# Patient Record
Sex: MALE | Race: WHITE | Employment: FULL TIME | URBAN - METROPOLITAN AREA
[De-identification: names, ages, dates, MRNs, and addresses within clinical notes are randomized per-mention and may not be internally consistent; named-entity substitution may affect disease eponyms.]

---

## 2022-01-15 ENCOUNTER — HOSPITAL ENCOUNTER (EMERGENCY)
Facility: CLINIC | Age: 67
Discharge: HOME | End: 2022-01-15
Attending: PHYSICIAN ASSISTANT | Admitting: PHYSICIAN ASSISTANT
Payer: OTHER GOVERNMENT

## 2022-01-15 ENCOUNTER — NURSE TRIAGE (OUTPATIENT)
Dept: NURSING | Facility: CLINIC | Age: 67
End: 2022-01-15

## 2022-01-15 ENCOUNTER — APPOINTMENT (OUTPATIENT)
Dept: CT IMAGING | Facility: CLINIC | Age: 67
End: 2022-01-15
Attending: PHYSICIAN ASSISTANT
Payer: OTHER GOVERNMENT

## 2022-01-15 VITALS
OXYGEN SATURATION: 95 % | HEART RATE: 66 BPM | BODY MASS INDEX: 31.29 KG/M2 | SYSTOLIC BLOOD PRESSURE: 135 MMHG | HEIGHT: 72 IN | WEIGHT: 231 LBS | DIASTOLIC BLOOD PRESSURE: 65 MMHG | RESPIRATION RATE: 20 BRPM | TEMPERATURE: 100.5 F

## 2022-01-15 DIAGNOSIS — U07.1 INFECTION DUE TO 2019 NOVEL CORONAVIRUS: ICD-10-CM

## 2022-01-15 LAB
ALBUMIN SERPL-MCNC: 3.1 G/DL (ref 3.4–5)
ALBUMIN UR-MCNC: 50 MG/DL
ALP SERPL-CCNC: 93 U/L (ref 40–150)
ALT SERPL W P-5'-P-CCNC: 56 U/L (ref 0–70)
ANION GAP SERPL CALCULATED.3IONS-SCNC: 8 MMOL/L (ref 3–14)
APPEARANCE UR: CLEAR
AST SERPL W P-5'-P-CCNC: 29 U/L (ref 0–45)
BASOPHILS # BLD AUTO: 0 10E3/UL (ref 0–0.2)
BASOPHILS NFR BLD AUTO: 0 %
BILIRUB DIRECT SERPL-MCNC: 0.2 MG/DL (ref 0–0.2)
BILIRUB SERPL-MCNC: 0.7 MG/DL (ref 0.2–1.3)
BILIRUB UR QL STRIP: NEGATIVE
BUN SERPL-MCNC: 17 MG/DL (ref 7–30)
CALCIUM SERPL-MCNC: 8.8 MG/DL (ref 8.5–10.1)
CHLORIDE BLD-SCNC: 99 MMOL/L (ref 94–109)
CO2 SERPL-SCNC: 26 MMOL/L (ref 20–32)
COLOR UR AUTO: YELLOW
CREAT SERPL-MCNC: 1 MG/DL (ref 0.66–1.25)
EOSINOPHIL # BLD AUTO: 0 10E3/UL (ref 0–0.7)
EOSINOPHIL NFR BLD AUTO: 0 %
ERYTHROCYTE [DISTWIDTH] IN BLOOD BY AUTOMATED COUNT: 11.9 % (ref 10–15)
FLUAV RNA SPEC QL NAA+PROBE: NEGATIVE
FLUBV RNA RESP QL NAA+PROBE: NEGATIVE
GFR SERPL CREATININE-BSD FRML MDRD: 84 ML/MIN/1.73M2
GLUCOSE BLD-MCNC: 282 MG/DL (ref 70–99)
GLUCOSE UR STRIP-MCNC: >=1000 MG/DL
HCT VFR BLD AUTO: 49.2 % (ref 40–53)
HGB BLD-MCNC: 16.6 G/DL (ref 13.3–17.7)
HGB UR QL STRIP: NEGATIVE
HOLD SPECIMEN: NORMAL
HYALINE CASTS: 4 /LPF
IMM GRANULOCYTES # BLD: 0 10E3/UL
IMM GRANULOCYTES NFR BLD: 0 %
KETONES UR STRIP-MCNC: 10 MG/DL
LEUKOCYTE ESTERASE UR QL STRIP: NEGATIVE
LYMPHOCYTES # BLD AUTO: 1 10E3/UL (ref 0.8–5.3)
LYMPHOCYTES NFR BLD AUTO: 17 %
MCH RBC QN AUTO: 28.2 PG (ref 26.5–33)
MCHC RBC AUTO-ENTMCNC: 33.7 G/DL (ref 31.5–36.5)
MCV RBC AUTO: 84 FL (ref 78–100)
MONOCYTES # BLD AUTO: 0.3 10E3/UL (ref 0–1.3)
MONOCYTES NFR BLD AUTO: 6 %
MUCOUS THREADS #/AREA URNS LPF: PRESENT /LPF
NEUTROPHILS # BLD AUTO: 4.5 10E3/UL (ref 1.6–8.3)
NEUTROPHILS NFR BLD AUTO: 77 %
NITRATE UR QL: NEGATIVE
NRBC # BLD AUTO: 0 10E3/UL
NRBC BLD AUTO-RTO: 0 /100
NT-PROBNP SERPL-MCNC: 67 PG/ML (ref 0–900)
PH UR STRIP: 5.5 [PH] (ref 5–7)
PLATELET # BLD AUTO: 154 10E3/UL (ref 150–450)
POTASSIUM BLD-SCNC: 3.8 MMOL/L (ref 3.4–5.3)
PROT SERPL-MCNC: 7.7 G/DL (ref 6.8–8.8)
RBC # BLD AUTO: 5.89 10E6/UL (ref 4.4–5.9)
RBC URINE: 1 /HPF
SARS-COV-2 RNA RESP QL NAA+PROBE: POSITIVE
SODIUM SERPL-SCNC: 133 MMOL/L (ref 133–144)
SP GR UR STRIP: 1.01 (ref 1–1.03)
SQUAMOUS EPITHELIAL: <1 /HPF
TROPONIN I SERPL HS-MCNC: 10 NG/L
TROPONIN I SERPL HS-MCNC: 8 NG/L
UROBILINOGEN UR STRIP-MCNC: NORMAL MG/DL
WBC # BLD AUTO: 5.8 10E3/UL (ref 4–11)
WBC URINE: 1 /HPF

## 2022-01-15 PROCEDURE — 84484 ASSAY OF TROPONIN QUANT: CPT | Performed by: PHYSICIAN ASSISTANT

## 2022-01-15 PROCEDURE — 36415 COLL VENOUS BLD VENIPUNCTURE: CPT | Performed by: PHYSICIAN ASSISTANT

## 2022-01-15 PROCEDURE — 87636 SARSCOV2 & INF A&B AMP PRB: CPT | Performed by: PHYSICIAN ASSISTANT

## 2022-01-15 PROCEDURE — 84484 ASSAY OF TROPONIN QUANT: CPT | Mod: 91 | Performed by: PHYSICIAN ASSISTANT

## 2022-01-15 PROCEDURE — 87636 SARSCOV2 & INF A&B AMP PRB: CPT | Performed by: EMERGENCY MEDICINE

## 2022-01-15 PROCEDURE — 93005 ELECTROCARDIOGRAM TRACING: CPT

## 2022-01-15 PROCEDURE — 250N000011 HC RX IP 250 OP 636: Performed by: PHYSICIAN ASSISTANT

## 2022-01-15 PROCEDURE — 84484 ASSAY OF TROPONIN QUANT: CPT | Performed by: EMERGENCY MEDICINE

## 2022-01-15 PROCEDURE — 74177 CT ABD & PELVIS W/CONTRAST: CPT

## 2022-01-15 PROCEDURE — 85025 COMPLETE CBC W/AUTO DIFF WBC: CPT | Performed by: EMERGENCY MEDICINE

## 2022-01-15 PROCEDURE — 81003 URINALYSIS AUTO W/O SCOPE: CPT | Performed by: PHYSICIAN ASSISTANT

## 2022-01-15 PROCEDURE — 99285 EMERGENCY DEPT VISIT HI MDM: CPT | Mod: 25

## 2022-01-15 PROCEDURE — 36415 COLL VENOUS BLD VENIPUNCTURE: CPT | Performed by: EMERGENCY MEDICINE

## 2022-01-15 PROCEDURE — C9803 HOPD COVID-19 SPEC COLLECT: HCPCS

## 2022-01-15 PROCEDURE — 250N000009 HC RX 250: Performed by: PHYSICIAN ASSISTANT

## 2022-01-15 PROCEDURE — 82310 ASSAY OF CALCIUM: CPT | Performed by: EMERGENCY MEDICINE

## 2022-01-15 PROCEDURE — 83880 ASSAY OF NATRIURETIC PEPTIDE: CPT | Performed by: PHYSICIAN ASSISTANT

## 2022-01-15 PROCEDURE — 80048 BASIC METABOLIC PNL TOTAL CA: CPT | Performed by: PHYSICIAN ASSISTANT

## 2022-01-15 PROCEDURE — 85025 COMPLETE CBC W/AUTO DIFF WBC: CPT | Performed by: PHYSICIAN ASSISTANT

## 2022-01-15 PROCEDURE — 82248 BILIRUBIN DIRECT: CPT | Performed by: PHYSICIAN ASSISTANT

## 2022-01-15 RX ORDER — LISINOPRIL 40 MG/1
1 TABLET ORAL DAILY
COMMUNITY
Start: 2021-10-13

## 2022-01-15 RX ORDER — IOPAMIDOL 755 MG/ML
500 INJECTION, SOLUTION INTRAVASCULAR ONCE
Status: COMPLETED | OUTPATIENT
Start: 2022-01-15 | End: 2022-01-15

## 2022-01-15 RX ORDER — GABAPENTIN 300 MG/1
900 CAPSULE ORAL 3 TIMES DAILY
COMMUNITY
Start: 2021-11-14

## 2022-01-15 RX ORDER — ATENOLOL 100 MG/1
1 TABLET ORAL DAILY
COMMUNITY
Start: 2021-10-13

## 2022-01-15 RX ORDER — ACETAMINOPHEN 325 MG/1
975 TABLET ORAL ONCE
Status: DISCONTINUED | OUTPATIENT
Start: 2022-01-15 | End: 2022-01-15 | Stop reason: HOSPADM

## 2022-01-15 RX ORDER — AMLODIPINE BESYLATE 10 MG/1
10 TABLET ORAL DAILY
COMMUNITY
Start: 2021-10-13

## 2022-01-15 RX ORDER — INSULIN ASPART 100 [IU]/ML
INJECTION, SOLUTION INTRAVENOUS; SUBCUTANEOUS
COMMUNITY

## 2022-01-15 RX ADMIN — SODIUM CHLORIDE 90 ML: 9 INJECTION, SOLUTION INTRAVENOUS at 12:37

## 2022-01-15 RX ADMIN — IOPAMIDOL 100 ML: 755 INJECTION, SOLUTION INTRAVENOUS at 12:37

## 2022-01-15 ASSESSMENT — ENCOUNTER SYMPTOMS
FEVER: 1
COUGH: 1
SHORTNESS OF BREATH: 1
CONSTIPATION: 1
WEAKNESS: 1
MYALGIAS: 1
FREQUENCY: 0
DYSURIA: 0
ABDOMINAL PAIN: 1
SORE THROAT: 1

## 2022-01-15 ASSESSMENT — MIFFLIN-ST. JEOR: SCORE: 1870.81

## 2022-01-15 NOTE — TELEPHONE ENCOUNTER
"Milly spouse calling reporting patient has been having symptoms \"of COVID.\"  Known exposure to COVID 19 in past week.  Symptoms starting in past 2 days.    RN requested to speak with patient to complete triage. Patient requesting spouse speak for him due to shortness of breath.    Patient is able to ambulate, completing \"short\" sentences.    Disposition ED.    Milly plans to drive patient by car now to [facility] ED.    Reviewed to call 911 with any increase or change in symptoms.     Caty Bird RN  [facility]    COVID 19 Nurse Triage Plan/Patient Instructions    Please be aware that novel coronavirus (COVID-19) may be circulating in the community. If you develop symptoms such as fever, cough, or SOB or if you have concerns about the presence of another infection including coronavirus (COVID-19), please contact your health care provider or visit [email or web address].     Disposition/Instructions    ED Visit recommended. Follow protocol based instructions.     Bring Your Own Device:  Please also bring your smart device(s) (smart phones, tablets, laptops) and their charging cables for your personal use and to communicate with your care team during your visit.    Thank you for taking steps to prevent the spread of this virus.  o Limit your contact with others.  o Wear a simple mask to cover your cough.  o Wash your hands well and often.    Resources    [facility]: About COVID-19: [email or web address]    CDC: What to Do If You're Sick: www.cdc.gov/coronavirus/2019-ncov/about/steps-when-sick.html    CDC: Ending Home Isolation: www.cdc.gov/coronavirus/2019-ncov/hcp/disposition-in-home-patients.html     CDC: Caring for Someone: www.cdc.gov/coronavirus/2019-ncov/if-you-are-sick/care-for-someone.html     [facility]: Interim Guidance for Hospital Discharge to Home: www.health.[facility].[place].us/diseases/coronavirus/hcp/hospdischarge.pdf    [facility] clinical trials (COVID-19 " research studies): [email or web address]-clinical-trials     Below are the COVID-19 hotlines at the [place] Department of Health ([facility]). Interpreters are available.   o For health questions: Call 698-420-4788 or 1-190.536.8922 (7 a.m. to 7 p.m.)  o For questions about schools and childcare: Call 679-191-5048 or 1-400.587.8792 (7 a.m. to 7 p.m.)                       Reason for Disposition    MODERATE difficulty breathing (e.g., speaks in phrases, SOB even at rest, pulse 100-120)    Additional Information    Negative: SEVERE difficulty breathing (e.g., struggling for each breath, speaks in single words)    Negative: Difficult to awaken or acting confused (e.g., disoriented, slurred speech)    Negative: Bluish (or gray) lips or face now    Negative: Shock suspected (e.g., cold/pale/clammy skin, too weak to stand, low BP, rapid pulse)    Negative: Sounds like a life-threatening emergency to the triager    Negative: SEVERE or constant chest pain or pressure (Exception: mild central chest pain, present only when coughing)    Protocols used: CORONAVIRUS (COVID-19) DIAGNOSED OR [id number] 8.25.2021

## 2022-01-15 NOTE — ED TRIAGE NOTES
A&O x4.  ABC's intact.      Pt arrives with c/o woke up this morning unable to breath with mid chest pain & abdomen.  On Tuesday intermittent dizziness w/ nausea.  Wife & grandson COVID.

## 2022-01-15 NOTE — ED PROVIDER NOTES
History   Chief Complaint:  Cough, Dyspnea, Chest Pain, and Generalized Myalgias     The history is provided by the patient.      Bob Echols is a 66 year old male with history of diabetes mellitus, hypertension, and hyperlipidemia who presents to the ED for evaluation of cough, dyspnea, chest pain, and generalized myalgias.  Patient reports starting to feel generally unwell about 5 days ago with generalized myalgias.  He notes that the next day he developed a fever up to 100.8F.  He states that since then he has developed mild sore throat.  Today he developed left-sided chest pain as well as dyspnea, prompting his presentation to the emergency department with concern for COVID.  He notes mild bilateral lower abdominal pain, constipation for the last 2 days, trace swelling to his bilateral lower extremities.  He denies any urinary symptoms.    Review of Systems   Constitutional: Positive for fever.   HENT: Positive for sore throat.    Respiratory: Positive for cough and shortness of breath.    Cardiovascular: Positive for chest pain and leg swelling.   Gastrointestinal: Positive for abdominal pain and constipation.   Genitourinary: Negative for dysuria, frequency and urgency.   Musculoskeletal: Positive for myalgias.   Neurological: Positive for weakness.   All other systems reviewed and are negative.      Allergies:  No Known Allergies    Medications:  amlodipine   aspirin 81 mg   Atenolol   atorvastatin   divalproex   gabapentin  lisinopril   Metformin  Naproxen    Viagra   trazodone    Past Medical History:     Anxiety   Bursitis, trochanteric   Cataract   Diabetes mellitus   Hyperlipidemia   Hypertension    Neuropathy   Obesity   Sleep difficulties   Unstable angina  Dasha (monopolar)   Depression  Bipolar I disorder    Past Surgical History:    Cholecystectomy   Phacoemulsification clear cornea with standard intraocular lens implant    Social History:  Presents alone.  PCP: Bradley Manuel  H      Physical Exam     Patient Vitals for the past 24 hrs:   BP Temp Temp src Pulse Resp SpO2 Height Weight   01/15/22 1703 135/65 -- -- -- -- 95 % -- --   01/15/22 1630 139/69 -- -- 66 -- 94 % -- --   01/15/22 1620 131/69 -- -- -- -- 95 % -- --   01/15/22 1545 139/71 -- -- 67 -- 96 % -- --   01/15/22 1530 134/81 -- -- 70 -- 95 % -- --   01/15/22 1515 (!) 149/97 -- -- 65 -- 94 % -- --   01/15/22 1500 (!) 141/74 -- -- 69 -- -- -- --   01/15/22 1445 (!) 141/67 -- -- 68 -- -- -- --   01/15/22 1430 138/68 -- -- 66 -- 96 % -- --   01/15/22 1415 (!) 146/67 -- -- 67 -- (!) 87 % -- --   01/15/22 1400 128/69 -- -- 68 -- 94 % -- --   01/15/22 1345 (!) 142/89 -- -- 72 -- 96 % -- --   01/15/22 1330 129/68 -- -- 68 -- 95 % -- --   01/15/22 1315 123/69 -- -- -- -- -- -- --   01/15/22 1230 (!) 153/94 -- -- 69 -- 95 % -- --   01/15/22 1215 136/71 -- -- 69 -- 95 % -- --   01/15/22 1200 115/68 -- -- 67 -- 94 % -- --   01/15/22 1145 124/66 -- -- 68 -- 95 % -- --   01/15/22 0735 (!) 140/92 (!) 100.5  F (38.1  C) Oral 77 20 97 % 1.829 m (6') 104.8 kg (231 lb)       Physical Exam  Constitutional: Pleasant. Cooperative.   Eyes: Pupils equally round and reactive  HENT: Head is normal in appearance. Oropharynx is normal with moist mucus membranes.  Cardiovascular: Regular rate and rhythm and without murmurs.  Respiratory: Normal respiratory effort, lungs are clear bilaterally.  GI: Mild lower abdominal TTP, otherwise non-tender, soft, non-distended. No guarding, rebound, or rigidity.  Musculoskeletal: No asymmetry of the lower extremities, no tenderness to palpation.   Skin: Normal, without rash.  Neurologic: Cranial nerves grossly intact, normal cognition, no focal deficits. Alert and oriented x 3.   Psychiatric: Normal affect.  Nursing notes and vital signs reviewed.      Emergency Department Course   ECG:  ECG taken at 0818, ECG read at 1141  Normal sinus rhythm  Possible anterior infarct, age undetermined   Abnormal ECG  No  significant change when compared to EKG dated 5/21/13.   Rate 75 bpm. DE interval 196 ms. QRS duration 86 ms. QT/QTc 394/439 ms. P-R-T axes 58 22 44.      Imaging:  CT Chest (PE) Abdomen Pelvis w Contrast   Final Result   IMPRESSION:   1.  Multifocal irregular pulmonary consolidation consistent with COVID pneumonia.   2.  Multiple enlarged lymph nodes in the chest.   3.  No evidence for pulmonary embolism.   4.  Fatty liver.   5.  No acute abnormality is seen in the abdomen or pelvis.           Report per radiology    Laboratory:  Labs Ordered and Resulted from Time of ED Arrival to Time of ED Departure   BASIC METABOLIC PANEL - Abnormal       Result Value    Sodium 133      Potassium 3.8      Chloride 99      Carbon Dioxide (CO2) 26      Anion Gap 8      Urea Nitrogen 17      Creatinine 1.00      Calcium 8.8      Glucose 282 (*)     GFR Estimate 84     INFLUENZA A/B & SARS-COV2 PCR MULTIPLEX - Abnormal    Influenza A PCR Negative      Influenza B PCR Negative      SARS CoV2 PCR Positive (*)    HEPATIC FUNCTION PANEL - Abnormal    Bilirubin Total 0.7      Bilirubin Direct 0.2      Protein Total 7.7      Albumin 3.1 (*)     Alkaline Phosphatase 93      AST 29      ALT 56     ROUTINE UA WITH MICROSCOPIC REFLEX TO CULTURE - Abnormal    Color Urine Yellow      Appearance Urine Clear      Glucose Urine >=1000 (*)     Bilirubin Urine Negative      Ketones Urine 10  (*)     Specific Gravity Urine 1.015      Blood Urine Negative      pH Urine 5.5      Protein Albumin Urine 50  (*)     Urobilinogen Urine Normal      Nitrite Urine Negative      Leukocyte Esterase Urine Negative      Mucus Urine Present (*)     RBC Urine 1      WBC Urine 1      Squamous Epithelials Urine <1      Hyaline Casts Urine 4 (*)    TROPONIN I - Normal    Troponin I High Sensitivity 10     NT PROBNP INPATIENT - Normal    N terminal Pro BNP Inpatient 67     TROPONIN I - Normal    Troponin I High Sensitivity 8     CBC WITH PLATELETS AND DIFFERENTIAL     WBC Count 5.8      RBC Count 5.89      Hemoglobin 16.6      Hematocrit 49.2      MCV 84      MCH 28.2      MCHC 33.7      RDW 11.9      Platelet Count 154      % Neutrophils 77      % Lymphocytes 17      % Monocytes 6      % Eosinophils 0      % Basophils 0      % Immature Granulocytes 0      NRBCs per 100 WBC 0      Absolute Neutrophils 4.5      Absolute Lymphocytes 1.0      Absolute Monocytes 0.3      Absolute Eosinophils 0.0      Absolute Basophils 0.0      Absolute Immature Granulocytes 0.0      Absolute NRBCs 0.0          Emergency Department Course:  Reviewed:  I reviewed nursing notes, vitals, past medical history and Care Everywhere    Assessments:  1125 I obtained history and examined the patient as noted above.   1530 I rechecked the patient and explained findings.   1648 I updated the patient.    Interventions:  Medications   acetaminophen (TYLENOL) tablet 975 mg (975 mg Oral Not Given 1/15/22 1433)   sodium chloride 0.9 % bag 500mL for CT scan flush use (90 mLs Intravenous Given 1/15/22 1237)   iopamidol (ISOVUE-370) solution 500 mL (100 mLs Intravenous Given 1/15/22 1237)       Disposition:  The patient was discharged to home.     Impression & Plan     Medical Decision Making:  Bob Echols is a 66 year old male who presents to the ED for evaluation of cough, dyspnea, chest pain, and generalized myalgias.  He is concerned for COVID.  See HPI as above for additional details.  Vitals and physical exam as above.  Differential was broad included pneumonia including COVID, ACS, CHF, dissection, PE, pneumothorax, pericarditis, among others.  Patient did note some lower abdominal pain, thus did work-up for appendicitis, UTI, diverticulitis, hernia, amongst others.  Work-up as above concerning for COVID-pneumonia.  Fortunately, patient's vital signs are reassuring here, no hypoxemia tachycardia.  No evidence for PE, pneumothorax, dissection, or intra-abdominal pathology on imaging.  ECG without any ST  changes as above.  Suspect patient's symptoms are all secondary to his COVID-positive status.  Overall, did feel that patient would be appropriate for at least initial outpatient management.  Patient did meet criteria for initiating outpatient Paxlovid. After coordination with pharmacy, this was prescribed.  [place] patient was safe for discharge to home. Discussed reasons to return. All questions answered. Patient discharged to home in stable condition.    Diagnosis:    ICD-10-CM    1. Infection due to 2019 novel coronavirus  U07.[facility]     Care Coordination Referral       Discharge Medications:  Discharge Medication List as of 1/15/2022  4:57 PM      START taking these medications    Details   nirmatrelvir and ritonavir (PAXLOVID) therapy pack Take 3 tablets by mouth 2 times daily for 5 days Take 2 Nirmatrelvir tablets and 1 Ritonavir tablet twice daily for 5 days., Disp-30 each, R-0, Local PrintIf Paxlovid unavailable and no Molnupiravir ordered, refer patient to MNRAP at [email or web address] [email or web address]. If Molnupiravir ordered along with Paxlovid, dispense Molnupiravir and review embryotoxicity.             Scribe Disclosure:  I, Natalia Sanabria, am serving as a scribe at 11:05 AM on 1/15/2022 to document services personally performed by Jasper Vasques PA-C based on my observations and the provider's statements to me.     This record was created at least in part using electronic voice recognition software, so please excuse any typographical errors.         Jasper Vasques PA-C  01/15/22 4741

## 2022-01-15 NOTE — PHARMACY-ADMISSION MEDICATION HISTORY
Admission medication history interview status for this patient is complete. See [facility] admission navigator for allergy information, prior to admission medications and immunization status.     Medication history interview source(s):Patient  Medication history resources (including written lists, pill bottles, clinic record):rx fill records  [facility]    Changes made to PTA medication list:  Added: amlodipine, gabapentin, insulins,   Deleted: lipitor, divalproex, Viagra, naproxen fish oil, trazodone  Changed: metformin amlodipine    Actions taken by pharmacist (provider contacted, etc):None     Additional medication history information:None    Medication reconciliation/reorder completed by provider prior to medication history? No      Prior to Admission medications    Medication Sig Last Dose Taking? Auth Provider   amLODIPine (NORVASC) 10 MG tablet Take 10 mg by mouth daily Unknown at Unknown time Yes Unknown, Entered By History   aspirin 81 MG tablet Take  by mouth daily. Unknown at Unknown time Yes Reported, Patient   atenolol (TENORMIN) 100 MG tablet Take 1 tablet by mouth daily Unknown at Unknown time Yes Unknown, Entered By History   gabapentin (NEURONTIN) 300 MG capsule Take 900 mg by mouth 3 times daily Unknown at Unknown time Yes Unknown, Entered By History   insulin aspart (NOVOLOG FLEXPEN) 100 UNIT/ML pen  Unknown at Unknown time Yes Unknown, Entered By History   insulin glargine (LANTUS PEN) 100 UNIT/ML pen Inject Subcutaneous At Bedtime Unknown at Unknown time Yes Unknown, Entered By History   lisinopril (ZESTRIL) 40 MG tablet Take 1 tablet by mouth daily Unknown at Unknown time Yes Unknown, Entered By History   metFORMIN (GLUCOPHAGE) 1000 MG tablet Take 1,000 mg by mouth daily Unknown at Unknown time Yes Unknown, Entered By History     '

## 2022-01-16 ENCOUNTER — PATIENT OUTREACH (OUTPATIENT)
Dept: CARE COORDINATION | Facility: CLINIC | Age: 67
End: 2022-01-16

## 2022-01-16 NOTE — PROGRESS NOTES
Clinic Care Coordination Contact    Care Coordination ED Discharge Follow up Note    Patient referred for Virtual Home Monitoring Program for COVID-19.     Called and left message for patient encouraging them to schedule virtual visit with PCP and to watch for invitation from [employer] website. Phone number to reach both [facility] primary scheduling and Nurse Advisor line ([phone]) reviewed on message.      Isa Serrato RN  [facility]  - Clinic Care Coordinator

## 2022-01-17 LAB
ATRIAL RATE - MUSE: 75 BPM
DIASTOLIC BLOOD PRESSURE - MUSE: NORMAL MMHG
INTERPRETATION ECG - MUSE: NORMAL
P AXIS - MUSE: 58 DEGREES
PR INTERVAL - MUSE: 196 MS
QRS DURATION - MUSE: 86 MS
QT - MUSE: 394 MS
QTC - MUSE: 439 MS
R AXIS - MUSE: 22 DEGREES
SYSTOLIC BLOOD PRESSURE - MUSE: NORMAL MMHG
T AXIS - MUSE: 44 DEGREES
VENTRICULAR RATE- MUSE: 75 BPM

## 2025-01-01 ENCOUNTER — APPOINTMENT (OUTPATIENT)
Dept: NEUROLOGY | Facility: CLINIC | Age: 70
DRG: 003 | End: 2025-01-01
Attending: STUDENT IN AN ORGANIZED HEALTH CARE EDUCATION/TRAINING PROGRAM
Payer: COMMERCIAL

## 2025-01-01 ENCOUNTER — APPOINTMENT (OUTPATIENT)
Dept: CT IMAGING | Facility: CLINIC | Age: 70
DRG: 003 | End: 2025-01-01
Attending: STUDENT IN AN ORGANIZED HEALTH CARE EDUCATION/TRAINING PROGRAM
Payer: COMMERCIAL

## 2025-01-01 ENCOUNTER — APPOINTMENT (OUTPATIENT)
Dept: GENERAL RADIOLOGY | Facility: CLINIC | Age: 70
DRG: 003 | End: 2025-01-01
Attending: HOSPITALIST
Payer: COMMERCIAL

## 2025-01-01 ENCOUNTER — APPOINTMENT (OUTPATIENT)
Dept: GENERAL RADIOLOGY | Facility: CLINIC | Age: 70
DRG: 003 | End: 2025-01-01
Attending: STUDENT IN AN ORGANIZED HEALTH CARE EDUCATION/TRAINING PROGRAM
Payer: COMMERCIAL

## 2025-01-01 ENCOUNTER — APPOINTMENT (OUTPATIENT)
Dept: ULTRASOUND IMAGING | Facility: CLINIC | Age: 70
DRG: 003 | End: 2025-01-01
Attending: STUDENT IN AN ORGANIZED HEALTH CARE EDUCATION/TRAINING PROGRAM
Payer: COMMERCIAL

## 2025-01-01 ENCOUNTER — APPOINTMENT (OUTPATIENT)
Dept: CARDIOLOGY | Facility: CLINIC | Age: 70
DRG: 003 | End: 2025-01-01
Attending: STUDENT IN AN ORGANIZED HEALTH CARE EDUCATION/TRAINING PROGRAM
Payer: COMMERCIAL

## 2025-01-01 ENCOUNTER — APPOINTMENT (OUTPATIENT)
Dept: GENERAL RADIOLOGY | Facility: CLINIC | Age: 70
DRG: 003 | End: 2025-01-01
Attending: INTERNAL MEDICINE
Payer: COMMERCIAL

## 2025-01-01 ENCOUNTER — HEALTH MAINTENANCE LETTER (OUTPATIENT)
Age: 70
End: 2025-01-01

## 2025-01-01 ENCOUNTER — TELEPHONE (OUTPATIENT)
Dept: CARDIOLOGY | Facility: CLINIC | Age: 70
End: 2025-01-01
Payer: COMMERCIAL

## 2025-01-01 PROCEDURE — 95714 VEEG EA 12-26 HR UNMNTR: CPT

## 2025-01-01 PROCEDURE — 74018 RADEX ABDOMEN 1 VIEW: CPT | Mod: 26 | Performed by: RADIOLOGY

## 2025-01-01 PROCEDURE — 71045 X-RAY EXAM CHEST 1 VIEW: CPT

## 2025-01-01 PROCEDURE — 95718 EEG PHYS/QHP 2-12 HR W/VEEG: CPT | Performed by: PSYCHIATRY & NEUROLOGY

## 2025-01-01 PROCEDURE — 70450 CT HEAD/BRAIN W/O DYE: CPT | Mod: 26 | Performed by: RADIOLOGY

## 2025-01-01 PROCEDURE — 71045 X-RAY EXAM CHEST 1 VIEW: CPT | Mod: 26 | Performed by: RADIOLOGY

## 2025-01-01 PROCEDURE — 70450 CT HEAD/BRAIN W/O DYE: CPT

## 2025-01-01 PROCEDURE — 93880 EXTRACRANIAL BILAT STUDY: CPT

## 2025-01-01 PROCEDURE — 71045 X-RAY EXAM CHEST 1 VIEW: CPT | Mod: 26 | Performed by: STUDENT IN AN ORGANIZED HEALTH CARE EDUCATION/TRAINING PROGRAM

## 2025-01-01 PROCEDURE — 93306 TTE W/DOPPLER COMPLETE: CPT

## 2025-01-01 PROCEDURE — 95720 EEG PHY/QHP EA INCR W/VEEG: CPT | Performed by: PSYCHIATRY & NEUROLOGY

## 2025-01-01 PROCEDURE — 95711 VEEG 2-12 HR UNMONITORED: CPT

## 2025-01-01 PROCEDURE — 95720 EEG PHY/QHP EA INCR W/VEEG: CPT | Mod: GC | Performed by: PSYCHIATRY & NEUROLOGY

## 2025-01-01 PROCEDURE — 71250 CT THORAX DX C-: CPT

## 2025-01-01 PROCEDURE — 71250 CT THORAX DX C-: CPT | Mod: 26 | Performed by: RADIOLOGY

## 2025-01-01 PROCEDURE — 999N000065 XR CHEST PORT 1 VIEW

## 2025-01-01 PROCEDURE — 74176 CT ABD & PELVIS W/O CONTRAST: CPT | Mod: 26 | Performed by: RADIOLOGY

## 2025-01-01 PROCEDURE — 93880 EXTRACRANIAL BILAT STUDY: CPT | Mod: 26 | Performed by: STUDENT IN AN ORGANIZED HEALTH CARE EDUCATION/TRAINING PROGRAM

## 2025-01-01 PROCEDURE — 999N000065 XR ABDOMEN PORT 1 VIEW

## 2025-01-01 PROCEDURE — 93306 TTE W/DOPPLER COMPLETE: CPT | Mod: 26 | Performed by: INTERNAL MEDICINE

## 2025-07-10 ENCOUNTER — HOSPITAL ENCOUNTER (INPATIENT)
Facility: CLINIC | Age: 70
DRG: 321 | End: 2025-07-10
Attending: EMERGENCY MEDICINE | Admitting: INTERNAL MEDICINE
Payer: COMMERCIAL

## 2025-07-10 ENCOUNTER — APPOINTMENT (OUTPATIENT)
Dept: CARDIOLOGY | Facility: CLINIC | Age: 70
DRG: 321 | End: 2025-07-10
Attending: INTERNAL MEDICINE
Payer: COMMERCIAL

## 2025-07-10 ENCOUNTER — APPOINTMENT (OUTPATIENT)
Dept: GENERAL RADIOLOGY | Facility: CLINIC | Age: 70
DRG: 321 | End: 2025-07-10
Attending: EMERGENCY MEDICINE
Payer: COMMERCIAL

## 2025-07-10 VITALS
OXYGEN SATURATION: 95 % | DIASTOLIC BLOOD PRESSURE: 75 MMHG | RESPIRATION RATE: 22 BRPM | HEART RATE: 72 BPM | WEIGHT: 267.42 LBS | SYSTOLIC BLOOD PRESSURE: 135 MMHG | BODY MASS INDEX: 36.27 KG/M2 | TEMPERATURE: 98.1 F

## 2025-07-10 DIAGNOSIS — I50.31 ACUTE DIASTOLIC CONGESTIVE HEART FAILURE (H): ICD-10-CM

## 2025-07-10 DIAGNOSIS — I21.3 ST ELEVATION MI (STEMI) (H): ICD-10-CM

## 2025-07-10 DIAGNOSIS — R55 VASOVAGAL SYNCOPE: ICD-10-CM

## 2025-07-10 DIAGNOSIS — I21.29 ST ELEVATION MYOCARDIAL INFARCTION (STEMI) INVOLVING OTHER CORONARY ARTERY (H): Primary | ICD-10-CM

## 2025-07-10 DIAGNOSIS — E11.42 DIABETIC POLYNEUROPATHY ASSOCIATED WITH TYPE 2 DIABETES MELLITUS (H): ICD-10-CM

## 2025-07-10 DIAGNOSIS — Z86.79 HISTORY OF HEART BLOCK: ICD-10-CM

## 2025-07-10 DIAGNOSIS — I21.3 ST ELEVATION MYOCARDIAL INFARCTION (STEMI), UNSPECIFIED ARTERY (H): ICD-10-CM

## 2025-07-10 DIAGNOSIS — I48.0 PAF (PAROXYSMAL ATRIAL FIBRILLATION) (H): ICD-10-CM

## 2025-07-10 PROBLEM — Z98.61 PERCUTANEOUS TRANSLUMINAL CORONARY ANGIOPLASTY STATUS: Status: ACTIVE | Noted: 2025-07-10

## 2025-07-10 LAB
ACT BLD: 251 SECONDS (ref 74–150)
ACT BLD: 255 SECONDS (ref 74–150)
ALBUMIN SERPL BCG-MCNC: 3.4 G/DL (ref 3.5–5.2)
ALP SERPL-CCNC: 74 U/L (ref 40–150)
ALT SERPL W P-5'-P-CCNC: 23 U/L (ref 0–70)
ANION GAP SERPL CALCULATED.3IONS-SCNC: 10 MMOL/L (ref 7–15)
ANION GAP SERPL CALCULATED.3IONS-SCNC: 12 MMOL/L (ref 7–15)
APTT PPP: 30 SECONDS (ref 22–38)
AST SERPL W P-5'-P-CCNC: 45 U/L (ref 0–45)
ATRIAL RATE - MUSE: 288 BPM
ATRIAL RATE - MUSE: 64 BPM
ATRIAL RATE - MUSE: 91 BPM
ATRIAL RATE - MUSE: NORMAL BPM
BASOPHILS # BLD AUTO: 0 10E3/UL (ref 0–0.2)
BASOPHILS NFR BLD AUTO: 0 %
BILIRUB SERPL-MCNC: 1 MG/DL
BUN SERPL-MCNC: 30.9 MG/DL (ref 8–23)
BUN SERPL-MCNC: 32.3 MG/DL (ref 8–23)
CALCIUM SERPL-MCNC: 8.4 MG/DL (ref 8.8–10.4)
CALCIUM SERPL-MCNC: 9 MG/DL (ref 8.8–10.4)
CHLORIDE SERPL-SCNC: 98 MMOL/L (ref 98–107)
CHLORIDE SERPL-SCNC: 98 MMOL/L (ref 98–107)
CHOLEST SERPL-MCNC: 187 MG/DL
CHOLEST SERPL-MCNC: 206 MG/DL
CREAT SERPL-MCNC: 1.58 MG/DL (ref 0.67–1.17)
CREAT SERPL-MCNC: 1.62 MG/DL (ref 0.67–1.17)
CRP SERPL-MCNC: 131.21 MG/L
DIASTOLIC BLOOD PRESSURE - MUSE: NORMAL MMHG
EGFRCR SERPLBLD CKD-EPI 2021: 46 ML/MIN/1.73M2
EGFRCR SERPLBLD CKD-EPI 2021: 47 ML/MIN/1.73M2
EOSINOPHIL # BLD AUTO: 0 10E3/UL (ref 0–0.7)
EOSINOPHIL NFR BLD AUTO: 0 %
ERYTHROCYTE [DISTWIDTH] IN BLOOD BY AUTOMATED COUNT: 13 % (ref 10–15)
ERYTHROCYTE [DISTWIDTH] IN BLOOD BY AUTOMATED COUNT: 13 % (ref 10–15)
ERYTHROCYTE [DISTWIDTH] IN BLOOD BY AUTOMATED COUNT: 13.2 % (ref 10–15)
EST. AVERAGE GLUCOSE BLD GHB EST-MCNC: 246 MG/DL
FLUAV RNA SPEC QL NAA+PROBE: NEGATIVE
FLUBV RNA RESP QL NAA+PROBE: NEGATIVE
GLUCOSE BLDC GLUCOMTR-MCNC: 161 MG/DL (ref 70–99)
GLUCOSE BLDC GLUCOMTR-MCNC: 168 MG/DL (ref 70–99)
GLUCOSE BLDC GLUCOMTR-MCNC: 180 MG/DL (ref 70–99)
GLUCOSE BLDC GLUCOMTR-MCNC: 227 MG/DL (ref 70–99)
GLUCOSE SERPL-MCNC: 227 MG/DL (ref 70–99)
GLUCOSE SERPL-MCNC: 268 MG/DL (ref 70–99)
HBA1C MFR BLD: 10.2 %
HCO3 SERPL-SCNC: 23 MMOL/L (ref 22–29)
HCO3 SERPL-SCNC: 25 MMOL/L (ref 22–29)
HCT VFR BLD AUTO: 35.6 % (ref 40–53)
HCT VFR BLD AUTO: 36.8 % (ref 40–53)
HCT VFR BLD AUTO: 38.1 % (ref 40–53)
HDLC SERPL-MCNC: 39 MG/DL
HDLC SERPL-MCNC: 41 MG/DL
HGB BLD-MCNC: 11.9 G/DL (ref 13.3–17.7)
HGB BLD-MCNC: 12.4 G/DL (ref 13.3–17.7)
HGB BLD-MCNC: 12.8 G/DL (ref 13.3–17.7)
HOLD SPECIMEN: NORMAL
IMM GRANULOCYTES # BLD: 0.1 10E3/UL
IMM GRANULOCYTES NFR BLD: 1 %
INR PPP: 1.08 (ref 0.85–1.15)
INTERPRETATION ECG - MUSE: NORMAL
LDLC SERPL CALC-MCNC: 132 MG/DL
LDLC SERPL CALC-MCNC: 141 MG/DL
LVEF ECHO: NORMAL
LYMPHOCYTES # BLD AUTO: 1.5 10E3/UL (ref 0.8–5.3)
LYMPHOCYTES NFR BLD AUTO: 11 %
MCH RBC QN AUTO: 27.9 PG (ref 26.5–33)
MCH RBC QN AUTO: 28 PG (ref 26.5–33)
MCH RBC QN AUTO: 28.1 PG (ref 26.5–33)
MCHC RBC AUTO-ENTMCNC: 33.4 G/DL (ref 31.5–36.5)
MCHC RBC AUTO-ENTMCNC: 33.6 G/DL (ref 31.5–36.5)
MCHC RBC AUTO-ENTMCNC: 33.7 G/DL (ref 31.5–36.5)
MCV RBC AUTO: 83 FL (ref 78–100)
MCV RBC AUTO: 83 FL (ref 78–100)
MCV RBC AUTO: 84 FL (ref 78–100)
MONOCYTES # BLD AUTO: 1.3 10E3/UL (ref 0–1.3)
MONOCYTES NFR BLD AUTO: 9 %
NEUTROPHILS # BLD AUTO: 11.1 10E3/UL (ref 1.6–8.3)
NEUTROPHILS NFR BLD AUTO: 79 %
NONHDLC SERPL-MCNC: 146 MG/DL
NONHDLC SERPL-MCNC: 167 MG/DL
NRBC # BLD AUTO: 0 10E3/UL
NRBC BLD AUTO-RTO: 0 /100
NT-PROBNP SERPL-MCNC: 1631 PG/ML (ref 0–229)
P AXIS - MUSE: 87 DEGREES
P AXIS - MUSE: NORMAL DEGREES
PLATELET # BLD AUTO: 172 10E3/UL (ref 150–450)
PLATELET # BLD AUTO: 202 10E3/UL (ref 150–450)
PLATELET # BLD AUTO: 204 10E3/UL (ref 150–450)
POTASSIUM SERPL-SCNC: 4.3 MMOL/L (ref 3.4–5.3)
POTASSIUM SERPL-SCNC: 4.4 MMOL/L (ref 3.4–5.3)
PR INTERVAL - MUSE: NORMAL MS
PROT SERPL-MCNC: 6.4 G/DL (ref 6.4–8.3)
PROTHROMBIN TIME: 13.8 SECONDS (ref 11.8–14.8)
QRS DURATION - MUSE: 104 MS
QRS DURATION - MUSE: 172 MS
QRS DURATION - MUSE: 84 MS
QRS DURATION - MUSE: 96 MS
QT - MUSE: 398 MS
QT - MUSE: 412 MS
QT - MUSE: 450 MS
QT - MUSE: 474 MS
QTC - MUSE: 366 MS
QTC - MUSE: 413 MS
QTC - MUSE: 428 MS
QTC - MUSE: 511 MS
R AXIS - MUSE: -6 DEGREES
R AXIS - MUSE: 43 DEGREES
R AXIS - MUSE: 44 DEGREES
R AXIS - MUSE: 75 DEGREES
RBC # BLD AUTO: 4.25 10E6/UL (ref 4.4–5.9)
RBC # BLD AUTO: 4.41 10E6/UL (ref 4.4–5.9)
RBC # BLD AUTO: 4.58 10E6/UL (ref 4.4–5.9)
RSV RNA SPEC NAA+PROBE: NEGATIVE
SARS-COV-2 RNA RESP QL NAA+PROBE: NEGATIVE
SODIUM SERPL-SCNC: 133 MMOL/L (ref 135–145)
SODIUM SERPL-SCNC: 133 MMOL/L (ref 135–145)
SYSTOLIC BLOOD PRESSURE - MUSE: NORMAL MMHG
T AXIS - MUSE: 2 DEGREES
T AXIS - MUSE: 38 DEGREES
T AXIS - MUSE: 50 DEGREES
T AXIS - MUSE: 57 DEGREES
TRIGL SERPL-MCNC: 130 MG/DL
TRIGL SERPL-MCNC: 72 MG/DL
TROPONIN T SERPL HS-MCNC: 1159 NG/L
TROPONIN T SERPL HS-MCNC: 1249 NG/L
TROPONIN T SERPL HS-MCNC: 526 NG/L
TROPONIN T SERPL HS-MCNC: 758 NG/L
UFH PPP CHRO-ACNC: <0.1 IU/ML (ref ?–1.1)
VENTRICULAR RATE- MUSE: 40 BPM
VENTRICULAR RATE- MUSE: 65 BPM
VENTRICULAR RATE- MUSE: 65 BPM
VENTRICULAR RATE- MUSE: 70 BPM
WBC # BLD AUTO: 12.3 10E3/UL (ref 4–11)
WBC # BLD AUTO: 13.4 10E3/UL (ref 4–11)
WBC # BLD AUTO: 14 10E3/UL (ref 4–11)

## 2025-07-10 PROCEDURE — 84484 ASSAY OF TROPONIN QUANT: CPT | Performed by: EMERGENCY MEDICINE

## 2025-07-10 PROCEDURE — 84155 ASSAY OF PROTEIN SERUM: CPT | Performed by: EMERGENCY MEDICINE

## 2025-07-10 PROCEDURE — 250N000012 HC RX MED GY IP 250 OP 636 PS 637: Performed by: HOSPITALIST

## 2025-07-10 PROCEDURE — 36415 COLL VENOUS BLD VENIPUNCTURE: CPT | Performed by: EMERGENCY MEDICINE

## 2025-07-10 PROCEDURE — 999N000157 HC STATISTIC RCP TIME EA 10 MIN

## 2025-07-10 PROCEDURE — 272N000001 HC OR GENERAL SUPPLY STERILE: Performed by: INTERNAL MEDICINE

## 2025-07-10 PROCEDURE — 93010 ELECTROCARDIOGRAM REPORT: CPT | Performed by: INTERNAL MEDICINE

## 2025-07-10 PROCEDURE — 93306 TTE W/DOPPLER COMPLETE: CPT | Mod: 26 | Performed by: INTERNAL MEDICINE

## 2025-07-10 PROCEDURE — 250N000013 HC RX MED GY IP 250 OP 250 PS 637: Performed by: INTERNAL MEDICINE

## 2025-07-10 PROCEDURE — 93010 ELECTROCARDIOGRAM REPORT: CPT | Mod: XU | Performed by: INTERNAL MEDICINE

## 2025-07-10 PROCEDURE — 93458 L HRT ARTERY/VENTRICLE ANGIO: CPT | Performed by: INTERNAL MEDICINE

## 2025-07-10 PROCEDURE — 250N000011 HC RX IP 250 OP 636: Performed by: NURSE PRACTITIONER

## 2025-07-10 PROCEDURE — 93005 ELECTROCARDIOGRAM TRACING: CPT

## 2025-07-10 PROCEDURE — 94640 AIRWAY INHALATION TREATMENT: CPT

## 2025-07-10 PROCEDURE — 250N000009 HC RX 250: Performed by: HOSPITALIST

## 2025-07-10 PROCEDURE — 93005 ELECTROCARDIOGRAM TRACING: CPT | Mod: 76

## 2025-07-10 PROCEDURE — 36415 COLL VENOUS BLD VENIPUNCTURE: CPT | Performed by: NURSE PRACTITIONER

## 2025-07-10 PROCEDURE — 258N000003 HC RX IP 258 OP 636: Performed by: EMERGENCY MEDICINE

## 2025-07-10 PROCEDURE — 85610 PROTHROMBIN TIME: CPT | Performed by: EMERGENCY MEDICINE

## 2025-07-10 PROCEDURE — 250N000011 HC RX IP 250 OP 636: Performed by: EMERGENCY MEDICINE

## 2025-07-10 PROCEDURE — 250N000009 HC RX 250: Performed by: INTERNAL MEDICINE

## 2025-07-10 PROCEDURE — B2151ZZ FLUOROSCOPY OF LEFT HEART USING LOW OSMOLAR CONTRAST: ICD-10-PCS | Performed by: INTERNAL MEDICINE

## 2025-07-10 PROCEDURE — 99153 MOD SED SAME PHYS/QHP EA: CPT | Performed by: INTERNAL MEDICINE

## 2025-07-10 PROCEDURE — 82465 ASSAY BLD/SERUM CHOLESTEROL: CPT | Performed by: INTERNAL MEDICINE

## 2025-07-10 PROCEDURE — B2111ZZ FLUOROSCOPY OF MULTIPLE CORONARY ARTERIES USING LOW OSMOLAR CONTRAST: ICD-10-PCS | Performed by: INTERNAL MEDICINE

## 2025-07-10 PROCEDURE — C1769 GUIDE WIRE: HCPCS | Performed by: INTERNAL MEDICINE

## 2025-07-10 PROCEDURE — 027034Z DILATION OF CORONARY ARTERY, ONE ARTERY WITH DRUG-ELUTING INTRALUMINAL DEVICE, PERCUTANEOUS APPROACH: ICD-10-PCS | Performed by: INTERNAL MEDICINE

## 2025-07-10 PROCEDURE — C1874 STENT, COATED/COV W/DEL SYS: HCPCS | Performed by: INTERNAL MEDICINE

## 2025-07-10 PROCEDURE — 86140 C-REACTIVE PROTEIN: CPT | Performed by: INTERNAL MEDICINE

## 2025-07-10 PROCEDURE — 85520 HEPARIN ASSAY: CPT | Performed by: HOSPITALIST

## 2025-07-10 PROCEDURE — 99207 PR NO BILLABLE SERVICE THIS VISIT: CPT | Performed by: HOSPITALIST

## 2025-07-10 PROCEDURE — 99207 PR NO BILLABLE SERVICE THIS VISIT: CPT | Performed by: INTERNAL MEDICINE

## 2025-07-10 PROCEDURE — C1894 INTRO/SHEATH, NON-LASER: HCPCS | Performed by: INTERNAL MEDICINE

## 2025-07-10 PROCEDURE — 87637 SARSCOV2&INF A&B&RSV AMP PRB: CPT | Performed by: INTERNAL MEDICINE

## 2025-07-10 PROCEDURE — 36415 COLL VENOUS BLD VENIPUNCTURE: CPT | Performed by: INTERNAL MEDICINE

## 2025-07-10 PROCEDURE — C1725 CATH, TRANSLUMIN NON-LASER: HCPCS | Performed by: INTERNAL MEDICINE

## 2025-07-10 PROCEDURE — 250N000011 HC RX IP 250 OP 636: Performed by: INTERNAL MEDICINE

## 2025-07-10 PROCEDURE — 99152 MOD SED SAME PHYS/QHP 5/>YRS: CPT | Performed by: INTERNAL MEDICINE

## 2025-07-10 PROCEDURE — 85027 COMPLETE CBC AUTOMATED: CPT | Performed by: INTERNAL MEDICINE

## 2025-07-10 PROCEDURE — C9606 PERC D-E COR REVASC W AMI S: HCPCS | Mod: LC | Performed by: INTERNAL MEDICINE

## 2025-07-10 PROCEDURE — 83036 HEMOGLOBIN GLYCOSYLATED A1C: CPT | Performed by: INTERNAL MEDICINE

## 2025-07-10 PROCEDURE — 83880 ASSAY OF NATRIURETIC PEPTIDE: CPT | Performed by: EMERGENCY MEDICINE

## 2025-07-10 PROCEDURE — 85347 COAGULATION TIME ACTIVATED: CPT

## 2025-07-10 PROCEDURE — 96374 THER/PROPH/DIAG INJ IV PUSH: CPT

## 2025-07-10 PROCEDURE — 84484 ASSAY OF TROPONIN QUANT: CPT | Performed by: NURSE PRACTITIONER

## 2025-07-10 PROCEDURE — 210N000001 HC R&B IMCU HEART CARE

## 2025-07-10 PROCEDURE — 85014 HEMATOCRIT: CPT | Performed by: NURSE PRACTITIONER

## 2025-07-10 PROCEDURE — C1730 CATH, EP, 19 OR FEW ELECT: HCPCS | Performed by: INTERNAL MEDICINE

## 2025-07-10 PROCEDURE — 258N000003 HC RX IP 258 OP 636: Performed by: INTERNAL MEDICINE

## 2025-07-10 PROCEDURE — 85730 THROMBOPLASTIN TIME PARTIAL: CPT | Performed by: EMERGENCY MEDICINE

## 2025-07-10 PROCEDURE — 255N000002 HC RX 255 OP 636: Performed by: INTERNAL MEDICINE

## 2025-07-10 PROCEDURE — 85025 COMPLETE CBC W/AUTO DIFF WBC: CPT | Performed by: EMERGENCY MEDICINE

## 2025-07-10 PROCEDURE — 99223 1ST HOSP IP/OBS HIGH 75: CPT | Mod: 25 | Performed by: INTERNAL MEDICINE

## 2025-07-10 PROCEDURE — 82310 ASSAY OF CALCIUM: CPT | Performed by: INTERNAL MEDICINE

## 2025-07-10 PROCEDURE — 71045 X-RAY EXAM CHEST 1 VIEW: CPT

## 2025-07-10 PROCEDURE — 33210 INSERT ELECTRD/PM CATH SNGL: CPT | Performed by: INTERNAL MEDICINE

## 2025-07-10 PROCEDURE — 250N000013 HC RX MED GY IP 250 OP 250 PS 637: Performed by: HOSPITALIST

## 2025-07-10 PROCEDURE — 250N000012 HC RX MED GY IP 250 OP 636 PS 637: Performed by: INTERNAL MEDICINE

## 2025-07-10 PROCEDURE — 999N000208 ECHOCARDIOGRAM COMPLETE

## 2025-07-10 PROCEDURE — C1887 CATHETER, GUIDING: HCPCS | Performed by: INTERNAL MEDICINE

## 2025-07-10 PROCEDURE — 99222 1ST HOSP IP/OBS MODERATE 55: CPT | Performed by: INTERNAL MEDICINE

## 2025-07-10 PROCEDURE — 99291 CRITICAL CARE FIRST HOUR: CPT | Mod: 25

## 2025-07-10 DEVICE — STENT CORONARY DES SYNERGY XD MR US 2.75X20MM H7493941820270: Type: IMPLANTABLE DEVICE | Status: FUNCTIONAL

## 2025-07-10 RX ORDER — FENTANYL CITRATE 50 UG/ML
INJECTION, SOLUTION INTRAMUSCULAR; INTRAVENOUS
Status: DISCONTINUED | OUTPATIENT
Start: 2025-07-10 | End: 2025-07-10 | Stop reason: HOSPADM

## 2025-07-10 RX ORDER — VERAPAMIL HYDROCHLORIDE 2.5 MG/ML
INJECTION INTRAVENOUS
Status: DISCONTINUED | OUTPATIENT
Start: 2025-07-10 | End: 2025-07-10 | Stop reason: HOSPADM

## 2025-07-10 RX ORDER — ACETAMINOPHEN 325 MG/1
650 TABLET ORAL EVERY 4 HOURS PRN
Status: DISCONTINUED | OUTPATIENT
Start: 2025-07-10 | End: 2025-07-14 | Stop reason: HOSPADM

## 2025-07-10 RX ORDER — SODIUM CHLORIDE 9 MG/ML
INJECTION, SOLUTION INTRAVENOUS CONTINUOUS PRN
Status: COMPLETED | OUTPATIENT
Start: 2025-07-10 | End: 2025-07-10

## 2025-07-10 RX ORDER — ONDANSETRON 4 MG/1
4 TABLET, ORALLY DISINTEGRATING ORAL EVERY 6 HOURS PRN
Status: DISCONTINUED | OUTPATIENT
Start: 2025-07-10 | End: 2025-07-14 | Stop reason: HOSPADM

## 2025-07-10 RX ORDER — OXYCODONE HYDROCHLORIDE 5 MG/1
5 TABLET ORAL EVERY 4 HOURS PRN
Status: DISCONTINUED | OUTPATIENT
Start: 2025-07-10 | End: 2025-07-14 | Stop reason: HOSPADM

## 2025-07-10 RX ORDER — ATROPINE SULFATE 0.1 MG/ML
0.5 INJECTION INTRAVENOUS
Status: ACTIVE | OUTPATIENT
Start: 2025-07-10 | End: 2025-07-10

## 2025-07-10 RX ORDER — HYDRALAZINE HYDROCHLORIDE 20 MG/ML
10 INJECTION INTRAMUSCULAR; INTRAVENOUS EVERY 4 HOURS PRN
Status: DISCONTINUED | OUTPATIENT
Start: 2025-07-10 | End: 2025-07-11

## 2025-07-10 RX ORDER — POLYETHYLENE GLYCOL 3350 17 G/17G
17 POWDER, FOR SOLUTION ORAL 2 TIMES DAILY PRN
Status: DISCONTINUED | OUTPATIENT
Start: 2025-07-10 | End: 2025-07-11

## 2025-07-10 RX ORDER — NALOXONE HYDROCHLORIDE 0.4 MG/ML
0.2 INJECTION, SOLUTION INTRAMUSCULAR; INTRAVENOUS; SUBCUTANEOUS
Status: DISCONTINUED | OUTPATIENT
Start: 2025-07-10 | End: 2025-07-14 | Stop reason: HOSPADM

## 2025-07-10 RX ORDER — ASPIRIN 81 MG/1
81 TABLET ORAL DAILY
Qty: 30 TABLET | Refills: 3 | Status: SHIPPED | OUTPATIENT
Start: 2025-07-11 | End: 2025-07-14

## 2025-07-10 RX ORDER — TICAGRELOR 90 MG/1
90 TABLET, FILM COATED ORAL EVERY 12 HOURS
Status: DISCONTINUED | OUTPATIENT
Start: 2025-07-10 | End: 2025-07-14

## 2025-07-10 RX ORDER — ONDANSETRON 2 MG/ML
4 INJECTION INTRAMUSCULAR; INTRAVENOUS EVERY 6 HOURS PRN
Status: DISCONTINUED | OUTPATIENT
Start: 2025-07-10 | End: 2025-07-14 | Stop reason: HOSPADM

## 2025-07-10 RX ORDER — GABAPENTIN 300 MG/1
300 CAPSULE ORAL 3 TIMES DAILY
Status: DISCONTINUED | OUTPATIENT
Start: 2025-07-10 | End: 2025-07-14 | Stop reason: HOSPADM

## 2025-07-10 RX ORDER — HEPARIN SODIUM 1000 [USP'U]/ML
INJECTION, SOLUTION INTRAVENOUS; SUBCUTANEOUS
Status: DISCONTINUED | OUTPATIENT
Start: 2025-07-10 | End: 2025-07-10 | Stop reason: HOSPADM

## 2025-07-10 RX ORDER — FENTANYL CITRATE-0.9 % NACL/PF 10 MCG/ML
PLASTIC BAG, INJECTION (ML) INTRAVENOUS
Status: DISCONTINUED | OUTPATIENT
Start: 2025-07-10 | End: 2025-07-10 | Stop reason: HOSPADM

## 2025-07-10 RX ORDER — FENTANYL CITRATE 50 UG/ML
25 INJECTION, SOLUTION INTRAMUSCULAR; INTRAVENOUS
Status: DISCONTINUED | OUTPATIENT
Start: 2025-07-10 | End: 2025-07-14 | Stop reason: HOSPADM

## 2025-07-10 RX ORDER — FUROSEMIDE 10 MG/ML
10 INJECTION INTRAMUSCULAR; INTRAVENOUS ONCE
Status: COMPLETED | OUTPATIENT
Start: 2025-07-10 | End: 2025-07-10

## 2025-07-10 RX ORDER — TICAGRELOR 90 MG/1
180 TABLET, FILM COATED ORAL ONCE
Status: DISCONTINUED | OUTPATIENT
Start: 2025-07-10 | End: 2025-07-10

## 2025-07-10 RX ORDER — NITROGLYCERIN 0.4 MG/1
0.4 TABLET SUBLINGUAL EVERY 5 MIN PRN
Status: DISCONTINUED | OUTPATIENT
Start: 2025-07-10 | End: 2025-07-14 | Stop reason: HOSPADM

## 2025-07-10 RX ORDER — LISINOPRIL 40 MG/1
40 TABLET ORAL DAILY
Status: DISCONTINUED | OUTPATIENT
Start: 2025-07-10 | End: 2025-07-14

## 2025-07-10 RX ORDER — FUROSEMIDE 10 MG/ML
INJECTION INTRAMUSCULAR; INTRAVENOUS
Status: DISCONTINUED | OUTPATIENT
Start: 2025-07-10 | End: 2025-07-10 | Stop reason: HOSPADM

## 2025-07-10 RX ORDER — ASPIRIN 81 MG/1
324 TABLET, CHEWABLE ORAL ONCE
Status: DISCONTINUED | OUTPATIENT
Start: 2025-07-10 | End: 2025-07-10

## 2025-07-10 RX ORDER — DEXTROSE MONOHYDRATE 25 G/50ML
25-50 INJECTION, SOLUTION INTRAVENOUS
Status: DISCONTINUED | OUTPATIENT
Start: 2025-07-10 | End: 2025-07-14 | Stop reason: HOSPADM

## 2025-07-10 RX ORDER — GABAPENTIN 300 MG/1
300 CAPSULE ORAL ONCE
Status: COMPLETED | OUTPATIENT
Start: 2025-07-10 | End: 2025-07-10

## 2025-07-10 RX ORDER — ASPIRIN 81 MG/1
81 TABLET, CHEWABLE ORAL ONCE
Status: COMPLETED | OUTPATIENT
Start: 2025-07-10 | End: 2025-07-10

## 2025-07-10 RX ORDER — NALOXONE HYDROCHLORIDE 0.4 MG/ML
0.4 INJECTION, SOLUTION INTRAMUSCULAR; INTRAVENOUS; SUBCUTANEOUS
Status: DISCONTINUED | OUTPATIENT
Start: 2025-07-10 | End: 2025-07-14 | Stop reason: HOSPADM

## 2025-07-10 RX ORDER — ATROPINE SULFATE 0.1 MG/ML
INJECTION INTRAVENOUS
Status: DISCONTINUED
Start: 2025-07-10 | End: 2025-07-10 | Stop reason: HOSPADM

## 2025-07-10 RX ORDER — IOPAMIDOL 755 MG/ML
INJECTION, SOLUTION INTRAVASCULAR
Status: DISCONTINUED | OUTPATIENT
Start: 2025-07-10 | End: 2025-07-10 | Stop reason: HOSPADM

## 2025-07-10 RX ORDER — ATORVASTATIN CALCIUM 80 MG/1
80 TABLET, FILM COATED ORAL DAILY
Status: DISCONTINUED | OUTPATIENT
Start: 2025-07-10 | End: 2025-07-14 | Stop reason: HOSPADM

## 2025-07-10 RX ORDER — SODIUM CHLORIDE 9 MG/ML
INJECTION, SOLUTION INTRAVENOUS CONTINUOUS
Status: ACTIVE | OUTPATIENT
Start: 2025-07-10 | End: 2025-07-10

## 2025-07-10 RX ORDER — ASPIRIN 81 MG/1
81 TABLET ORAL DAILY
Status: DISCONTINUED | OUTPATIENT
Start: 2025-07-11 | End: 2025-07-14 | Stop reason: HOSPADM

## 2025-07-10 RX ORDER — AMOXICILLIN 250 MG
1 CAPSULE ORAL 2 TIMES DAILY
Status: DISCONTINUED | OUTPATIENT
Start: 2025-07-10 | End: 2025-07-14 | Stop reason: HOSPADM

## 2025-07-10 RX ORDER — ATORVASTATIN CALCIUM 80 MG/1
80 TABLET, FILM COATED ORAL DAILY
Qty: 90 TABLET | Refills: 3 | Status: SHIPPED | OUTPATIENT
Start: 2025-07-10 | End: 2025-07-14

## 2025-07-10 RX ORDER — HEPARIN SODIUM 10000 [USP'U]/100ML
0-5000 INJECTION, SOLUTION INTRAVENOUS CONTINUOUS
Status: DISCONTINUED | OUTPATIENT
Start: 2025-07-10 | End: 2025-07-12

## 2025-07-10 RX ORDER — NITROGLYCERIN 5 MG/ML
VIAL (ML) INTRAVENOUS
Status: DISCONTINUED | OUTPATIENT
Start: 2025-07-10 | End: 2025-07-10 | Stop reason: HOSPADM

## 2025-07-10 RX ORDER — NICOTINE POLACRILEX 4 MG
15-30 LOZENGE BUCCAL
Status: DISCONTINUED | OUTPATIENT
Start: 2025-07-10 | End: 2025-07-14 | Stop reason: HOSPADM

## 2025-07-10 RX ORDER — ALBUTEROL SULFATE 0.83 MG/ML
2.5 SOLUTION RESPIRATORY (INHALATION)
Status: DISCONTINUED | OUTPATIENT
Start: 2025-07-10 | End: 2025-07-14 | Stop reason: HOSPADM

## 2025-07-10 RX ORDER — AMLODIPINE BESYLATE 10 MG/1
10 TABLET ORAL DAILY
Status: DISCONTINUED | OUTPATIENT
Start: 2025-07-10 | End: 2025-07-11

## 2025-07-10 RX ORDER — FUROSEMIDE 10 MG/ML
40 INJECTION INTRAMUSCULAR; INTRAVENOUS
Status: DISCONTINUED | OUTPATIENT
Start: 2025-07-10 | End: 2025-07-10

## 2025-07-10 RX ORDER — FLUMAZENIL 0.1 MG/ML
0.2 INJECTION, SOLUTION INTRAVENOUS
Status: DISCONTINUED | OUTPATIENT
Start: 2025-07-10 | End: 2025-07-14 | Stop reason: HOSPADM

## 2025-07-10 RX ORDER — FUROSEMIDE 10 MG/ML
40 INJECTION INTRAMUSCULAR; INTRAVENOUS
Status: DISCONTINUED | OUTPATIENT
Start: 2025-07-10 | End: 2025-07-11

## 2025-07-10 RX ORDER — TICAGRELOR 90 MG/1
TABLET, FILM COATED ORAL
Status: DISCONTINUED | OUTPATIENT
Start: 2025-07-10 | End: 2025-07-10 | Stop reason: HOSPADM

## 2025-07-10 RX ORDER — OXYCODONE HYDROCHLORIDE 5 MG/1
10 TABLET ORAL EVERY 4 HOURS PRN
Status: DISCONTINUED | OUTPATIENT
Start: 2025-07-10 | End: 2025-07-14 | Stop reason: HOSPADM

## 2025-07-10 RX ORDER — METOPROLOL TARTRATE 1 MG/ML
5 INJECTION, SOLUTION INTRAVENOUS
Status: DISCONTINUED | OUTPATIENT
Start: 2025-07-10 | End: 2025-07-14 | Stop reason: HOSPADM

## 2025-07-10 RX ADMIN — GABAPENTIN 300 MG: 300 CAPSULE ORAL at 05:05

## 2025-07-10 RX ADMIN — ALBUTEROL SULFATE 2.5 MG: 2.5 SOLUTION RESPIRATORY (INHALATION) at 16:24

## 2025-07-10 RX ADMIN — FUROSEMIDE 40 MG: 10 INJECTION, SOLUTION INTRAMUSCULAR; INTRAVENOUS at 19:19

## 2025-07-10 RX ADMIN — SENNOSIDES AND DOCUSATE SODIUM 1 TABLET: 50; 8.6 TABLET ORAL at 21:28

## 2025-07-10 RX ADMIN — GABAPENTIN 300 MG: 300 CAPSULE ORAL at 09:56

## 2025-07-10 RX ADMIN — TICAGRELOR 90 MG: 90 TABLET, FILM COATED ORAL at 14:29

## 2025-07-10 RX ADMIN — INSULIN GLARGINE 24 UNITS: 100 INJECTION, SOLUTION SUBCUTANEOUS at 21:29

## 2025-07-10 RX ADMIN — INSULIN ASPART 1 UNITS: 100 INJECTION, SOLUTION INTRAVENOUS; SUBCUTANEOUS at 14:42

## 2025-07-10 RX ADMIN — EMPAGLIFLOZIN 10 MG: 10 TABLET, FILM COATED ORAL at 09:56

## 2025-07-10 RX ADMIN — INSULIN ASPART 2 UNITS: 100 INJECTION, SOLUTION INTRAVENOUS; SUBCUTANEOUS at 11:28

## 2025-07-10 RX ADMIN — FUROSEMIDE 10 MG: 10 INJECTION, SOLUTION INTRAMUSCULAR; INTRAVENOUS at 06:32

## 2025-07-10 RX ADMIN — ATORVASTATIN CALCIUM 80 MG: 80 TABLET, FILM COATED ORAL at 08:13

## 2025-07-10 RX ADMIN — HEPARIN SODIUM 1200 UNITS/HR: 10000 INJECTION, SOLUTION INTRAVENOUS at 12:31

## 2025-07-10 RX ADMIN — SODIUM CHLORIDE 1000 ML: 0.9 INJECTION, SOLUTION INTRAVENOUS at 01:06

## 2025-07-10 RX ADMIN — FUROSEMIDE 40 MG: 10 INJECTION, SOLUTION INTRAMUSCULAR; INTRAVENOUS at 13:09

## 2025-07-10 RX ADMIN — INSULIN ASPART 1 UNITS: 100 INJECTION, SOLUTION INTRAVENOUS; SUBCUTANEOUS at 19:20

## 2025-07-10 RX ADMIN — HEPARIN SODIUM 8500 UNITS: 1000 INJECTION, SOLUTION INTRAVENOUS; SUBCUTANEOUS at 01:13

## 2025-07-10 RX ADMIN — SENNOSIDES AND DOCUSATE SODIUM 1 TABLET: 50; 8.6 TABLET ORAL at 13:09

## 2025-07-10 RX ADMIN — PERFLUTREN 10 ML (DILUTED): 6.52 INJECTION, SUSPENSION INTRAVENOUS at 14:00

## 2025-07-10 RX ADMIN — GABAPENTIN 300 MG: 300 CAPSULE ORAL at 19:18

## 2025-07-10 ASSESSMENT — ACTIVITIES OF DAILY LIVING (ADL)
ADLS_ACUITY_SCORE: 41
ADLS_ACUITY_SCORE: 20
ADLS_ACUITY_SCORE: 20
ADLS_ACUITY_SCORE: 25
ADLS_ACUITY_SCORE: 20
ADLS_ACUITY_SCORE: 25
ADLS_ACUITY_SCORE: 27
ADLS_ACUITY_SCORE: 20
ADLS_ACUITY_SCORE: 20
ADLS_ACUITY_SCORE: 25
ADLS_ACUITY_SCORE: 20
ADLS_ACUITY_SCORE: 25
ADLS_ACUITY_SCORE: 20
ADLS_ACUITY_SCORE: 20
ADLS_ACUITY_SCORE: 27
ADLS_ACUITY_SCORE: 41
ADLS_ACUITY_SCORE: 20
ADLS_ACUITY_SCORE: 27
ADLS_ACUITY_SCORE: 41

## 2025-07-10 NOTE — PROGRESS NOTES
[facility]  Cardiology Progress Note  Date of Service: 07/10/2025  Date of Admission: 7/10/2025    Summary    Bob Echols is a 70 year old male with a history of uncontrolled diabetes, obstructive sleep apnea, hypertension admitted on 7/10/2025. He presents to the emergency department after 1 day of central chest tightness building throughout the day and resulting in lightheadedness, increasing shortness of breath, diaphoresis and presyncope. Found to have an inferior ST elevation myocardial infarction.     Asssessment:     Inferior ST elevation myocardial infarction   Coronary artery disease  New atrial fibrillation, controlled rate, TPW in place.  Acute hypoxic respiratory failure  Improved symptoms subjectively.  On 8L O2, diuresing IV furosemide 40 mg BID.  Sats >95%.  Essentially CP free after PCI revascularization.  Multiple uncontrolled cardiac risk factors.  LIBBY on CKD, Cr 1.58  Uncontrolled type 2 diabetes with neuropathy, nephropathy; started on Jardiance this admission.  Obstructive sleep apnea, untreated.  Hypertension  Hyperlipidemia, , PTA untreated, started on statin this admission.  Elevated CRP  Presented with 2 day of GI symptoms/fever and stuttering anginal symptoms.  Viral panel negative.  Query infection vs post-MI stress response?  _____________________________________________________________    PLAN:  TPW in place - EP adjusted to minimum pacing rate @ 40.  Pt continues to intermittently require temporary pacing on telemetry review.  Symptomatic to lower rates.  Continue DAPT (ASA + Brilinta).  Start IV heparin.  Inpatient echocardiogram pending.  Trend troponin to peak.  Accurate I/O monitoring.  _____________________________________________________________    Plan of care and the majority of MDM was formulated under direction and guidance of Dr. Marquez.    Thank you for the opportunity to participate in the care of . Bob Echols.  Please feel  free to reach out with any additional questions.    ROSALBA Martin, CNP, CCK  Nurse Practitioner  [facility]  Paging available via [employer]    Physical Exam   Temp: 98.7  F (37.1  C) Temp src: Oral BP: (!) 154/95 Pulse: 64   Resp: (!) 8 SpO2: 95 % O2 Device: Oxymizer cannula Oxygen Delivery: 6 LPM    Vitals:    07/10/25 0111   Weight: 121.3 kg (267 lb 6.7 oz)     I/O last 3 completed shifts:  In: 200 [P.O.:200]  Out: 400 [Urine:400]    Constitutional: Appears stated age, well nourished, NAD.  Eyes: Pupils equal, round. Sclerae anicteric.   HEENT: Normocephalic, atraumatic.   Neck: Supple. Carotid pulses full and equal. No JVD appreciated.  Respiratory: Non-labored. Lungs CTAB.  Cardiovascular: ***RRR, normal S1 and S2. No M/G/R.  Vascular: Peripheral pulses *** and symmetric bilaterally  GI: Soft, non-distended, non-tender, bowel sounds present equally.  Skin: Warm and dry. No rashes, cyanosis, edema.  Musculoskeletal/Extremities: Symmetrical movement. No edema.  Neurologic: No gross focal deficits. Alert, awake, and oriented to all spheres.  Psychiatric: Affect appropriate. Mentation normal.    Data   {What labs and imaging do you want to display?:958823}    {Hospitalist Lab Pick List (Most refreshable):488880}     Patient Active Problem List   Diagnosis    CARDIOVASCULAR SCREENING; LDL GOAL LESS THAN 160    Unstable angina (H)    Dasha (monopolar) single episode or unspecified    Depression    Bipolar I disorder, most recent episode depressed (H)    Percutaneous transluminal coronary angioplasty status     Medications   Current Facility-Administered Medications   Medication Dose Route Frequency Provider Last Rate Last Admin    heparin 25,000 units in 0.45% NaCl 250 mL ANTICOAGULANT infusion  0-5,000 Units/hr Intravenous Continuous Maude Rodriguez APRN CNP        Percutaneous Coronary Intervention orders placed (this is information for BPA alerting)   Does not apply DOES NOT GO TO  Farhana Chapa MD        Reason beta blocker order not selected   Does not apply DOES NOT GO TO Farhana Chapa MD         Current Facility-Administered Medications   Medication Dose Route Frequency Provider Last Rate Last Admin    [Held by provider] amLODIPine (NORVASC) tablet 10 mg  10 mg Oral Daily Epifanio Bush MD        [START ON 7/11/2025] aspirin EC tablet 81 mg  81 mg Oral Daily Farhana Clark MD        atorvastatin (LIPITOR) tablet 80 mg  80 mg Oral Daily MarchFarhana MD   80 mg at 07/10/25 0813    empagliflozin (JARDIANCE) tablet 10 mg  10 mg Oral Daily Bush, Epifanio Rudd MD   10 mg at 07/10/25 0956    furosemide (LASIX) injection 40 mg  40 mg Intravenous BID Dallas Hill MD        gabapentin (NEURONTIN) capsule 300 mg  300 mg Oral TID Bush, Epifanio Rudd MD   300 mg at 07/10/25 0956    insulin aspart (NovoLOG) injection (RAPID ACTING)   Subcutaneous TID w/meals Dallas Hill MD   12 Units at 07/10/25 1131    insulin aspart (NovoLOG) injection (RAPID ACTING)  1-7 Units Subcutaneous TID AC Bush, Epifanio Rudd MD   2 Units at 07/10/25 1128    insulin aspart (NovoLOG) injection (RAPID ACTING)  1-5 Units Subcutaneous At Bedtime Bush, Epifanio Rudd MD        insulin glargine (LANTUS PEN) injection 24 Units  24 Units Subcutaneous At Bedtime Dallas Hill MD        [Held by provider] lisinopril (ZESTRIL) tablet 40 mg  40 mg Oral Daily Epifanio Bush MD        [Held by provider] metFORMIN (GLUCOPHAGE) tablet 1,000 mg  1,000 mg Oral Daily with supper Bush, Epifanio Rudd MD        senna-docusate (SENOKOT-S/PERICOLACE) 8.6-50 MG per tablet 1 tablet  1 tablet Oral BID Joel Gonzalez MD        ticagrelor (BRILINTA) tablet 90 mg  90 mg Oral Q12H Farhana Clark MD           Data   Last 24 hours labs personally reviewed.  Echo: No results found for this or any previous visit (from the past 4320 hours).       mg  81 mg Oral Daily MarchFarhana MD        atorvastatin (LIPITOR) tablet 80 mg  80 mg Oral Daily March, Farhana Daniel MD   80 mg at 07/10/25 0813    empagliflozin (JARDIANCE) tablet 10 mg  10 mg Oral Daily [place], Epifanio Rudd MD   10 mg at 07/10/25 0956    furosemide (LASIX) injection 40 mg  40 mg Intravenous BID Dallas Hill MD        gabapentin (NEURONTIN) capsule 300 mg  300 mg Oral TID [place], Epifanio Rudd MD   300 mg at 07/10/25 0956    insulin aspart (NovoLOG) injection (RAPID ACTING)   Subcutaneous TID w/meals Dallas Hill MD   12 Units at 07/10/25 1131    insulin aspart (NovoLOG) injection (RAPID ACTING)  1-7 Units Subcutaneous TID AC [place], Epifanio Rudd MD   2 Units at 07/10/25 1128    insulin aspart (NovoLOG) injection (RAPID ACTING)  1-5 Units Subcutaneous At Bedtime [place], Epifanio Rudd MD        insulin glargine (LANTUS PEN) injection 24 Units  24 Units Subcutaneous At Bedtime Dallas Hill MD        [Held by provider] lisinopril (ZESTRIL) tablet 40 mg  40 mg Oral Daily Bush, Epifanio Rudd MD        [Held by provider] metFORMIN (GLUCOPHAGE) tablet 1,000 mg  1,000 mg Oral Daily with supper Bush, Epifanio Rudd MD        senna-docusate (SENOKOT-S/PERICOLACE) 8.6-50 MG per tablet 1 tablet  1 tablet Oral BID Joel Gonzalez MD        ticagrelor (BRILINTA) tablet 90 mg  90 mg Oral Q12H Farhana Clark MD           Data   Last 24 hours labs personally reviewed.  Echo: No results found for this or any previous visit (from the past 4320 hours).

## 2025-07-10 NOTE — Clinical Note
The first balloon was inserted into the circumflex.Max pressure = 6 abdelrahman. Total duration = 6 seconds.     Max pressure = 6 abdelrahman. Total duration = 4 seconds.    Balloon reinflated a second time: Max pressure = 6 abdelrahman. Total duration = 4 seconds.  Balloon reinflated a third time: Max pressure = 6 abdelrahman. Total duration = 4 seconds. Max pressure = 6 abdelrahman. Total duration = 4 seconds.  Balloon reinflated a fourth time: Max pressure = 6 abdelrahman. Total d uration = 4 seconds.

## 2025-07-10 NOTE — ED TRIAGE NOTES
Pt BIBA after chest pain and dyspnea that began at 1000 7/9/25. Pt at times bradycardic. Pt had 324 mg ASA PTA

## 2025-07-10 NOTE — CONSULTS
NUTRITION EDUCATION      REASON FOR ASSESSMENT:  Consult: Heart Healthy Education     NUTRITION HISTORY:  Information obtained from patient and his wife at bedside.   Patient typically has 2-3 meals daily PTA. [facility] Patient tries to follow a HH diet by consuming lean meats, whole foods, not salting foods, and limiting CHO intakes.     CURRENT DIET:  Orders Placed This Encounter      Low Saturated Fat Na <2400 mg      INTERVENTIONS:    Nutrition Prescription:  Discussed heart healthy education.     Implementation:      *  Nutrition Education (Content):   A)  Provided handout Heart Healthy Nutrition Therapy   B)  Discussed lean meats, frequency of red meats, whole grains, whole foods, limit salt      *  Nutrition Education (Application):   A)  Discussed current eating habits and recommended alternative food choices      *  Anticipate good compliance      *  Diet Education - refer to Education Flowsheet    Goals:      *  Patient will verbalize understanding of diet.       *  All of the above goals met during the education session    Follow Up/Monitoring:      *  Provided RD contact information for future questions      *  Recommended Out-Patient Nutrition Referral, if further diet instructions are needed    Jason Hogan, RD, LD, MS  Pool Coverage  Available on [employer]

## 2025-07-10 NOTE — Clinical Note
Temporary pacemaker Rate= 70bpmPaced mA= 4Pacer wire was captured appropriately, Pacer is set and Pacing catheter was left in place

## 2025-07-10 NOTE — PROGRESS NOTES
Brief Cardiology Note  Pt: Bob Echols    1955     Bob Echols is a 70 year old male with a history of uncontrolled diabetes, obstructive sleep apnea, hypertension admitted on 7/10/2025. He presents to the emergency department after 1 day of central chest tightness building throughout the day and resulting in lightheadedness, increasing shortness of breath, diaphoresis and presyncope.  Found to have an inferior ST elevation myocardial infarction.     ASSESSMENT:    Inferior ST elevation myocardial infarction   Coronary artery disease  New atrial fibrillation, controlled rate, TPW in place.  Acute hypoxic respiratory failure  Improved symptoms subjectively.  On 8L O2, diuresing.  Sats >95%.  LIBBY on CKD, Cr 1.58  Uncontrolled type 2 diabetes with neuropathy, nephropathy  Obstructive sleep apnea, untreated.  Hypertension  Elevated CRP  Presented with 2 day of GI symptoms/fever and stuttering anginal symptoms.  Viral panel negative.  Query infection vs post-MI stress response?    PLAN:  TPW in place - EP adjusted to minimum pacing rate @ 40.  Pt continues to intermittently require temporary pacing on telemetry review.  Symptomatic to lower rates.  Continue DAPT (ASA + Brilinta).  Start IV heparin.  Inpatient echocardiogram pending.  Trend troponin to peak.  Accurate I/O monitoring.    ROSALBA Martin, CNP, CCK  9:54 AM 7/10/2025   [facility]  Pager: [phone]

## 2025-07-10 NOTE — PRE-PROCEDURE
GENERAL PRE-PROCEDURE:   Procedure:  Coronary angiogram  Date/Time:  7/10/2025 1:50 AM    Verbal consent obtained?: Yes    Written consent obtained?: No    Emergent situation    Risks and benefits: Risks, benefits and alternatives were discussed    Consent given by:  Patient  Patient states understanding of procedure being performed: Yes    Patient's understanding of procedure matches consent: Yes    Procedure consent matches procedure scheduled: Yes    Expected level of sedation:  Moderate  Appropriately NPO:  Yes  ASA Class:  3  Mallampati  :  Grade 2- soft palate, base of uvula, tonsillar pillars, and portion of posterior pharyngeal wall visible  Lungs:  Lungs clear with good breath sounds bilaterally  Heart:  Normal heart sounds and rate  History & Physical reviewed:  History and physical reviewed and no updates needed  Statement of review:  I have reviewed the lab findings, diagnostic data, medications, and the plan for sedation

## 2025-07-10 NOTE — ED PROVIDER NOTES
Emergency Department Note      History of Present Illness     Chief Complaint   Shortness of Breath      HPI   Bob Echols is a 70 year old male with a history of type 2 diabetes mellitus, hypertension, hyperlipidemia, anxiety, depression, and obesity who presents to the ED for shortness of breath. The patient reports that he started having chest pressure two days ago that lasted all day. As of yesterday, the patient's chest pressure had primarily gone away, but he started feeling short of breath on top of a light chest pressure. The patient then woke up at midnight feeling excessive shortness of breath and called 911. Upon EMS arrival the patient was near syncopal while getting on to stretcher. They note the reason the patient did not call EMS sooner was due to the chest pressure feeling similar to COVID. The patient's HR was 40s-60s on route and had a BP of 84/40. The patient did not receive nitroglycerin or aspirin on route, but an 18 gauge was placed in to the right AC. The patient is an insulin diabetic that had a blood glucose of 351 upon arrival. Upon arrival the patient is conscious and reports that he has no chest pain, but has constant chest tightness and shortness of breath. The patient denies radiation of pain. The patient noted that sleeping down made the pressure better. The patient denies known allergies and use of anticoagulants.     Independent Historian   EMS as detailed above.    Review of External Notes   Office visit from 5/23/2025    Past Medical History     Medical History and Problem List   Anxiety   Bursitis, trochanteric   Cataract   Type 2 diabetes mellitus   Hyperlipidemia   Hypertension   Neuropathy   Obesity   Sleep difficulties   Stress at work   Bipolar 1 disorder  Depression  Erectile dysfunction    Medications   Amlodipine  Aspirin 81mg  Atenolol  Gabapentin  Insulin   Metformin  Lisinopril    Surgical History   Cholecystectomy  Eye surgery  Phacoemulsification clear cornea  with standard intraocular lens implant   Cataract removal    Physical Exam     Patient Vitals for the past 24 hrs:   BP Temp Temp src Pulse Resp SpO2 Weight   07/10/25 0114 -- 99.2  F (37.3  C) Temporal -- -- -- --   07/10/25 0112 98/68 -- -- (!) 40 10 97 % --   07/10/25 0111 -- -- -- -- -- -- 121.3 kg (267 lb 6.7 oz)   07/10/25 0107 -- -- -- (!) 38 (!) 0 94 % --   07/10/25 0057 97/43 -- -- (!) 46 (!) 9 -- --     Physical Exam  General: Patient is alert and dyspneic appearing  HEENT: Head atraumatic    Eyes: pupils equal and reactive. Conjunctiva clear   Nares: patent   Oropharynx: no lesions, uvula midline, no palatal draping, normal voice, no trismus  Neck: Supple without lymphadenopathy, no meningismus  Chest: Bradycardic but regular.  Lungs: Equal clear to auscultation with no wheeze or rales  Abdomen: Soft, non tender, nondistended, normal bowel sounds  Back: No costovertebral angle tenderness, no midline C, T or L spine tenderness  Neuro: Grossly nonfocal, normal speech, strength equal bilaterally, CN 2-12 intact  Extremities: No deformities, equal radial and DP pulses. No clubbing, cyanosis.  No edema  Skin: Warm and dry with no rash.       Diagnostics     Lab Results   Labs Ordered and Resulted from Time of ED Arrival to Time of ED Departure   COMPREHENSIVE METABOLIC PANEL - Abnormal       Result Value    Sodium 133 (*)     Potassium 4.4      Carbon Dioxide (CO2) 25      Anion Gap 10      Urea Nitrogen 30.9 (*)     Creatinine 1.62 (*)     GFR Estimate 46 (*)     Calcium 9.0      Chloride 98      Glucose 268 (*)     Alkaline Phosphatase 74      AST 45      ALT 23      Protein Total 6.4      Albumin 3.4 (*)     Bilirubin Total 1.0     CBC WITH PLATELETS AND DIFFERENTIAL - Abnormal    WBC Count 14.0 (*)     RBC Count 4.41      Hemoglobin 12.4 (*)     Hematocrit 36.8 (*)     MCV 83      MCH 28.1      MCHC 33.7      RDW 13.0      Platelet Count 204      % Neutrophils 79      % Lymphocytes 11      % Monocytes 9       % Eosinophils 0      % Basophils 0      % Immature Granulocytes 1      NRBCs per 100 WBC 0      Absolute Neutrophils 11.1 (*)     Absolute Lymphocytes 1.5      Absolute Monocytes 1.3      Absolute Eosinophils 0.0      Absolute Basophils 0.0      Absolute Immature Granulocytes 0.1      Absolute NRBCs 0.0     INR - Normal    INR 1.08      PT 13.8     PARTIAL THROMBOPLASTIN TIME - Normal    aPTT 30     NT-PROBNP       Imaging   XR Chest Port 1 View   Final Result   IMPRESSION:       Hypoinflated lungs, limiting evaluation. Additionally, external objects also limits evaluation. Streaky bilateral opacities, favored to be atelectasis. No definite focal airspace disease. No pleural effusion or pneumothorax.      Mildly enlarged cardiopericardial silhouette, possibly exaggerated by lung hypoinflation and AP technique. Aortic calcifications.        ECG #1  ECG taken at 0056, ECG read at 0058  ** Critical test result: STEMI  Atrial fibrillation with slow ventricular response with premature ventricular or aberrantly conducted complexes  Possible inferior infarct, age undetermined  Anterolateral injury pattern   Rate 52 bpm. WA interval * ms. QRS duration 102 ms. QT/QTc 452/420 ms. P-R-T axes * 39 40.     EKG #2   ECG taken at 0058, ECG read at 0100  ** Critical test result: Low HR , STEMI  Junctional bradycardia  Inferior infarct, age undetermined  Anterolateral injury pattern  ** ** Acute MI   / STEMI ** **  Rate 40 bpm. WA interval * ms. QRS duration 84 ms. QT/QTc 450/366 ms. P-R-T axes * 44 50.      Independent Interpretation   CXR: No pneumothorax or infiltrate.  Consideration for aortic dissection given slightly wider mediastinum however due to to patient's AP positioning I suspect that the reason it appears this way.  Will hold Brilinta until Cath Lab    ED Course      Medications Administered   Medications   sodium chloride 0.9% BOLUS 1,000 mL (1,000 mLs Intravenous $New Bag 7/10/25 0106)   atropine 1 MG/10ML  injection (has no administration in time range)   heparin (porcine) injection 1000 units/mL (rounds in 500 unit increments) (8,500 Units Intravenous $Given 7/10/25 0113)       Procedures   Procedures     Discussion of Management   Cardiology,  March 0103    ED Course   ED Course as of 07/10/25 0146   u Jul 10, 2025   0055 I obtained history and examined the patient as noted above.    0137 The patient is being transferred to cath lab.        Additional Documentation  None    Medical Decision Making / Diagnosis     CMS Diagnoses: The patient has a STEMI     The patient was evaluated at 0055   Cath lab transfer at 0137 for cardiac intervention   ASA given by medics or taken today              MIPS   None               MDM   Bob Echols is a 70 year old male who presents with chest pain, shortness of breath, nausea and dizziness..  Their history and risk factor analysis are significant for diabetes and hypertension.  The workup in the Emergency Department (see above for cardiac enzymes and EKG)  is consistent with a STEMI.    After discussing with patient the risks and benefits of heparinization and given lack of contraindication to this therapy, heparin bolus was started.  Aspirin was given by EMS.  Cardiology will initiate heparin drip and Brilinta in cath lab.  Patient bradycardic and hypotensive.  Fluids were given for the hypotension.  Pacer pads were placed however did not start pacing as patient maintained his mentation and blood pressure with fluids.  Consideration for slightly wider mediastinum on chest x-ray however does not take up more than one third of the chest cavity and given patient's flat for x-ray suspect this is the reasoning.  Patient has equal pulses bilaterally.  No pain radiating to the back and it is a heaviness in his chest or tightness feeling.  Will give heparin for the STEMI but hold Brilinta until Cath Lab    There is no clinical, laboratory, or radiographic evidence of  pulmonary embolism, AAA, aortic dissection, pneumonia or pneumothorax.     The patient agrees to plan and will discuss with cardiologist further plan including catheterization.       Disposition   The patient  was transferred to cath lab.    Critical Care  The critical condition was: ACS: STEMI/NSTEMI      Critical care time was 35 minutes exclusive of time spent on separately billable procedures.      Diagnosis     ICD-10-CM    1. ST elevation myocardial infarction (STEMI), unspecified artery (H)  I21.3            Discharge Medications   New Prescriptions    No medications on file         Scribe Disclosure:  I, Joel Bradford, am serving as a scribe at 1:13 AM on 7/10/2025 to document services personally performed by Lisa Holley MD based on my observations and the provider's statements to me.        Lisa Holley MD  07/10/25 0150       Lisa Holley MD  07/10/25 0151

## 2025-07-10 NOTE — PROGRESS NOTES
Hospitalist brief udpate note:    H&P reviewed, Patient was seen this morning. No chest pain but reports dyspnea. No h/o asthma/COPD, no cough/fever. Was on 8 LPM oxymask.   A fib with slow ventricular rate, Temporary pacing wire in place, intermittent pacing.   Lung dim, crackles noted, no increased WOB    S/p coronary angiogram and stent to distal Cx, on DAPT, statin, jardiance.   Hypoxia likely due to acute CHF, lasix 40 mg BID. Daily weight and I&O. O2 weaned down to 2 LPM  Heparin started per cardiology for a fib.   HbA1c 12.2, PTA lantus resumed, premeal novolog and ISS. Gabapentin for neuropathy.   Follow labs including Cr/WBC. Hold off on abx. Mild CRP elevation non specific and could be due to MI as well.   Large callus Lt toe. Podiatry consulted.     Care plan discussed with patient, his spouse. Discussed with cardiologist.     Dallas Hill MD  Hospitalist

## 2025-07-10 NOTE — PHARMACY-ADMISSION MEDICATION HISTORY
Pharmacy Intern Admission Medication History    Admission medication history is complete. The information provided in this note is only as accurate as the sources available at the time of the update.    Information Source(s): Patient via in-person    Pertinent Information: None    Changes made to PTA medication list:  Added: None  Deleted: Metformin  Changed: Confirmed dosing on both insulins    Allergies reviewed with patient and updates made in EHR: yes    Medication History Completed By: Zack Penn 7/10/2025 9:27 AM    PTA Med List   Medication Sig Last Dose/Taking    amLODIPine (NORVASC) 10 MG tablet Take 10 mg by mouth daily 7/9/2025 Morning    aspirin 81 MG tablet Take  by mouth daily. 7/9/2025 Evening    atenolol (TENORMIN) 100 MG tablet Take 1 tablet by mouth daily 7/9/2025 Evening    gabapentin (NEURONTIN) 300 MG capsule Take 900 mg by mouth 3 times daily 7/9/2025 at  4:30 PM    insulin aspart (NOVOLOG FLEXPEN) 100 UNIT/ML pen Inject 12 Units subcutaneously 3 times daily (with meals). 7/9/2025 Noon    insulin glargine (LANTUS PEN) 100 UNIT/ML pen Inject 24 Units subcutaneously at bedtime. 7/8/2025 Evening    lisinopril (ZESTRIL) 40 MG tablet Take 1 tablet by mouth daily 7/9/2025 Morning

## 2025-07-10 NOTE — PLAN OF CARE
Pt arrived from Cath Lab post Angio and TPM placement.  Tele:V paced, rate of 70 which matches TPM settings.  Left wrist is c/d/I post Angio and TR band has been removed.  No c/o chest pain/pressure but he does endorse intermittent SOB for which Dr Bush of informed and he ordered 10 mg of IV Lasix.  EKG has been completed.  His SOB causes the Pt to hyperventilate for a few breaths and then his breathing  goes to his baseline. He is on Oxymask at 8L.  LS are clear and he has normal heart tones.  Covid swab has been completed.  Next shift informed, plan of care is on going.

## 2025-07-10 NOTE — PROVIDER NOTIFICATION
MD Notification    Notified Person: MD    Notified Person Name: Bush     Notification Date/Time:7/10/2025  9513    Notification Interaction:    Purpose of Notification:intermittent SOB    Orders Received:IV Lasix    Comments:will continue to monitor.

## 2025-07-10 NOTE — CONSULTS
[facility]    Cardiology Consultation     Date of Admission:  7/10/2025    Assessment & Plan   #1 Inferior STEMI- distal circumflex S/P 2.75 X 20 Promus RENA  #2 Heart block with bradycardia secondary to #1  #3 HTN  #4 Hyperlipidemia  #5 DM II  - poorly controlled with A1C 10  #6 BMI 36  #7 Acute renal insufficiency with GFR 46, creatinine 1.6 (2022 GFR 84, Cr 1)  #8 Diastolic heart failure (EDP 21 mmHg, BNP 1600, EF 60% on LV angiogram)  #9 SOB, fever reported, WBC 14    It was a pleasure to be involved in the care of Mr. Echols. I reviewed his history and symptoms in detail and testing as outlined.  Presented with 2 days of stuttering symptoms of SOB/Chest pressure that worsened this evening.  Found to have inferior ST elevation on ECG and HR in the 40s with CHB in the ED.   Distal circumflex occluded, S/P placement of 2.75 X 20 mm Promus RENA.  Distal vessels small.  Symptoms improved but remained SOB with elevated BNP so 10 mg IV lasix given in the cath lab.  Temporary pacemaker placed in the RIJ with HR set to 70.  Patient reported to his wife he thought he had covid and reported a fever (not certain whether just diaphoretic or objective fever).  Covid PCR sent.  Brilinta given in the cath lab. Continue ASA.  Will start on statin.  Start BB/ACE as BP/HR allows. Would benefit from Ozempic for his diabetes/weight/CAD.      Echo tomorrow. If remains in CHB, EP consult for consideration of permanent pacemaker.   Discussed with ED physician, patient, his family and Dr. Bush on hospital service. Cardiology will follow. Please call with questions.     High Complexity - 47 minutes separate from the procedure     Farhana Clark MD    Primary Care Physician   [facility]    Reason for Consult   Reason for consult: I was asked by the ER to evaluate this patient for chest pain.    History of Present Illness   Bob Echols is a 70 year old male with a PMH significant  for diabetes mellitus II, hypertension, hyperlipidemia, BMI 36, who presented this evening to the [facility] ED for shortness of breath.     He reported he started having chest pressure two days ago that lasted all day, but resolved mostly but continued to have SOB.  He then woke up at midnight with symptoms of increased shortness of breath and recurrent chest pressure, for which he called 911.  He was near syncopal when EMS arrived and hypotensive, so nitroglycerin was not given.  In the ED he was found to have ST elevation inferiorly and was bradycardic with HR in the 40s.  Troponin was 526. Cath lab was activated.    Past Medical History   Past Medical History:   Diagnosis Date    Anxiety     Bursitis, trochanteric     Cataract     Diabetes mellitus (H)     Hyperlipidaemia     Hypertension     Hypertension     Neuropathy     Obesity     Sleep difficulties     Stress at work          Past Surgical History   Past Surgical History:   Procedure Laterality Date    CHOLECYSTECTOMY      EYE SURGERY      as a child for crossed eyes    PHACOEMULSIFICATION CLEAR CORNEA WITH STANDARD INTRAOCULAR LENS IMPLANT  4/24/2013    Procedure: PHACOEMULSIFICATION CLEAR CORNEA WITH STANDARD INTRAOCULAR LENS IMPLANT;  RIGHT CLEAR CORNEA PHACOEMULSIFICATION WITH STANDARD INTRAOCULAR LENS IMPLANT ;  Surgeon: Elvira Ch MD;  Location: [facility]       Prior to Admission Medications   Prior to Admission Medications   Prescriptions Last Dose Informant Patient Reported? Taking?   amLODIPine (NORVASC) 10 MG tablet   Yes No   Sig: Take 10 mg by mouth daily   aspirin 81 MG tablet   Yes No   Sig: Take  by mouth daily.   atenolol (TENORMIN) 100 MG tablet   Yes No   Sig: Take 1 tablet by mouth daily   gabapentin (NEURONTIN) 300 MG capsule   Yes No   Sig: Take 900 mg by mouth 3 times daily   insulin aspart (NOVOLOG FLEXPEN) 100 UNIT/ML pen   Yes No   insulin glargine (LANTUS PEN) 100 UNIT/ML pen   Yes No   Sig: Inject Subcutaneous At  Bedtime   lisinopril (ZESTRIL) 40 MG tablet   Yes No   Sig: Take 1 tablet by mouth daily   metFORMIN (GLUCOPHAGE) 1000 MG tablet   Yes No   Sig: Take 1,000 mg by mouth daily      Facility-Administered Medications: None     Current Facility-Administered Medications   Medication Dose Route Frequency Provider Last Rate Last Admin    sodium chloride 0.9% BOLUS 1,000 mL  1,000 mL Intravenous Once Lisa Holley MD 1,000 mL/hr at 07/10/25 0106 1,000 mL at 07/10/25 0106     Current Facility-Administered Medications   Medication Dose Route Frequency Provider Last Rate Last Admin    sodium chloride 0.9% BOLUS 1,000 mL  1,000 mL Intravenous Once Lisa Holley MD 1,000 mL/hr at 07/10/25 0106 1,000 mL at 07/10/25 0106     Allergies   No Known Allergies    Social History    reports that he has never smoked. He does not have any smokeless tobacco history on file. He reports current alcohol use. He reports that he does not use drugs.    Family History   No early CAD    Review of Systems   A comprehensive review of system was performed and is negative other than that noted in the HPI or here.     Physical Exam   Vital Signs with Ranges  Temp:  [99.2  F (37.3  C)] 99.2  F (37.3  C)  Pulse:  [38-46] 40  Resp:  [0-10] 10  BP: (97-98)/(43-68) 98/68  SpO2:  [94 %-97 %] 97 %  Wt Readings from Last 4 Encounters:   07/10/25 121.3 kg (267 lb 6.7 oz)   01/15/22 104.8 kg (231 lb)   07/07/13 100.7 kg (222 lb)   07/02/13 101.2 kg (223 lb)     No intake/output data recorded.      Vitals: BP 98/68   Pulse (!) 40   Temp 99.2  F (37.3  C) (Temporal)   Resp 10   Wt 121.3 kg (267 lb 6.7 oz)   SpO2 97%   BMI 36.27 kg/m      Physical Exam:   General - Alert and oriented to time place and person in no acute distress  Eyes - No scleral icterus  HEENT - Neck supple, moist mucous membranes  Cardiovascular - RRR  Extremities - There is no edema  Respiratory - clear   Skin - No pallor or cyanosis  Gastrointestinal - Non tender and non  "distended without rebound or guarding  Psych - Appropriate affect   Neurological - No gross motor neurological focal deficits    No lab results found in last 7 days.    Invalid input(s): \"TROPONINIES\"    Recent Labs   Lab 07/10/25  0100   WBC 14.0*   HGB 12.4*   MCV 83      INR 1.08   *   POTASSIUM 4.4   CHLORIDE 98   CO2 25   BUN 30.9*   CR 1.62*   GFRESTIMATED 46*   ANIONGAP 10   PALMIRA 9.0   *   ALBUMIN 3.4*   PROTTOTAL 6.4   BILITOTAL 1.0   ALKPHOS 74   ALT 23   AST 45     No results for input(s): \"CHOL\", \"HDL\", \"LDL\", \"TRIG\", \"CHOLHDLRATIO\" in the last 84843 hours.  Recent Labs   Lab 07/10/25  0100   WBC 14.0*   HGB 12.4*   HCT 36.8*   MCV 83          Recent Labs   Lab 07/10/25  0100          Imaging:  Recent Results (from the past 48 hours)   XR Chest Port 1 View    Narrative    EXAM: XR CHEST PORT 1 VIEW  LOCATION: [facility]  DATE: 7/10/2025    INDICATION: Chest pain.  COMPARISON: 5/21/2013, CTA chest 1/15/2022      Impression    IMPRESSION:     Hypoinflated lungs, limiting evaluation. Additionally, external objects also limits evaluation. Streaky bilateral opacities, favored to be atelectasis. No definite focal airspace disease. No pleural effusion or pneumothorax.    Mildly enlarged cardiopericardial silhouette, possibly exaggerated by lung hypoinflation and AP technique. Aortic calcifications.       Echo:  No results found for this or any previous visit (from the past 4320 hours).    Clinically Significant Risk Factors Present on Admission         # Hyponatremia: Lowest Na = 133 mmol/L in last 2 days, will monitor as appropriate       # Hypoalbuminemia: Lowest albumin = 3.4 g/dL at 7/10/2025  1:00 AM, will monitor as appropriate   # Drug Induced Platelet Defect: home medication list includes an antiplatelet medication   # Hypertension: Home medication list includes antihypertensive(s)                     Native vessel CAD    Acute kidney failure, " unspecified      Chronic Fatigue and Other Debilities: Age-related physical debility

## 2025-07-10 NOTE — H&P
[facility]    History and Physical - Hospitalist Service       Date of Admission:  7/10/2025    Assessment & Plan      Bob Echols is a 70 year old male with a history of uncontrolled diabetes, obstructive sleep apnea, hypertension admitted on 7/10/2025. He presents to the emergency department after 1 day of central chest tightness building throughout the day and resulting in lightheadedness, increasing shortness of breath, diaphoresis and presyncope.  Found to have an inferior ST elevation myocardial infarction.    Inferior ST elevation myocardial infarction: 2 days of GI upset, 1 day of cough, chest tightness with shortness of breath and subsequent diaphoresis and presyncope.  Distal circumflex occlusion on angiogram now stented.  Does not smoke, intermittent infrequent alcohol use.  - Continue temporary pacemaker, may need conversion to permanent pacemaker  - Cardiology consulted and following  - Hold beta-blockade with inferior infarct and bradycardia  - Continue aspirin 81 mg daily  - Continue Brilinta 90 mg twice daily  - Initiating Jardiance 10 mg daily  - Atorvastatin 80 mg daily  - Lipid panel pending  - Cardiac rehab  - TTE  - Diabetes control as below  - Holding lisinopril currently as patient with some hypotension requiring pressors in the Cath Lab as well as acute kidney injury.    Acute hypoxic respiratory failure: Suspect secondary to STEMI with heart failure.  Acute diastolic heart failure: Thought to be hyperdynamic on LV gram in the Cath Lab but requiring 6 L facemask oxygen to maintain oxygen saturations.  BNP elevated.  May be related to bradycardia and systemic hypoperfusion  - Temporary pacemaker in place  - Received an initial 10 mg IV Lasix in the Cath Lab.  Repeating 10 mg Lasix dose now given some increased intermittent dyspnea    Possible acute infectious illness: Temperature of 100.3 measured at home.  GI upset for 2 days, cough for 1 day.  Uncertain if  patient's cough and GI upset were actually anginal equivalents in the setting of inferior coronary disease.  Symptoms are resolved currently.  - COVID, influenza, RSV pending  - Chest x-ray without clear infiltrates, though study somewhat limited by body habitus  - Monitor for infectious signs/symptoms  - Not currently on anti-infectives.  - Trend CBC.  Leukocytosis could be stress response with STEMI  - Added on CRP.  If significantly elevated, can initiate anti-infectives for possible community-acquired pneumonia    Acute kidney injury on stage II chronic kidney disease: Baseline creatinine appears to be 1.1-1.2 in the setting of uncontrolled diabetes.  Suspect secondary to hypoperfusion with bradycardia and hypotension with myocardial infarction  - Received an initial 1 L fluid bolus in the emergency department for hypotension  - Received 10 mg x 2 IV Lasix  - Intake and output, daily weights    Uncontrolled type 2 diabetes with neuropathy, nephropathy: Hemoglobin A1c has been in the 10-11 range for the past 2 years.  Reports he has had no changes to his insulin regimen over that timeframe  - Lantus 32 units at bedtime  - Prandial insulin at 1 unit per 15 g carbohydrate  - Medium dose sliding scale insulin  - Added Jardiance 10 mg daily  - Will need to get back to primary care clinic for ongoing adjustments to his diabetic regimen.  Consider semaglutide or similar for obesity with diabetes.  Not available in hospital  - Outpatient ophthalmology follow-up for annual eye exams  - Continue aspirin 81 mg daily.  Was already taking this prior to admission  - Increase to high intensity statin  - Metformin currently on hold given intra-arterial contrast administration    Obstructive sleep apnea: Nonadherent with CPAP  - CPAP as per home regimen ordered.  Discussed importance of correcting nocturnal hypoxia as this causes stress on the heart.  - Encouraged patient to trial CPAP during waking hours to acclimate to  mask.    Hypertension:  - Holding prior to admission amlodipine 10 mg daily  - Holding prior to admission atenolol with bradycardia  - Holding prior to admission lisinopril 40 mg daily with LIBBY, though anticipate resuming if creatinine improves with diuresis            Diet: NPO for Medical/Clinical Reasons Except for: Meds    DVT Prophylaxis: Pneumatic Compression Devices  Fox Catheter: Not present  Lines: PRESENT         Temporary pacemaker right IJ  Cardiac Monitoring: ACTIVE order. Indication: Post- PCI/Angiogram (24 hours)  Code Status: Full Code      Clinically Significant Risk Factors Present on Admission         # Hyponatremia: Lowest Na = 133 mmol/L in last 2 days, will monitor as appropriate       # Hypoalbuminemia: Lowest albumin = 3.4 g/dL at 7/10/2025  1:00 AM, will monitor as appropriate   # Drug Induced Platelet Defect: home medication list includes an antiplatelet medication   # Hypertension: Home medication list includes antihypertensive(s)          # DMII: A1C = 10.2 % (Ref range: <5.7 %) within past 6 months               Disposition Plan     Medically Ready for Discharge: Anticipated in 2-4 Days pending cardiac rehab clearance, possible permanent pacemaker implantation           Epifanio Bush MD  Hospitalist Service  [facility]  Securely message with [employer] (more info)  Text page via [facility] Paging/Directory     ______________________________________________________________________    Chief Complaint   Chest discomfort, lightheadedness, shortness of breath    History is obtained from the patient, patient's wife at bedside, chart review, discussion with Dr. Clark of interventional cardiology, review of outside records including primary care follow-up through [facility] and persistently elevated hemoglobin A1c    History of Present Illness   Bob Echols is a 70 year old male who has a history of hypertension, uncontrolled type 2 diabetes, untreated  sleep apnea, obesity who presents to the emergency department with 1 day of central chest tightness with increasing shortness of breath and culminating in presyncope with diaphoresis.  He is found to have an inferior ST elevation myocardial infarction.  Went emergently to the Cath Lab where he was noted to have an occluded distal circumflex, stented.    Patient has poorly controlled type 2 diabetes.  No regimen changes in the past 2 years by his report despite hemoglobin A1c in the 10-11 range.  He tells me that he is good about taking his insulin and describes at bedtime Lantus and prandial insulin dosing with short acting insulin.    Has been taking an aspirin 81 mg daily.    For the past 2 days, patient has not had an appetite.  Describes some general stomach upset without nausea or vomiting.  Question if this may have been a coronary equivalent with inferior coronary disease.    7/9/2025 noted a cough as well as some developing shortness of breath.  Later in the day did have some central chest tightness which continued to build throughout the day.  Took a nap, but it still worsened.  Wondered if he might have COVID, and had a forehead temperature of 100.3 per his wife.    Went to bed, but woke up at night as he was now developing significant shortness of breath.  And when he stood, felt lightheaded and presyncopal.    Presented to the emergency department was found to be hypotensive in the 80s, bradycardic in the 30s, inferior STEMI on EKG, hypoxic.  Did receive an initial 1 L bolus for his hypotension and blood pressures responded.  Mentation remained intact    Went emergently to the Cath Lab where stent was placed.    Did require pressors briefly for hypotension and a temporary pacemaker was placed.    After intervention, patient reports no shortness of breath, though still on 4 to 6 L of facemask oxygen.  No further chest discomfort.    Past Medical History    Past Medical History:   Diagnosis Date    Anxiety      Bursitis, trochanteric     Cataract     Diabetes mellitus (H)     Hyperlipidaemia     Hypertension     Hypertension     Neuropathy     Obesity     Sleep difficulties     Stress at work        Past Surgical History   Past Surgical History:   Procedure Laterality Date    CHOLECYSTECTOMY      EYE SURGERY      as a child for crossed eyes    PHACOEMULSIFICATION CLEAR CORNEA WITH STANDARD INTRAOCULAR LENS IMPLANT  4/24/2013    Procedure: PHACOEMULSIFICATION CLEAR CORNEA WITH STANDARD INTRAOCULAR LENS IMPLANT;  RIGHT CLEAR CORNEA PHACOEMULSIFICATION WITH STANDARD INTRAOCULAR LENS IMPLANT ;  Surgeon: Elvira Ch MD;  Location: [facility]       Prior to Admission Medications   Prior to Admission Medications   Prescriptions Last Dose Informant Patient Reported? Taking?   amLODIPine (NORVASC) 10 MG tablet   Yes No   Sig: Take 10 mg by mouth daily   aspirin 81 MG tablet   Yes No   Sig: Take  by mouth daily.   atenolol (TENORMIN) 100 MG tablet   Yes No   Sig: Take 1 tablet by mouth daily   gabapentin (NEURONTIN) 300 MG capsule   Yes No   Sig: Take 900 mg by mouth 3 times daily   insulin aspart (NOVOLOG FLEXPEN) 100 UNIT/ML pen   Yes No   insulin glargine (LANTUS PEN) 100 UNIT/ML pen   Yes No   Sig: Inject Subcutaneous At Bedtime   lisinopril (ZESTRIL) 40 MG tablet   Yes No   Sig: Take 1 tablet by mouth daily   metFORMIN (GLUCOPHAGE) 1000 MG tablet   Yes No   Sig: Take 1,000 mg by mouth daily      Facility-Administered Medications: None           Physical Exam   Vital Signs: Temp: 99.2  F (37.3  C) Temp src: Temporal BP: 94/50 Pulse: 70   Resp: (!) 8 SpO2: 95 % O2 Device: Oxymask    Weight: 267 lbs 6.69 oz    General Appearance: Obese 70-year-old male resting comfortably in bed.  Nontoxic-appearing on facemask oxygen, but no increased work of breathing  Eyes: No scleral icterus or injection  HEENT: Normocephalic and atraumatic, right IJ temporary pacemaker.    Respiratory: Breath sounds are clear bilaterally to  auscultation with no wheezes, no crackles.  On facemask oxygen  Cardiovascular: Paced rhythm with heart rate of 70  Lymph/Hematologic: Patient with 1+ pitting edema bilateral lower extremities  Skin: Somewhat gabino complexion.  Calluses on the right 1st and 2nd toes with some underlying eschar  Musculoskeletal: Muscular tone and bulk intact in all extremities.  Neurologic: Peripheral neuropathy present.  Alert and conversant.  Mental status is grossly intact  Psychiatric: Pleasant, normal affect    Medical Decision Making       65 MINUTES SPENT BY ME on the date of service doing chart review, history, exam, documentation & further activities per the note.      Data     I have personally reviewed the following data over the past 24 hrs:    12.3 (H)  \   11.9 (L)   / 172     133 (L) 98 32.3 (H) /  227 (H)   4.3 23 1.58 (H) \     ALT: 23 AST: 45 AP: 74 TBILI: 1.0   ALB: 3.4 (L) TOT PROTEIN: 6.4 LIPASE: N/A     Trop: 758 (HH) BNP: 1,631 (H)     TSH: N/A T4: N/A A1C: 10.2 (H)     INR:  1.08 PTT:  30   D-dimer:  N/A Fibrinogen:  N/A       Imaging results reviewed over the past 24 hrs:   Recent Results (from the past 24 hours)   XR Chest Port 1 View    Narrative    EXAM: XR CHEST PORT 1 VIEW  LOCATION: [facility]  DATE: 7/10/2025    INDICATION: Chest pain.  COMPARISON: 5/21/2013, CTA chest 1/15/2022      Impression    IMPRESSION:     Hypoinflated lungs, limiting evaluation. Additionally, external objects also limits evaluation. Streaky bilateral opacities, favored to be atelectasis. No definite focal airspace disease. No pleural effusion or pneumothorax.    Mildly enlarged cardiopericardial silhouette, possibly exaggerated by lung hypoinflation and AP technique. Aortic calcifications.   Cardiac Catheterization    Narrative    1) Occluded distal circumflex S/P placement of 2.75 X 20 mm Promus RENA  2) Diffuse coronary artery disease with small caliber distal vessels,   consistent with long-term  poorly controlled DM II  3) Complete heart block likely secondary to #1,  S/P placement of   temporary PM with HR set at 70  4) SOB with LVEDP 20 mmHg

## 2025-07-11 ENCOUNTER — APPOINTMENT (OUTPATIENT)
Dept: GENERAL RADIOLOGY | Facility: CLINIC | Age: 70
DRG: 321 | End: 2025-07-11
Attending: NURSE PRACTITIONER
Payer: COMMERCIAL

## 2025-07-11 LAB
ALBUMIN SERPL BCG-MCNC: 3.4 G/DL (ref 3.5–5.2)
ALBUMIN UR-MCNC: 50 MG/DL
ALP SERPL-CCNC: 84 U/L (ref 40–150)
ALT SERPL W P-5'-P-CCNC: 22 U/L (ref 0–70)
ANION GAP SERPL CALCULATED.3IONS-SCNC: 14 MMOL/L (ref 7–15)
ANION GAP SERPL CALCULATED.3IONS-SCNC: 14 MMOL/L (ref 7–15)
APO A-I SERPL-MCNC: 24 MG/DL
APPEARANCE UR: CLEAR
AST SERPL W P-5'-P-CCNC: 31 U/L (ref 0–45)
ATRIAL RATE - MUSE: 288 BPM
ATRIAL RATE - MUSE: 375 BPM
ATRIAL RATE - MUSE: 80 BPM
ATRIAL RATE - MUSE: 91 BPM
BILIRUB SERPL-MCNC: 0.9 MG/DL
BILIRUB UR QL STRIP: NEGATIVE
BUN SERPL-MCNC: 28 MG/DL (ref 8–23)
BUN SERPL-MCNC: 29.1 MG/DL (ref 8–23)
CALCIUM SERPL-MCNC: 8.8 MG/DL (ref 8.8–10.4)
CALCIUM SERPL-MCNC: 9.2 MG/DL (ref 8.8–10.4)
CHLORIDE SERPL-SCNC: 100 MMOL/L (ref 98–107)
CHLORIDE SERPL-SCNC: 97 MMOL/L (ref 98–107)
COLOR UR AUTO: ABNORMAL
CREAT SERPL-MCNC: 1.39 MG/DL (ref 0.67–1.17)
CREAT SERPL-MCNC: 1.46 MG/DL (ref 0.67–1.17)
CRP SERPL-MCNC: 251.59 MG/L
DIASTOLIC BLOOD PRESSURE - MUSE: NORMAL MMHG
EGFRCR SERPLBLD CKD-EPI 2021: 52 ML/MIN/1.73M2
EGFRCR SERPLBLD CKD-EPI 2021: 55 ML/MIN/1.73M2
ERYTHROCYTE [DISTWIDTH] IN BLOOD BY AUTOMATED COUNT: 13.2 % (ref 10–15)
ERYTHROCYTE [DISTWIDTH] IN BLOOD BY AUTOMATED COUNT: 13.4 % (ref 10–15)
ERYTHROCYTE [SEDIMENTATION RATE] IN BLOOD BY WESTERGREN METHOD: 117 MM/HR (ref 0–20)
GLUCOSE BLDC GLUCOMTR-MCNC: 132 MG/DL (ref 70–99)
GLUCOSE BLDC GLUCOMTR-MCNC: 136 MG/DL (ref 70–99)
GLUCOSE BLDC GLUCOMTR-MCNC: 170 MG/DL (ref 70–99)
GLUCOSE BLDC GLUCOMTR-MCNC: 175 MG/DL (ref 70–99)
GLUCOSE BLDC GLUCOMTR-MCNC: 176 MG/DL (ref 70–99)
GLUCOSE SERPL-MCNC: 141 MG/DL (ref 70–99)
GLUCOSE SERPL-MCNC: 171 MG/DL (ref 70–99)
GLUCOSE UR STRIP-MCNC: >=1000 MG/DL
HCO3 SERPL-SCNC: 24 MMOL/L (ref 22–29)
HCO3 SERPL-SCNC: 24 MMOL/L (ref 22–29)
HCT VFR BLD AUTO: 37.7 % (ref 40–53)
HCT VFR BLD AUTO: 39.2 % (ref 40–53)
HGB BLD-MCNC: 13.1 G/DL (ref 13.3–17.7)
HGB BLD-MCNC: 13.2 G/DL (ref 13.3–17.7)
HGB UR QL STRIP: ABNORMAL
INTERPRETATION ECG - MUSE: NORMAL
KETONES UR STRIP-MCNC: 10 MG/DL
LEUKOCYTE ESTERASE UR QL STRIP: NEGATIVE
MCH RBC QN AUTO: 27.9 PG (ref 26.5–33)
MCH RBC QN AUTO: 28.2 PG (ref 26.5–33)
MCHC RBC AUTO-ENTMCNC: 33.7 G/DL (ref 31.5–36.5)
MCHC RBC AUTO-ENTMCNC: 34.7 G/DL (ref 31.5–36.5)
MCV RBC AUTO: 81 FL (ref 78–100)
MCV RBC AUTO: 83 FL (ref 78–100)
MUCOUS THREADS #/AREA URNS LPF: PRESENT /LPF
NITRATE UR QL: NEGATIVE
P AXIS - MUSE: 65 DEGREES
P AXIS - MUSE: 81 DEGREES
P AXIS - MUSE: 87 DEGREES
P AXIS - MUSE: NORMAL DEGREES
PH UR STRIP: 5.5 [PH] (ref 5–7)
PLATELET # BLD AUTO: 206 10E3/UL (ref 150–450)
PLATELET # BLD AUTO: 235 10E3/UL (ref 150–450)
POTASSIUM SERPL-SCNC: 3.4 MMOL/L (ref 3.4–5.3)
POTASSIUM SERPL-SCNC: 3.6 MMOL/L (ref 3.4–5.3)
PR INTERVAL - MUSE: 208 MS
PR INTERVAL - MUSE: NORMAL MS
PROCALCITONIN SERPL IA-MCNC: 0.56 NG/ML
PROT SERPL-MCNC: 7.2 G/DL (ref 6.4–8.3)
QRS DURATION - MUSE: 104 MS
QRS DURATION - MUSE: 104 MS
QRS DURATION - MUSE: 106 MS
QRS DURATION - MUSE: 96 MS
QT - MUSE: 398 MS
QT - MUSE: 406 MS
QT - MUSE: 408 MS
QT - MUSE: 412 MS
QTC - MUSE: 413 MS
QTC - MUSE: 428 MS
QTC - MUSE: 437 MS
QTC - MUSE: 468 MS
R AXIS - MUSE: -24 DEGREES
R AXIS - MUSE: -6 DEGREES
R AXIS - MUSE: 26 DEGREES
R AXIS - MUSE: 43 DEGREES
RBC # BLD AUTO: 4.65 10E6/UL (ref 4.4–5.9)
RBC # BLD AUTO: 4.73 10E6/UL (ref 4.4–5.9)
RBC URINE: 2 /HPF
SODIUM SERPL-SCNC: 135 MMOL/L (ref 135–145)
SODIUM SERPL-SCNC: 138 MMOL/L (ref 135–145)
SP GR UR STRIP: 1.03 (ref 1–1.03)
SQUAMOUS EPITHELIAL: <1 /HPF
SYSTOLIC BLOOD PRESSURE - MUSE: NORMAL MMHG
T AXIS - MUSE: 38 DEGREES
T AXIS - MUSE: 44 DEGREES
T AXIS - MUSE: 51 DEGREES
T AXIS - MUSE: 57 DEGREES
TROPONIN T SERPL HS-MCNC: 1015 NG/L
TROPONIN T SERPL HS-MCNC: 989 NG/L
UFH PPP CHRO-ACNC: 0.18 IU/ML (ref ?–1.1)
UFH PPP CHRO-ACNC: 0.19 IU/ML (ref ?–1.1)
UFH PPP CHRO-ACNC: 0.21 IU/ML (ref ?–1.1)
UROBILINOGEN UR STRIP-MCNC: NORMAL MG/DL
VENTRICULAR RATE- MUSE: 65 BPM
VENTRICULAR RATE- MUSE: 65 BPM
VENTRICULAR RATE- MUSE: 69 BPM
VENTRICULAR RATE- MUSE: 80 BPM
WBC # BLD AUTO: 12.4 10E3/UL (ref 4–11)
WBC # BLD AUTO: 13.8 10E3/UL (ref 4–11)
WBC URINE: <1 /HPF

## 2025-07-11 PROCEDURE — 250N000013 HC RX MED GY IP 250 OP 250 PS 637: Performed by: INTERNAL MEDICINE

## 2025-07-11 PROCEDURE — 80048 BASIC METABOLIC PNL TOTAL CA: CPT | Performed by: HOSPITALIST

## 2025-07-11 PROCEDURE — 84484 ASSAY OF TROPONIN QUANT: CPT | Performed by: HOSPITALIST

## 2025-07-11 PROCEDURE — 85018 HEMOGLOBIN: CPT | Performed by: NURSE PRACTITIONER

## 2025-07-11 PROCEDURE — 85520 HEPARIN ASSAY: CPT | Performed by: HOSPITALIST

## 2025-07-11 PROCEDURE — 99207 PR APP CREDIT; MD BILLING SHARED VISIT: CPT

## 2025-07-11 PROCEDURE — 99291 CRITICAL CARE FIRST HOUR: CPT | Performed by: NURSE PRACTITIONER

## 2025-07-11 PROCEDURE — 85652 RBC SED RATE AUTOMATED: CPT | Performed by: NURSE PRACTITIONER

## 2025-07-11 PROCEDURE — 87040 BLOOD CULTURE FOR BACTERIA: CPT | Performed by: NURSE PRACTITIONER

## 2025-07-11 PROCEDURE — 71045 X-RAY EXAM CHEST 1 VIEW: CPT

## 2025-07-11 PROCEDURE — 93010 ELECTROCARDIOGRAM REPORT: CPT | Performed by: INTERNAL MEDICINE

## 2025-07-11 PROCEDURE — 99233 SBSQ HOSP IP/OBS HIGH 50: CPT | Mod: FS | Performed by: NURSE PRACTITIONER

## 2025-07-11 PROCEDURE — 250N000011 HC RX IP 250 OP 636: Performed by: NURSE PRACTITIONER

## 2025-07-11 PROCEDURE — 85018 HEMOGLOBIN: CPT | Performed by: HOSPITALIST

## 2025-07-11 PROCEDURE — 86140 C-REACTIVE PROTEIN: CPT | Performed by: NURSE PRACTITIONER

## 2025-07-11 PROCEDURE — 0HBNXZZ EXCISION OF LEFT FOOT SKIN, EXTERNAL APPROACH: ICD-10-PCS | Performed by: PODIATRIST

## 2025-07-11 PROCEDURE — 11055 PARING/CUTG B9 HYPRKER LES 1: CPT | Mod: GY | Performed by: PODIATRIST

## 2025-07-11 PROCEDURE — 81003 URINALYSIS AUTO W/O SCOPE: CPT | Performed by: NURSE PRACTITIONER

## 2025-07-11 PROCEDURE — 36415 COLL VENOUS BLD VENIPUNCTURE: CPT | Performed by: HOSPITALIST

## 2025-07-11 PROCEDURE — 36415 COLL VENOUS BLD VENIPUNCTURE: CPT | Performed by: NURSE PRACTITIONER

## 2025-07-11 PROCEDURE — 85520 HEPARIN ASSAY: CPT | Performed by: PODIATRIST

## 2025-07-11 PROCEDURE — 84145 PROCALCITONIN (PCT): CPT | Performed by: NURSE PRACTITIONER

## 2025-07-11 PROCEDURE — 83695 ASSAY OF LIPOPROTEIN(A): CPT | Performed by: INTERNAL MEDICINE

## 2025-07-11 PROCEDURE — 82310 ASSAY OF CALCIUM: CPT | Performed by: NURSE PRACTITIONER

## 2025-07-11 PROCEDURE — 210N000001 HC R&B IMCU HEART CARE

## 2025-07-11 PROCEDURE — 250N000013 HC RX MED GY IP 250 OP 250 PS 637: Performed by: HOSPITALIST

## 2025-07-11 PROCEDURE — 99221 1ST HOSP IP/OBS SF/LOW 40: CPT | Mod: 25 | Performed by: PODIATRIST

## 2025-07-11 PROCEDURE — 99232 SBSQ HOSP IP/OBS MODERATE 35: CPT | Performed by: HOSPITALIST

## 2025-07-11 PROCEDURE — 93010 ELECTROCARDIOGRAM REPORT: CPT | Mod: XP | Performed by: INTERNAL MEDICINE

## 2025-07-11 PROCEDURE — 93005 ELECTROCARDIOGRAM TRACING: CPT

## 2025-07-11 RX ORDER — FUROSEMIDE 40 MG/1
40 TABLET ORAL DAILY
Status: DISCONTINUED | OUTPATIENT
Start: 2025-07-12 | End: 2025-07-13

## 2025-07-11 RX ORDER — POLYETHYLENE GLYCOL 3350 17 G/17G
17 POWDER, FOR SOLUTION ORAL DAILY PRN
Status: DISCONTINUED | OUTPATIENT
Start: 2025-07-11 | End: 2025-07-14 | Stop reason: HOSPADM

## 2025-07-11 RX ORDER — HYDRALAZINE HYDROCHLORIDE 20 MG/ML
10 INJECTION INTRAMUSCULAR; INTRAVENOUS EVERY 4 HOURS PRN
Status: DISCONTINUED | OUTPATIENT
Start: 2025-07-11 | End: 2025-07-14 | Stop reason: HOSPADM

## 2025-07-11 RX ORDER — AMLODIPINE BESYLATE 5 MG/1
5 TABLET ORAL DAILY
Status: DISCONTINUED | OUTPATIENT
Start: 2025-07-12 | End: 2025-07-12

## 2025-07-11 RX ADMIN — ACETAMINOPHEN 650 MG: 325 TABLET ORAL at 19:50

## 2025-07-11 RX ADMIN — INSULIN GLARGINE 24 UNITS: 100 INJECTION, SOLUTION SUBCUTANEOUS at 21:39

## 2025-07-11 RX ADMIN — POLYETHYLENE GLYCOL 3350 17 G: 17 POWDER, FOR SOLUTION ORAL at 00:41

## 2025-07-11 RX ADMIN — GABAPENTIN 300 MG: 300 CAPSULE ORAL at 21:38

## 2025-07-11 RX ADMIN — ASPIRIN 81 MG: 81 TABLET, DELAYED RELEASE ORAL at 09:02

## 2025-07-11 RX ADMIN — HEPARIN SODIUM 1500 UNITS/HR: 10000 INJECTION, SOLUTION INTRAVENOUS at 02:45

## 2025-07-11 RX ADMIN — POLYETHYLENE GLYCOL 3350 17 G: 17 POWDER, FOR SOLUTION ORAL at 16:00

## 2025-07-11 RX ADMIN — INSULIN ASPART 1 UNITS: 100 INJECTION, SOLUTION INTRAVENOUS; SUBCUTANEOUS at 18:30

## 2025-07-11 RX ADMIN — TICAGRELOR 90 MG: 90 TABLET, FILM COATED ORAL at 15:55

## 2025-07-11 RX ADMIN — EMPAGLIFLOZIN 10 MG: 10 TABLET, FILM COATED ORAL at 09:02

## 2025-07-11 RX ADMIN — GABAPENTIN 300 MG: 300 CAPSULE ORAL at 00:41

## 2025-07-11 RX ADMIN — SENNOSIDES AND DOCUSATE SODIUM 1 TABLET: 50; 8.6 TABLET ORAL at 21:38

## 2025-07-11 RX ADMIN — HYDRALAZINE HYDROCHLORIDE 10 MG: 20 INJECTION INTRAMUSCULAR; INTRAVENOUS at 20:40

## 2025-07-11 RX ADMIN — GABAPENTIN 300 MG: 300 CAPSULE ORAL at 15:55

## 2025-07-11 RX ADMIN — SENNOSIDES AND DOCUSATE SODIUM 1 TABLET: 50; 8.6 TABLET ORAL at 09:02

## 2025-07-11 RX ADMIN — GABAPENTIN 300 MG: 300 CAPSULE ORAL at 09:01

## 2025-07-11 RX ADMIN — HEPARIN SODIUM 1800 UNITS/HR: 10000 INJECTION, SOLUTION INTRAVENOUS at 15:51

## 2025-07-11 RX ADMIN — TICAGRELOR 90 MG: 90 TABLET, FILM COATED ORAL at 00:41

## 2025-07-11 RX ADMIN — ATORVASTATIN CALCIUM 80 MG: 80 TABLET, FILM COATED ORAL at 09:02

## 2025-07-11 RX ADMIN — FUROSEMIDE 40 MG: 10 INJECTION, SOLUTION INTRAMUSCULAR; INTRAVENOUS at 08:48

## 2025-07-11 ASSESSMENT — ACTIVITIES OF DAILY LIVING (ADL)
ADLS_ACUITY_SCORE: 37
ADLS_ACUITY_SCORE: 37
ADLS_ACUITY_SCORE: 27
ADLS_ACUITY_SCORE: 37
ADLS_ACUITY_SCORE: 27
ADLS_ACUITY_SCORE: 37
ADLS_ACUITY_SCORE: 27
ADLS_ACUITY_SCORE: 37
ADLS_ACUITY_SCORE: 38
ADLS_ACUITY_SCORE: 37
ADLS_ACUITY_SCORE: 27
ADLS_ACUITY_SCORE: 37
ADLS_ACUITY_SCORE: 27
ADLS_ACUITY_SCORE: 37

## 2025-07-11 NOTE — PROGRESS NOTES
Temporary pacemaker wire removal         Consulted to have temporary pacemaker removed. Patient stable. Pacemaker not currently on. Removal of pacer wire after removal of any remaining air from the balloon       20 seconds after pacemaker wire removal Mr Echols experienced short loss of consciousness (true syncope). Telemetry showed Pause of approximately 6 seconds without ventricular escape.    Pacer sheath remains in place capped on case another pacemaker needs to be floated.

## 2025-07-11 NOTE — CONSULTS
[facility]  CONSULTATION NOTE    CHIEF COMPLAINT:      I was asked by Dallas Hill MD  to evaluate this patient for     PATIENT HISTORY:  Bob Echols is a 70 year old male  with a past medical history significant for what's listed below, presented for increasing SOB and diaphoresis, found to have an MI.   -Podiatry has been consulted for his left foot where there's a noted large callus. Per patient, no previous history of foot ulcers before. Denies drainage or pain to site. Hasn't followed with podiatry in the past.         Review of Systems:  A 10 point review of systems was performed and is positive for that noted above in the patient history.  All other areas are negative.     PAST MEDICAL HISTORY:   Past Medical History:   Diagnosis Date    Anxiety     Bursitis, trochanteric     Cataract     Diabetes mellitus (H)     Hyperlipidaemia     Hypertension     Hypertension     Neuropathy     Obesity     Sleep difficulties     Stress at work         PAST SURGICAL HISTORY:   Past Surgical History:   Procedure Laterality Date    CHOLECYSTECTOMY      CV CORONARY ANGIOGRAM N/A 7/10/2025    Procedure: Coronary Angiogram;  Surgeon: Farhana Clark MD;  Location: [facility] CARDIAC CATH LAB    EYE SURGERY      as a child for crossed eyes    PHACOEMULSIFICATION CLEAR CORNEA WITH STANDARD INTRAOCULAR LENS IMPLANT  4/24/2013    Procedure: PHACOEMULSIFICATION CLEAR CORNEA WITH STANDARD INTRAOCULAR LENS IMPLANT;  RIGHT CLEAR CORNEA PHACOEMULSIFICATION WITH STANDARD INTRAOCULAR LENS IMPLANT ;  Surgeon: Elvira Ch MD;  [facility]        MEDICATIONS:  Reviewed in Epic. Current.     ALLERGIES:  No Known Allergies     SOCIAL HISTORY:   Social History     Socioeconomic History    Marital status:      Spouse name: Not on file    Number of children: Not on file    Years of education: Not on file    Highest education level: Not on file   Occupational History    Not on  file   Tobacco Use    Smoking status: Never    Smokeless tobacco: Not on file   Substance and Sexual Activity    Alcohol use: Yes     Comment: occ    Drug use: No    Sexual activity: Not on file   Other Topics Concern    Not on file   Social History Narrative    Not on file     Social Drivers of Health     Financial Resource Strain: Low Risk  (7/10/2025)    Financial Resource Strain     Within the past 12 months, have you or your family members you live with been unable to get utilities (heat, electricity) when it was really needed?: No   Food Insecurity: Low Risk  (7/10/2025)    Food Insecurity     Within the past 12 months, did you worry that your food would run out before you got money to buy more?: No     Within the past 12 months, did the food you bought just not last and you didn t have money to get more?: No   Transportation Needs: Low Risk  (7/10/2025)    Transportation Needs     Within the past 12 months, has lack of transportation kept you from medical appointments, getting your medicines, non-medical meetings or appointments, work, or from getting things that you need?: No   Physical Activity: Not on file   Stress: Not on file   Social Connections: Not on file   Interpersonal Safety: Low Risk  (7/10/2025)    Interpersonal Safety     Do you feel physically and emotionally safe where you currently live?: Yes     Within the past 12 months, have you been hit, slapped, kicked or otherwise physically hurt by someone?: No     Within the past 12 months, have you been humiliated or emotionally abused in other ways by your partner or ex-partner?: No   Housing Stability: Low Risk  (7/10/2025)    Housing Stability     Do you have housing? : Yes     Are you worried about losing your housing?: No        FAMILY HISTORY: No family history on file.     EXAM:Vitals: /61   Pulse 75   Temp 99.2  F (37.3  C) (Axillary)   Resp 20   Wt 117 kg (257 lb 14.4 oz)   SpO2 (!) 90%   BMI 34.98 kg/m    BMI= Body mass index  is 34.98 kg/m .    LABS:   .a1c  Last Comprehensive Metabolic Panel:  Sodium   Date Value Ref Range Status   07/11/2025 138 135 - 145 mmol/L Final   07/06/2013 142 133 - 144 mmol/L Final     Potassium   Date Value Ref Range Status   07/11/2025 3.4 3.4 - 5.3 mmol/L Final   01/15/2022 3.8 3.4 - 5.3 mmol/L Final   07/06/2013 3.6 3.4 - 5.3 mmol/L Final     Chloride   Date Value Ref Range Status   07/11/2025 100 98 - 107 mmol/L Final   01/15/2022 99 94 - 109 mmol/L Final   07/06/2013 103 94 - 109 mmol/L Final     Carbon Dioxide   Date Value Ref Range Status   07/06/2013 26 20 - 32 mmol/L Final     Carbon Dioxide (CO2)   Date Value Ref Range Status   07/11/2025 24 22 - 29 mmol/L Final   01/15/2022 26 20 - 32 mmol/L Final     Anion Gap   Date Value Ref Range Status   07/11/2025 14 7 - 15 mmol/L Final   01/15/2022 8 3 - 14 mmol/L Final   07/06/2013 12.9 6 - 17 mmol/L Final     Glucose   Date Value Ref Range Status   07/11/2025 141 (H) 70 - 99 mg/dL Final   01/15/2022 282 (H) 70 - 99 mg/dL Final   07/06/2013 86 60 - 99 mg/dL Final     GLUCOSE BY METER POCT   Date Value Ref Range Status   07/11/2025 132 (H) 70 - 99 mg/dL Final     Urea Nitrogen   Date Value Ref Range Status   07/11/2025 28.0 (H) 8.0 - 23.0 mg/dL Final   01/15/2022 17 7 - 30 mg/dL Final   07/06/2013 15 7 - 30 mg/dL Final     Creatinine   Date Value Ref Range Status   07/11/2025 1.46 (H) 0.67 - 1.17 mg/dL Final   07/06/2013 0.90 0.66 - 1.25 mg/dL Final     GFR Estimate   Date Value Ref Range Status   07/11/2025 52 (L) >60 mL/min/1.73m2 Final     Comment:     eGFR calculated using 2021 CKD-EPI equation.   07/06/2013 87 >60 mL/min/1.7m2 Final     Calcium   Date Value Ref Range Status   07/11/2025 8.8 8.8 - 10.4 mg/dL Final   07/06/2013 9.7 8.5 - 10.4 mg/dL Final     Lab Results   Component Value Date    WBC 13.4 07/10/2025    WBC 6.7 06/27/2013     Lab Results   Component Value Date    RBC 4.58 07/10/2025    RBC 4.76 06/27/2013     Lab Results   Component Value  "Date    HGB 12.8 07/10/2025    HGB 13.7 06/27/2013     Lab Results   Component Value Date    HCT 38.1 07/10/2025    HCT 41.1 06/27/2013     Lab Results   Component Value Date     07/10/2025     06/27/2013      Lab Results   Component Value Date    INR 1.08 07/10/2025        General appearance: Patient is alert and fully cooperative with history & exam.  No sign of distress is noted during the visit.      Respiratory: Breathing is regular & unlabored while sitting.      HEENT: Hearing is intact to spoken word.  Speech is clear.  No gross evidence of visual impairment that would impact ambulation.      Dermatologic: Left foot: distal hallux callus - debrided as below. No open lesions. No cellulitis. No ischemia. No drainage.      Vascular: Dorsalis pedis and posterior tibial pulses are intact & regular bilaterally.  CFT and skin temperature is normal to both lower extremities.       Neurologic: Lower extremity sensation is diminished, bilateral foot, to light touch.  No evidence of neurological-based weakness or contracture in the lower extremities.       Musculoskeletal: Patient is ambulatory without an assistive device or brace.  No gross foot or ankle deformity noted.       Psychiatric: Affect is pleasant & appropriate.      All cultures:  No results for input(s): \"CULTURE\" in the last 168 hours.     IMAGING:   None per lower extremity     PROCEDURE:   Verbal consent was obtained for debridement. A 15 blade was used to excise the nonivable tissue to the callus, x 1 on left foot. No noted purulence afterwards. Debrided site measures 3 cm x 2 cm x .1 cm. No probe to bone. Well tolerated. Site was cleansed with alcohol.       ASSESSMENT:  Left foot hallux callus: debrided at bedside, no open lesions  Diabetes Mellitus --> hba1c: 10.2  Admission: [facility] [place] [place]     MEDICAL DECISION MAKING:   -Discussed all findings with patient. Chart and imaging reviewed.     -Left foot evaluated: no open lesions following " callus debridement. Okay to be full weightbearing. No dressing needed.     -Discussed and advised patient to follow up outpatient give his elevated hba1c and preulcerative lesion to his hallux. Likely would benefit from diabetic shoes and further outpatient management.     -Will place podiatry contact information his chart. Will sign off. [employer] with questions/concerns.       Thank you for the consultation request and the opportunity to participate in the care of Bob Hawkins DPM   [facility]  Available via [email or web address]

## 2025-07-11 NOTE — CODE/RAPID RESPONSE
[facility] NP Note    Syncope 2/2 10 second pause  Patient had a brief episode of unresponsiveness after his temporary pacemaker wire was removed by the Cardiology fellow while the patient was sitting in the chair. Code blue was called, however it was cancelled as patient gained consciousness approximately 20 seconds later. Upon arrival patient is sitting upright in the chair. He is alert, but does not recall losing consciousness. Initially, patient reported blurred vision and speech was delayed. About a minute into evaluation, patient reports blurred vision resolved and had clear speech. A full neurological exam was performed, and no focal neurological deficits appreciated.     Previous EKG this at 7:26 AM showed Aflutter with inferolateral injury pattern. There was some ST elevation present in lead II, and aVF.     Differentials considered include PE, air embolus, dislodged pacer wire, ACS, and vasovagal response.     Interventions  - 12-lead EKG shows patient has converted to SR; however does appear to have new ST elevation in V5 and V6  - Discussed with Maude Rodriguez CNP with Cardiology who reviewed EKGs; no concern for STEMI at this time  - Troponin pending  - Patient will remain in CCU  - Appreciate further recommendations from Cardiology.    At end of evaluation patient is lying in bed, not in distress. Remains hemodynamically stable. Discussed and defer further cares to Cardiology and primary hospitalist Dr. Hill.    Victor Hugo Rosales NP  [facility]  Securely message with the [employer]oc[employer] Web Console (learn more here)  Text page via [employer] Paging/Directory           EKG Interpretation:      Interpreted by Victor Hugo Rosales NP  Time reviewed: 11:35 AM   Symptoms at time of EKG: syncope   Rhythm: Normal sinus   Rate: 80  Axis: Normal  Ectopy: None  Conduction: Normal  ST Segments/ T Waves: Worsening ST elevation in V5 and V6  Q Waves: None  Comparison to prior: lateral leads with ST  elevation    Clinical Impression: ST elevation in lateral leads         Victor Hugo Rosales NP  [facility]  Securely message with the [employer] Web Console (learn more here)  Text page via [employer] Paging/Directory        Time Spent on this Encounter   I spent 30 minutes of critical care time on the unit/floor managing the care of Bob Echols. Upon evaluation, this patient had a high probability of imminent or life-threatening deterioration due to the interventions listed in this note, which required my direct attention, intervention, and personal management. 100% of my time was spent at the bedside counseling the patient and/or coordinating care regarding services listed in this note.

## 2025-07-11 NOTE — PROGRESS NOTES
[facility]    Medicine Progress Note - Hospitalist Service    Date of Admission:  7/10/2025    Assessment & Plan     Bob Echols is a 70 year old male with a history of uncontrolled diabetes, obstructive sleep apnea, hypertension admitted on 7/10/2025. He presents to the emergency department after 1 day of central chest tightness building throughout the day and resulting in lightheadedness, increasing shortness of breath, diaphoresis and presyncope.  Found to have an inferior ST elevation myocardial infarction.     Inferior ST elevation myocardial infarction  Hypertension   2 days of GI upset, 1 day of cough, chest tightness with shortness of breath and subsequent diaphoresis and presyncope.  Distal circumflex occlusion on angiogram now stented.  Also noted 70% stenosis in second marginal, 50% in proximal to mid LAD and 70% in right posterior lateral branch.  Does not smoke, intermittent infrequent alcohol use.  -On aspirin and Brilinta, continue.  Since he will be on anticoagulation as well, antiplatelet regimen per cardiology.  -High intensity statin, Lipitor 80 Mg daily.  Jardiance initiated  -PTA amlodipine resumed.  Holding lisinopril, plan is to resume as outpatient once creatinine improves  -TTE shows LVEF 55 to 60%, mild concentric LVH, mild AS moderate biatrial enlargement, mild MR.  -Cardiology considering outpatient stress MRI  -PTA amlodipine resumed  -No beta-blocker due to pause, see below and no ACE inhibitor given LIBBY.      Paroxysmal A-fib/flutter with bradycardia  10 seconds asystole with LOC after removal of pacing wire, ?  Vasovagal  Post angiogram.  Temporary pacing wire was placed.  Patient with intermittent paced rhythm initially.  Did not require pacing overnight.  Given this TPW was removed but shortly after procedure, after 20 seconds, had 10 seconds of asystole and patient lost consciousness.  Subsequently on sinus rhythm  - Heparin initiated, plan to  transition to apixaban likely in a.m.  - Not on rate controlling med given bradycardia and pauses.  Patient does not want pacemaker.  Pacer sheath remains in place in case of another pacemaker wire needs to be floated.    Acute hypoxic respiratory failure: Suspect secondary to STEMI with heart failure.  Acute diastolic heart failure: Thought to be hyperdynamic on LV gram in the Cath Lab but requiring 6 L facemask oxygen to maintain oxygen saturations.  BNP elevated.  May be related to bradycardia and systemic hypoperfusion  - Temporary pacemaker in place, TPW wire removed 7/11  -Diuresed with IV Lasix, transition to p.o.  -Daily weight, fluid restriction      Possible acute infectious illness: Temperature of 100.3 measured at home.  GI upset for 2 days, cough for 1 day.  Uncertain if patient's cough and GI upset were actually anginal equivalents in the setting of inferior coronary disease.  Symptoms are resolved currently.  - COVID, influenza, RSV negative  - Chest x-ray without clear infiltrates, though study somewhat limited by body habitus   - Leukocytosis could be stress response with STEMI, trended down.  Fever has resolved     Acute kidney injury on stage II chronic kidney disease: Baseline creatinine appears to be 1.1-1.2 in the setting of uncontrolled diabetes, although this was 1.5 years ago.  Suspect  CKD has progressed since then.  Suspect secondary to hypoperfusion with bradycardia and hypotension with myocardial infarction  -Received IV fluid in ER but showed signs of fluid overload and on Lasix.  -Creatinine was up to 1.58, slightly better at 1.46 despite diuresis.  - Follow BMP.     Uncontrolled type 2 diabetes with neuropathy, nephropathy: Hemoglobin A1c has been in the 10-11 range for the past 2 years.  Reports he has had no changes to his insulin regimen over that timeframe  - Lantus 24 units at bedtime  - Prandial insulin at 1 unit per 10 g carbohydrate  - Medium dose sliding scale insulin  -  Added Jardiance 10 mg daily  - Will need to get back to primary care clinic for ongoing adjustments to his diabetic regimen.  Consider semaglutide or similar for obesity with diabetes.  Not available in hospital  - Outpatient ophthalmology follow-up for annual eye exams  - Continue aspirin 81 mg daily.  Was already taking this prior to admission  - Increase to high intensity statin  - Metformin currently on hold given intra-arterial contrast administration     Obstructive sleep apnea: Nonadherent with CPAP  - CPAP as per home regimen ordered.  Discussed importance of correcting nocturnal hypoxia as this causes stress on the heart.  - Encouraged patient to trial CPAP during waking hours to acclimate to mask.         Diet: Low Saturated Fat Na <2400 mg    DVT Prophylaxis: anticoagulation as above   Fox Catheter: Not present  Lines: PRESENT             Cardiac Monitoring: ACTIVE order. Indication: Post- PCI/Angiogram (24 hours)  Code Status: Full Code      Clinically Significant Risk Factors         # Hyponatremia: Lowest Na = 133 mmol/L in last 2 days, will monitor as appropriate   # Hypocalcemia: Lowest Ca = 8.4 mg/dL in last 2 days, will monitor and replace as appropriate     # Hypoalbuminemia: Lowest albumin = 3.4 g/dL at 7/10/2025  1:00 AM, will monitor as appropriate                            Disposition Plan     Medically Ready for Discharge: Anticipated Tomorrow             Dallas Hill MD  Hospitalist Service  [facility]  Securely message with [employer] (more info)  Text page via [facility] Paging/Directory   ______________________________________________________________________    Interval History     Chart reviewed and patient was seen this morning.  Dyspnea much improved, good response to diuretic, weaned off supplemental O2 and is on room air.  Denies chest pain.  Denies dizziness or palpitation.  Patient in A-fib/flutter, CVR.  Subsequently, temporary pacing wire was removed, and  20 seconds later, he had 10 seconds of asystole with loss of consciousness.    Physical Exam   Vital Signs: Temp: 98.1  F (36.7  C) Temp src: Oral BP: 124/79 Pulse: 64   Resp: 20 SpO2: (!) 90 % O2 Device: Oxymizer cannula Oxygen Delivery: 1 LPM  Weight: 257 lbs 14.4 oz    General: AAOx3, appears comfortable.  HEENT: PERRLA EOMI. Mucosa moist.   Lungs: Bilateral equal air entry. Clear to auscultation, normal work of breathing.   CVS: S1S2 regular, no tachycardia or murmur.   Abdomen: Soft, NT, ND. BS heard.  MSK: No edema or deformities.  Neuro: AAOX3. CN 2-12 normal. Strength symmetrical.  Skin: No rash.       Medical Decision Making       45 MINUTES SPENT BY ME on the date of service doing chart review, history, exam, documentation & further activities per the note.  MANAGEMENT DISCUSSED with the following over the past 24 hours: patient RN, cardiology and EP       Data     I have personally reviewed the following data over the past 24 hrs:    12.4 (H)  \   13.1 (L)   / 206     138 100 28.0 (H) /  136 (H)   3.4 24 1.46 (H) \     Trop: 989 (HH) BNP: N/A       Imaging results reviewed over the past 24 hrs:   Recent Results (from the past 24 hours)   Echocardiogram Complete   Result Value    LVEF  55-60%    Narrative    [id number]  [id number]  [id number]  [id number]^MARCH^FARHANA^SPENSER     [facility]  Echocardiography Laboratory  [address] [place] [address]     Name: HOOD MARTÍNEZ  MRN: [id number]  : 1955  Study Date: 07/10/2025 01:39 PM  Age: 70 yrs  Gender: Male  Patient Location: [facility]  Reason For Study: MI - Acute  Ordering Physician: Farhana Clark MD  Referring Physician: [facility]  Performed By: Tobin Chowdary RDCS     BSA: 2.4 m2  Height: 72 in  Weight: 267 lb  HR: 73  BP: 159/93 mmHg  ______________________________________________________________________________  Procedure  Echocardiogram with two-dimensional, color and spectral Doppler.  Definity (NDC  #[id number]) given intravenously.  ______________________________________________________________________________  Interpretation Summary     The rhythm was atrial fibrillation with paced rhythm.  No regional wall motion abnormalities noted.  Left ventricular systolic function is normal.  The visual ejection fraction is 55-60%.  The left ventricle is normal in size.  There is mild concentric left ventricular hypertrophy.  The right ventricle is normal in structure, function and size.  There is a pacemaker lead in the right ventricle.  There is mild-moderate biatrial enlargement.  There is mild (1+) mitral regurgitation.  No old studies for comparison  ______________________________________________________________________________  Left Ventricle  The left ventricle is normal in size. There is mild concentric left  ventricular hypertrophy. Left ventricular systolic function is normal. The  visual ejection fraction is 55-60%. Left ventricular diastolic function is  indeterminate. No regional wall motion abnormalities noted. There is no  thrombus seen in the left ventricle.     Right Ventricle  The right ventricle is normal in structure, function and size. There is a  pacemaker lead in the right ventricle.     Atria  There is mild-moderate biatrial enlargement. Pacer wire in right atrium. The  left atrial appendage is not well visualized.     Mitral Valve  The mitral valve leaflets appear normal. There is no evidence of stenosis,  fluttering, or prolapse. There is mild (1+) mitral regurgitation. There is no  mitral valve stenosis.     Tricuspid Valve  Normal tricuspid valve. No tricuspid regurgitation. Right ventricular systolic  pressure could not be approximated due to inadequate tricuspid regurgitation.  There is no tricuspid stenosis.     Aortic Valve  The aortic valve is trileaflet. No aortic regurgitation is present. No aortic  stenosis is present.     Pulmonic Valve  The pulmonic valve is not well  visualized. There is no pulmonic valvular  regurgitation. There is no pulmonic valvular stenosis.     Vessels  The aortic root is normal size. Normal size ascending aorta. Dilation of the  inferior vena cava is present with normal respiratory variation in diameter.  The pulmonary artery is normal size.     Pericardium  The pericardium appears normal. There is no pleural effusion.     Rhythm  The rhythm was atrial fibrillation with paced rhythm.  ______________________________________________________________________________  MMode/2D Measurements & Calculations  IVSd: 1.3 cm     LVIDd: 4.8 cm  LVIDs: 3.6 cm  LVPWd: 1.2 cm  FS: 25.0 %  LV mass(C)d: 232.2 grams  LV mass(C)dI: 96.4 grams/m2  Ao root diam: 3.4 cm  asc Aorta Diam: 3.8 cm  Ao root diam index Ht(cm/m): 1.9  Ao root diam index BSA (cm/m2): 1.4  Asc Ao diam index BSA (cm/m2): 1.6  Asc Ao diam index Ht(cm/m): 2.1  EF Biplane: 50.1 %  LA Volume (BP): 94.1 ml     LA Volume Index (BP): 39.0 ml/m2  RWT: 0.50     Doppler Measurements & Calculations  MV E max jabari: 117.0 cm/sec  MV dec time: 0.13 sec  PA acc time: 0.14 sec  E/E' avg: 10.5  Lateral E/e': 10.1  Medial E/e': 10.9     ______________________________________________________________________________  Report approved by: Dr. Nick Kendall on 07/10/2025 04:06 PM

## 2025-07-11 NOTE — PLAN OF CARE
Heart Failure Care Map  GOALS TO BE MET BEFORE DISCHARGE:    1. Decrease congestion and/or edema with diuretic therapy to achieve near optimal volume status.     Dyspnea improved: No, further care required to meet this goal. Please explain  Pt endorsed of SOB and needs 2LNC   Edema improved: No, further care required to meet this goal. Please explain  Pt has 2+ BLE edema.         Last 24 hour I/O:   Intake/Output Summary (Last 24 hours) at 7/10/2025 2300  Last data filed at 7/10/2025 2300  Gross per 24 hour   Intake 650 ml   Output 2800 ml   Net -2150 ml           Net I/O and Weights since admission:   06/11 0700 - 07/11 0659  In: 650 [P.O.:650]  Out: 2800 [Urine:2800]  Net: -2150     Vitals:    07/10/25 0111   Weight: 121.3 kg (267 lb 6.7 oz)       2.  O2 sats > 90% on room air, or at prior home O2 therapy level.      Able to wean O2 this shift to keep sats above 90%?: No, further care required to meet this goal. Please explain  2LNC   Does patient use Home O2? No          Current oxygenation status:   SpO2: 95 %     O2 Device: Oxymizer cannula, Oxygen Delivery: 2 LPM    3.  Tolerates ambulation and mobility near baseline.     Ambulation: No, further care required to meet this goal. Please explain  Did not ambulate today.    Times patient ambulated with staff this shift: 0    Please review the Heart Failure Care Map for additional HF goal outcomes.    Pt aox4, A1, 2LNC and full code. Tele afib/ aflutter cvr. Pt denies CP. Pt endorses SOB all day. STEMI over night with stent to cirx. L radial WDL, soft and CMS intact. Temp pacer placed for CHB. VVI, 40 and 4 amps. RIJ WDL. New afib, started heparin 1500, recheck xa at 3am. IV lasix BID. L great toe wound. Podiatry ordered.   Plan: wean O2, continue to diuresis and cards rehab.     Dominic Rudd, RN  7/10/2025

## 2025-07-11 NOTE — PROGRESS NOTES
[facility]  Cardiology Progress Note  Date of Service: 07/11/2025  Date of Admission: 7/10/2025    Summary    Bob Echols is a 70 year old male with a history of uncontrolled diabetes, obstructive sleep apnea, hypertension admitted on 7/10/2025. He presents to the emergency department after 1 day of central chest tightness building throughout the day and resulting in lightheadedness, increasing shortness of breath, diaphoresis and presyncope. Found to have an inferior ST elevation myocardial infarction.     Interval 07/11/25    Feeling well today.  Telemetry reviewed.  No acute overnight events.    Asssessment:     Inferior ST elevation myocardial infarction   Coronary artery disease  S/p PCI of 100% mCx lesion with RENA x1, 7/10/25  Acute hypoxic respiratory failure, improved  Improved symptoms subjectively.  Weaning O2 needs.  Diuresing IV furosemide 40 mg BID.  TN peaked ~1200, now trending down.  CP free after PCI revascularization.  Multiple uncontrolled cardiac risk factors.  Inpatient TTE: Echo reviewed, EF reported normal, however on direct review appears low normal with mid to basal inferior and inferolateral hypokinesis. Patient RV appears mild dilated mild RV dysfunction with dilated IVC.   Tolerating DAPT.  New atrial fibrillation  Controlled rate, currently 70s.  TPW in place - rate adjusted down 7/10 with minimal pacing burden.  On heparin gtt.  LIBBY on CKD, Cr 1.58  Uncontrolled type 2 diabetes with neuropathy, nephropathy  Started on Jardiance this admission.  Obstructive sleep apnea, untreated.  Hypertension  Hyperlipidemia, , PTA untreated, started on statin this admission.  Elevated CRP  Presented with 2 day of GI symptoms/fever and stuttering anginal symptoms.  Viral panel negative.  Query infection vs post-MI stress response?  _____________________________________________________________    PLAN:      TPW removed at bedside by cath lab resident.  Pt  experienced 10 seconds asystole with LOC.  [employer] ISMAEL called to bedside, formal evaluation performed.  See note for complete details.  While the TPW wire has been removed, the sheath remains in place.  If there are recurrent blocks or pauses, may need to float a new wire in.  I discussed this personally with the resident.  Continue telemetry.  Discussed with EP, review of rhythm is coarse AF vs Aflutter.  Now SR following TPW removal.  In light of above events, hold off on OAC for now.  Keep heparin gtt.  PAF likely transient in the setting of acute STEMI > review need for long-term OAC tomorrow.  Heart rhythm monitor at discharge.    No BB 2/2 transient heart block; no ACEi 2/2 LIBBY - consider starting as outpatient.  Resume PTA Amlodipine.  Transition IV diuresis to PO Furosemide 40 mg/daily.  Inpatient cardiac rehab.  Education and support regarding CAD and diabetes.  Needs aggressive risk factor optimization.  Hold discharge today, monitor overnight.  Possible home tomorrow with close outpatient follow up in cardiology clinic.  _____________________________________________________________    Plan of care and the majority of MDM was formulated under direction and guidance of Dr. Marquez.    Thank you for the opportunity to participate in the care of Mr. Bob Echols.  Please feel free to reach out with any additional questions.    Maude Rodriguez, APRN, CNP, CCK  Nurse Practitioner  [facility]  Paging available via [employer]    Physical Exam   Temp: 99.2  F (37.3  C) Temp src: Axillary BP: 135/61 Pulse: 75   Resp: 20 SpO2: (!) 90 % O2 Device: Oxymizer cannula Oxygen Delivery: 1 LPM    Vitals:    07/10/25 0111 07/11/25 0605   Weight: 121.3 kg (267 lb 6.7 oz) 117 kg (257 lb 14.4 oz)     I/O last 3 completed shifts:  In: 650 [P.O.:650]  Out: 3375 [Urine:3375]    Constitutional: Appears stated age, well nourished, NAD.  Respiratory: Non-labored. Lungs CTAB.  Cardiovascular: IRR, normal S1 and S2.  No M/G/R.  Vascular: Peripheral pulses intact and symmetric bilaterally  GI: Soft, non-distended, non-tender, bowel sounds present equally.  Skin: Warm and dry. No rashes, cyanosis, edema.  Musculoskeletal/Extremities: Symmetrical movement. No edema.  Neurologic: No gross focal deficits. Alert, awake, and oriented to all spheres.  Psychiatric: Affect appropriate. Mentation normal.    Data   Recent Labs   Lab 07/11/25  0747 07/11/25  0307 07/10/25  2125 07/10/25  1438 07/10/25  1124 07/10/25  0846 07/10/25  0350 07/10/25  0100   WBC  --   --   --   --  13.4*  --  12.3* 14.0*   HGB  --   --   --   --  12.8*  --  11.9* 12.4*   MCV  --   --   --   --  83  --  84 83   PLT  --   --   --   --  202  --  172 204   INR  --   --   --   --   --   --   --  1.08   NA  --  138  --   --   --   --  133* 133*   POTASSIUM  --  3.4  --   --   --   --  4.3 4.4   CHLORIDE  --  100  --   --   --   --  98 98   CO2  --  24  --   --   --   --  23 25   BUN  --  28.0*  --   --   --   --  32.3* 30.9*   CR  --  1.46*  --   --   --   --  1.58* 1.62*   ANIONGAP  --  14  --   --   --   --  12 10   PALMIRA  --  8.8  --   --   --   --  8.4* 9.0   * 141* 168*   < >  --    < > 227* 268*   ALBUMIN  --   --   --   --   --   --   --  3.4*   PROTTOTAL  --   --   --   --   --   --   --  6.4   BILITOTAL  --   --   --   --   --   --   --  1.0   ALKPHOS  --   --   --   --   --   --   --  74   ALT  --   --   --   --   --   --   --  23   AST  --   --   --   --   --   --   --  45    < > = values in this interval not displayed.       Recent Labs   Lab 07/10/25  1124 07/10/25  0350 07/10/25  0100   WBC 13.4* 12.3* 14.0*   HGB 12.8* 11.9* 12.4*   HCT 38.1* 35.6* 36.8*   MCV 83 84 83    172 204     Recent Labs   Lab 07/11/25  0747 07/11/25  0307 07/10/25  2125 07/10/25  0846 07/10/25  0350 07/10/25  0100   NA  --  138  --   --  133* 133*   POTASSIUM  --  3.4  --   --  4.3 4.4   CHLORIDE  --  100  --   --  98 98   CO2  --  24  --   --  23 25   ANIONGAP  --   14  --   --  12 10   * 141* 168*   < > 227* 268*   BUN  --  28.0*  --   --  32.3* 30.9*   CR  --  1.46*  --   --  1.58* 1.62*   GFRESTIMATED  --  52*  --   --  47* 46*   PALMIRA  --  8.8  --   --  8.4* 9.0    < > = values in this interval not displayed.        Patient Active Problem List   Diagnosis    CARDIOVASCULAR SCREENING; LDL GOAL LESS THAN 160    Unstable angina (H)    Dasha (monopolar) single episode or unspecified    Depression    Bipolar I disorder, most recent episode depressed (H)    Percutaneous transluminal coronary angioplasty status     Medications   Current Facility-Administered Medications   Medication Dose Route Frequency Provider Last Rate Last Admin    heparin 25,000 units in 0.45% NaCl 250 mL ANTICOAGULANT infusion  0-5,000 Units/hr Intravenous Continuous Maude Rodriguez APRN CNP 16.5 mL/hr at 07/11/25 0402 1,650 Units/hr at 07/11/25 0402    Percutaneous Coronary Intervention orders placed (this is information for BPA alerting)   Does not apply DOES NOT GO TO MAR Farhana Clark MD        Reason beta blocker order not selected   Does not apply DOES NOT GO TO MAR MarchFarhana MD         Current Facility-Administered Medications   Medication Dose Route Frequency Provider Last Rate Last Admin    [Held by provider] amLODIPine (NORVASC) tablet 10 mg  10 mg Oral Daily Eleazar, Epifanio Rudd MD        aspirin EC tablet 81 mg  81 mg Oral Daily MarchFarhana MD   81 mg at 07/11/25 0902    atorvastatin (LIPITOR) tablet 80 mg  80 mg Oral Daily MarchFarhana MD   80 mg at 07/11/25 0902    empagliflozin (JARDIANCE) tablet 10 mg  10 mg Oral Daily Epifanio Bush MD   10 mg at 07/11/25 0902    furosemide (LASIX) injection 40 mg  40 mg Intravenous BID Maude Rodriguez APRN CNP   40 mg at 07/11/25 0848    gabapentin (NEURONTIN) capsule 300 mg  300 mg Oral TID Eleazar, Epifanio Rudd MD   300 mg at 07/11/25 0901    insulin aspart (NovoLOG) injection (RAPID ACTING)    Subcutaneous TID w/meals Dallas Hill MD   3 Units at 07/11/25 0858    insulin aspart (NovoLOG) injection (RAPID ACTING)  1-7 Units Subcutaneous TID AC Bush, Epifanio Rudd MD   1 Units at 07/10/25 1920    insulin aspart (NovoLOG) injection (RAPID ACTING)  1-5 Units Subcutaneous At Bedtime Bush, Epifanio Rudd MD        insulin glargine (LANTUS PEN) injection 24 Units  24 Units Subcutaneous At Bedtime Dallas Hill MD   24 Units at 07/10/25 2129    [Held by provider] lisinopril (ZESTRIL) tablet 40 mg  40 mg Oral Daily Bush, Epifanio Rudd MD        senna-docusate (SENOKOT-S/PERICOLACE) 8.6-50 MG per tablet 1 tablet  1 tablet Oral BID Joel Gonzalez MD   1 tablet at 07/11/25 0902    ticagrelor (BRILINTA) tablet 90 mg  90 mg Oral Q12H Farhana Clark MD   90 mg at 07/11/25 0041       Data   Last 24 hours labs personally reviewed.  Echo: No results found for this or any previous visit (from the past 4320 hours).

## 2025-07-11 NOTE — PLAN OF CARE
Neuro- A&Ox4  Most Recent Vitals- Temp: 98.1  F (36.7  C) Temp src: Oral BP: 124/79 Pulse: 64   Resp: 20 SpO2: (!) 90 % O2 Device: Oxymizer cannula   Tele/Cardiac- A flutter CVR, denies CP, no pacing overnight seen  Resp- weaned to RA, LS dim, endorses some PARKS  Activity- SBA due to temporary pacemaker  Pain- denies  Drips- Heparin  Drains/Tubes- PIV  Skin- dark area on left great toe, L radial site-CMS intact  GI/- WDL  Aggression Color- Green  Plan- podiatry consult, cardiac rehab, possibly pull temp pacer   Misc- NA    Tosha Mancera, RN

## 2025-07-12 ENCOUNTER — APPOINTMENT (OUTPATIENT)
Dept: CARDIOLOGY | Facility: CLINIC | Age: 70
DRG: 321 | End: 2025-07-12
Attending: NURSE PRACTITIONER
Payer: COMMERCIAL

## 2025-07-12 ENCOUNTER — APPOINTMENT (OUTPATIENT)
Dept: OCCUPATIONAL THERAPY | Facility: CLINIC | Age: 70
DRG: 321 | End: 2025-07-12
Attending: INTERNAL MEDICINE
Payer: COMMERCIAL

## 2025-07-12 LAB
ANION GAP SERPL CALCULATED.3IONS-SCNC: 14 MMOL/L (ref 7–15)
ATRIAL RATE - MUSE: 111 BPM
ATRIAL RATE - MUSE: 85 BPM
BASOPHILS # BLD AUTO: 0 10E3/UL (ref 0–0.2)
BASOPHILS NFR BLD AUTO: 0 %
BUN SERPL-MCNC: 26.4 MG/DL (ref 8–23)
CALCIUM SERPL-MCNC: 9.1 MG/DL (ref 8.8–10.4)
CHLORIDE SERPL-SCNC: 101 MMOL/L (ref 98–107)
CREAT SERPL-MCNC: 1.36 MG/DL (ref 0.67–1.17)
CRP SERPL-MCNC: 254.01 MG/L
DIASTOLIC BLOOD PRESSURE - MUSE: NORMAL MMHG
DIASTOLIC BLOOD PRESSURE - MUSE: NORMAL MMHG
EGFRCR SERPLBLD CKD-EPI 2021: 56 ML/MIN/1.73M2
EOSINOPHIL # BLD AUTO: 0.1 10E3/UL (ref 0–0.7)
EOSINOPHIL NFR BLD AUTO: 1 %
ERYTHROCYTE [DISTWIDTH] IN BLOOD BY AUTOMATED COUNT: 13.2 % (ref 10–15)
ERYTHROCYTE [DISTWIDTH] IN BLOOD BY AUTOMATED COUNT: 13.2 % (ref 10–15)
ERYTHROCYTE [SEDIMENTATION RATE] IN BLOOD BY WESTERGREN METHOD: 130 MM/HR (ref 0–20)
GLUCOSE BLDC GLUCOMTR-MCNC: 128 MG/DL (ref 70–99)
GLUCOSE BLDC GLUCOMTR-MCNC: 135 MG/DL (ref 70–99)
GLUCOSE BLDC GLUCOMTR-MCNC: 145 MG/DL (ref 70–99)
GLUCOSE BLDC GLUCOMTR-MCNC: 162 MG/DL (ref 70–99)
GLUCOSE SERPL-MCNC: 118 MG/DL (ref 70–99)
HCO3 SERPL-SCNC: 23 MMOL/L (ref 22–29)
HCT VFR BLD AUTO: 35 % (ref 40–53)
HCT VFR BLD AUTO: 38.9 % (ref 40–53)
HGB BLD-MCNC: 12.2 G/DL (ref 13.3–17.7)
HGB BLD-MCNC: 13 G/DL (ref 13.3–17.7)
IMM GRANULOCYTES # BLD: 0.1 10E3/UL
IMM GRANULOCYTES NFR BLD: 1 %
INTERPRETATION ECG - MUSE: NORMAL
INTERPRETATION ECG - MUSE: NORMAL
LYMPHOCYTES # BLD AUTO: 1.3 10E3/UL (ref 0.8–5.3)
LYMPHOCYTES NFR BLD AUTO: 10 %
MCH RBC QN AUTO: 27.9 PG (ref 26.5–33)
MCH RBC QN AUTO: 28 PG (ref 26.5–33)
MCHC RBC AUTO-ENTMCNC: 33.4 G/DL (ref 31.5–36.5)
MCHC RBC AUTO-ENTMCNC: 34.9 G/DL (ref 31.5–36.5)
MCV RBC AUTO: 80 FL (ref 78–100)
MCV RBC AUTO: 84 FL (ref 78–100)
MONOCYTES # BLD AUTO: 0.9 10E3/UL (ref 0–1.3)
MONOCYTES NFR BLD AUTO: 7 %
NEUTROPHILS # BLD AUTO: 10 10E3/UL (ref 1.6–8.3)
NEUTROPHILS NFR BLD AUTO: 81 %
NRBC # BLD AUTO: 0 10E3/UL
NRBC BLD AUTO-RTO: 0 /100
P AXIS - MUSE: 49 DEGREES
P AXIS - MUSE: 52 DEGREES
PLATELET # BLD AUTO: 225 10E3/UL (ref 150–450)
PLATELET # BLD AUTO: 246 10E3/UL (ref 150–450)
POTASSIUM SERPL-SCNC: 3.6 MMOL/L (ref 3.4–5.3)
PR INTERVAL - MUSE: 194 MS
PR INTERVAL - MUSE: 202 MS
QRS DURATION - MUSE: 100 MS
QRS DURATION - MUSE: 86 MS
QT - MUSE: 330 MS
QT - MUSE: 404 MS
QTC - MUSE: 448 MS
QTC - MUSE: 480 MS
R AXIS - MUSE: -42 DEGREES
R AXIS - MUSE: 26 DEGREES
RBC # BLD AUTO: 4.36 10E6/UL (ref 4.4–5.9)
RBC # BLD AUTO: 4.66 10E6/UL (ref 4.4–5.9)
SODIUM SERPL-SCNC: 138 MMOL/L (ref 135–145)
SYSTOLIC BLOOD PRESSURE - MUSE: NORMAL MMHG
SYSTOLIC BLOOD PRESSURE - MUSE: NORMAL MMHG
T AXIS - MUSE: 54 DEGREES
T AXIS - MUSE: 62 DEGREES
UFH PPP CHRO-ACNC: 0.19 IU/ML (ref ?–1.1)
UFH PPP CHRO-ACNC: 0.23 IU/ML (ref ?–1.1)
UFH PPP CHRO-ACNC: 0.24 IU/ML (ref ?–1.1)
UFH PPP CHRO-ACNC: 0.3 IU/ML (ref ?–1.1)
VENTRICULAR RATE- MUSE: 111 BPM
VENTRICULAR RATE- MUSE: 85 BPM
WBC # BLD AUTO: 11.3 10E3/UL (ref 4–11)
WBC # BLD AUTO: 12.4 10E3/UL (ref 4–11)

## 2025-07-12 PROCEDURE — 93321 DOPPLER ECHO F-UP/LMTD STD: CPT | Mod: 26 | Performed by: INTERNAL MEDICINE

## 2025-07-12 PROCEDURE — 36415 COLL VENOUS BLD VENIPUNCTURE: CPT | Performed by: NURSE PRACTITIONER

## 2025-07-12 PROCEDURE — 85520 HEPARIN ASSAY: CPT | Performed by: INTERNAL MEDICINE

## 2025-07-12 PROCEDURE — 93325 DOPPLER ECHO COLOR FLOW MAPG: CPT | Mod: 26 | Performed by: INTERNAL MEDICINE

## 2025-07-12 PROCEDURE — 97535 SELF CARE MNGMENT TRAINING: CPT | Mod: GO | Performed by: OCCUPATIONAL THERAPIST

## 2025-07-12 PROCEDURE — 85004 AUTOMATED DIFF WBC COUNT: CPT | Performed by: HOSPITALIST

## 2025-07-12 PROCEDURE — 85027 COMPLETE CBC AUTOMATED: CPT | Performed by: INTERNAL MEDICINE

## 2025-07-12 PROCEDURE — 93010 ELECTROCARDIOGRAM REPORT: CPT | Performed by: INTERNAL MEDICINE

## 2025-07-12 PROCEDURE — 250N000013 HC RX MED GY IP 250 OP 250 PS 637

## 2025-07-12 PROCEDURE — 255N000002 HC RX 255 OP 636: Performed by: NURSE PRACTITIONER

## 2025-07-12 PROCEDURE — 85520 HEPARIN ASSAY: CPT | Performed by: HOSPITALIST

## 2025-07-12 PROCEDURE — 97165 OT EVAL LOW COMPLEX 30 MIN: CPT | Mod: GO | Performed by: OCCUPATIONAL THERAPIST

## 2025-07-12 PROCEDURE — 250N000013 HC RX MED GY IP 250 OP 250 PS 637: Performed by: INTERNAL MEDICINE

## 2025-07-12 PROCEDURE — 250N000013 HC RX MED GY IP 250 OP 250 PS 637: Performed by: HOSPITALIST

## 2025-07-12 PROCEDURE — 250N000013 HC RX MED GY IP 250 OP 250 PS 637: Performed by: NURSE PRACTITIONER

## 2025-07-12 PROCEDURE — 97110 THERAPEUTIC EXERCISES: CPT | Mod: GO | Performed by: OCCUPATIONAL THERAPIST

## 2025-07-12 PROCEDURE — 99233 SBSQ HOSP IP/OBS HIGH 50: CPT | Mod: 25 | Performed by: INTERNAL MEDICINE

## 2025-07-12 PROCEDURE — 36415 COLL VENOUS BLD VENIPUNCTURE: CPT | Performed by: HOSPITALIST

## 2025-07-12 PROCEDURE — 93325 DOPPLER ECHO COLOR FLOW MAPG: CPT

## 2025-07-12 PROCEDURE — 85652 RBC SED RATE AUTOMATED: CPT | Performed by: NURSE PRACTITIONER

## 2025-07-12 PROCEDURE — 93005 ELECTROCARDIOGRAM TRACING: CPT

## 2025-07-12 PROCEDURE — 250N000011 HC RX IP 250 OP 636: Performed by: NURSE PRACTITIONER

## 2025-07-12 PROCEDURE — 99232 SBSQ HOSP IP/OBS MODERATE 35: CPT | Performed by: HOSPITALIST

## 2025-07-12 PROCEDURE — 86140 C-REACTIVE PROTEIN: CPT | Performed by: NURSE PRACTITIONER

## 2025-07-12 PROCEDURE — 36415 COLL VENOUS BLD VENIPUNCTURE: CPT | Performed by: INTERNAL MEDICINE

## 2025-07-12 PROCEDURE — 210N000001 HC R&B IMCU HEART CARE

## 2025-07-12 PROCEDURE — 93308 TTE F-UP OR LMTD: CPT | Mod: 26 | Performed by: INTERNAL MEDICINE

## 2025-07-12 PROCEDURE — 80048 BASIC METABOLIC PNL TOTAL CA: CPT | Performed by: HOSPITALIST

## 2025-07-12 RX ORDER — HEPARIN SODIUM 10000 [USP'U]/100ML
0-5000 INJECTION, SOLUTION INTRAVENOUS CONTINUOUS
Status: DISCONTINUED | OUTPATIENT
Start: 2025-07-12 | End: 2025-07-13

## 2025-07-12 RX ORDER — HEPARIN SODIUM 10000 [USP'U]/100ML
0-5000 INJECTION, SOLUTION INTRAVENOUS CONTINUOUS
Status: DISCONTINUED | OUTPATIENT
Start: 2025-07-12 | End: 2025-07-12

## 2025-07-12 RX ORDER — AMLODIPINE BESYLATE 10 MG/1
10 TABLET ORAL DAILY
Status: DISCONTINUED | OUTPATIENT
Start: 2025-07-13 | End: 2025-07-14 | Stop reason: HOSPADM

## 2025-07-12 RX ORDER — AMLODIPINE BESYLATE 5 MG/1
5 TABLET ORAL ONCE
Status: COMPLETED | OUTPATIENT
Start: 2025-07-12 | End: 2025-07-12

## 2025-07-12 RX ADMIN — GABAPENTIN 300 MG: 300 CAPSULE ORAL at 16:01

## 2025-07-12 RX ADMIN — INSULIN ASPART 1 UNITS: 100 INJECTION, SOLUTION INTRAVENOUS; SUBCUTANEOUS at 13:13

## 2025-07-12 RX ADMIN — AMLODIPINE BESYLATE 5 MG: 5 TABLET ORAL at 08:57

## 2025-07-12 RX ADMIN — TICAGRELOR 90 MG: 90 TABLET, FILM COATED ORAL at 13:22

## 2025-07-12 RX ADMIN — HEPARIN SODIUM 1950 UNITS/HR: 10000 INJECTION, SOLUTION INTRAVENOUS at 02:36

## 2025-07-12 RX ADMIN — INSULIN GLARGINE 24 UNITS: 100 INJECTION, SOLUTION SUBCUTANEOUS at 21:01

## 2025-07-12 RX ADMIN — HEPARIN SODIUM 2100 UNITS/HR: 10000 INJECTION, SOLUTION INTRAVENOUS at 12:55

## 2025-07-12 RX ADMIN — AMLODIPINE BESYLATE 5 MG: 5 TABLET ORAL at 13:11

## 2025-07-12 RX ADMIN — GABAPENTIN 300 MG: 300 CAPSULE ORAL at 21:01

## 2025-07-12 RX ADMIN — TICAGRELOR 90 MG: 90 TABLET, FILM COATED ORAL at 01:35

## 2025-07-12 RX ADMIN — SENNOSIDES AND DOCUSATE SODIUM 1 TABLET: 50; 8.6 TABLET ORAL at 08:56

## 2025-07-12 RX ADMIN — ASPIRIN 81 MG: 81 TABLET, DELAYED RELEASE ORAL at 08:57

## 2025-07-12 RX ADMIN — FUROSEMIDE 40 MG: 40 TABLET ORAL at 08:56

## 2025-07-12 RX ADMIN — MELATONIN TAB 3 MG 3 MG: 3 TAB at 21:47

## 2025-07-12 RX ADMIN — ATORVASTATIN CALCIUM 80 MG: 80 TABLET, FILM COATED ORAL at 08:56

## 2025-07-12 RX ADMIN — PERFLUTREN 10 ML (DILUTED): 6.52 INJECTION, SUSPENSION INTRAVENOUS at 12:58

## 2025-07-12 RX ADMIN — EMPAGLIFLOZIN 10 MG: 10 TABLET, FILM COATED ORAL at 08:57

## 2025-07-12 RX ADMIN — GABAPENTIN 300 MG: 300 CAPSULE ORAL at 08:56

## 2025-07-12 ASSESSMENT — ACTIVITIES OF DAILY LIVING (ADL)
ADLS_ACUITY_SCORE: 37
ADLS_ACUITY_SCORE: 36
ADLS_ACUITY_SCORE: 37
ADLS_ACUITY_SCORE: 38
ADLS_ACUITY_SCORE: 36
ADLS_ACUITY_SCORE: 37
ADLS_ACUITY_SCORE: 36
ADLS_ACUITY_SCORE: 37
ADLS_ACUITY_SCORE: 37
ADLS_ACUITY_SCORE: 36
ADLS_ACUITY_SCORE: 37

## 2025-07-12 NOTE — PROGRESS NOTES
07/12/25 1340   Appointment Info   Signing Clinician's Name / Credentials (OT) Lizz Host, OTR/L   Living Environment   People in Home other (see comments);child(irlanda), adult;other relative(s)  (2 grandsons)   Current Living Arrangements house   Home Accessibility stairs within home   Number of Stairs, Within Home, Primary greater than 10 stairs   Living Environment Comments Split level home   Self-Care   Usual Activity Tolerance good   Current Activity Tolerance fair   Equipment Currently Used at Home none   Fall history within last six months no   Activity/Exercise/Self-Care Comment Pt reports independence at baseline, no AD.   General Information   Onset of Illness/Injury or Date of Surgery 07/10/25   Referring Physician Farhana Clark MD   Patient/Family Therapy Goal Statement (OT) none stated   Additional Occupational Profile Info/Pertinent History of Current Problem STEMI, S/p PCI of 100% mCx lesion with RENA x1, 7/10/25   Existing Precautions/Restrictions cardiac   Cognitive Status Examination   Orientation Status orientation to person, place and time   Affect/Mental Status (Cognitive) flat/blunted affect   Pain Assessment   Patient Currently in Pain No   Transfers   Transfers sit-stand transfer   Sit-Stand Transfer   Sit-Stand Stanton (Transfers) supervision   Assistive Device (Sit-Stand Transfers) other (see comments)  (IV Pole)   Clinical Impression   Criteria for Skilled Therapeutic Interventions Met (OT) Yes, treatment indicated   OT Diagnosis decreased activity margo for I/ADLs   OT Problem List-Impairments impacting ADL problems related to;activity tolerance impaired   Assessment of Occupational Performance 1-3 Performance Deficits   Identified Performance Deficits activity margo for I/ADLs   Planned Therapy Interventions (OT) ADL retraining;progressive activity/exercise;home program guidelines;risk factor education   Clinical Decision Making Complexity (OT) problem focused assessment/low  complexity   Risk & Benefits of therapy have been explained patient   OT Total Evaluation Time   OT Eval, Low Complexity Minutes (12936) 8   OT Goals   Therapy Frequency (OT) Daily   OT Predicted Duration/Target Date for Goal Attainment 07/18/25   OT Goals Cardiac Phase 1   OT: Understanding of cardiac education to maximize quality of life, condition management, and health outcomes Patient;Verbalize   OT: Perform aerobic activity with stable cardiovascular response intermittent;10 minutes;ambulation   OT: Functional/aerobic ambulation tolerance with stable cardiovascular response in order to return to home and community environment Independent   OT: Navigation of stairs simulating home set up with stable cardiovascular response in order to return to home and community environment Independent;Greater than 10 stairs   Self-Care/Home Management   Self-Care/Home Mgmt/ADL, Compensatory, Meal Prep Minutes (56209) 9   Treatment Detail/Skilled Intervention Initiated MI packet edu, see details below. Pt receptive, verbalized understanding. Will cont to complete edu in future sessions.   Therapeutic Procedures/Exercise   Therapeutic Procedure: strength, endurance, ROM, flexibillity minutes (18637) 8   Symptoms Noted During/After Treatment fatigue;increase work of breath   Treatment Detail/Skilled Intervention see below   Treatment Time Includes (CR Only) See specific exercise details intervention group(s);Monitoring of vital signs (see vital signs flowsheet for details)   Ambulation   Workload 200 ft   Duration (minutes) 4 minutes   Symptoms Dyspnea;Fatigue;Weakness   Cardiovascular Response Hypertension at rest   Exercise Details CGA pushing IV pole.   Vital Signs Details see VS Flowsheet   Cardiac Education   Education Provided Stop light tool;Signs and symptoms;Risk factors;Resuming home activities;Outpatient Cardiac Rehab;Diet;Precautions   Education Packet Given to Patient Yes   All Patient Education Handouts Reviewed  with Patient and/or Family No   Cardiac Rehab Phase II Plan   Phase II Order Received Yes   Phase II Appointment Status Scheduled   Date/Time 7/28 @7am   Location [facility]   OT Discharge Planning   OT Plan amb, stairs. finish edu (HEP)   OT Discharge Recommendation (DC Rec) home with assist;home with outpatient cardiac rehab   OT Rationale for DC Rec Recommend OP CR for further cardiac edu, exercise and monitoring   OT Brief overview of current status Goals of therapy will be to address safe mobility and make recs for d/c to next level of care. Pt and RN will continue to follow all falls risk precautions as documented by RN staff while hospitalized   OT Total Distance Amb During Session (feet) 200   Total Session Time   Timed Code Treatment Minutes 17   Total Session Time (sum of timed and untimed services) 25

## 2025-07-12 NOTE — PROGRESS NOTES
[facility]    Medicine Progress Note - Hospitalist Service    Date of Admission:  7/10/2025    Assessment & Plan     Bob Echols is a 70 year old male with a history of uncontrolled diabetes, obstructive sleep apnea, hypertension admitted on 7/10/2025. He presents to the emergency department after 1 day of central chest tightness building throughout the day and resulting in lightheadedness, increasing shortness of breath, diaphoresis and presyncope.  Found to have an inferior ST elevation myocardial infarction.     Inferior ST elevation myocardial infarction  Hypertension  Recurrent chest pain, suspect pericarditis   2 days of GI upset, 1 day of cough, chest tightness with shortness of breath and subsequent diaphoresis and presyncope.  Distal circumflex occlusion on angiogram now stented.  Also noted 70% stenosis in second marginal, 50% in proximal to mid LAD and 70% in right posterior lateral branch.  Does not smoke, intermittent infrequent alcohol use.  -On aspirin and Brilinta, continue. Since he will be on anticoagulation as well, antiplatelet regimen per cardiology.  -High intensity statin, Lipitor 80 Mg daily.  Jardiance initiated.  -PTA amlodipine resumed.  Holding lisinopril, plan is to resume as outpatient once creatinine improves  -TTE shows LVEF 55 to 60%, mild concentric LVH, mild AS moderate biatrial enlargement, mild MR.  -Cardiology considering outpatient stress MRI  -PTA amlodipine resumed  -No beta-blocker due to pause, see below and no ACE inhibitor given LIBBY.     -Recurrence of chest pain with ST elevation and increased troponin on 7/11.  CRP as well elevated.  Pain is better when he leans forward.  Suspect pericarditis.  Repeat TTE is pending.  Defer colchicine to cardiology.     Paroxysmal A-fib/flutter with bradycardia  10 seconds asystole with LOC after removal of pacing wire, ?  Vasovagal  Post angiogram.  Temporary pacing wire was placed.  Patient with  intermittent paced rhythm initially.  Did not require pacing overnight.  Given this TPW was removed but shortly after procedure, after 20 seconds, had 10 seconds of asystole and patient lost consciousness.  Subsequently on sinus rhythm  - Heparin initiated, plan to transition to apixaban, defer to cardiology.  - Not on rate controlling med given bradycardia and pauses.  Patient does not want pacemaker.  Pacer sheath remains in place in case of another pacemaker wire needs to be floated.  No subsequent pauses.    Acute hypoxic respiratory failure: Suspect secondary to STEMI with heart failure.  Acute diastolic heart failure: Thought to be hyperdynamic on LV gram in the Cath Lab but requiring 6 L facemask oxygen to maintain oxygen saturations.  BNP elevated.  May be related to bradycardia and systemic hypoperfusion  - Temporary pacemaker in place, TPW wire removed 7/11  - Diuresed with IV Lasix, transitioned to p.o., remains on room air.  - Daily weight, fluid restriction      Possible acute viral illness, could be pericarditis: Temperature of 100.3 measured at home.  GI upset for 2 days, cough for 1 day.  Uncertain if patient's cough and GI upset were actually anginal equivalents in the setting of inferior coronary disease.  Symptoms are resolved currently.  - COVID, influenza, RSV negative.  - Chest x-ray without clear infiltrates, including on repeat.  Blood cultures have been obtained, follow-up.  Procalcitonin not significantly elevated.  - Leukocytosis could be stress response with STEMI / pericarditis. T max 99.7     Acute kidney injury on stage II chronic kidney disease: Baseline creatinine appears to be 1.1-1.2 in the setting of uncontrolled diabetes, although this was 1.5 years ago.  Suspect  CKD has progressed since then.  Suspect secondary to hypoperfusion with bradycardia and hypotension with myocardial infarction  -Received IV fluid in ER but showed signs of fluid overload and on Lasix.  -Creatinine was  up to 1.58, slightly better at 1.36 despite diuresis.  - Follow BMP.     Uncontrolled type 2 diabetes with neuropathy, nephropathy: Hemoglobin A1c has been in the 10-11 range for the past 2 years.  Reports he has had no changes to his insulin regimen over that timeframe  - Lantus 24 units at bedtime  - Prandial insulin at 1 unit per 10 g carbohydrate  - Medium dose sliding scale insulin  - Added Jardiance 10 mg daily  - Will need to get back to primary care clinic for ongoing adjustments to his diabetic regimen.  Consider semaglutide or similar for obesity with diabetes.  Not available in hospital  - Outpatient ophthalmology follow-up for annual eye exams  - Continue aspirin 81 mg daily.  Was already taking this prior to admission  - Increase to high intensity statin  - Metformin currently on hold given contrast administration, resume at discharge    Callus Lt Great toe  -Podiatry consulted and callus excised.     Obstructive sleep apnea: Nonadherent with CPAP  - CPAP as per home regimen ordered.  Discussed importance of correcting nocturnal hypoxia as this causes stress on the heart.  - Encouraged patient to trial CPAP during waking hours to acclimate to mask.         Diet: Low Saturated Fat Na <2400 mg    DVT Prophylaxis: anticoagulation as above   Fox Catheter: Not present  Lines: PRESENT             Cardiac Monitoring: ACTIVE order. Indication: Post- PCI/Angiogram (24 hours)  Code Status: Full Code      Clinically Significant Risk Factors          # Hypochloremia: Lowest Cl = 97 mmol/L in last 2 days, will monitor as appropriate      # Hypoalbuminemia: Lowest albumin = 3.4 g/dL at 7/11/2025  8:25 PM, will monitor as appropriate                            Disposition Plan     Medically Ready for Discharge: Anticipated Tomorrow             Dallas Hill MD  Hospitalist Service  [facility]  Securely message with [employer] (more info)  Text page via [employer] Paging/Directory    ______________________________________________________________________    Interval History     Chart reviewed and patient was seen this morning.   Noted RRT for chest pain/pressure.  EKG showed ST elevation in inferior lateral leads.  Cardiology contacted.  Mascot due to pericarditis.  -Patient remains in sinus otherwise, no subsequent pauses since yesterday.  - Denies shortness of breath.    Physical Exam   Vital Signs: Temp: 98.4  F (36.9  C) Temp src: Oral BP: 131/64 Pulse: 83   Resp: 18 SpO2: 95 % O2 Device: None (Room air) Oxygen Delivery: 2 LPM  Weight: 256 lbs 6.4 oz    General: AAOx3, appears comfortable.  HEENT: PERRLA EOMI. Mucosa moist.   Lungs: Bilateral equal air entry. Clear to auscultation, normal work of breathing.   CVS: S1S2 regular, no tachycardia or murmur.   Abdomen: Soft, NT, ND. BS heard.  MSK: No edema or deformities.  Neuro: AAOX3. CN 2-12 normal. Strength symmetrical.  Skin: No rash.       Medical Decision Making       45 MINUTES SPENT BY ME on the date of service doing chart review, history, exam, documentation & further activities per the note.  MANAGEMENT DISCUSSED with the following over the past 24 hours: patient RN, cardiology and EP       Data     I have personally reviewed the following data over the past 24 hrs:    12.4 (H)  \   13.0 (L)   / 246     138 101 26.4 (H) /  118 (H)   3.6 23 1.36 (H) \     ALT: 22 AST: 31 AP: 84 TBILI: 0.9   ALB: 3.4 (L) TOT PROTEIN: 7.2 LIPASE: N/A     Trop: 1,015 (HH) BNP: N/A     Procal: 0.56 (H) CRP: 254.01 (H) Lactic Acid: N/A         Imaging results reviewed over the past 24 hrs:   Recent Results (from the past 24 hours)   XR Chest Port 1 View    Narrative    EXAM: XR CHEST PORT 1 VIEW  LOCATION: [facility]  DATE: 7/11/2025    INDICATION: RRT hypoxia fever  COMPARISON: None.      Impression    IMPRESSION: Right IJ line tip in SVC. Lungs clear.

## 2025-07-12 NOTE — PROGRESS NOTES
Admission/Transfer from: Cath Lab  2 RN skin assessment completed. Yes  Significant findings include: left toe wounds  WOC Nurse Consult Ordered? Yes

## 2025-07-12 NOTE — PLAN OF CARE
A&O x 4. VSS, RA. Tele: SR w/BBB. Hep gtt @ 1800units/hr. Next Hep Xa at 1736. Denies pain/CP/dizziness/SOB. Up SBA. Diuresing w/IV lasix. R) radial site, unchanged, soft, no hematoma. CMS intact. R) internal jugular sheath dressing, CDI. Will continue w/plan of care.    Addendum: Temp pacer sent to Cath Lab.

## 2025-07-12 NOTE — PROGRESS NOTES
[facility]  Cardiology Progress Note  Date of Service: 07/12/2025  Date of Admission: 7/10/2025    Summary    Bob Echols is a 70 year old male with a history of uncontrolled diabetes, obstructive sleep apnea, hypertension admitted on 7/10/2025. He presents to the emergency department after 1 day of central chest tightness building throughout the day and resulting in lightheadedness, increasing shortness of breath, diaphoresis and presyncope. Found to have an inferior ST elevation myocardial infarction.     Interval 07/12/25    Yesterday evening he had issues with left-sided chest pain, elevated blood pressure and low-grade fever.  An ECG demonstrated improving ST segment elevations in his inferior leads.  Chest x-ray did not demonstrate any consolidation or edema.  His inflammatory markers were elevated.  Blood pressure was managed with as needed hydralazine.    This morning he feels well and reports no chest pain, shortness of breath, lightheadedness or dizziness.  Repeat ECG demonstrates ST elevations diffusely with saddle shape.  Repeat limited echocardiogram this morning demonstrates preserved ejection fraction and trivial pericardial effusion.  His creatinine is improving and is 1.36 today compared to 1.39 and 1.46 yesterday. He has had no pauses or arrhythmia on telemetry.  Blood cultures drawn yesterday evening are negative to date.    Asssessment:     Inferior ST elevation myocardial infarction   Coronary artery disease  S/p PCI of 100% mCx lesion with RENA x1, 7/10/25  Acute hypoxic respiratory failure, improved  Improved symptoms subjectively.  Weaning O2 needs.  Diuresing IV furosemide 40 mg BID.  TN peaked ~1200, now trending down.  Multiple uncontrolled cardiac risk factors.  Inpatient TTE: Echo reviewed, EF 65%, no significant valvular heart disease, trivial pericardial effusion  Tolerating DAPT.  New atrial fibrillation  Satisfactory heart rates  On heparin gtt.  LIBBY  on CKD,  Uncontrolled type 2 diabetes with neuropathy, nephropathy  Started on Jardiance this admission.  Obstructive sleep apnea, untreated.  Hypertension  Hyperlipidemia, , PTA untreated, started on statin this admission.  Elevated CRP  Presented with 2 day of GI symptoms/fever and stuttering anginal symptoms.  Viral panel negative.  Query infection vs post-MI stress response?  _____________________________________________________________    PLAN:    Keep TPW sheath in place for 24 more hours, if no further evidence of forces this can be removed tomorrow  Discussed with EP, review of rhythm is coarse AF vs Aflutter.  Now SR  In light of above events, hold off on OAC for now.  Keep heparin gtt.  PAF likely transient in the setting of acute STEMI > review need for long-term OAC.  Heart rhythm monitor at discharge.  No BB 2/2 transient heart block; no ACEi 2/2 LIBBY - consider starting as outpatient.  Increase Amlodipine to 10 mg daily for better BP control  Continue PO Furosemide 40 mg/daily.  Despite diffuse ST elevation with a saddle shape morphology on today's ECG we will hold off on initiating treatment for pericarditis as he is pain-free, although we will continue to observe him for this.  Inpatient cardiac rehab.  Education and support regarding CAD and diabetes.  Needs aggressive risk factor optimization.  Possible home tomorrow/Monday with close outpatient follow up in cardiology clinic.      Physical Exam   Temp: 98  F (36.7  C) Temp src: Oral BP: (!) 163/93 Pulse: 87   Resp: 18 SpO2: 96 % O2 Device: None (Room air) Oxygen Delivery: 2 LPM    Vitals:    07/10/25 0111 07/11/25 0605 07/12/25 0239   Weight: 121.3 kg (267 lb 6.7 oz) 117 kg (257 lb 14.4 oz) 116.3 kg (256 lb 6.4 oz)     I/O last 3 completed shifts:  In: 450 [P.O.:450]  Out: 1125 [Urine:1125]    Constitutional: Appears stated age, well nourished, NAD.  Respiratory: Non-labored. Lungs CTAB.  Cardiovascular: IRR, normal S1 and S2. No  M/G/R.  Vascular: Peripheral pulses intact and symmetric bilaterally  GI: Soft, non-distended, non-tender, bowel sounds present equally.  Skin: Warm and dry. No rashes, cyanosis, edema.  Musculoskeletal/Extremities: Symmetrical movement. No edema.  Neurologic: No gross focal deficits. Alert, awake, and oriented to all spheres.  Psychiatric: Affect appropriate. Mentation normal.    Data   Recent Labs   Lab 07/12/25  1128 07/12/25  0715 07/12/25  0233 07/11/25  2136 07/11/25  2025 07/11/25  1328 07/11/25  1027 07/11/25  0747 07/11/25  0307 07/10/25  0350 07/10/25  0100   WBC  --  12.4*  --   --  13.8*  --  12.4*  --   --    < > 14.0*   HGB  --  13.0*  --   --  13.2*  --  13.1*  --   --    < > 12.4*   MCV  --  84  --   --  83  --  81  --   --    < > 83   PLT  --  246  --   --  235  --  206  --   --    < > 204   INR  --   --   --   --   --   --   --   --   --   --  1.08   NA  --  138  --   --  135  --   --   --  138   < > 133*   POTASSIUM  --  3.6  --   --  3.6  --   --   --  3.4   < > 4.4   CHLORIDE  --  101  --   --  97*  --   --   --  100   < > 98   CO2  --  23  --   --  24  --   --   --  24   < > 25   BUN  --  26.4*  --   --  29.1*  --   --   --  28.0*   < > 30.9*   CR  --  1.36*  --   --  1.39*  --   --   --  1.46*   < > 1.62*   ANIONGAP  --  14  --   --  14  --   --   --  14   < > 10   PALMIRA  --  9.1  --   --  9.2  --   --   --  8.8   < > 9.0   * 118* 145*   < > 171*   < >  --    < > 141*   < > 268*   ALBUMIN  --   --   --   --  3.4*  --   --   --   --   --  3.4*   PROTTOTAL  --   --   --   --  7.2  --   --   --   --   --  6.4   BILITOTAL  --   --   --   --  0.9  --   --   --   --   --  1.0   ALKPHOS  --   --   --   --  84  --   --   --   --   --  74   ALT  --   --   --   --  22  --   --   --   --   --  23   AST  --   --   --   --  31  --   --   --   --   --  45    < > = values in this interval not displayed.       Recent Labs   Lab 07/12/25  0715 07/11/25  2025 07/11/25  1027   WBC 12.4* 13.8* 12.4*   HGB  13.0* 13.2* 13.1*   HCT 38.9* 39.2* 37.7*   MCV 84 83 81    235 206     Recent Labs   Lab 07/12/25  1128 07/12/25  0715 07/12/25  0233 07/11/25  2136 07/11/25 2025 07/11/25  0747 07/11/25  0307   NA  --  138  --   --  135  --  138   POTASSIUM  --  3.6  --   --  3.6  --  3.4   CHLORIDE  --  101  --   --  97*  --  100   CO2  --  23  --   --  24  --  24   ANIONGAP  --  14  --   --  14  --  14   * 118* 145*   < > 171*   < > 141*   BUN  --  26.4*  --   --  29.1*  --  28.0*   CR  --  1.36*  --   --  1.39*  --  1.46*   GFRESTIMATED  --  56*  --   --  55*  --  52*   PALMIRA  --  9.1  --   --  9.2  --  8.8    < > = values in this interval not displayed.        Patient Active Problem List   Diagnosis    CARDIOVASCULAR SCREENING; LDL GOAL LESS THAN 160    Unstable angina (H)    Dasha (monopolar) single episode or unspecified    Depression    Bipolar I disorder, most recent episode depressed (H)    Percutaneous transluminal coronary angioplasty status     Medications   Current Facility-Administered Medications   Medication Dose Route Frequency Provider Last Rate Last Admin    Percutaneous Coronary Intervention orders placed (this is information for BPA alerting)   Does not apply DOES NOT GO TO MAR MarchFarhana MD        Reason beta blocker order not selected   Does not apply DOES NOT GO TO MAR March, Farhana Daniel MD         Current Facility-Administered Medications   Medication Dose Route Frequency Provider Last Rate Last Admin    [START ON 7/13/2025] amLODIPine (NORVASC) tablet 10 mg  10 mg Oral Daily Teresita Judd MD        aspirin EC tablet 81 mg  81 mg Oral Daily March, Farhana Daniel MD   81 mg at 07/12/25 0857    atorvastatin (LIPITOR) tablet 80 mg  80 mg Oral Daily March, Farhana Daniel MD   80 mg at 07/12/25 0856    empagliflozin (JARDIANCE) tablet 10 mg  10 mg Oral Daily Epifanio Bush MD   10 mg at 07/12/25 0857    furosemide (LASIX) tablet 40 mg  40 mg Oral Daily Jennifer  ROSALBA Santoro CNP   40 mg at 07/12/25 0856    gabapentin (NEURONTIN) capsule 300 mg  300 mg Oral TID Bush, Epifanio Rudd MD   300 mg at 07/12/25 0856    insulin aspart (NovoLOG) injection (RAPID ACTING)   Subcutaneous TID w/meals Dallas Hill MD   3 Units at 07/12/25 1316    insulin aspart (NovoLOG) injection (RAPID ACTING)  1-7 Units Subcutaneous TID AC Bush, Epifanio Rudd MD   1 Units at 07/12/25 1313    insulin aspart (NovoLOG) injection (RAPID ACTING)  1-5 Units Subcutaneous At Bedtime Bush, Epifanio Rudd MD        insulin glargine (LANTUS PEN) injection 24 Units  24 Units Subcutaneous At Bedtime Dallas Hill MD   24 Units at 07/11/25 2139    [Held by provider] lisinopril (ZESTRIL) tablet 40 mg  40 mg Oral Daily Bush, Epifanio Rudd MD        senna-docusate (SENOKOT-S/PERICOLACE) 8.6-50 MG per tablet 1 tablet  1 tablet Oral BID Joel Gonzalez MD   1 tablet at 07/12/25 0856    ticagrelor (BRILINTA) tablet 90 mg  90 mg Oral Q12H MarchFarhana MD   90 mg at 07/12/25 1322       Data   Last 24 hours labs personally reviewed.  Echo 7/12/2025:  1. Normal biventricular size and systolic function. Left ventricular ejection  fraction of 60-65%. Mild LVH.  2. No hemodynamically significant valvular disease.  3. Trivial pericardial effusion.

## 2025-07-12 NOTE — PLAN OF CARE
Goal Outcome Evaluation:      Plan of Care Reviewed With: patient    Overall Patient Progress: no changeOverall Patient Progress: no change        Orientations: Alert and oriented x 4  Vitals/Pain: Slightly febrile,Vital signs stable,Pain level 1/10 on R side of chest.  Tele: SR with ST Elevations  Lines/Drains: R and L PIV,infusing with Heparin x 1950 units/hr  Skin/Wounds: Scattered bruising   GI/: No BM on this shift,passing gas.Adequate urine output.  Labs: Abnormal/Trends, Electrolyte Replacement- Hep XA monitoring,BS ACHS  Ambulation/Assist: Independent  Sleep Quality: Fair  Plan: Continue plan of care,possible discharge today.Repeat ECHO and EKG in am.    RRT callled at 19:30 for chest pain and elevated BP.Blood CS,Urinalysis,EKG done.          Heart Failure Care Map  GOALS TO BE MET BEFORE DISCHARGE:    1. Decrease congestion and/or edema with diuretic therapy to achieve near optimal volume status.     Dyspnea improved: Yes, satisfactory for discharge.   Edema improved: Yes, satisfactory for discharge.        Last 24 hour I/O: Intake/Output Summary (Last 24 hours) at 07/12/25 0619   Last data filed at 07/12/25 0400          Gross per 24 hour  Intake:              450 ml  Output:             1500 ml  Net   :            -1050 ml        Net I/O and Weights since admission:   06/12 0700 - 07/12 0659  In: 1300 [P.O.:1300]  Out: 5275 [Urine:5275]  Net: -3975   -----------------------------------------------------------------------------------                   07/10/25               07/11/25                07/12/25                             0111                   0605                    0239            -----------------------------------------------------------------------------------   Weight: 121.3 kg (267 lb 6.7 oz)117 kg (257 lb 14.4 oz)116.3 kg (256 lb 6.4 oz)  -----------------------------------------------------------------------------------    2.  O2 sats > 90% on room air, or at  prior home O2 therapy level.      Able to wean O2 this shift to keep sats above 90%?: Yes, satisfactory for discharge.   Does patient use Home O2? No          Current oxygenation status:   SpO2: 95 %     O2 Device: None (Room air), Oxygen Delivery: 2 LPM    3.  Tolerates ambulation and mobility near baseline.     Ambulation: Yes, satisfactory for discharge.   Times patient ambulated with staff this shift: 3    Please review the Heart Failure Care Map for additional HF goal outcomes.    Tae Ch RN  7/12/2025

## 2025-07-12 NOTE — CODE/RAPID RESPONSE
[facility]    House ISMAEL RRT Note  7/12/2025   Time Called: 1959    RRT called for: Chest pain, low grade fever, hypoxia    Assessment & Plan     L sided chest pressure suspect 2/2 recent STEMI, hypertension.  Alternatively considered pericarditis  Low grade fever, elevated inflammatory markers.  Considered pericarditis  Acute hypoxic respiratory failure possibly 2/2 low grade fever, tachycardia.  Hypertension.  Mild leukocytosis, persistent.    - Upon arrival, pt lying in bed, awake, alert, in no overt distress with pt's wife present at pt's bedside.  Nursing notes concern that pt endorsing L sided chest pressure, tachycardia, hypoxia, low grade fever.  Pt reports no rhinorrhea, sore throat, cough, SOB, pain with urination, diarrhea.  Pt's VS noting HR 110s, SBP 170s-200s, RR 10s, O2 sats > 92% on 5L O2 via oximyzer, temp 99.7.  Pt reports no recent tick exposure.  Note mild erythema of pt's R lateral hand/wrist, ?previous site of PIV.      INTERVENTIONS:  - Stat EKG obtained prior to arrival - noted improving ischemic changes  - Stat CXR  - UA/UC  - Stat CBC, procalcitonin, CRP, ESR, CMP  - Will order PRN hydralazine for SBP > 180  - Contacted by nursing at 2121 noting CRP elevated  - Contacted Dr. Teresita Judd, cardiology, to discuss concern for possible pericarditis.  Dr. Judd requests for blood cultures x2, repeat echocardiogram and EKG tomorrow AM along with repeat ESR, CRP in AM and notes to defer initiating pt on colchicine at this time  - Provided an update to pt regarding above    At the end of the RRT pt resting in bed, SBP 140s, no current chest pressure after BP improvement    Discussed with and defer further cares to nursing, hospitalist and Dr. Judd, cardiology    Interval History     Bob Echols is a 70 year old male who was admitted on 7/10/2025 for chest pain, SOB, lightheadedness, diaphoresis.    Medical history significant for:   Past Medical History:   Diagnosis  Date    Anxiety     Bursitis, trochanteric     Cataract     Diabetes mellitus (H)     Hyperlipidaemia     Hypertension     Hypertension     Neuropathy     Obesity     Sleep difficulties     Stress at work      Code Status: Full Code    Allergies   No Known Allergies    Physical Exam   Vital Signs with Ranges:  Temp:  [98.3  F (36.8  C)-99.7  F (37.6  C)] 99.3  F (37.4  C)  Pulse:  [] 94  Resp:  [16-20] 16  BP: (126-189)/(61-97) 147/87  SpO2:  [92 %-97 %] 97 %  I/O last 3 completed shifts:  In: 200 [P.O.:200]  Out: 2475 [Urine:2475]    Constitutional: Pt lying in bed, awake, alert, in no overt distress with pt's wife present at pt's bedside  Neck: No upper airway wheezes or stridor noted  Pulmonary: In no apparent respiratory distress, clear to auscultation bilaterally, no crackles or wheezes noted  Cardiovascular: Tachycardic, regular rate and rhythm, normal S1S2, no murmur, rub or gallop noted  GI: Round, soft, nondistended, nontender to palpation, no guarding or rebound tenderness noted  Skin/Integumen: Warm, dry, pink, noted mild erythema of pt's R lateral hand/wrist  Neuro: Awake, alert, clear speech, no obvious focal neuro deficit noted, moving all extremities spontaneously  Psych:  Calm  Extremities: Generalized nonpitting BLE edema    Data     EKG:  Interpreted by ROSALBA De Paz CNP  Time reviewed: 1955  Symptoms at time of EKG: Chest pressure   Rhythm: sinus tachycardia  Rate: 100-110  Axis: Normal  Ectopy: none  Conduction: normal  ST Segments/ T Waves: Diffuse ST changes  Q Waves: III  Comparison to prior: Improved from post     IMAGING: (X-ray/CT/MRI)   Recent Results (from the past 24 hours)   XR Chest Port 1 View    Narrative    EXAM: XR CHEST PORT 1 VIEW  LOCATION: [facility]  DATE: 7/11/2025    INDICATION: RRT hypoxia fever  COMPARISON: None.      Impression    IMPRESSION: Right IJ line tip in SVC. Lungs clear.     CBC with Diff:  Recent Labs   Lab Test  07/11/25  2025 07/10/25  0350 07/10/25  0100   WBC 13.8*   < > 14.0*   HGB 13.2*   < > 12.4*   MCV 83   < > 83      < > 204   INR  --   --  1.08    < > = values in this interval not displayed.      Comprehensive Metabolic Panel:  Recent Labs   Lab 07/11/25 2136 07/11/25 2025   NA  --  135   POTASSIUM  --  3.6   CHLORIDE  --  97*   CO2  --  24   ANIONGAP  --  14   * 171*   BUN  --  29.1*   CR  --  1.39*   GFRESTIMATED  --  55*   PALMIRA  --  9.2   PROTTOTAL  --  7.2   ALBUMIN  --  3.4*   BILITOTAL  --  0.9   ALKPHOS  --  84   AST  --  31   ALT  --  22     Recent Labs   Lab 07/11/25 2025   *      Latest Reference Range & Units 07/11/25 20:25   Procalcitonin <0.50 ng/mL 0.56 (H)   (H): Data is abnormally high     Latest Reference Range & Units 07/11/25 20:25   CRP Inflammation <5.00 mg/L 251.59 (H)   (H): Data is abnormally high    UA:  Recent Labs   Lab 07/11/25 2120   COLOR Light Yellow   APPEARANCE Clear   URINEGLC >=1000*   URINEBILI Negative   URINEKETONE 10*   SG 1.026   UBLD Trace*   URINEPH 5.5   PROTEIN 50*   NITRITE Negative   LEUKEST Negative   RBCU 2   WBCU <1     Time Spent on this Encounter   I spent 45 minutes of critical care time on the unit/floor managing the care of Bob Echols. Upon evaluation, this patient had a high probability of imminent or life-threatening deterioration due to myriad symptoms, which required my direct attention, intervention, and personal management. 100% of my time was spent at the bedside counseling the patient and/or coordinating care regarding services listed in this note.    ROSALBA De Paz [facility]  Hospitalist-House ISMAEL  Hospitalist Service  Securely message with [employer] (more info)  Text page via [facility] Paging/Directory

## 2025-07-13 ENCOUNTER — APPOINTMENT (OUTPATIENT)
Dept: OCCUPATIONAL THERAPY | Facility: CLINIC | Age: 70
DRG: 321 | End: 2025-07-13
Payer: COMMERCIAL

## 2025-07-13 LAB
ANION GAP SERPL CALCULATED.3IONS-SCNC: 15 MMOL/L (ref 7–15)
BASOPHILS # BLD AUTO: 0 10E3/UL (ref 0–0.2)
BASOPHILS NFR BLD AUTO: 0 %
BUN SERPL-MCNC: 24.8 MG/DL (ref 8–23)
CALCIUM SERPL-MCNC: 8.9 MG/DL (ref 8.8–10.4)
CHLORIDE SERPL-SCNC: 98 MMOL/L (ref 98–107)
CREAT SERPL-MCNC: 1.24 MG/DL (ref 0.67–1.17)
EGFRCR SERPLBLD CKD-EPI 2021: 63 ML/MIN/1.73M2
EOSINOPHIL # BLD AUTO: 0.2 10E3/UL (ref 0–0.7)
EOSINOPHIL NFR BLD AUTO: 2 %
ERYTHROCYTE [DISTWIDTH] IN BLOOD BY AUTOMATED COUNT: 13.2 % (ref 10–15)
GLUCOSE BLDC GLUCOMTR-MCNC: 110 MG/DL (ref 70–99)
GLUCOSE BLDC GLUCOMTR-MCNC: 130 MG/DL (ref 70–99)
GLUCOSE BLDC GLUCOMTR-MCNC: 135 MG/DL (ref 70–99)
GLUCOSE BLDC GLUCOMTR-MCNC: 180 MG/DL (ref 70–99)
GLUCOSE SERPL-MCNC: 84 MG/DL (ref 70–99)
HCO3 SERPL-SCNC: 24 MMOL/L (ref 22–29)
HCT VFR BLD AUTO: 36.1 % (ref 40–53)
HGB BLD-MCNC: 12.5 G/DL (ref 13.3–17.7)
IMM GRANULOCYTES # BLD: 0 10E3/UL
IMM GRANULOCYTES NFR BLD: 0 %
LYMPHOCYTES # BLD AUTO: 1.4 10E3/UL (ref 0.8–5.3)
LYMPHOCYTES NFR BLD AUTO: 15 %
MCH RBC QN AUTO: 28 PG (ref 26.5–33)
MCHC RBC AUTO-ENTMCNC: 34.6 G/DL (ref 31.5–36.5)
MCV RBC AUTO: 81 FL (ref 78–100)
MONOCYTES # BLD AUTO: 0.8 10E3/UL (ref 0–1.3)
MONOCYTES NFR BLD AUTO: 9 %
NEUTROPHILS # BLD AUTO: 6.6 10E3/UL (ref 1.6–8.3)
NEUTROPHILS NFR BLD AUTO: 74 %
NRBC # BLD AUTO: 0 10E3/UL
NRBC BLD AUTO-RTO: 0 /100
PLATELET # BLD AUTO: 241 10E3/UL (ref 150–450)
POTASSIUM SERPL-SCNC: 3.3 MMOL/L (ref 3.4–5.3)
RBC # BLD AUTO: 4.46 10E6/UL (ref 4.4–5.9)
SODIUM SERPL-SCNC: 137 MMOL/L (ref 135–145)
UFH PPP CHRO-ACNC: 0.48 IU/ML (ref ?–1.1)
WBC # BLD AUTO: 9 10E3/UL (ref 4–11)

## 2025-07-13 PROCEDURE — 250N000013 HC RX MED GY IP 250 OP 250 PS 637: Performed by: INTERNAL MEDICINE

## 2025-07-13 PROCEDURE — 250N000011 HC RX IP 250 OP 636: Performed by: INTERNAL MEDICINE

## 2025-07-13 PROCEDURE — 93005 ELECTROCARDIOGRAM TRACING: CPT

## 2025-07-13 PROCEDURE — 36415 COLL VENOUS BLD VENIPUNCTURE: CPT | Performed by: HOSPITALIST

## 2025-07-13 PROCEDURE — 93010 ELECTROCARDIOGRAM REPORT: CPT | Performed by: INTERNAL MEDICINE

## 2025-07-13 PROCEDURE — 250N000011 HC RX IP 250 OP 636: Performed by: HOSPITALIST

## 2025-07-13 PROCEDURE — 99232 SBSQ HOSP IP/OBS MODERATE 35: CPT | Performed by: HOSPITALIST

## 2025-07-13 PROCEDURE — 250N000013 HC RX MED GY IP 250 OP 250 PS 637: Performed by: NURSE PRACTITIONER

## 2025-07-13 PROCEDURE — 99232 SBSQ HOSP IP/OBS MODERATE 35: CPT | Performed by: INTERNAL MEDICINE

## 2025-07-13 PROCEDURE — 85520 HEPARIN ASSAY: CPT | Performed by: HOSPITALIST

## 2025-07-13 PROCEDURE — 250N000013 HC RX MED GY IP 250 OP 250 PS 637

## 2025-07-13 PROCEDURE — 210N000001 HC R&B IMCU HEART CARE

## 2025-07-13 PROCEDURE — 97110 THERAPEUTIC EXERCISES: CPT | Mod: GO | Performed by: OCCUPATIONAL THERAPIST

## 2025-07-13 PROCEDURE — 85004 AUTOMATED DIFF WBC COUNT: CPT | Performed by: HOSPITALIST

## 2025-07-13 PROCEDURE — 80048 BASIC METABOLIC PNL TOTAL CA: CPT | Performed by: HOSPITALIST

## 2025-07-13 RX ORDER — TORSEMIDE 20 MG/1
20 TABLET ORAL DAILY
Status: DISCONTINUED | OUTPATIENT
Start: 2025-07-14 | End: 2025-07-14 | Stop reason: HOSPADM

## 2025-07-13 RX ORDER — METOPROLOL TARTRATE 25 MG/1
25 TABLET, FILM COATED ORAL 2 TIMES DAILY
Status: DISCONTINUED | OUTPATIENT
Start: 2025-07-13 | End: 2025-07-13

## 2025-07-13 RX ORDER — HEPARIN SODIUM 10000 [USP'U]/100ML
0-5000 INJECTION, SOLUTION INTRAVENOUS CONTINUOUS
Status: DISCONTINUED | OUTPATIENT
Start: 2025-07-13 | End: 2025-07-14

## 2025-07-13 RX ADMIN — ATORVASTATIN CALCIUM 80 MG: 80 TABLET, FILM COATED ORAL at 09:59

## 2025-07-13 RX ADMIN — AMLODIPINE BESYLATE 10 MG: 10 TABLET ORAL at 10:00

## 2025-07-13 RX ADMIN — FUROSEMIDE 40 MG: 40 TABLET ORAL at 09:59

## 2025-07-13 RX ADMIN — INSULIN GLARGINE 24 UNITS: 100 INJECTION, SOLUTION SUBCUTANEOUS at 21:21

## 2025-07-13 RX ADMIN — HEPARIN SODIUM 2400 UNITS/HR: 10000 INJECTION, SOLUTION INTRAVENOUS at 18:01

## 2025-07-13 RX ADMIN — GABAPENTIN 300 MG: 300 CAPSULE ORAL at 09:59

## 2025-07-13 RX ADMIN — TICAGRELOR 90 MG: 90 TABLET, FILM COATED ORAL at 01:36

## 2025-07-13 RX ADMIN — MELATONIN TAB 3 MG 3 MG: 3 TAB at 23:05

## 2025-07-13 RX ADMIN — TICAGRELOR 90 MG: 90 TABLET, FILM COATED ORAL at 15:36

## 2025-07-13 RX ADMIN — GABAPENTIN 300 MG: 300 CAPSULE ORAL at 21:21

## 2025-07-13 RX ADMIN — HEPARIN SODIUM 2400 UNITS/HR: 10000 INJECTION, SOLUTION INTRAVENOUS at 00:14

## 2025-07-13 RX ADMIN — HEPARIN SODIUM 2400 UNITS/HR: 10000 INJECTION, SOLUTION INTRAVENOUS at 10:12

## 2025-07-13 RX ADMIN — ASPIRIN 81 MG: 81 TABLET, DELAYED RELEASE ORAL at 09:59

## 2025-07-13 RX ADMIN — GABAPENTIN 300 MG: 300 CAPSULE ORAL at 15:36

## 2025-07-13 RX ADMIN — EMPAGLIFLOZIN 10 MG: 10 TABLET, FILM COATED ORAL at 09:59

## 2025-07-13 RX ADMIN — METOPROLOL TARTRATE 12.5 MG: 25 TABLET, FILM COATED ORAL at 11:25

## 2025-07-13 RX ADMIN — METOPROLOL TARTRATE 12.5 MG: 25 TABLET, FILM COATED ORAL at 21:21

## 2025-07-13 ASSESSMENT — ACTIVITIES OF DAILY LIVING (ADL)
ADLS_ACUITY_SCORE: 38

## 2025-07-13 NOTE — PROGRESS NOTES
[facility]    Medicine Progress Note - Hospitalist Service    Date of Admission:  7/10/2025    Assessment & Plan     Bob Echols is a 70 year old male with a history of uncontrolled diabetes, obstructive sleep apnea, hypertension admitted on 7/10/2025. He presents to the emergency department after 1 day of central chest tightness building throughout the day and resulting in lightheadedness, increasing shortness of breath, diaphoresis and presyncope.  Found to have an inferior ST elevation myocardial infarction.     Inferior ST elevation myocardial infarction  Hypertension  Recurrent chest pain, suspect pericarditis   2 days of GI upset, 1 day of cough, chest tightness with shortness of breath and subsequent diaphoresis and presyncope.  Distal circumflex occlusion on angiogram now stented.  Also noted 70% stenosis in second marginal, 50% in proximal to mid LAD and 70% in right posterior lateral branch.  Does not smoke, intermittent infrequent alcohol use.  -On aspirin and Brilinta post PCI, continue. Since he will be on anticoagulation as well, antiplatelet regimen per cardiology.  -High intensity statin, Lipitor 80 Mg daily.  Jardiance initiated this stay.  -PTA amlodipine resumed.  Holding lisinopril, plan is to resume as outpatient once creatinine improves per cardiology  -Bradycardic with pulse earlier, resolved, low-dose metoprolol started.  -TTE shows LVEF 55 to 60%, mild concentric LVH, mild AS moderate biatrial enlargement, mild MR.  -Cardiology considering outpatient stress MRI     -Recurrence of chest pain with ST elevation and increased troponin on 7/11.  CRP as well elevated.  Pain is better when he leans forward.  Suspect pericarditis.  Repeat limited TTE shows LVEF 60-65%, no hemodynamically significant valvular disease and trivial pericardial effusion.  Since pain resolved, no plan to restart colchicine per cardiology.     Paroxysmal A-fib/flutter with bradycardia  10  seconds asystole with LOC after removal of pacing wire, ?  Vasovagal  Post angiogram.  Temporary pacing wire was placed.  Patient with intermittent paced rhythm initially.  Did not require pacing overnight.  Given this TPW was removed but shortly after procedure, after 20 seconds, had 10 seconds of asystole and patient lost consciousness.  Subsequently on sinus rhythm  - Heparin initiated, plan to transition to apixaban, defer to cardiology.  - Was not on rate controlling medication at that time, monitored and no recurrence. Remains sinus.  Low-dose metoprolol initiated per cardiology as above.    Acute hypoxic respiratory failure: Suspect secondary to STEMI with heart failure.  Acute diastolic heart failure: Thought to be hyperdynamic on LV gram in the Cath Lab but requiring 6 L facemask oxygen to maintain oxygen saturations.  BNP elevated.  May be related to bradycardia and systemic hypoperfusion  - Temporary pacemaker in place, TPW wire removed 7/11  - Diuresed with IV Lasix initially, transitioned to p.o. since, remains on room air.  - Daily weight, fluid restriction      Possible acute viral illness, could be pericarditis: Temperature of 100.3 measured at home.  GI upset for 2 days, cough for 1 day.  Uncertain if patient's cough and GI upset were actually anginal equivalents in the setting of inferior coronary disease.  Symptoms are resolved currently.  - COVID, influenza, RSV negative.  - Chest x-ray without clear infiltrates, including on repeat.  Blood cultures have been obtained, follow-up.  Procalcitonin not significantly elevated.  - Leukocytosis could be stress response with STEMI / pericarditis.  Resolved.     Acute kidney injury on stage II chronic kidney disease: Baseline creatinine appears to be 1.1-1.2 in the setting of uncontrolled diabetes, although this was 1.5 years ago.  Suspect  CKD has progressed since then.  Suspect secondary to hypoperfusion with bradycardia and hypotension with myocardial  infarction  -Received IV fluid in ER but showed signs of fluid overload and on Lasix.  -Creatinine was up to 1.58, slightly better at 1. 24 despite diuresis.  - Follow BMP.     Uncontrolled type 2 diabetes with neuropathy, nephropathy: Hemoglobin A1c has been in the 10-11 range for the past 2 years.  Reports he has had no changes to his insulin regimen over that timeframe  - Lantus 24 units at bedtime  - Prandial insulin at 1 unit per 10 g carbohydrate  - Medium dose sliding scale insulin  - Added Jardiance 10 mg daily  - Will need to get back to primary care clinic for ongoing adjustments to his diabetic regimen.  Consider semaglutide or similar for obesity with diabetes.  Not available in hospital  - Outpatient ophthalmology follow-up for annual eye exams  - Continue aspirin 81 mg daily.  Was already taking this prior to admission  - Increase to high intensity statin  - Metformin currently on hold given contrast administration, resume at discharge    Callus Lt Great toe  - Podiatry consulted and callus excised.     Obstructive sleep apnea: Nonadherent with CPAP  - CPAP as per home regimen ordered.  Discussed importance of correcting nocturnal hypoxia as this causes stress on the heart.  - Encouraged patient to trial CPAP during waking hours to acclimate to mask.         Diet: Low Saturated Fat Na <2400 mg    DVT Prophylaxis: anticoagulation as above   Fox Catheter: Not present  Lines: PRESENT             Cardiac Monitoring: ACTIVE order. Indication: Post- PCI/Angiogram (24 hours)  Code Status: Full Code      Clinically Significant Risk Factors                                  Disposition Plan     Medically Ready for Discharge: Anticipated Tomorrow when okay with cardiology             Dallas Hill MD  Hospitalist Service  [facility]  Securely message with [employer] (more info)  Text page via [employer] Paging/Directory    ______________________________________________________________________    Interval History     Chart reviewed and patient was seen this morning.  Up and ambulating in the room, mildly tachycardic to 130s getting up and walking, denied chest pain or shortness of breath.  Denies dizziness    Physical Exam   Vital Signs: Temp: 97.7  F (36.5  C) Temp src: Oral BP: 121/81 Pulse: 84   Resp: 18 SpO2: 95 % O2 Device: None (Room air)    Weight: 254 lbs 9.6 oz    General: AAOx3, appears comfortable.  HEENT: PERRLA EOMI. Mucosa moist. TPW sheath in Rt IJ  Lungs: Bilateral equal air entry. Clear to auscultation, normal work of breathing.   CVS: S1S2 regular, no tachycardia or murmur.   Abdomen: Soft, NT, ND. BS heard.  MSK: No edema or deformities.  Neuro: AAOX3. CN 2-12 normal. Strength symmetrical.  Skin: No rash.       Medical Decision Making       35 MINUTES SPENT BY ME on the date of service doing chart review, history, exam, documentation & further activities per the note.  MANAGEMENT DISCUSSED with the following over the past 24 hours: patient RN, cardiology and EP       Data     I have personally reviewed the following data over the past 24 hrs:    9.0  \   12.5 (L)   / 241     137 98 24.8 (H) /  180 (H)   3.3 (L) 24 1.24 (H) \       Imaging results reviewed over the past 24 hrs:   Recent Results (from the past 24 hours)   Echo Limited    Narrative    [id number]  [id number]  [id number]  [id number]^DESTINY^MARSHA^JUAN     [facility]  Echocardiography Laboratory  [address] [place] [address]     Name: HOOD MARTÍNEZ  MRN: [id number]  : 1955  Study Date: 2025 12:43 PM  Age: 70 yrs  Gender: Male  Patient Location: [facility]  Reason For Study: Pericarditis  Ordering Physician: MARSHA DIXON  Performed By: Mamta Bitzer, RDCS     BSA: 2.4 m2  Height: 72 in  Weight: 257 lb  HR: 85  BP: 163/93  mmHg  ______________________________________________________________________________  Procedure  Limited Echocardiogram with two-dimensional, color and spectral Doppler.  Definity (NDC #73729-248) given intravenously.  ______________________________________________________________________________  Interpretation Summary     1. Normal biventricular size and systolic function. Left ventricular ejection  fraction of 60-65%. Mild LVH.  2. No hemodynamically significant valvular disease.  3. Trivial pericardial effusion.  ______________________________________________________________________________  ______________________________________________________________________________  MMode/2D Measurements & Calculations  IVSd: 1.2 cm  LVIDd: 5.2 cm  LVIDs: 3.7 cm  LVPWd: 1.3 cm  FS: 28.8 %  LV mass(C)d: 263.4 grams  LV mass(C)dI: 111.2 grams/m2  RWT: 0.50     ______________________________________________________________________________  Report approved by: Yash Estevez MD on 07/12/2025 03:20 PM

## 2025-07-13 NOTE — PROGRESS NOTES
[facility]  Cardiology Progress Note  Date of Service: 07/13/2025  Date of Admission: 7/10/2025    Summary    Bob Echols is a 70 year old male with a history of uncontrolled diabetes, obstructive sleep apnea, hypertension admitted on 7/10/2025. He presented to the emergency department after 1 day of central chest tightness building throughout the day and resulting in lightheadedness, increasing shortness of breath, diaphoresis and presyncope. Found to have an inferior ST elevation myocardial infarction.  PCI to an occluded distal left circumflex.  He was also noted to have diffuse coronary artery disease with small caliber distal vessels consistent with long-term poorly controlled type 2 diabetes mellitus.  During the procedure he had complete heart block and had a temporary pacemaker placed.  Heart rates improved and his pacing wire was removed.    On 7/11 he had left-sided chest pain, elevated blood pressure and a low-grade fever.  An ECG demonstrated improving ST segment elevations in his inferior leads.  Chest x-ray did not demonstrate any consolidation or edema.  His inflammatory markers were elevated stable.  Blood pressure was managed with as needed hydralazine.  Repeat ECG yesterday demonstrated ST elevations diffusely with saddle shape.  Repeat limited echocardiogram yesterday demonstrates preserved ejection fraction and trivial pericardial effusion.  He had no chest pain and so treatment for pericarditis was deferred.  Cultures drawn on 7/11 are negative.    Of note he was started on IV heparin for transient episode of atrial fibrillation 7/10 that lasted approximately an hour.  He has not had any recurrent atrial fibrillation since then.    Interval history  His creatinine is improving and is 1.24 today from 1.36 yesterday.  He has had no pauses or arrhythmia on telemetry.  He feels well and reports no chest pain, lightheadedness/dizziness or shortness of  breath.    Asssessment:     Inferior ST elevation myocardial infarction   Coronary artery disease  S/p PCI of 100% mCx lesion with RENA x1, 7/10/25  Acute hypoxic respiratory failure, improved  Improved symptoms subjectively.  Weaning O2 needs.  Diuresed with IV furosemide   TN peaked ~1200, now trending down.  Multiple uncontrolled cardiac risk factors.  Inpatient TTE: Echo reviewed, EF 65%, no significant valvular heart disease, trivial pericardial effusion  Tolerating DAPT.  Transient atrial fibrillation in the context of STEMI, resolved  Satisfactory heart rates in normal sinus rhythm now  On heparin gtt.  LIBBY on CKD,  Uncontrolled type 2 diabetes with neuropathy, nephropathy  Started on Jardiance this admission.  Obstructive sleep apnea, untreated.  Hypertension  Hyperlipidemia, , PTA untreated, started on statin this admission.  Elevated CRP  Presented with 2 day of GI symptoms/fever and stuttering anginal symptoms.  Viral panel negative.  Query infection vs post-MI stress response?  _____________________________________________________________    PLAN:    Remove TPW sheath  Discussed with EP, review of rhythm on 7/10 is coarse AF vs Aflutter.  Now SR  PAF likely transient in the setting of acute STEMI > review need for long-term OAC.  Stop IV heparin today  Heart rhythm monitor at discharge.  Start metoprolol tartrate 12.5 mg twice daily as treatment for coronary disease   Holding ACEi 2/2 LIBBY - consider starting tomorrow/as outpatient depending on his creatinine  Increase Amlodipine to 10 mg daily for better BP control  Switch to PO Torsemide 20 mg/daily.  Continue aspirin.lifelong, atorvastatin 80 mg once daily, Jardiance 10 mg once daily, and ticagrelor for at least 12 months  Despite diffuse ST elevation with a saddle shape morphology on today's ECG we will hold off on initiating treatment for pericarditis as he is pain-free, although we will continue to observe him for this.  Inpatient cardiac  rehab.  Education and support regarding CAD and diabetes.  Needs aggressive risk factor optimization.  Possible home Monday/Tuesday with close outpatient follow up in cardiology clinic.      Physical Exam   Temp: 97.7  F (36.5  C) Temp src: Oral BP: 121/81 Pulse: 84   Resp: 18 SpO2: 95 % O2 Device: None (Room air)      Vitals:    07/11/25 0605 07/12/25 0239 07/13/25 0449   Weight: 117 kg (257 lb 14.4 oz) 116.3 kg (256 lb 6.4 oz) 115.5 kg (254 lb 9.6 oz)     I/O last 3 completed shifts:  In: 240 [P.O.:240]  Out: 1025 [Urine:1025]    Constitutional: Appears stated age, well nourished, NAD.  Respiratory: Non-labored. Lungs CTAB.  Cardiovascular: IRR, normal S1 and S2. No M/G/R.  Vascular: Peripheral pulses intact and symmetric bilaterally  GI: Soft, non-distended, non-tender, bowel sounds present equally.  Skin: Warm and dry. No rashes, cyanosis, edema.  Musculoskeletal/Extremities: Symmetrical movement. No edema.  Neurologic: No gross focal deficits. Alert, awake, and oriented to all spheres.  Psychiatric: Affect appropriate. Mentation normal.    Data   Recent Labs   Lab 07/13/25  1117 07/13/25  0752 07/13/25  0555 07/12/25  1706 07/12/25  1544 07/12/25  1128 07/12/25  0715 07/11/25  2136 07/11/25  2025 07/10/25  0350 07/10/25  0100   WBC  --   --  9.0  --  11.3*  --  12.4*  --  13.8*   < > 14.0*   HGB  --   --  12.5*  --  12.2*  --  13.0*  --  13.2*   < > 12.4*   MCV  --   --  81  --  80  --  84  --  83   < > 83   PLT  --   --  241  --  225  --  246  --  235   < > 204   INR  --   --   --   --   --   --   --   --   --   --  1.08   NA  --   --  137  --   --   --  138  --  135   < > 133*   POTASSIUM  --   --  3.3*  --   --   --  3.6  --  3.6   < > 4.4   CHLORIDE  --   --  98  --   --   --  101  --  97*   < > 98   CO2  --   --  24  --   --   --  23  --  24   < > 25   BUN  --   --  24.8*  --   --   --  26.4*  --  29.1*   < > 30.9*   CR  --   --  1.24*  --   --   --  1.36*  --  1.39*   < > 1.62*   ANIONGAP  --   --  15  --    --   --  14  --  14   < > 10   PALMIRA  --   --  8.9  --   --   --  9.1  --  9.2   < > 9.0   * 135* 84   < >  --    < > 118*   < > 171*   < > 268*   ALBUMIN  --   --   --   --   --   --   --   --  3.4*  --  3.4*   PROTTOTAL  --   --   --   --   --   --   --   --  7.2  --  6.4   BILITOTAL  --   --   --   --   --   --   --   --  0.9  --  1.0   ALKPHOS  --   --   --   --   --   --   --   --  84  --  74   ALT  --   --   --   --   --   --   --   --  22  --  23   AST  --   --   --   --   --   --   --   --  31  --  45    < > = values in this interval not displayed.       Recent Labs   Lab 07/13/25  0555 07/12/25  1544 07/12/25  0715   WBC 9.0 11.3* 12.4*   HGB 12.5* 12.2* 13.0*   HCT 36.1* 35.0* 38.9*   MCV 81 80 84    225 246     Recent Labs   Lab 07/13/25  1117 07/13/25  0752 07/13/25  0555 07/12/25  1128 07/12/25  0715 07/11/25  2136 07/11/25 2025   NA  --   --  137  --  138  --  135   POTASSIUM  --   --  3.3*  --  3.6  --  3.6   CHLORIDE  --   --  98  --  101  --  97*   CO2  --   --  24  --  23  --  24   ANIONGAP  --   --  15  --  14  --  14   * 135* 84   < > 118*   < > 171*   BUN  --   --  24.8*  --  26.4*  --  29.1*   CR  --   --  1.24*  --  1.36*  --  1.39*   GFRESTIMATED  --   --  63  --  56*  --  55*   PALMIRA  --   --  8.9  --  9.1  --  9.2    < > = values in this interval not displayed.        Patient Active Problem List   Diagnosis    CARDIOVASCULAR SCREENING; LDL GOAL LESS THAN 160    Unstable angina (H)    Dasha (monopolar) single episode or unspecified    Depression    Bipolar I disorder, most recent episode depressed (H)    Percutaneous transluminal coronary angioplasty status     Medications   Current Facility-Administered Medications   Medication Dose Route Frequency Provider Last Rate Last Admin    heparin 25,000 units in 0.45% NaCl 250 mL ANTICOAGULANT infusion  0-5,000 Units/hr Intravenous Continuous Teresita Judd MD 24 mL/hr at 07/13/25 1012 2,400 Units/hr at 07/13/25 1012     Percutaneous Coronary Intervention orders placed (this is information for BPA alerting)   Does not apply DOES NOT GO TO MAR MarchFarhana MD        Reason beta blocker order not selected   Does not apply DOES NOT GO TO MAR March, Farhana Daniel MD         Current Facility-Administered Medications   Medication Dose Route Frequency Provider Last Rate Last Admin    amLODIPine (NORVASC) tablet 10 mg  10 mg Oral Daily Teresita Judd MD   10 mg at 07/13/25 1000    aspirin EC tablet 81 mg  81 mg Oral Daily MarchFarhana MD   81 mg at 07/13/25 0959    atorvastatin (LIPITOR) tablet 80 mg  80 mg Oral Daily March, Farhana Daniel MD   80 mg at 07/13/25 0959    empagliflozin (JARDIANCE) tablet 10 mg  10 mg Oral Daily [place], Epifanio Rudd MD   10 mg at 07/13/25 0959    furosemide (LASIX) tablet 40 mg  40 mg Oral Daily Maude Rodriguez APRN CNP   40 mg at 07/13/25 0959    gabapentin (NEURONTIN) capsule 300 mg  300 mg Oral TID [place], Epifanio Rudd MD   300 mg at 07/13/25 0959    insulin aspart (NovoLOG) injection (RAPID ACTING)   Subcutaneous TID w/meals Dallas Hill MD   3 Units at 07/12/25 1856    insulin aspart (NovoLOG) injection (RAPID ACTING)  1-7 Units Subcutaneous TID AC Bush, Epifanio Rudd MD   1 Units at 07/12/25 1313    insulin aspart (NovoLOG) injection (RAPID ACTING)  1-5 Units Subcutaneous At Bedtime Bush, Epifanio Rudd MD        insulin glargine (LANTUS PEN) injection 24 Units  24 Units Subcutaneous At Bedtime Dallas Hill MD   24 Units at 07/12/25 2101    [Held by provider] lisinopril (ZESTRIL) tablet 40 mg  40 mg Oral Daily Bush, Epifanio Rudd MD        metoprolol tartrate (LOPRESSOR) half-tab 12.5 mg  12.5 mg Oral BID Teresita Judd MD   12.5 mg at 07/13/25 1125    senna-docusate (SENOKOT-S/PERICOLACE) 8.6-50 MG per tablet 1 tablet  1 tablet Oral BID Joel Gonzalez MD   1 tablet at 07/12/25 0856    ticagrelor (BRILINTA) tablet 90 mg  90 mg Oral Q12H Farhana Clark  MD Fredy   90 mg at 07/13/25 0136       Data   Last 24 hours labs personally reviewed.  Echo 7/12/2025:  1. Normal biventricular size and systolic function. Left ventricular ejection  fraction of 60-65%. Mild LVH.  2. No hemodynamically significant valvular disease.  3. Trivial pericardial effusion.

## 2025-07-13 NOTE — PLAN OF CARE
Goal Outcome Evaluation:      Plan of Care Reviewed With: patient    Overall Patient Progress: improvingOverall Patient Progress: improving    Patient Name: [place]  MRN: [id number]  Date of Admission: 7/10/2025  Reason for Admission: Chest pain  Level of Care: [facility] [place]    Vitals:   BP Readings from Last 1 Encounters:  07/13/25 : (!) 147/81    Pulse Readings from Last 1 Encounters:  07/13/25 : 83    Wt Readings from Last 1 Encounters:  07/12/25 : 116.3 kg (256 lb 6.4 oz)    Ht Readings from Last 1 Encounters:  01/15/22 : 1.829 m (6')    Estimated body mass index is 34.77 kg/m  as calculated from the following:    Height as of 1/15/22: 1.829 m (6').    Weight as of this encounter: 116.3 kg (256 lb 6.4 oz).  Temp Readings from Last 1 Encounters:  07/13/25 : 98  F (36.7  C) (Oral)      Pain: Pain goal:0 Pain Rating: Denies Effective pain medication/regimen: None        Assessment:    Orientations: Alert and oriented x 4  Vitals/Pain: Slightly febrile,Vital signs stable,Pain level 1/10 on R side of chest.  Tele: SR with ST Elevations  Lines/Drains: R and L PIV,infusing with Heparin x 2400 units/hr,recheck at 12 NN  Skin/Wounds: Scattered bruising   GI/: No BM on this shift,passing gas.Adequate urine output.  Labs: Abnormal/Trends, Electrolyte Replacement- Hep XA monitoring,BS ACHS  Ambulation/Assist: Independent  Sleep Quality: Fair  Plan: Continue plan of care      Heart Failure Care Map  GOALS TO BE MET BEFORE DISCHARGE:    1. Decrease congestion and/or edema with diuretic therapy to achieve near optimal volume status.     Dyspnea improved: Yes, satisfactory for discharge.   Edema improved: Yes, satisfactory for discharge.        Last 24 hour I/O: Intake/Output Summary (Last 24 hours) at 07/13/25 0622   Last data filed at 07/13/25 0400          Gross per 24 hour  Intake:              240 ml  Output:             1025 ml  Net   :             -785 ml        Net I/O and Weights since admission:   06/13 0700 - 07/13  0659  In: 1540 [P.O.:1540]  Out: 6300 [Urine:6300]  Net: -4760   ------------------------------------------------------------------------------------------------------------                   07/10/25               07/11/25                07/12/25                07/13/25                             0111                   0605                    0239                    0449            ------------------------------------------------------------------------------------------------------------   Weight: 121.3 kg (267 lb 6.7 oz)117 kg (257 lb 14.4 oz)116.3 kg (256 lb 6.4 oz)115.5 kg (254 lb 9.6 oz)  ------------------------------------------------------------------------------------------------------------    2.  O2 sats > 90% on room air, or at prior home O2 therapy level.      Able to wean O2 this shift to keep sats above 90%?: Yes, satisfactory for discharge.   Does patient use Home O2? No          Current oxygenation status:   SpO2: 95 %     O2 Device: None (Room air),      3.  Tolerates ambulation and mobility near baseline.     Ambulation: No, further care required to meet this goal   Times patient ambulated with staff this shift: 1    Please review the Heart Failure Care Map for additional HF goal outcomes.    Tae Ch RN  7/13/2025

## 2025-07-13 NOTE — PLAN OF CARE
A&O x 4. VSS. RA. Tele: Afib CVR. Denies pain/CP/SOB. Up SBA. Hep gtt @ 2400units/hr, next Hep Xa @ 7219. R) internal jugular removed today, dressing CDI. Ambulated in hallway, tolerating well. Will continue w/plan of care.

## 2025-07-13 NOTE — PLAN OF CARE
A&O x 4. VSS. RA. Tele: SR. Denies pain/CP/SOB. Up independent in room. Hep gtt @ 2250, next Hep Xa @ 2142. Ambulated in hallway, tolerated well. R) radial site, unchanged. CMS intact. RIJ intact, dressing CDI. Will continue w/plan of care.

## 2025-07-14 VITALS
DIASTOLIC BLOOD PRESSURE: 85 MMHG | TEMPERATURE: 98.3 F | OXYGEN SATURATION: 95 % | SYSTOLIC BLOOD PRESSURE: 128 MMHG | HEIGHT: 72 IN | HEART RATE: 93 BPM | WEIGHT: 254.6 LBS | BODY MASS INDEX: 34.48 KG/M2 | RESPIRATION RATE: 18 BRPM

## 2025-07-14 LAB
ANION GAP SERPL CALCULATED.3IONS-SCNC: 11 MMOL/L (ref 7–15)
ATRIAL RATE - MUSE: 187 BPM
BUN SERPL-MCNC: 24.5 MG/DL (ref 8–23)
CALCIUM SERPL-MCNC: 9.2 MG/DL (ref 8.8–10.4)
CHLORIDE SERPL-SCNC: 102 MMOL/L (ref 98–107)
CREAT SERPL-MCNC: 1.22 MG/DL (ref 0.67–1.17)
CRP SERPL-MCNC: 157.36 MG/L
DIASTOLIC BLOOD PRESSURE - MUSE: NORMAL MMHG
EGFRCR SERPLBLD CKD-EPI 2021: 64 ML/MIN/1.73M2
GLUCOSE BLDC GLUCOMTR-MCNC: 106 MG/DL (ref 70–99)
GLUCOSE SERPL-MCNC: 89 MG/DL (ref 70–99)
HCO3 SERPL-SCNC: 24 MMOL/L (ref 22–29)
INTERPRETATION ECG - MUSE: NORMAL
P AXIS - MUSE: NORMAL DEGREES
POTASSIUM SERPL-SCNC: 3.4 MMOL/L (ref 3.4–5.3)
POTASSIUM SERPL-SCNC: 4 MMOL/L (ref 3.4–5.3)
PR INTERVAL - MUSE: NORMAL MS
QRS DURATION - MUSE: 92 MS
QT - MUSE: 404 MS
QTC - MUSE: 488 MS
R AXIS - MUSE: -32 DEGREES
SODIUM SERPL-SCNC: 137 MMOL/L (ref 135–145)
SYSTOLIC BLOOD PRESSURE - MUSE: NORMAL MMHG
T AXIS - MUSE: 51 DEGREES
TROPONIN T SERPL HS-MCNC: 916 NG/L
TROPONIN T SERPL HS-MCNC: 936 NG/L
UFH PPP CHRO-ACNC: 0.43 IU/ML (ref ?–1.1)
UFH PPP CHRO-ACNC: 0.44 IU/ML (ref ?–1.1)
UFH PPP CHRO-ACNC: 0.55 IU/ML (ref ?–1.1)
VENTRICULAR RATE- MUSE: 88 BPM

## 2025-07-14 PROCEDURE — 99239 HOSP IP/OBS DSCHRG MGMT >30: CPT | Performed by: HOSPITALIST

## 2025-07-14 PROCEDURE — 84132 ASSAY OF SERUM POTASSIUM: CPT | Performed by: HOSPITALIST

## 2025-07-14 PROCEDURE — 84484 ASSAY OF TROPONIN QUANT: CPT | Performed by: NURSE PRACTITIONER

## 2025-07-14 PROCEDURE — 250N000013 HC RX MED GY IP 250 OP 250 PS 637: Performed by: HOSPITALIST

## 2025-07-14 PROCEDURE — 99233 SBSQ HOSP IP/OBS HIGH 50: CPT | Mod: FS | Performed by: NURSE PRACTITIONER

## 2025-07-14 PROCEDURE — 97110 THERAPEUTIC EXERCISES: CPT | Mod: GO | Performed by: OCCUPATIONAL THERAPIST

## 2025-07-14 PROCEDURE — 250N000011 HC RX IP 250 OP 636: Performed by: HOSPITALIST

## 2025-07-14 PROCEDURE — 250N000013 HC RX MED GY IP 250 OP 250 PS 637: Performed by: INTERNAL MEDICINE

## 2025-07-14 PROCEDURE — 85520 HEPARIN ASSAY: CPT | Performed by: HOSPITALIST

## 2025-07-14 PROCEDURE — 36415 COLL VENOUS BLD VENIPUNCTURE: CPT | Performed by: NURSE PRACTITIONER

## 2025-07-14 PROCEDURE — 36415 COLL VENOUS BLD VENIPUNCTURE: CPT | Performed by: HOSPITALIST

## 2025-07-14 PROCEDURE — 80048 BASIC METABOLIC PNL TOTAL CA: CPT | Performed by: HOSPITALIST

## 2025-07-14 PROCEDURE — 250N000013 HC RX MED GY IP 250 OP 250 PS 637: Performed by: NURSE PRACTITIONER

## 2025-07-14 PROCEDURE — 97535 SELF CARE MNGMENT TRAINING: CPT | Mod: GO | Performed by: OCCUPATIONAL THERAPIST

## 2025-07-14 PROCEDURE — 86140 C-REACTIVE PROTEIN: CPT | Performed by: NURSE PRACTITIONER

## 2025-07-14 RX ORDER — COLCHICINE 0.6 MG/1
0.6 TABLET ORAL 2 TIMES DAILY
Qty: 180 TABLET | Refills: 0 | Status: SHIPPED | OUTPATIENT
Start: 2025-07-14

## 2025-07-14 RX ORDER — HEPARIN SODIUM 10000 [USP'U]/100ML
0-5000 INJECTION, SOLUTION INTRAVENOUS CONTINUOUS
Status: ACTIVE | OUTPATIENT
Start: 2025-07-14 | End: 2025-07-14

## 2025-07-14 RX ORDER — CLOPIDOGREL BISULFATE 75 MG/1
75 TABLET ORAL DAILY
Status: DISCONTINUED | OUTPATIENT
Start: 2025-07-15 | End: 2025-07-14 | Stop reason: HOSPADM

## 2025-07-14 RX ORDER — METOPROLOL TARTRATE 25 MG/1
25 TABLET, FILM COATED ORAL 2 TIMES DAILY
Qty: 60 TABLET | Refills: 0 | Status: SHIPPED | OUTPATIENT
Start: 2025-07-14

## 2025-07-14 RX ORDER — TORSEMIDE 20 MG/1
20 TABLET ORAL DAILY
Qty: 30 TABLET | Refills: 0 | Status: SHIPPED | OUTPATIENT
Start: 2025-07-15

## 2025-07-14 RX ORDER — ATORVASTATIN CALCIUM 80 MG/1
80 TABLET, FILM COATED ORAL DAILY
Qty: 90 TABLET | Refills: 3 | Status: SHIPPED | OUTPATIENT
Start: 2025-07-14

## 2025-07-14 RX ORDER — CLOPIDOGREL BISULFATE 75 MG/1
75 TABLET ORAL DAILY
Qty: 90 TABLET | Refills: 3 | Status: SHIPPED | OUTPATIENT
Start: 2025-07-15

## 2025-07-14 RX ORDER — CLOPIDOGREL 300 MG/1
600 TABLET, FILM COATED ORAL ONCE
Status: COMPLETED | OUTPATIENT
Start: 2025-07-14 | End: 2025-07-14

## 2025-07-14 RX ORDER — POTASSIUM CHLORIDE 1500 MG/1
40 TABLET, EXTENDED RELEASE ORAL ONCE
Status: COMPLETED | OUTPATIENT
Start: 2025-07-14 | End: 2025-07-14

## 2025-07-14 RX ORDER — COLCHICINE 0.6 MG/1
0.6 TABLET ORAL 2 TIMES DAILY
Status: DISCONTINUED | OUTPATIENT
Start: 2025-07-14 | End: 2025-07-14 | Stop reason: HOSPADM

## 2025-07-14 RX ORDER — ASPIRIN 81 MG/1
81 TABLET ORAL DAILY
Qty: 7 TABLET | Refills: 0 | Status: SHIPPED | OUTPATIENT
Start: 2025-07-14

## 2025-07-14 RX ORDER — METOPROLOL TARTRATE 25 MG/1
25 TABLET, FILM COATED ORAL 2 TIMES DAILY
Status: DISCONTINUED | OUTPATIENT
Start: 2025-07-14 | End: 2025-07-14 | Stop reason: HOSPADM

## 2025-07-14 RX ORDER — GABAPENTIN 300 MG/1
900 CAPSULE ORAL 3 TIMES DAILY
Qty: 90 CAPSULE | Refills: 0 | Status: SHIPPED | OUTPATIENT
Start: 2025-07-14

## 2025-07-14 RX ORDER — NITROGLYCERIN 0.4 MG/1
TABLET SUBLINGUAL
Qty: 15 TABLET | Refills: 1 | Status: SHIPPED | OUTPATIENT
Start: 2025-07-14

## 2025-07-14 RX ADMIN — HEPARIN SODIUM 2400 UNITS/HR: 10000 INJECTION, SOLUTION INTRAVENOUS at 03:33

## 2025-07-14 RX ADMIN — AMLODIPINE BESYLATE 10 MG: 10 TABLET ORAL at 08:32

## 2025-07-14 RX ADMIN — TORSEMIDE 20 MG: 20 TABLET ORAL at 08:32

## 2025-07-14 RX ADMIN — METOPROLOL TARTRATE 12.5 MG: 25 TABLET, FILM COATED ORAL at 08:32

## 2025-07-14 RX ADMIN — ASPIRIN 81 MG: 81 TABLET, DELAYED RELEASE ORAL at 08:32

## 2025-07-14 RX ADMIN — GABAPENTIN 300 MG: 300 CAPSULE ORAL at 15:31

## 2025-07-14 RX ADMIN — CLOPIDOGREL BISULFATE 600 MG: 300 TABLET, FILM COATED ORAL at 15:31

## 2025-07-14 RX ADMIN — EMPAGLIFLOZIN 10 MG: 10 TABLET, FILM COATED ORAL at 08:32

## 2025-07-14 RX ADMIN — GABAPENTIN 300 MG: 300 CAPSULE ORAL at 08:32

## 2025-07-14 RX ADMIN — TICAGRELOR 90 MG: 90 TABLET, FILM COATED ORAL at 02:01

## 2025-07-14 RX ADMIN — SENNOSIDES AND DOCUSATE SODIUM 1 TABLET: 50; 8.6 TABLET ORAL at 08:32

## 2025-07-14 RX ADMIN — POTASSIUM CHLORIDE 40 MEQ: 1500 TABLET, EXTENDED RELEASE ORAL at 08:32

## 2025-07-14 RX ADMIN — APIXABAN 5 MG: 5 TABLET, FILM COATED ORAL at 17:42

## 2025-07-14 RX ADMIN — ATORVASTATIN CALCIUM 80 MG: 80 TABLET, FILM COATED ORAL at 08:32

## 2025-07-14 RX ADMIN — COLCHICINE 0.6 MG: 0.6 TABLET ORAL at 11:59

## 2025-07-14 ASSESSMENT — ACTIVITIES OF DAILY LIVING (ADL)
ADLS_ACUITY_SCORE: 38

## 2025-07-14 NOTE — PROGRESS NOTES
[facility] Progress Note  Date of Service: 07/14/2025    Primary Cardiologist: Will be Dr. Clark (hospital consult)    Bob Echols is a 70 year old male with past medical history of uncontrolled diabetes type 2 (A1c 10.2), obesity BMI 36, obstructive sleep apnea, hypertension and untreated hyperlipidemia who was admitted on 7/10/2025 after 1 day of central chest tightness building throughout the day and resulting in lightheadedness, increasing shortness of breath, diaphoresis and presyncope. Found to have an inferior ST elevation myocardial infarction and CHB requiring TPW initially and acute heart failure/fluid overload responded to IV lasix, mild LIBBY. Found to have 100% mid Cx lesion s/p PCI RENA 7/10/2025. The following day the temp wire was removed, 20 seconds later had transient vasovagal syncope. Sheath left in place. PAF/AFL rate controlled, back to sinus with recurrence again 7/13/25.     Subjective: Up in the chair. Denies chest pain or dyspnea. A few palpitations after walking. Mild LE/ankle edema similar to before admission.      Assessment:  Inferior STEMI s/p PCI RENA CX 7/10/2025  Persistent diffuse ST elevation with inflammatory marker elevations  -EF 60-65%, no regional WMA, trivial pericardial effusion   -Residual CAD with 70% stenosis in the second marginal felt small caliber, 55% stenosis in the proximal to mid LAD. RPDA was 70% stenosed and small caliber. The distal vessels were small caliber consistent with diabetes. If recurrent angina in future consider stress MRI outpatient though report states vessel sizes small caliber.   -On ECG with persistent diffuse ST elevation. Trivial effusion. No chest pain but notes had an episode a couple days ago that he felt more comfortable leaning forward. Troponin's were down trending to 989/1,015. Repeat this morning stable at 916  -Sed rate 117,  day after MI. Repeat inflammatory markers for trend  -Recommend  colchicine 0.6mg twice a day for atleast 1 month prophylactic for pericarditis but plan for 3 months ideally  -Continues on aspirin, brilinta 90mg twice a day. With addition of necessary anticoagulation will plan to stop aspirin after 7 days to reduce bleeding risks and switch brilinta to plavix to reduce bleeding risks. Will need plavix load this evening at time of next brilinta dose then plavix 75mg daily for 1 year.   -Continue high intensity statin. Low dose beta blocker as tolerated. Resume ACEi at discharge  -Cardiac rehab outpatient arranged     Acute hypoxic respiratory failure and acute HFpEF post MI  -LVEDP initially 20 at time of presentation/cath. Admit weight 267 - 254 today, net neg 7L but missing intake some days   -Transitioned to torsemide 20mg daily with jardiance 10mg daily (test claim placed)    Complete heart block post MI  PAF/AFL  -Back in controlled Afib ventricular rate 80's since yesterday. Brief RVR this morning rates 120's. No further bradycardia. Temp wire out 7/11/25.   -Started on IV heparin for anticoagulation with recurrence of atrial arrhythmia   -Test claim DOAC options and plan to fill FREE 30 day supply at discharge  -Started on low dose metoprolol 12.5mg twice a day 7/13/25  -STOP PTA atenolol   -Plan for Holter monitor at discharge   -Rhythm control can be considered outpatient if persists     LIBBY/CKD   -Creatinine back to baseline 1.2    Hyperlipidemia  Normal lipo(a)  -, untreated  -Started on high intensity statin therapy with lipitor 80mg  -Repeat lipids in 3 months      Uncontrolled DM A1c 10.2%  -Management per primary team. Needs aggressive DM control. Jardiance added for HF dosing but could optimize dose per primary team for DM if needed    Untreated DEMOND   -Outpatient sleep medicine consultation, he is agreeable for reassessment    Plan:   Repeat HS troponin this morning with no further increase, stable at 916. Reassuring Echocardiogram with no wall motion  abnormality, small pericardial effusion, no chest pain recurrence.  Given ECG findings with persistent diffuse ST elevation recommend prophylactic colchicine 0.6mg twice a day for 3 months given concern of pericarditis post MI  Continues on IV heparin will plan to likely transition to Eliquis later today. DOAC test claim placed  Continues on aspirin and Brilinta currently. With addition of anticoagulation needed will plan to continue aspirin for 7 days then plan to stop aspirin to avoid triple therapy and switch brilinta to Plavix to reduce bleeding risks. At time of next brilinta dose will start plavix load of 600mg and then tomorrow plavix 75mg daily for 1 year.   Continue metoprolol increase slightly to 25mg twice a day, torsemide 20mg, Jardiance 10mg, amlodipine 10mg. Can resume lisinopril 40mg daily likely at discharge now that renal function back to baseline.   Heart monitor at discharge  Needs aggressive secondary risk factor modification  Inpatient cardiac rehab and outpatient phase II referral   Anticipate likely discharge later today pending test claims and coordination of medications, cardiac rehab completion   Follow up in [place] - message to schedulers to arrange before discharge     Plan discussed with Dr. Hightower and bedside RN.    ROSLABA Justice [facility]  Page via [employer]   __________________________________________________________________________  Physical Exam   Temp: 98.3  F (36.8  C) Temp src: Oral BP: 137/79 Pulse: 89   Resp: 18 SpO2: 95 % O2 Device: None (Room air) Oxygen Delivery: 2 LPM  Vitals:    07/12/25 0239 07/13/25 0449 07/14/25 0654   Weight: 116.3 kg (256 lb 6.4 oz) 115.5 kg (254 lb 9.6 oz) 115.5 kg (254 lb 9.6 oz)       GENERAL:  The patient is in no apparent distress.   PULMONARY:  There is a normal respiratory effort. Clear lungs to auscultation bilaterally.   CARDIOVASCULAR:  IRR, normal S1 S2, no m/r/g.  GI:  Non tender abdomen    EXTREMITIES:  Warm/dry, slight ankle/pretibial edema  VASCULAR: 2+ Pulses bilaterally in upper and lower extremities.    Medications   Current Facility-Administered Medications   Medication Dose Route Frequency Provider Last Rate Last Admin    heparin 25,000 units in 0.45% NaCl 250 mL ANTICOAGULANT infusion  0-5,000 Units/hr Intravenous Continuous Dallas Hill MD 22 mL/hr at 07/14/25 0829 2,200 Units/hr at 07/14/25 0829    Percutaneous Coronary Intervention orders placed (this is information for BPA alerting)   Does not apply DOES NOT GO TO MAR Farhana Clark MD        Reason beta blocker order not selected   Does not apply DOES NOT GO TO MAR MarchFarhana MD         Current Facility-Administered Medications   Medication Dose Route Frequency Provider Last Rate Last Admin    amLODIPine (NORVASC) tablet 10 mg  10 mg Oral Daily Teresita Judd MD   10 mg at 07/14/25 0832    aspirin EC tablet 81 mg  81 mg Oral Daily March, Farhana Daniel MD   81 mg at 07/14/25 0832    atorvastatin (LIPITOR) tablet 80 mg  80 mg Oral Daily March, Farhana Daniel MD   80 mg at 07/14/25 0832    empagliflozin (JARDIANCE) tablet 10 mg  10 mg Oral Daily Gorham, Epifanio Rudd MD   10 mg at 07/14/25 0832    gabapentin (NEURONTIN) capsule 300 mg  300 mg Oral TID Bush, Epifanio Rudd MD   300 mg at 07/14/25 0832    insulin aspart (NovoLOG) injection (RAPID ACTING)   Subcutaneous TID w/meals Dallas Hill MD   5 Units at 07/14/25 0832    insulin aspart (NovoLOG) injection (RAPID ACTING)  1-7 Units Subcutaneous TID AC Bush, Epifanio Rudd MD   1 Units at 07/12/25 1313    insulin aspart (NovoLOG) injection (RAPID ACTING)  1-5 Units Subcutaneous At Bedtime Bush, Epifanio Rudd MD        insulin glargine (LANTUS PEN) injection 24 Units  24 Units Subcutaneous At Bedtime Dallas Hill MD   24 Units at 07/13/25 2121    [Held by provider] lisinopril (ZESTRIL) tablet 40 mg  40 mg Oral Daily Bush, Epifanio Rudd MD         metoprolol tartrate (LOPRESSOR) half-tab 12.5 mg  12.5 mg Oral BID Teresita Judd MD   12.5 mg at 07/14/25 0832    senna-docusate (SENOKOT-S/PERICOLACE) 8.6-50 MG per tablet 1 tablet  1 tablet Oral BID Joel Gonzalez MD   1 tablet at 07/14/25 0832    ticagrelor (BRILINTA) tablet 90 mg  90 mg Oral Q12H MarchFarhana MD   90 mg at 07/14/25 0201    torsemide (DEMADEX) tablet 20 mg  20 mg Oral Daily Teresita Judd MD   20 mg at 07/14/25 0832       Data   Most Recent 3 CBC's:  Recent Labs   Lab Test 07/13/25  0555 07/12/25  1544 07/12/25  0715   WBC 9.0 11.3* 12.4*   HGB 12.5* 12.2* 13.0*   MCV 81 80 84    225 246     Most Recent 3 BMP's:  Recent Labs   Lab Test 07/14/25  0642 07/13/25  1735 07/13/25  1117 07/13/25  0752 07/13/25  0555 07/12/25  1128 07/12/25  0715     --   --   --  137  --  138   POTASSIUM 3.4  --   --   --  3.3*  --  3.6   CHLORIDE 102  --   --   --  98  --  101   CO2 24  --   --   --  24  --  23   BUN 24.5*  --   --   --  24.8*  --  26.4*   CR 1.22*  --   --   --  1.24*  --  1.36*   ANIONGAP 11  --   --   --  15  --  14   PALMIRA 9.2  --   --   --  8.9  --  9.1   GLC 89 130* 180*   < > 84   < > 118*    < > = values in this interval not displayed.     Most Recent 2 LFT's:  Recent Labs   Lab Test 07/11/25  2025 07/10/25  0100   AST 31 45   ALT 22 23   ALKPHOS 84 74   BILITOTAL 0.9 1.0     Most Recent 3 Troponin's:No lab results found.  Most Recent 3 BNP's:  Recent Labs   Lab Test 07/10/25  0100 01/15/22  0745   NTBNPI  --  67   NTBNP 1,631*  --      Most Recent Cholesterol Panel:  Recent Labs   Lab Test 07/10/25  0350   CHOL 187   *   HDL 41   TRIG 72     Most Recent Hemoglobin A1c:  Recent Labs   Lab Test 07/10/25  0100   A1C 10.2*     Most Recent ESR & CRP:  Recent Labs   Lab Test 07/12/25  0715   *   CRPI 254.01*       Repeat limited Echo: 7/12/2025   Summary     1. Normal biventricular size and systolic function. Left ventricular  ejection  fraction of 60-65%. Mild LVH.  2. No hemodynamically significant valvular disease.  3. Trivial pericardial effusion.    Coronary angiogram: 7/10/2025  1) Occluded distal circumflex S/P placement of 2.75 X 20 mm Promus RENA  2) Diffuse coronary artery disease with small caliber distal vessels, consistent with long-term poorly controlled DM II  3) Complete heart block likely secondary to #1,  S/P placement of temporary PM with HR set at 70  4) SOB with LVEDP 20 mmHg    Left Anterior Descending   Prox LAD to Mid LAD lesion is 55% stenosed.      First Diagonal Branch   1st Diag lesion is 40% stenosed.      Left Circumflex   Mid Cx lesion is 100% stenosed.   Mid Cx to Dist Cx lesion is 60% stenosed.      Second Obtuse Marginal Branch   2nd Mrg lesion is 70% stenosed.      Right Coronary Artery   Prox RCA lesion is 30% stenosed.      Right Posterior Atrioventricular Artery   RPAV lesion is 70% stenosed.

## 2025-07-14 NOTE — PLAN OF CARE
Neuro- A&Ox4  Most Recent Vitals- Temp: 98.7  F (37.1  C) Temp src: Oral BP: (!) 143/84 Pulse: 85   Resp: 18 SpO2: 97 % O2 Device: None (Room air)   Tele/Cardiac- A flutter CVR, denies CP  Resp- Stable on RA while awake, placed on 2L via NC while sleeping due to apnea, denies SOB/PARKS, LS clear  Activity- Independent  Pain- denies  Drips- Heparin  Drains/Tubes- PIV  Skin- L radial site-CMS intact, R internal jugular site-dressing CDI  GI/- WDL  Aggression Color- Green  Plan- possible discharge today  Misc- NA    Tosha Mancera, RN

## 2025-07-14 NOTE — CONSULTS
Patient has Medicare Advantage through [employer].    $295 deductible has been met.    Xarelto/Eliquis  --$47/mo at retail, or $94 for 3 mo at [facility].    Jardiance/Farxiga  --$47/mo at retail, or $94 for 3 mo at [facility].    --If/when total out of pocket drug costs exceed $2000, patient will pay $0 for all covered drugs for the remainder of the year.      Rowan Ojeda  Pharmacy Technician/Liaison, Discharge Pharmacy   [phone] (voice or text)  [email or web address]  Pharmacy test claims are estimates and may not reflect final costs.   Suggested alternatives aim to be cost-effective but may not be therapeutically equivalent as this consult is informational and does not constitute medical advice.   Clinical decisions should be made by qualified healthcare providers.

## 2025-07-14 NOTE — DISCHARGE INSTRUCTIONS
Cardiac Angiogram Discharge Instructions - Neck & Radial    After you go home:    Have an adult stay with you until tomorrow.  Drink extra fluids for 2 days.  You may resume your normal diet.  No smoking       For 24 hours - due to the sedation you received:  Relax and take it easy.  Do NOT make any important or legal decisions.  Do NOT drive or operate machines at home or at work.  Do NOT drink alcohol.    Care of Neck & Wrist Puncture Sites:    For the first 24 hrs - check the puncture site every 1-2 hours while awake.  It is normal to have soreness at the puncture site and mild tingling in your hand for up to 3 days.  Remove the bandaid after 24 hours. If there is minor oozing, apply another bandaid and remove it after 12 hours.  You may shower tomorrow. Do NOT take a bath, or use a hot tub or pool for at least 3 days. Do NOT scrub the site. Do not use lotion or powder near the puncture site.           Activity - For 2 days:    Neck site:  Avoid heavy lifting or the overuse of your shoulder.        Wrist site:  do not use your hand or arm to support your weight (such as rising from a chair)   do not bend your wrist (such as lifting a garage door).  do not lift more than 5 pounds or exercise your arm (such as tennis, golf or bowling).  Do NOT do any heavy activity such as exercise, lifting, or straining.     Bleeding:     Neck site:  If you start bleeding from the site in your neck, sit down and press gently on the site for 10 minutes.   Once bleeding stops, sit still for 2 hours.   Call [facility] as soon as you can    Wrist site:  If you start bleeding from the site in your wrist, sit down and press firmly on/above the site for 10 minutes.   Once bleeding stops, keep arm still for 2 hours.   Call [facility] as soon as you can.    Call 911 right away if you have heavy bleeding or bleeding that does not stop.      Medicines:    If you are taking an antiplatelet medication such as Plavix, Brilinta or Effient, do  not stop taking it until you talk to your cardiologist.      If you are on Metformin (Glucophage), do not restart it until you have blood tests (within 2 to 3 days after discharge).  After you have your blood drawn, you may restart the Metformin.   Take your medications, including blood thinners, unless your provider tells you not to.    If you take Coumadin (Warfarin), have your INR checked by your provider in  3-5 days. Call your clinic to schedule this.  If you have stopped any medicines, check with your provider about when to restart them.    Follow Up Appointments:    Follow up with [facility] Nurse Practitioner at [facility] of patient preference in 7-10 days.    Call the clinic if:    You have increased pain or a large or growing hard lump around the site.  The site is red, swollen, hot or tender.  Blood or fluid is draining from the site.  You have chills or a fever greater than 101 F (38 C).  Your arm feels numb, cool or changes color.  You have hives, a rash or unusual itching.  Any questions or concerns.      [facility] [place]:    [phone] [facility] (7 days a week)    Or you may contact your provider via My Chart

## 2025-07-14 NOTE — DISCHARGE SUMMARY
[facility]  Hospitalist Discharge Summary      Date of Admission:  7/10/2025  Date of Discharge:  7/14/2025  Discharging Provider: Dallas Hill MD  Discharge Service: Hospitalist Service    Discharge Diagnoses     Please refer to the hospital course below    Clinically Significant Risk Factors     # Obesity: Estimated body mass index is 34.53 kg/m  as calculated from the following:    Height as of this encounter: 1.829 m (6').    Weight as of this encounter: 115.5 kg (254 lb 9.6 oz).       Follow-ups Needed After Discharge   Follow-up Appointments       Follow Up      [facility] up [facility], located at [id number] [place] [address] [address], [place] [address]. Phone number is [phone]. Call to schedule appt in 3-4 weeks.        Hospital Follow-up with Existing Primary Care Provider (PCP)          Schedule Primary Care visit within: 30 Days   Recommended labs and Imaging (to be ordered by Primary Care Provider): CBC, BMP                Unresulted Labs Ordered in the Past 30 Days of this Admission       Date and Time Order Name Status Description    7/11/2025 10:05 PM Blood Culture Peripheral blood (BC) Hand, Left Preliminary     7/11/2025 10:05 PM Blood Culture Peripheral blood (BC) Hand, Left Preliminary         These results will be followed up by hospitalist     Discharge Disposition   Discharged to home  Condition at discharge: Stable    Hospital Course   Bob Echols is a 70 year old male with a history of uncontrolled diabetes, obstructive sleep apnea, hypertension admitted on 7/10/2025. He presents to the emergency department after 1 day of central chest tightness building throughout the day and resulting in lightheadedness, increasing shortness of breath, diaphoresis and presyncope.  Found to have an inferior ST elevation myocardial infarction.     Inferior ST elevation myocardial infarction  Hypertension  Recurrent chest pain, suspect pericarditis   2  days of GI upset, 1 day of cough, chest tightness with shortness of breath and subsequent diaphoresis and presyncope.  Distal circumflex occlusion on angiogram now stented.  Also noted 70% stenosis in second marginal, 50% in proximal to mid LAD and 70% in right posterior lateral branch.  Does not smoke, intermittent infrequent alcohol use.  -On aspirin and Brilinta post PCI. Subsequently, Brillinta subsequently changed to Plavix 75 mg daily after lavix load. Since he needs anticoagulation as well, plan is Tripple regimen (ASA 81 mg daily, Apixaban 5 mg bid and plavix 75 mg daily) for 1 week followed by only Apixaban and Plavix.   -High intensity statin, Lipitor 80 Mg daily.    -Jardiance initiated this stay.  -PTA amlodipine resumed.    -Holding lisinopril given CKD, plan is to resume as outpatient once creatinine improves/stable per cardiology  -Bradycardic with pause earlier, resolved, low-dose metoprolol started and titrated. Discharging on cardiac monitor.   -TTE shows LVEF 55 to 60%, mild concentric LVH, mild AS moderate biatrial enlargement, mild MR.     -Recurrence of chest pain with ST elevation and increased troponin on 7/11.  ESR, CRP as well elevated.  Pain is better when he leans forward.  Suspect pericarditis.  Repeat limited TTE shows LVEF 60-65%, no hemodynamically significant valvular disease and trivial pericardial effusion.  Started on colchicine as well.      Paroxysmal A-fib/flutter with bradycardia  10 seconds asystole with LOC after removal of pacing wire, ?  Vasovagal  Post angiogram.  Temporary pacing wire was placed.  Patient with intermittent paced rhythm initially.  Did not require pacing overnight.  Given this TPW was removed but shortly after procedure, after 20 seconds, had 10 seconds of asystole and patient lost consciousness.  Subsequently on sinus rhythm  - Heparin initiated, initially a fib/flutter was brief and AC discontinued but it recurred again. Metoprolol initiated and  titrated, Drip resumed and discharging on Apixaban and cardiac monitor.      Acute hypoxic respiratory failure: Suspect secondary to STEMI with heart failure.  Acute diastolic heart failure: Thought to be hyperdynamic on LV gram in the Cath Lab but requiring 6 L facemask oxygen to maintain oxygen saturations.  BNP elevated.  May be related to bradycardia and systemic hypoperfusion  - Temporary pacemaker in place, TPW wire removed 7/11  - Diuresed with IV Lasix initially, transitioned to p.o. and Torsemide at discharge, remains on room air.      Possible acute viral illness, could be pericarditis: Temperature of 100.3 measured at home.  GI upset for 2 days, cough for 1 day.  Uncertain if patient's cough and GI upset were actually anginal equivalents in the setting of inferior coronary disease.  Symptoms are resolved currently.  - COVID, influenza, RSV negative.  - Chest x-ray without clear infiltrates, including on repeat.  Blood cultures have been obtained, follow-up.  Procalcitonin not significantly elevated.  - Leukocytosis could be stress response with STEMI / pericarditis.  Resolved.     Acute kidney injury on stage II chronic kidney disease: Baseline creatinine appears to be 1.1-1.2 in the setting of uncontrolled diabetes, although this was 1.5 years ago.  Suspect  CKD has progressed since then.  Suspect secondary to hypoperfusion with bradycardia and hypotension with myocardial infarction  -Received IV fluid in ER but showed signs of fluid overload and on Lasix.  -Creatinine was up to 1.58, slightly better at 1. 24 despite diuresis.  - Follow BMP.     Uncontrolled type 2 diabetes with neuropathy, nephropathy: Hemoglobin A1c has been in the 10-11 range for the past 2 years.  Reports he has had no changes to his insulin regimen over that timeframe  - Lantus 24 units at bedtime  - Prandial insulin at 1 unit per 10 g carbohydrate  - Medium dose sliding scale insulin  - Added Jardiance 10 mg daily  - Will need to  get back to primary care clinic for ongoing adjustments to his diabetic regimen.  Consider semaglutide or similar for obesity with diabetes.  Not available in hospital  - Outpatient ophthalmology follow-up for annual eye exams  - Continue aspirin 81 mg daily.  Was already taking this prior to admission  - Increased to high intensity statin  - Metformin held given contrast administration, resumed at discharge    Callus Lt Great toe  - Podiatry consulted and callus excised.     Obstructive sleep apnea: Nonadherent with CPAP  - CPAP as per home regimen ordered.  Discussed importance of correcting nocturnal hypoxia as this causes stress on the heart.  - Encouraged patient to trial CPAP during waking hours to acclimate to mask.         Diet: Low Saturated Fat Na <2400 mg    DVT Prophylaxis: anticoagulation as above   Fox Catheter: Not present  Lines: PRESENT             Cardiac Monitoring: ACTIVE order. Indication: Post- PCI/Angiogram (24 hours)  Code Status: Full Code      Consultations This Hospital Stay   HOSPITALIST IP CONSULT  NUTRITION SERVICES ADULT IP CONSULT  CARDIAC REHAB IP CONSULT  CARDIOLOGY IP CONSULT  WOUND OSTOMY CONTINENCE NURSE  IP CONSULT  PHARMACY IP CONSULT  PODIATRY IP CONSULT  PHARMACY IP CONSULT  PHARMACY IP CONSULT  PHARMACY LIAISON FOR MEDICATION COVERAGE CONSULT  PHARMACY LIAISON FOR MEDICATION COVERAGE CONSULT  SMOKING CESSATION PROGRAM IP CONSULT    Code Status   Full Code    Time Spent on this Encounter   I, Dallas Hill MD, personally saw the patient today and spent greater than 30 minutes discharging this patient.       Dallas Hill MD  [facility]  [address] [place], [place] [address], [address]  [facility] [address]  Phone: [phone]  ______________________________________________________________________    Physical Exam   Vital Signs: Temp: 98.3  F (36.8  C) Temp src: Oral BP: 128/85 Pulse: 93   Resp: 18 SpO2: 95 % O2 Device: None (Room air) Oxygen Delivery:  2 LPM  Weight: 254 lbs 9.6 oz    General: AAOx3, appears comfortable.  HEENT: PERRLA EOMI. Mucosa moist.   Lungs: Bilateral equal air entry. Clear to auscultation, normal work of breathing.   CVS: S1S2 irregular, no tachycardia or murmur.   Abdomen: Soft, NT, ND. BS heard.  MSK:1 (+) leg edema  Neuro: AAOX3. CN 2-12 normal. Strength symmetrical.  Skin: No rash.          Primary Care Physician   [facility]    Discharge Orders      Cardiac Rehab  Referral      Follow-Up with Cardiology ISMAEL      Cardiac Rehab  Referral      Follow Up    Follow up with [facility] at [facility], located at [id number] [place] [address]  [address], [place] [address]. Phone number is [phone]. Call to schedule appt in 3-4 weeks.     Reason for your hospital stay    Inferior STEMI  Bradycardia  Atrial flutter     Activity    Your activity upon discharge: activity as tolerated     Cardiac Event Monitor Adult Pediatric     Diet    Follow this diet upon discharge: Current Diet:Orders Placed This Encounter      Low Saturated Fat Na <2400 mg     Hospital Follow-up with Existing Primary Care Provider (PCP)            Significant Results and Procedures   Most Recent 3 CBC's:  Recent Labs   Lab Test 07/13/25  0555 07/12/25  1544 07/12/25  0715   WBC 9.0 11.3* 12.4*   HGB 12.5* 12.2* 13.0*   MCV 81 80 84    225 246     Most Recent 3 BMP's:  Recent Labs   Lab Test 07/14/25  1223 07/14/25  1201 07/14/25  0642 07/13/25  1735 07/13/25  0752 07/13/25  0555 07/12/25  1128 07/12/25  0715   NA  --   --  137  --   --  137  --  138   POTASSIUM 4.0  --  3.4  --   --  3.3*  --  3.6   CHLORIDE  --   --  102  --   --  98  --  101   CO2  --   --  24  --   --  24  --  23   BUN  --   --  24.5*  --   --  24.8*  --  26.4*   CR  --   --  1.22*  --   --  1.24*  --  1.36*   ANIONGAP  --   --  11  --   --  15  --  14   PALMIRA  --   --  9.2  --   --  8.9  --  9.1   GLC  --  106* 89 130*   < > 84   < > 118*    < > = values in this  interval not displayed.     Most Recent 2 LFT's:  Recent Labs   Lab Test 07/11/25  2025 07/10/25  0100   AST 31 45   ALT 22 23   ALKPHOS 84 74   BILITOTAL 0.9 1.0     Most Recent 3 INR's:  Recent Labs   Lab Test 07/10/25  0100   INR 1.08     Most Recent 3 Troponin's:No lab results found.  Most Recent 3 BNP's:  Recent Labs   Lab Test 07/10/25  0100 01/15/22  0745   NTBNPI  --  67   NTBNP 1,631*  --      Most Recent Cholesterol Panel:  Recent Labs   Lab Test 07/10/25  0350   CHOL 187   *   HDL 41   TRIG 72     Most Recent TSH and T4:No lab results found.  Most Recent 6 glucoses:  Recent Labs   Lab Test 07/14/25  1201 07/14/25  0642 07/13/25  1735 07/13/25  1117 07/13/25  0752 07/13/25  0555   * 89 130* 180* 135* 84     Most Recent ESR & CRP:  Recent Labs   Lab Test 07/14/25  0642 07/12/25  0715   SED  --  130*   CRPI 157.36* 254.01*     Most Recent Anemia Panel:  Recent Labs   Lab Test 07/13/25  0555   WBC 9.0   HGB 12.5*   HCT 36.1*   MCV 81      ,   Results for orders placed or performed during the hospital encounter of 07/10/25   XR Chest Port 1 View    Narrative    EXAM: XR CHEST PORT 1 VIEW  LOCATION: [facility]  DATE: 7/10/2025    INDICATION: Chest pain.  COMPARISON: 5/21/2013, CTA chest 1/15/2022      Impression    IMPRESSION:     Hypoinflated lungs, limiting evaluation. Additionally, external objects also limits evaluation. Streaky bilateral opacities, favored to be atelectasis. No definite focal airspace disease. No pleural effusion or pneumothorax.    Mildly enlarged cardiopericardial silhouette, possibly exaggerated by lung hypoinflation and AP technique. Aortic calcifications.   XR Chest Port 1 View    Narrative    EXAM: XR CHEST PORT 1 VIEW  LOCATION: [facility]  DATE: 7/11/2025    INDICATION: RRT hypoxia fever  COMPARISON: None.      Impression    IMPRESSION: Right IJ line tip in SVC. Lungs clear.   Echocardiogram Complete     Value     LVEF  55-60%    formerly [facility]    [id number]  [id number]  [id number]  [id number]^MARCH^FARHANA^SPENSER     [facility]  Echocardiography Laboratory  [address] [place] [address]     Name: HOOD MARTÍNEZ  MRN: [id number]  : 1955  Study Date: 07/10/2025 01:39 PM  Age: 70 yrs  Gender: Male  Patient Location: [facility]  Reason For Study: MI - Acute  Ordering Physician: Farhana Clark MD  Referring Physician: [facility]  Performed By: Tobin Chowdary RDCS     BSA: 2.4 m2  Height: 72 in  Weight: 267 lb  HR: 73  BP: 159/93 mmHg  ______________________________________________________________________________  Procedure  Echocardiogram with two-dimensional, color and spectral Doppler. Definity (NDC  #[id number]) given intravenously.  ______________________________________________________________________________  Interpretation Summary     The rhythm was atrial fibrillation with paced rhythm.  No regional wall motion abnormalities noted.  Left ventricular systolic function is normal.  The visual ejection fraction is 55-60%.  The left ventricle is normal in size.  There is mild concentric left ventricular hypertrophy.  The right ventricle is normal in structure, function and size.  There is a pacemaker lead in the right ventricle.  There is mild-moderate biatrial enlargement.  There is mild (1+) mitral regurgitation.  No old studies for comparison  ______________________________________________________________________________  Left Ventricle  The left ventricle is normal in size. There is mild concentric left  ventricular hypertrophy. Left ventricular systolic function is normal. The  visual ejection fraction is 55-60%. Left ventricular diastolic function is  indeterminate. No regional wall motion abnormalities noted. There is no  thrombus seen in the left ventricle.     Right Ventricle  The right ventricle is normal in structure, function and size. There is a  pacemaker  lead in the right ventricle.     Atria  There is mild-moderate biatrial enlargement. Pacer wire in right atrium. The  left atrial appendage is not well visualized.     Mitral Valve  The mitral valve leaflets appear normal. There is no evidence of stenosis,  fluttering, or prolapse. There is mild (1+) mitral regurgitation. There is no  mitral valve stenosis.     Tricuspid Valve  Normal tricuspid valve. No tricuspid regurgitation. Right ventricular systolic  pressure could not be approximated due to inadequate tricuspid regurgitation.  There is no tricuspid stenosis.     Aortic Valve  The aortic valve is trileaflet. No aortic regurgitation is present. No aortic  stenosis is present.     Pulmonic Valve  The pulmonic valve is not well visualized. There is no pulmonic valvular  regurgitation. There is no pulmonic valvular stenosis.     Vessels  The aortic root is normal size. Normal size ascending aorta. Dilation of the  inferior vena cava is present with normal respiratory variation in diameter.  The pulmonary artery is normal size.     Pericardium  The pericardium appears normal. There is no pleural effusion.     Rhythm  The rhythm was atrial fibrillation with paced rhythm.  ______________________________________________________________________________  MMode/2D Measurements & Calculations  IVSd: 1.3 cm     LVIDd: 4.8 cm  LVIDs: 3.6 cm  LVPWd: 1.2 cm  FS: 25.0 %  LV mass(C)d: 232.2 grams  LV mass(C)dI: 96.4 grams/m2  Ao root diam: 3.4 cm  asc Aorta Diam: 3.8 cm  Ao root diam index Ht(cm/m): 1.9  Ao root diam index BSA (cm/m2): 1.4  Asc Ao diam index BSA (cm/m2): 1.6  Asc Ao diam index Ht(cm/m): 2.1  EF Biplane: 50.1 %  LA Volume (BP): 94.1 ml     LA Volume Index (BP): 39.0 ml/m2  RWT: 0.50     Doppler Measurements & Calculations  MV E max jabari: 117.0 cm/sec  MV dec time: 0.13 sec  PA acc time: 0.14 sec  E/E' avg: 10.5  Lateral E/e': 10.1  Medial E/e': 10.9      ______________________________________________________________________________  Report approved by: Dr. Nick Kendall on 07/10/2025 04:06 PM         Echocardiogram Complete *Canceled*    Narrative    The following orders were created for panel order Echocardiogram Complete.  Procedure                               Abnormality         Status                     ---------                               -----------         ------                       Please view results for these tests on the individual orders.   Echocardiogram Complete *Canceled*    Narrative    The following orders were created for panel order Echocardiogram Complete.  Procedure                               Abnormality         Status                     ---------                               -----------         ------                       Please view results for these tests on the individual orders.   Echo Limited    Narrative    [id number]  [id number]  [id number]  [id number]^DESTINY^MARSHA^JUAN     [facility]  Echocardiography Laboratory  [address] [place] [address]     Name: HOOD MARTÍNEZ  MRN: [id number]  : 1955  Study Date: 2025 12:43 PM  Age: 70 yrs  Gender: Male  Patient Location: [facility]  Reason For Study: Pericarditis  Ordering Physician: MARSHA DIXON  Performed By: Mamta Robertson RDCS     BSA: 2.4 m2  Height: 72 in  Weight: 257 lb  HR: 85  BP: 163/93 mmHg  ______________________________________________________________________________  Procedure  Limited Echocardiogram with two-dimensional, color and spectral Doppler.  Definity (NDC #[id number]) given intravenously.  ______________________________________________________________________________  Interpretation Summary     1. Normal biventricular size and systolic function. Left ventricular ejection  fraction of 60-65%. Mild LVH.  2. No hemodynamically significant valvular disease.  3. Trivial pericardial  effusion.  ______________________________________________________________________________  ______________________________________________________________________________  MMode/2D Measurements & Calculations  IVSd: 1.2 cm  LVIDd: 5.2 cm  LVIDs: 3.7 cm  LVPWd: 1.3 cm  FS: 28.8 %  LV mass(C)d: 263.4 grams  LV mass(C)dI: 111.2 grams/m2  RWT: 0.50     ______________________________________________________________________________  Report approved by: Yash Estevez MD on 07/12/2025 03:20 PM         Cardiac Catheterization    Narrative    1) Occluded distal circumflex S/P placement of 2.75 X 20 mm Promus RENA  2) Diffuse coronary artery disease with small caliber distal vessels,   consistent with long-term poorly controlled DM II  3) Complete heart block likely secondary to #1,  S/P placement of   temporary PM with HR set at 70  4) SOB with LVEDP 20 mmHg         Discharge Medications      Review of your medicines        START taking        Dose / Directions   apixaban ANTICOAGULANT 5 MG tablet  Commonly known as: ELIQUIS  Indication: Atrial Fibrillation Not Caused By A Heart Valve Problem  Used for: PAF (paroxysmal atrial fibrillation) (H)      Dose: 5 mg  Take 1 tablet (5 mg) by mouth 2 times daily.  Quantity: 60 tablet  Refills: 3     aspirin 81 MG EC tablet  Used for: ST elevation myocardial infarction (STEMI), unspecified artery (H)  Replaces: aspirin 81 MG tablet      Dose: 81 mg  Take 1 tablet (81 mg) by mouth daily. Stop after 7 days.  Quantity: 7 tablet  Refills: 0     atorvastatin 80 MG tablet  Commonly known as: LIPITOR  Used for: ST elevation myocardial infarction (STEMI), unspecified artery (H)      Dose: 80 mg  Take 1 tablet (80 mg) by mouth daily.  Quantity: 90 tablet  Refills: 3     clopidogrel 75 MG tablet  Commonly known as: PLAVIX  Used for: ST elevation myocardial infarction (STEMI) involving other coronary artery (H)      Dose: 75 mg  Start taking on: July 15, 2025  Take 1 tablet (75 mg) by mouth daily.  For heart stent.  Quantity: 90 tablet  Refills: 3     colchicine 0.6 MG tablet  Commonly known as: COLCRYS  Used for: ST elevation myocardial infarction (STEMI) involving other coronary artery (H)      Dose: 0.6 mg  Take 1 tablet (0.6 mg) by mouth 2 times daily.  Quantity: 180 tablet  Refills: 0     empagliflozin 10 MG Tabs tablet  Commonly known as: JARDIANCE  Used for: ST elevation myocardial infarction (STEMI) involving other coronary artery (H)      Dose: 10 mg  Start taking on: July 15, 2025  Take 1 tablet (10 mg) by mouth daily.  Quantity: 90 tablet  Refills: 1     metoprolol tartrate 25 MG tablet  Commonly known as: LOPRESSOR  Used for: ST elevation myocardial infarction (STEMI) involving other coronary artery (H)      Dose: 25 mg  Take 1 tablet (25 mg) by mouth 2 times daily.  Quantity: 60 tablet  Refills: 0     nitroGLYcerin 0.4 MG sublingual tablet  Commonly known as: NITROSTAT  Used for: ST elevation myocardial infarction (STEMI) involving other coronary artery (H)      For chest pain place 1 tablet under the tongue every 5 minutes for 3 doses. If symptoms persist 5 minutes after 1st dose call 911.  Quantity: 15 tablet  Refills: 1     torsemide 20 MG tablet  Commonly known as: DEMADEX  Used for: Acute diastolic congestive heart failure (H)      Dose: 20 mg  Start taking on: July 15, 2025  Take 1 tablet (20 mg) by mouth daily.  Quantity: 30 tablet  Refills: 0            CONTINUE these medicines which have NOT CHANGED        Dose / Directions   amLODIPine 10 MG tablet  Commonly known as: NORVASC      Dose: 10 mg  Take 10 mg by mouth daily  Refills: 0     gabapentin 300 MG capsule  Commonly known as: NEURONTIN  Used for: Diabetic polyneuropathy associated with type 2 diabetes mellitus (H)      Dose: 900 mg  Take 3 capsules (900 mg) by mouth 3 times daily.  Quantity: 90 capsule  Refills: 0     insulin glargine 100 UNIT/ML pen  Commonly known as: LANTUS PEN      Dose: 24 Units  Inject 24 Units subcutaneously  at bedtime.  Refills: 0     NovoLOG FLEXPEN 100 UNIT/ML soln  Generic drug: insulin aspart      Dose: 12 Units  Inject 12 Units subcutaneously 3 times daily (with meals).  Refills: 0            STOP taking      aspirin 81 MG tablet  Commonly known as: ASA  Replaced by: aspirin 81 MG EC tablet        atenolol 100 MG tablet  Commonly known as: TENORMIN        lisinopril 40 MG tablet  Commonly known as: ZESTRIL                  Where to get your medicines        These medications were sent to [facility], [place] - [id number] [address] [address]      Phone: [phone]   apixaban ANTICOAGULANT 5 MG tablet  aspirin 81 MG EC tablet  atorvastatin 80 MG tablet  clopidogrel 75 MG tablet  colchicine 0.6 MG tablet  empagliflozin 10 MG Tabs tablet  gabapentin 300 MG capsule  metoprolol tartrate 25 MG tablet  nitroGLYcerin 0.4 MG sublingual tablet  torsemide 20 MG tablet       Allergies   No Known Allergies

## 2025-07-15 LAB — GLUCOSE BLDC GLUCOMTR-MCNC: 143 MG/DL (ref 70–99)

## 2025-07-15 NOTE — PLAN OF CARE
Cardiac Rehab Discharge Summary    Reason for therapy discharge:    Discharged to home with outpatient therapy.    Progress towards therapy goal(s). See goals on Care Plan in Epic electronic health record for goal details.  Goals partially met.  Barriers to achieving goals:   discharge from facility.    Therapy recommendation(s):    Continued therapy is recommended.  Rationale/Recommendations:  Recommend OP CR for further cardiac edu, exercise and monitoring.

## 2025-07-16 ENCOUNTER — PATIENT OUTREACH (OUTPATIENT)
Dept: CARE COORDINATION | Facility: CLINIC | Age: 70
End: 2025-07-16
Payer: COMMERCIAL

## 2025-07-16 NOTE — PROGRESS NOTES
[facility]:   [facility] Contact  [facility]/Voicemail     Clinical Data: Post-Discharge Outreach     Outreach attempted x 2.  Left message on patient's voicemail, providing [facility]'s central phone number of [phone] ([phone]) for questions/concerns and/or to schedule an appt with an [facility] provider, if they do not have a PCP.      Plan:  [facility] will do no further outreaches at this time.       Sandy Curran  Community Health Worker  [facility], [facility]  [phone]      *Connected Care Resource Team does NOT follow patient ongoing. Referrals are identified based on internal discharge reports and the outreach is to ensure patient has an understanding of their discharge instructions.

## 2025-07-17 LAB
BACTERIA SPEC CULT: NO GROWTH
BACTERIA SPEC CULT: NO GROWTH

## 2025-07-18 ENCOUNTER — HOSPITAL ENCOUNTER (INPATIENT)
Facility: CLINIC | Age: 70
DRG: 003 | End: 2025-07-18
Attending: INTERNAL MEDICINE | Admitting: INTERNAL MEDICINE
Payer: COMMERCIAL

## 2025-07-18 ENCOUNTER — APPOINTMENT (OUTPATIENT)
Dept: CT IMAGING | Facility: CLINIC | Age: 70
End: 2025-07-18
Attending: STUDENT IN AN ORGANIZED HEALTH CARE EDUCATION/TRAINING PROGRAM
Payer: COMMERCIAL

## 2025-07-18 ENCOUNTER — HOSPITAL ENCOUNTER (EMERGENCY)
Facility: CLINIC | Age: 70
Discharge: NURSING, REHAB OR LONG-TERM CARE | End: 2025-07-18
Attending: EMERGENCY MEDICINE | Admitting: EMERGENCY MEDICINE
Payer: COMMERCIAL

## 2025-07-18 ENCOUNTER — APPOINTMENT (OUTPATIENT)
Dept: GENERAL RADIOLOGY | Facility: CLINIC | Age: 70
End: 2025-07-18
Attending: EMERGENCY MEDICINE
Payer: COMMERCIAL

## 2025-07-18 VITALS — OXYGEN SATURATION: 100 % | RESPIRATION RATE: 18 BRPM | HEART RATE: 62 BPM

## 2025-07-18 DIAGNOSIS — I49.01 CARDIAC ARREST WITH VENTRICULAR FIBRILLATION (H): ICD-10-CM

## 2025-07-18 DIAGNOSIS — I48.0 PAROXYSMAL ATRIAL FIBRILLATION (H): ICD-10-CM

## 2025-07-18 DIAGNOSIS — I21.3 ST ELEVATION MI (STEMI) (H): ICD-10-CM

## 2025-07-18 DIAGNOSIS — I46.9 CARDIAC ARREST WITH VENTRICULAR FIBRILLATION (H): ICD-10-CM

## 2025-07-18 DIAGNOSIS — I21.19: Primary | ICD-10-CM

## 2025-07-18 LAB
ABO + RH BLD: NORMAL
ABO + RH BLD: NORMAL
ACT BLD: 182 SECONDS (ref 74–150)
ACT BLD: 206 SECONDS (ref 74–150)
ACT BLD: 210 SECONDS (ref 74–150)
ACT BLD: 211 SECONDS (ref 74–150)
ACT BLD: 239 SECONDS (ref 74–150)
ALBUMIN SERPL BCG-MCNC: 3.2 G/DL (ref 3.5–5.2)
ALBUMIN SERPL BCG-MCNC: 3.2 G/DL (ref 3.5–5.2)
ALBUMIN UR-MCNC: 200 MG/DL
ALLEN'S TEST: ABNORMAL
ALLEN'S TEST: ABNORMAL
ALP SERPL-CCNC: 113 U/L (ref 40–150)
ALP SERPL-CCNC: 118 U/L (ref 40–150)
ALT SERPL W P-5'-P-CCNC: 129 U/L (ref 0–70)
ALT SERPL W P-5'-P-CCNC: 169 U/L (ref 0–70)
AMORPH CRY #/AREA URNS HPF: ABNORMAL /HPF
AMPHETAMINES UR QL SCN: ABNORMAL
ANION GAP SERPL CALCULATED.3IONS-SCNC: 14 MMOL/L (ref 7–15)
ANION GAP SERPL CALCULATED.3IONS-SCNC: 20 MMOL/L (ref 7–15)
APPEARANCE UR: ABNORMAL
APTT PPP: 197 SECONDS (ref 22–38)
APTT PPP: 33 SECONDS (ref 22–38)
AST SERPL W P-5'-P-CCNC: 134 U/L (ref 0–45)
AST SERPL W P-5'-P-CCNC: 199 U/L (ref 0–45)
ATRIAL RATE - MUSE: 45 BPM
BARBITURATES UR QL SCN: ABNORMAL
BASE EXCESS BLDA CALC-SCNC: -10 MMOL/L (ref -3–3)
BASE EXCESS BLDA CALC-SCNC: -3 MMOL/L (ref -3–3)
BASE EXCESS BLDA CALC-SCNC: -3 MMOL/L (ref -3–3)
BASE EXCESS BLDA CALC-SCNC: -3.2 MMOL/L (ref -3–3)
BASE EXCESS BLDA CALC-SCNC: -3.9 MMOL/L (ref -3–3)
BASE EXCESS BLDA CALC-SCNC: -4 MMOL/L (ref -3–3)
BASE EXCESS BLDA CALC-SCNC: -4 MMOL/L (ref -3–3)
BASE EXCESS BLDA CALC-SCNC: -8 MMOL/L (ref -3–3)
BASE EXCESS BLDA CALC-SCNC: -8 MMOL/L (ref -3–3)
BASE EXCESS BLDA CALC-SCNC: -9 MMOL/L (ref -3–3)
BASE EXCESS BLDV CALC-SCNC: -2.5 MMOL/L (ref -3–3)
BASE EXCESS BLDV CALC-SCNC: -3.3 MMOL/L (ref -3–3)
BASOPHILS # BLD AUTO: 0.1 10E3/UL (ref 0–0.2)
BASOPHILS NFR BLD AUTO: 0 %
BENZODIAZ UR QL SCN: ABNORMAL
BILIRUB SERPL-MCNC: 0.3 MG/DL
BILIRUB SERPL-MCNC: 0.4 MG/DL
BILIRUB UR QL STRIP: NEGATIVE
BLD GP AB SCN SERPL QL: NEGATIVE
BLD GP AB SCN SERPL QL: NEGATIVE
BUN SERPL-MCNC: 28 MG/DL (ref 8–23)
BUN SERPL-MCNC: 36.3 MG/DL (ref 8–23)
BZE UR QL SCN: ABNORMAL
CA-I BLD-MCNC: 4.4 MG/DL (ref 4.4–5.2)
CALCIUM SERPL-MCNC: 8.5 MG/DL (ref 8.8–10.4)
CALCIUM SERPL-MCNC: 8.9 MG/DL (ref 8.8–10.4)
CANNABINOIDS UR QL SCN: ABNORMAL
CHLORIDE SERPL-SCNC: 100 MMOL/L (ref 98–107)
CHLORIDE SERPL-SCNC: 97 MMOL/L (ref 98–107)
COLOR UR AUTO: YELLOW
CORTIS SERPL-MCNC: 39.1 UG/DL
CPB POCT: NO
CPB POCT: YES
CREAT SERPL-MCNC: 1.82 MG/DL (ref 0.67–1.17)
CREAT SERPL-MCNC: 2.12 MG/DL (ref 0.67–1.17)
CREAT UR-MCNC: 48 MG/DL
CRP SERPL-MCNC: 47.3 MG/L
D DIMER PPP FEU-MCNC: 9.5 UG/ML FEU (ref 0–0.5)
DIASTOLIC BLOOD PRESSURE - MUSE: NORMAL MMHG
EGFRCR SERPLBLD CKD-EPI 2021: 33 ML/MIN/1.73M2
EGFRCR SERPLBLD CKD-EPI 2021: 40 ML/MIN/1.73M2
ELLIPTOCYTES BLD QL SMEAR: SLIGHT
EOSINOPHIL # BLD AUTO: 0 10E3/UL (ref 0–0.7)
EOSINOPHIL NFR BLD AUTO: 0 %
ERYTHROCYTE [DISTWIDTH] IN BLOOD BY AUTOMATED COUNT: 13.1 % (ref 10–15)
ERYTHROCYTE [SEDIMENTATION RATE] IN BLOOD BY WESTERGREN METHOD: 49 MM/HR (ref 0–20)
FENTANYL UR QL: ABNORMAL
GLUCOSE BLD-MCNC: 288 MG/DL (ref 70–99)
GLUCOSE BLDC GLUCOMTR-MCNC: 271 MG/DL (ref 70–99)
GLUCOSE BLDC GLUCOMTR-MCNC: 271 MG/DL (ref 70–99)
GLUCOSE SERPL-MCNC: 306 MG/DL (ref 70–99)
GLUCOSE SERPL-MCNC: 328 MG/DL (ref 70–99)
GLUCOSE UR STRIP-MCNC: >=1000 MG/DL
HCO3 BLD-SCNC: 23 MMOL/L (ref 21–28)
HCO3 BLD-SCNC: 24 MMOL/L (ref 21–28)
HCO3 BLDA-SCNC: 18 MMOL/L (ref 21–28)
HCO3 BLDA-SCNC: 19 MMOL/L (ref 21–28)
HCO3 BLDA-SCNC: 19 MMOL/L (ref 21–28)
HCO3 BLDA-SCNC: 20 MMOL/L (ref 21–28)
HCO3 BLDA-SCNC: 21 MMOL/L (ref 21–28)
HCO3 BLDA-SCNC: 21 MMOL/L (ref 21–28)
HCO3 BLDA-SCNC: 22 MMOL/L (ref 21–28)
HCO3 BLDA-SCNC: 24 MMOL/L (ref 21–28)
HCO3 BLDV-SCNC: 25 MMOL/L (ref 21–28)
HCO3 BLDV-SCNC: 25 MMOL/L (ref 21–28)
HCO3 SERPL-SCNC: 22 MMOL/L (ref 22–29)
HCO3 SERPL-SCNC: 22 MMOL/L (ref 22–29)
HCT VFR BLD AUTO: 37.9 % (ref 40–53)
HCT VFR BLD CALC: 33 % (ref 40–53)
HCT VFR BLD CALC: 39 % (ref 40–53)
HGB BLD-MCNC: 11.2 G/DL (ref 13.3–17.7)
HGB BLD-MCNC: 12.3 G/DL (ref 13.3–17.7)
HGB BLD-MCNC: 13.3 G/DL (ref 13.3–17.7)
HGB FREE PLAS-MCNC: <30 MG/DL
HGB UR QL STRIP: ABNORMAL
HYALINE CASTS: 2 /LPF
IMM GRANULOCYTES # BLD: 0.7 10E3/UL
IMM GRANULOCYTES NFR BLD: 3 %
INR PPP: 1.4 (ref 0.85–1.15)
INR PPP: 1.52 (ref 0.85–1.15)
INTERPRETATION ECG - MUSE: NORMAL
KETONES UR STRIP-MCNC: NEGATIVE MG/DL
LACTATE BLD-SCNC: 1.2 MMOL/L (ref 0.7–2)
LACTATE BLD-SCNC: 2.2 MMOL/L (ref 0.7–2)
LACTATE BLD-SCNC: 3.2 MMOL/L (ref 0.7–2)
LACTATE BLD-SCNC: 6.7 MMOL/L (ref 0.7–2)
LACTATE BLD-SCNC: 8.6 MMOL/L (ref 0.7–2)
LACTATE SERPL-SCNC: 1.3 MMOL/L (ref 0.7–2)
LDH SERPL L TO P-CCNC: 490 U/L (ref 0–250)
LEUKOCYTE ESTERASE UR QL STRIP: NEGATIVE
LYMPHOCYTES # BLD AUTO: 1.3 10E3/UL (ref 0.8–5.3)
LYMPHOCYTES NFR BLD AUTO: 7 %
MAGNESIUM SERPL-MCNC: 2.4 MG/DL (ref 1.7–2.3)
MAGNESIUM SERPL-MCNC: 2.8 MG/DL (ref 1.7–2.3)
MCH RBC QN AUTO: 27.3 PG (ref 26.5–33)
MCHC RBC AUTO-ENTMCNC: 32.5 G/DL (ref 31.5–36.5)
MCV RBC AUTO: 84 FL (ref 78–100)
MONOCYTES # BLD AUTO: 1.7 10E3/UL (ref 0–1.3)
MONOCYTES NFR BLD AUTO: 9 %
MRSA DNA SPEC QL NAA+PROBE: NEGATIVE
MUCOUS THREADS #/AREA URNS LPF: PRESENT /LPF
NEUTROPHILS # BLD AUTO: 15.5 10E3/UL (ref 1.6–8.3)
NEUTROPHILS NFR BLD AUTO: 81 %
NITRATE UR QL: NEGATIVE
NRBC # BLD AUTO: 0 10E3/UL
NRBC BLD AUTO-RTO: 0 /100
O2/TOTAL GAS SETTING VFR VENT: 100 %
O2/TOTAL GAS SETTING VFR VENT: 60 %
O2/TOTAL GAS SETTING VFR VENT: 80 %
OPIATES UR QL SCN: ABNORMAL
OXYHGB MFR BLDA: 97 % (ref 75–100)
OXYHGB MFR BLDA: 97 % (ref 92–100)
OXYHGB MFR BLDA: 98 % (ref 92–100)
OXYHGB MFR BLDV: 79 %
OXYHGB MFR BLDV: 88 % (ref 70–75)
P AXIS - MUSE: 95 DEGREES
PCO2 BLD: 41 MM HG (ref 35–45)
PCO2 BLD: 55 MM HG (ref 35–45)
PCO2 BLDA: 25 MM HG (ref 35–45)
PCO2 BLDA: 32 MM HG (ref 35–45)
PCO2 BLDA: 38 MM HG (ref 35–45)
PCO2 BLDA: 45 MM HG (ref 35–45)
PCO2 BLDA: 46 MM HG (ref 35–45)
PCO2 BLDA: 51 MM HG (ref 35–45)
PCO2 BLDA: 69 MM HG (ref 35–45)
PCO2 BLDA: 74 MM HG (ref 35–45)
PCO2 BLDV: 54 MM HG (ref 40–50)
PCO2 BLDV: 58 MM HG (ref 40–50)
PCP QUAL URINE (ROCHE): ABNORMAL
PH BLD: 7.25 [PH] (ref 7.35–7.45)
PH BLD: 7.35 [PH] (ref 7.35–7.45)
PH BLDA: 7.08 [PH] (ref 7.35–7.45)
PH BLDA: 7.08 [PH] (ref 7.35–7.45)
PH BLDA: 7.23 [PH] (ref 7.35–7.45)
PH BLDA: 7.24 [PH] (ref 7.35–7.45)
PH BLDA: 7.28 [PH] (ref 7.35–7.45)
PH BLDA: 7.36 [PH] (ref 7.35–7.45)
PH BLDA: 7.41 [PH] (ref 7.35–7.45)
PH BLDA: 7.46 [PH] (ref 7.35–7.45)
PH BLDV: 7.24 [PH] (ref 7.32–7.43)
PH BLDV: 7.28 [PH] (ref 7.32–7.43)
PH UR STRIP: 5.5 [PH] (ref 5–7)
PLAT MORPH BLD: ABNORMAL
PLATELET # BLD AUTO: 464 10E3/UL (ref 150–450)
PO2 BLD: 142 MM HG (ref 80–105)
PO2 BLD: 162 MM HG (ref 80–105)
PO2 BLDA: 105 MM HG (ref 80–105)
PO2 BLDA: 107 MM HG (ref 80–105)
PO2 BLDA: 137 MM HG (ref 80–105)
PO2 BLDA: 199 MM HG (ref 80–105)
PO2 BLDA: 254 MM HG (ref 80–105)
PO2 BLDA: 272 MM HG (ref 80–105)
PO2 BLDA: 58 MM HG (ref 80–105)
PO2 BLDA: 61 MM HG (ref 80–105)
PO2 BLDV: 50 MM HG (ref 25–47)
PO2 BLDV: 66 MM HG (ref 25–47)
POLYCHROMASIA BLD QL SMEAR: SLIGHT
POTASSIUM BLD-SCNC: 3.7 MMOL/L (ref 3.4–5.3)
POTASSIUM BLD-SCNC: 3.8 MMOL/L (ref 3.4–5.3)
POTASSIUM SERPL-SCNC: 3.2 MMOL/L (ref 3.4–5.3)
POTASSIUM SERPL-SCNC: 4.2 MMOL/L (ref 3.4–5.3)
PR INTERVAL - MUSE: 232 MS
PROCALCITONIN SERPL IA-MCNC: 1.01 NG/ML
PROT SERPL-MCNC: 6.6 G/DL (ref 6.4–8.3)
PROT SERPL-MCNC: 6.7 G/DL (ref 6.4–8.3)
PROTHROMBIN TIME: 16.8 SECONDS (ref 11.8–14.8)
PROTHROMBIN TIME: 18.1 SECONDS (ref 11.8–14.8)
QRS DURATION - MUSE: 88 MS
QT - MUSE: 564 MS
QTC - MUSE: 487 MS
R AXIS - MUSE: -49 DEGREES
RBC # BLD AUTO: 4.51 10E6/UL (ref 4.4–5.9)
RBC MORPH BLD: ABNORMAL
RBC URINE: 14 /HPF
SA TARGET DNA: NEGATIVE
SAO2 % BLDA: 100 % (ref 95–96)
SAO2 % BLDA: 76 % (ref 95–96)
SAO2 % BLDA: 79 % (ref 95–96)
SAO2 % BLDA: 97 % (ref 95–96)
SAO2 % BLDA: 97 % (ref 95–96)
SAO2 % BLDA: 98.8 % (ref 95–96)
SAO2 % BLDA: 98.8 % (ref 95–96)
SAO2 % BLDV: 79.7 % (ref 70–75)
SAO2 % BLDV: 89.1 % (ref 70–75)
SODIUM BLD-SCNC: 139 MMOL/L (ref 135–145)
SODIUM BLD-SCNC: 139 MMOL/L (ref 135–145)
SODIUM SERPL-SCNC: 136 MMOL/L (ref 135–145)
SODIUM SERPL-SCNC: 139 MMOL/L (ref 135–145)
SP GR UR STRIP: 1.02 (ref 1–1.03)
SPECIMEN EXP DATE BLD: NORMAL
SPECIMEN EXP DATE BLD: NORMAL
SQUAMOUS EPITHELIAL: <1 /HPF
SYSTOLIC BLOOD PRESSURE - MUSE: NORMAL MMHG
T AXIS - MUSE: 86 DEGREES
T3 SERPL-MCNC: 131 NG/DL (ref 85–202)
T3FREE SERPL-MCNC: 2.8 PG/ML (ref 2–4.4)
T4 FREE SERPL-MCNC: 1.4 NG/DL (ref 0.9–1.7)
TRANSITIONAL EPI: <1 /HPF
TROPONIN T SERPL HS-MCNC: 1959 NG/L
TROPONIN T SERPL HS-MCNC: 761 NG/L
TSH SERPL DL<=0.005 MIU/L-ACNC: 0.61 UIU/ML (ref 0.3–4.2)
UFH PPP CHRO-ACNC: >1.1 IU/ML (ref ?–1.1)
UROBILINOGEN UR STRIP-MCNC: NORMAL MG/DL
VENTRICULAR RATE- MUSE: 45 BPM
WBC # BLD AUTO: 19.2 10E3/UL (ref 4–11)
WBC URINE: 8 /HPF

## 2025-07-18 PROCEDURE — C1887 CATHETER, GUIDING: HCPCS | Performed by: INTERNAL MEDICINE

## 2025-07-18 PROCEDURE — 85730 THROMBOPLASTIN TIME PARTIAL: CPT | Performed by: EMERGENCY MEDICINE

## 2025-07-18 PROCEDURE — 999N000179 XR SURGERY CARM FLUORO LESS THAN 5 MIN W STILLS

## 2025-07-18 PROCEDURE — 85025 COMPLETE CBC W/AUTO DIFF WBC: CPT | Performed by: STUDENT IN AN ORGANIZED HEALTH CARE EDUCATION/TRAINING PROGRAM

## 2025-07-18 PROCEDURE — 33952 ECMO/ECLS INSJ PRPH CANNULA: CPT | Performed by: EMERGENCY MEDICINE

## 2025-07-18 PROCEDURE — C1894 INTRO/SHEATH, NON-LASER: HCPCS | Performed by: INTERNAL MEDICINE

## 2025-07-18 PROCEDURE — 85347 COAGULATION TIME ACTIVATED: CPT

## 2025-07-18 PROCEDURE — 84439 ASSAY OF FREE THYROXINE: CPT | Performed by: STUDENT IN AN ORGANIZED HEALTH CARE EDUCATION/TRAINING PROGRAM

## 2025-07-18 PROCEDURE — 85027 COMPLETE CBC AUTOMATED: CPT | Performed by: EMERGENCY MEDICINE

## 2025-07-18 PROCEDURE — 85396 CLOTTING ASSAY WHOLE BLOOD: CPT | Performed by: STUDENT IN AN ORGANIZED HEALTH CARE EDUCATION/TRAINING PROGRAM

## 2025-07-18 PROCEDURE — 85379 FIBRIN DEGRADATION QUANT: CPT | Performed by: STUDENT IN AN ORGANIZED HEALTH CARE EDUCATION/TRAINING PROGRAM

## 2025-07-18 PROCEDURE — 86316 IMMUNOASSAY TUMOR OTHER: CPT | Performed by: STUDENT IN AN ORGANIZED HEALTH CARE EDUCATION/TRAINING PROGRAM

## 2025-07-18 PROCEDURE — 84295 ASSAY OF SERUM SODIUM: CPT

## 2025-07-18 PROCEDURE — 85610 PROTHROMBIN TIME: CPT | Performed by: STUDENT IN AN ORGANIZED HEALTH CARE EDUCATION/TRAINING PROGRAM

## 2025-07-18 PROCEDURE — 84145 PROCALCITONIN (PCT): CPT | Performed by: STUDENT IN AN ORGANIZED HEALTH CARE EDUCATION/TRAINING PROGRAM

## 2025-07-18 PROCEDURE — 99291 CRITICAL CARE FIRST HOUR: CPT | Mod: 25 | Performed by: INTERNAL MEDICINE

## 2025-07-18 PROCEDURE — B2111ZZ FLUOROSCOPY OF MULTIPLE CORONARY ARTERIES USING LOW OSMOLAR CONTRAST: ICD-10-PCS | Performed by: INTERNAL MEDICINE

## 2025-07-18 PROCEDURE — 250N000011 HC RX IP 250 OP 636: Performed by: STUDENT IN AN ORGANIZED HEALTH CARE EDUCATION/TRAINING PROGRAM

## 2025-07-18 PROCEDURE — B240ZZ3 ULTRASONOGRAPHY OF SINGLE CORONARY ARTERY, INTRAVASCULAR: ICD-10-PCS | Performed by: INTERNAL MEDICINE

## 2025-07-18 PROCEDURE — C1751 CATH, INF, PER/CENT/MIDLINE: HCPCS

## 2025-07-18 PROCEDURE — 82947 ASSAY GLUCOSE BLOOD QUANT: CPT | Performed by: STUDENT IN AN ORGANIZED HEALTH CARE EDUCATION/TRAINING PROGRAM

## 2025-07-18 PROCEDURE — 83051 HEMOGLOBIN PLASMA: CPT | Performed by: STUDENT IN AN ORGANIZED HEALTH CARE EDUCATION/TRAINING PROGRAM

## 2025-07-18 PROCEDURE — 93005 ELECTROCARDIOGRAM TRACING: CPT

## 2025-07-18 PROCEDURE — 272N000001 HC OR GENERAL SUPPLY STERILE: Performed by: INTERNAL MEDICINE

## 2025-07-18 PROCEDURE — 33946 ECMO/ECLS INITIATION VENOUS: CPT

## 2025-07-18 PROCEDURE — 82805 BLOOD GASES W/O2 SATURATION: CPT | Performed by: STUDENT IN AN ORGANIZED HEALTH CARE EDUCATION/TRAINING PROGRAM

## 2025-07-18 PROCEDURE — 85520 HEPARIN ASSAY: CPT | Performed by: STUDENT IN AN ORGANIZED HEALTH CARE EDUCATION/TRAINING PROGRAM

## 2025-07-18 PROCEDURE — 70450 CT HEAD/BRAIN W/O DYE: CPT | Mod: 26 | Performed by: RADIOLOGY

## 2025-07-18 PROCEDURE — 999N000055 HC STATISTIC END TITIAL CO2 MONITORING

## 2025-07-18 PROCEDURE — 258N000003 HC RX IP 258 OP 636: Performed by: INTERNAL MEDICINE

## 2025-07-18 PROCEDURE — 92950 HEART/LUNG RESUSCITATION CPR: CPT

## 2025-07-18 PROCEDURE — 84132 ASSAY OF SERUM POTASSIUM: CPT

## 2025-07-18 PROCEDURE — 999N000158 HC STATISTIC RCP TIME ED VENT EA 10 MIN

## 2025-07-18 PROCEDURE — 999N000009 HC STATISTIC AIRWAY CARE

## 2025-07-18 PROCEDURE — 84480 ASSAY TRIIODOTHYRONINE (T3): CPT | Performed by: STUDENT IN AN ORGANIZED HEALTH CARE EDUCATION/TRAINING PROGRAM

## 2025-07-18 PROCEDURE — 84443 ASSAY THYROID STIM HORMONE: CPT | Performed by: STUDENT IN AN ORGANIZED HEALTH CARE EDUCATION/TRAINING PROGRAM

## 2025-07-18 PROCEDURE — C9460 INJECTION, CANGRELOR: HCPCS | Mod: JZ | Performed by: INTERNAL MEDICINE

## 2025-07-18 PROCEDURE — 86140 C-REACTIVE PROTEIN: CPT | Performed by: STUDENT IN AN ORGANIZED HEALTH CARE EDUCATION/TRAINING PROGRAM

## 2025-07-18 PROCEDURE — 80307 DRUG TEST PRSMV CHEM ANLYZR: CPT | Performed by: STUDENT IN AN ORGANIZED HEALTH CARE EDUCATION/TRAINING PROGRAM

## 2025-07-18 PROCEDURE — 85652 RBC SED RATE AUTOMATED: CPT | Performed by: STUDENT IN AN ORGANIZED HEALTH CARE EDUCATION/TRAINING PROGRAM

## 2025-07-18 PROCEDURE — C1753 CATH, INTRAVAS ULTRASOUND: HCPCS | Performed by: INTERNAL MEDICINE

## 2025-07-18 PROCEDURE — 82805 BLOOD GASES W/O2 SATURATION: CPT | Performed by: INTERNAL MEDICINE

## 2025-07-18 PROCEDURE — 86901 BLOOD TYPING SEROLOGIC RH(D): CPT | Performed by: EMERGENCY MEDICINE

## 2025-07-18 PROCEDURE — 87040 BLOOD CULTURE FOR BACTERIA: CPT | Performed by: STUDENT IN AN ORGANIZED HEALTH CARE EDUCATION/TRAINING PROGRAM

## 2025-07-18 PROCEDURE — 99285 EMERGENCY DEPT VISIT HI MDM: CPT | Mod: 25 | Performed by: EMERGENCY MEDICINE

## 2025-07-18 PROCEDURE — 36415 COLL VENOUS BLD VENIPUNCTURE: CPT | Performed by: STUDENT IN AN ORGANIZED HEALTH CARE EDUCATION/TRAINING PROGRAM

## 2025-07-18 PROCEDURE — 5A1522G EXTRACORPOREAL OXYGENATION, MEMBRANE, PERIPHERAL VENO-ARTERIAL: ICD-10-PCS | Performed by: INTERNAL MEDICINE

## 2025-07-18 PROCEDURE — 92920 PRQ TRLUML C ANGIOP 1ART&/BR: CPT | Mod: LC | Performed by: INTERNAL MEDICINE

## 2025-07-18 PROCEDURE — 84155 ASSAY OF PROTEIN SERUM: CPT | Performed by: STUDENT IN AN ORGANIZED HEALTH CARE EDUCATION/TRAINING PROGRAM

## 2025-07-18 PROCEDURE — 83615 LACTATE (LD) (LDH) ENZYME: CPT | Performed by: STUDENT IN AN ORGANIZED HEALTH CARE EDUCATION/TRAINING PROGRAM

## 2025-07-18 PROCEDURE — 250N000013 HC RX MED GY IP 250 OP 250 PS 637: Performed by: STUDENT IN AN ORGANIZED HEALTH CARE EDUCATION/TRAINING PROGRAM

## 2025-07-18 PROCEDURE — 92978 ENDOLUMINL IVUS OCT C 1ST: CPT | Mod: 26 | Performed by: INTERNAL MEDICINE

## 2025-07-18 PROCEDURE — 80347 BENZODIAZEPINES 13 OR MORE: CPT | Performed by: STUDENT IN AN ORGANIZED HEALTH CARE EDUCATION/TRAINING PROGRAM

## 2025-07-18 PROCEDURE — C1725 CATH, TRANSLUMIN NON-LASER: HCPCS | Performed by: INTERNAL MEDICINE

## 2025-07-18 PROCEDURE — 33947 ECMO/ECLS INITIATION ARTERY: CPT | Performed by: EMERGENCY MEDICINE

## 2025-07-18 PROCEDURE — 250N000011 HC RX IP 250 OP 636: Performed by: EMERGENCY MEDICINE

## 2025-07-18 PROCEDURE — 80171 DRUG SCREEN QUANT GABAPENTIN: CPT | Performed by: STUDENT IN AN ORGANIZED HEALTH CARE EDUCATION/TRAINING PROGRAM

## 2025-07-18 PROCEDURE — 84481 FREE ASSAY (FT-3): CPT | Performed by: STUDENT IN AN ORGANIZED HEALTH CARE EDUCATION/TRAINING PROGRAM

## 2025-07-18 PROCEDURE — 87641 MR-STAPH DNA AMP PROBE: CPT | Performed by: STUDENT IN AN ORGANIZED HEALTH CARE EDUCATION/TRAINING PROGRAM

## 2025-07-18 PROCEDURE — 94002 VENT MGMT INPAT INIT DAY: CPT

## 2025-07-18 PROCEDURE — 258N000003 HC RX IP 258 OP 636: Performed by: EMERGENCY MEDICINE

## 2025-07-18 PROCEDURE — 85610 PROTHROMBIN TIME: CPT | Performed by: EMERGENCY MEDICINE

## 2025-07-18 PROCEDURE — 36556 INSERT NON-TUNNEL CV CATH: CPT | Mod: XU | Performed by: EMERGENCY MEDICINE

## 2025-07-18 PROCEDURE — 999N000157 HC STATISTIC RCP TIME EA 10 MIN

## 2025-07-18 PROCEDURE — 83735 ASSAY OF MAGNESIUM: CPT | Performed by: EMERGENCY MEDICINE

## 2025-07-18 PROCEDURE — 85007 BL SMEAR W/DIFF WBC COUNT: CPT | Performed by: EMERGENCY MEDICINE

## 2025-07-18 PROCEDURE — 81001 URINALYSIS AUTO W/SCOPE: CPT | Performed by: STUDENT IN AN ORGANIZED HEALTH CARE EDUCATION/TRAINING PROGRAM

## 2025-07-18 PROCEDURE — 999N000026 HC STATISTIC CARDIOPULM RESUSCITATION

## 2025-07-18 PROCEDURE — C1769 GUIDE WIRE: HCPCS | Performed by: INTERNAL MEDICINE

## 2025-07-18 PROCEDURE — 82533 TOTAL CORTISOL: CPT | Performed by: STUDENT IN AN ORGANIZED HEALTH CARE EDUCATION/TRAINING PROGRAM

## 2025-07-18 PROCEDURE — 5A1955Z RESPIRATORY VENTILATION, GREATER THAN 96 CONSECUTIVE HOURS: ICD-10-PCS | Performed by: INTERNAL MEDICINE

## 2025-07-18 PROCEDURE — 84484 ASSAY OF TROPONIN QUANT: CPT | Performed by: EMERGENCY MEDICINE

## 2025-07-18 PROCEDURE — 82330 ASSAY OF CALCIUM: CPT | Performed by: STUDENT IN AN ORGANIZED HEALTH CARE EDUCATION/TRAINING PROGRAM

## 2025-07-18 PROCEDURE — 83605 ASSAY OF LACTIC ACID: CPT

## 2025-07-18 PROCEDURE — 999N000159 HC STATISTIC RCP TIME PACU VENT EA 10 MIN

## 2025-07-18 PROCEDURE — 83735 ASSAY OF MAGNESIUM: CPT | Performed by: STUDENT IN AN ORGANIZED HEALTH CARE EDUCATION/TRAINING PROGRAM

## 2025-07-18 PROCEDURE — 83605 ASSAY OF LACTIC ACID: CPT | Performed by: STUDENT IN AN ORGANIZED HEALTH CARE EDUCATION/TRAINING PROGRAM

## 2025-07-18 PROCEDURE — 272N000237 HC CARDIOHELP CIRCUIT

## 2025-07-18 PROCEDURE — 250N000009 HC RX 250: Performed by: STUDENT IN AN ORGANIZED HEALTH CARE EDUCATION/TRAINING PROGRAM

## 2025-07-18 PROCEDURE — 250N000011 HC RX IP 250 OP 636: Performed by: INTERNAL MEDICINE

## 2025-07-18 PROCEDURE — 92941 PRQ TRLML REVSC TOT OCCL AMI: CPT | Performed by: INTERNAL MEDICINE

## 2025-07-18 PROCEDURE — 36415 COLL VENOUS BLD VENIPUNCTURE: CPT | Performed by: EMERGENCY MEDICINE

## 2025-07-18 PROCEDURE — 999N000065 XR CHEST PORT 1 VIEW

## 2025-07-18 PROCEDURE — 85730 THROMBOPLASTIN TIME PARTIAL: CPT | Performed by: STUDENT IN AN ORGANIZED HEALTH CARE EDUCATION/TRAINING PROGRAM

## 2025-07-18 PROCEDURE — 272N000555 HC SENSOR NIRS OXIMETER, ADULT

## 2025-07-18 PROCEDURE — 84484 ASSAY OF TROPONIN QUANT: CPT | Performed by: STUDENT IN AN ORGANIZED HEALTH CARE EDUCATION/TRAINING PROGRAM

## 2025-07-18 PROCEDURE — 70450 CT HEAD/BRAIN W/O DYE: CPT

## 2025-07-18 PROCEDURE — 36620 INSERTION CATHETER ARTERY: CPT | Mod: XU | Performed by: EMERGENCY MEDICINE

## 2025-07-18 PROCEDURE — 80053 COMPREHEN METABOLIC PANEL: CPT | Performed by: EMERGENCY MEDICINE

## 2025-07-18 PROCEDURE — 250N000011 HC RX IP 250 OP 636: Mod: JZ | Performed by: INTERNAL MEDICINE

## 2025-07-18 PROCEDURE — 33947 ECMO/ECLS INITIATION ARTERY: CPT

## 2025-07-18 PROCEDURE — 200N000002 HC R&B ICU UMMC

## 2025-07-18 PROCEDURE — 92978 ENDOLUMINL IVUS OCT C 1ST: CPT | Performed by: INTERNAL MEDICINE

## 2025-07-18 PROCEDURE — 250N000011 HC RX IP 250 OP 636

## 2025-07-18 PROCEDURE — 999N000185 HC STATISTIC TRANSPORT TIME EA 15 MIN

## 2025-07-18 PROCEDURE — 87070 CULTURE OTHR SPECIMN AEROBIC: CPT | Performed by: STUDENT IN AN ORGANIZED HEALTH CARE EDUCATION/TRAINING PROGRAM

## 2025-07-18 PROCEDURE — 80353 DRUG SCREENING COCAINE: CPT | Performed by: STUDENT IN AN ORGANIZED HEALTH CARE EDUCATION/TRAINING PROGRAM

## 2025-07-18 PROCEDURE — 96365 THER/PROPH/DIAG IV INF INIT: CPT

## 2025-07-18 RX ORDER — TICAGRELOR 90 MG/1
90 TABLET, FILM COATED ORAL 2 TIMES DAILY
Status: DISCONTINUED | OUTPATIENT
Start: 2025-07-19 | End: 2025-07-27

## 2025-07-18 RX ORDER — NALOXONE HYDROCHLORIDE 0.4 MG/ML
0.4 INJECTION, SOLUTION INTRAMUSCULAR; INTRAVENOUS; SUBCUTANEOUS
Status: DISCONTINUED | OUTPATIENT
Start: 2025-07-18 | End: 2025-07-27 | Stop reason: HOSPADM

## 2025-07-18 RX ORDER — FENTANYL CITRATE 50 UG/ML
INJECTION, SOLUTION INTRAMUSCULAR; INTRAVENOUS
Status: DISCONTINUED | OUTPATIENT
Start: 2025-07-18 | End: 2025-07-18 | Stop reason: HOSPADM

## 2025-07-18 RX ORDER — MIDAZOLAM HCL IN 0.9 % NACL/PF 1 MG/ML
PLASTIC BAG, INJECTION (ML) INTRAVENOUS
Status: COMPLETED
Start: 2025-07-18 | End: 2025-07-18

## 2025-07-18 RX ORDER — TICAGRELOR 90 MG/1
180 TABLET, FILM COATED ORAL ONCE
Status: COMPLETED | OUTPATIENT
Start: 2025-07-18 | End: 2025-07-18

## 2025-07-18 RX ORDER — AMOXICILLIN 250 MG
1-2 CAPSULE ORAL 2 TIMES DAILY PRN
Status: DISCONTINUED | OUTPATIENT
Start: 2025-07-20 | End: 2025-07-27 | Stop reason: HOSPADM

## 2025-07-18 RX ORDER — HEPARIN SODIUM 10000 [USP'U]/100ML
10-50 INJECTION, SOLUTION INTRAVENOUS CONTINUOUS
Status: DISCONTINUED | OUTPATIENT
Start: 2025-07-18 | End: 2025-07-27

## 2025-07-18 RX ORDER — NITROGLYCERIN 5 MG/ML
VIAL (ML) INTRAVENOUS
Status: DISCONTINUED | OUTPATIENT
Start: 2025-07-18 | End: 2025-07-18 | Stop reason: HOSPADM

## 2025-07-18 RX ORDER — CHLORHEXIDINE GLUCONATE ORAL RINSE 1.2 MG/ML
15 SOLUTION DENTAL EVERY 12 HOURS
Status: DISCONTINUED | OUTPATIENT
Start: 2025-07-18 | End: 2025-07-27

## 2025-07-18 RX ORDER — IOPAMIDOL 755 MG/ML
INJECTION, SOLUTION INTRAVASCULAR
Status: DISCONTINUED | OUTPATIENT
Start: 2025-07-18 | End: 2025-07-18 | Stop reason: HOSPADM

## 2025-07-18 RX ORDER — CEFTRIAXONE 2 G/1
2 INJECTION, POWDER, FOR SOLUTION INTRAMUSCULAR; INTRAVENOUS EVERY 24 HOURS
Status: COMPLETED | OUTPATIENT
Start: 2025-07-18 | End: 2025-07-22

## 2025-07-18 RX ORDER — HEPARIN SODIUM 200 [USP'U]/100ML
3 INJECTION, SOLUTION INTRAVENOUS CONTINUOUS
Status: DISCONTINUED | OUTPATIENT
Start: 2025-07-18 | End: 2025-07-27 | Stop reason: HOSPADM

## 2025-07-18 RX ORDER — FENTANYL CITRATE 50 UG/ML
INJECTION, SOLUTION INTRAMUSCULAR; INTRAVENOUS
Status: DISCONTINUED
Start: 2025-07-18 | End: 2025-07-18 | Stop reason: HOSPADM

## 2025-07-18 RX ORDER — FENTANYL CITRATE 50 UG/ML
100 INJECTION, SOLUTION INTRAMUSCULAR; INTRAVENOUS
Status: DISCONTINUED | OUTPATIENT
Start: 2025-07-18 | End: 2025-07-18 | Stop reason: HOSPADM

## 2025-07-18 RX ORDER — DEXTROSE MONOHYDRATE 25 G/50ML
25-50 INJECTION, SOLUTION INTRAVENOUS
Status: DISCONTINUED | OUTPATIENT
Start: 2025-07-18 | End: 2025-07-27 | Stop reason: HOSPADM

## 2025-07-18 RX ORDER — ASPIRIN 81 MG/1
81 TABLET, CHEWABLE ORAL DAILY
Status: DISCONTINUED | OUTPATIENT
Start: 2025-07-19 | End: 2025-07-27

## 2025-07-18 RX ORDER — HEPARIN SODIUM 1000 [USP'U]/ML
INJECTION, SOLUTION INTRAVENOUS; SUBCUTANEOUS
Status: DISCONTINUED | OUTPATIENT
Start: 2025-07-18 | End: 2025-07-18 | Stop reason: HOSPADM

## 2025-07-18 RX ORDER — MIDAZOLAM HCL IN 0.9 % NACL/PF 1 MG/ML
1-8 PLASTIC BAG, INJECTION (ML) INTRAVENOUS CONTINUOUS
Status: DISCONTINUED | OUTPATIENT
Start: 2025-07-18 | End: 2025-07-18 | Stop reason: HOSPADM

## 2025-07-18 RX ORDER — NALOXONE HYDROCHLORIDE 0.4 MG/ML
0.2 INJECTION, SOLUTION INTRAMUSCULAR; INTRAVENOUS; SUBCUTANEOUS
Status: DISCONTINUED | OUTPATIENT
Start: 2025-07-18 | End: 2025-07-27 | Stop reason: HOSPADM

## 2025-07-18 RX ORDER — LIDOCAINE 40 MG/G
CREAM TOPICAL
Status: DISCONTINUED | OUTPATIENT
Start: 2025-07-18 | End: 2025-07-27 | Stop reason: HOSPADM

## 2025-07-18 RX ORDER — NICOTINE POLACRILEX 4 MG
15-30 LOZENGE BUCCAL
Status: DISCONTINUED | OUTPATIENT
Start: 2025-07-18 | End: 2025-07-27 | Stop reason: HOSPADM

## 2025-07-18 RX ORDER — MIDAZOLAM HCL IN 0.9 % NACL/PF 1 MG/ML
1-8 PLASTIC BAG, INJECTION (ML) INTRAVENOUS CONTINUOUS
Status: DISCONTINUED | OUTPATIENT
Start: 2025-07-18 | End: 2025-07-18

## 2025-07-18 RX ORDER — POLYETHYLENE GLYCOL 3350 17 G/17G
17 POWDER, FOR SOLUTION ORAL DAILY
Status: DISCONTINUED | OUTPATIENT
Start: 2025-07-19 | End: 2025-07-27

## 2025-07-18 RX ORDER — DEXTROSE MONOHYDRATE 100 MG/ML
INJECTION, SOLUTION INTRAVENOUS CONTINUOUS PRN
Status: DISCONTINUED | OUTPATIENT
Start: 2025-07-18 | End: 2025-07-27 | Stop reason: HOSPADM

## 2025-07-18 RX ORDER — AMIODARONE HYDROCHLORIDE 50 MG/ML
150 INJECTION, SOLUTION INTRAVENOUS EVERY 5 MIN PRN
Status: DISCONTINUED | OUTPATIENT
Start: 2025-07-18 | End: 2025-07-18 | Stop reason: HOSPADM

## 2025-07-18 RX ORDER — HEPARIN SODIUM 1000 [USP'U]/ML
10000 INJECTION, SOLUTION INTRAVENOUS; SUBCUTANEOUS ONCE
Status: COMPLETED | OUTPATIENT
Start: 2025-07-18 | End: 2025-07-18

## 2025-07-18 RX ORDER — PROPOFOL 10 MG/ML
5-75 INJECTION, EMULSION INTRAVENOUS CONTINUOUS
Status: DISCONTINUED | OUTPATIENT
Start: 2025-07-18 | End: 2025-07-22

## 2025-07-18 RX ADMIN — HEPARIN SODIUM 10000 UNITS: 1000 INJECTION, SOLUTION INTRAVENOUS; SUBCUTANEOUS at 16:21

## 2025-07-18 RX ADMIN — MIDAZOLAM HYDROCHLORIDE 2 MG/HR: 1 INJECTION, SOLUTION INTRAVENOUS at 16:33

## 2025-07-18 RX ADMIN — FENTANYL CITRATE 100 MCG/ML: 50 INJECTION INTRAMUSCULAR; INTRAVENOUS at 16:25

## 2025-07-18 RX ADMIN — CANGRELOR 4 MCG/KG/MIN: 50 INJECTION, POWDER, LYOPHILIZED, FOR SOLUTION INTRAVENOUS at 18:36

## 2025-07-18 RX ADMIN — SODIUM CHLORIDE 1000 ML: 0.9 INJECTION, SOLUTION INTRAVENOUS at 16:33

## 2025-07-18 RX ADMIN — Medication 2 MG/HR: at 16:33

## 2025-07-18 RX ADMIN — HEPARIN SODIUM IN SODIUM CHLORIDE 3 ML/HR: 200 INJECTION INTRAVENOUS at 21:41

## 2025-07-18 RX ADMIN — Medication 50 MCG/HR: at 21:39

## 2025-07-18 RX ADMIN — INSULIN HUMAN 4 UNITS/HR: 1 INJECTION, SOLUTION INTRAVENOUS at 21:38

## 2025-07-18 RX ADMIN — VANCOMYCIN HYDROCHLORIDE 2000 MG: 1 INJECTION, POWDER, LYOPHILIZED, FOR SOLUTION INTRAVENOUS at 22:57

## 2025-07-18 RX ADMIN — TICAGRELOR 180 MG: 90 TABLET ORAL at 23:05

## 2025-07-18 RX ADMIN — CEFTRIAXONE SODIUM 2 G: 2 INJECTION, POWDER, FOR SOLUTION INTRAMUSCULAR; INTRAVENOUS at 21:55

## 2025-07-18 RX ADMIN — MIDAZOLAM 2 MG: 1 INJECTION INTRAMUSCULAR; INTRAVENOUS at 16:31

## 2025-07-18 RX ADMIN — AMIODARONE HYDROCHLORIDE 150 MG: 50 INJECTION, SOLUTION INTRAVENOUS at 16:34

## 2025-07-18 RX ADMIN — PROPOFOL 30 MCG/KG/MIN: 10 INJECTION, EMULSION INTRAVENOUS at 23:22

## 2025-07-18 RX ADMIN — CHLORHEXIDINE GLUCONATE 15 ML: 1.2 SOLUTION ORAL at 22:56

## 2025-07-18 RX ADMIN — AMIODARONE HYDROCHLORIDE 150 MG: 50 INJECTION, SOLUTION INTRAVENOUS at 16:29

## 2025-07-18 RX ADMIN — MIDAZOLAM 2 MG: 1 INJECTION INTRAMUSCULAR; INTRAVENOUS at 16:26

## 2025-07-18 RX ADMIN — HEPARIN SODIUM 780 UNITS/HR: 10000 INJECTION, SOLUTION INTRAVENOUS at 23:38

## 2025-07-18 RX ADMIN — NICARDIPINE HYDROCHLORIDE 5 MG/HR: 0.2 INJECTION INTRAVENOUS at 21:42

## 2025-07-18 RX ADMIN — FENTANYL CITRATE 100 MCG/ML: 50 INJECTION, SOLUTION INTRAMUSCULAR; INTRAVENOUS at 16:25

## 2025-07-18 RX ADMIN — MIDAZOLAM IN SODIUM CHLORIDE 4 MG/HR: 1 INJECTION INTRAVENOUS at 21:40

## 2025-07-18 ASSESSMENT — ACTIVITIES OF DAILY LIVING (ADL)
ADLS_ACUITY_SCORE: 73
ADLS_ACUITY_SCORE: 56

## 2025-07-18 NOTE — Clinical Note
The first balloon was inserted into the circumflex.Max pressure = 10 abdelrahman. Total duration = 15 seconds.     Max pressure = 18 abdelrahman. Total duration = 20 seconds.    Balloon reinflated a second time: Max pressure = 18 abdelrahman. Total duration = 20 seconds.

## 2025-07-18 NOTE — Clinical Note
The first balloon was inserted into the circumflex.Max pressure = 17 abdelrahman. Total duration = 19 seconds.

## 2025-07-18 NOTE — ED TRIAGE NOTES
Patient BIBA from home, after family checked on patient taking a nap and they were not breathing. EMS call was at 1537, family thinks patient might have been down for 10-15 mins prior to making the call. EMS arrived at 1642. When EMS arrived patient was asystole, moved to a vfib, asystole, vfib and was asystole upon arrival. Patient Jr was on.  Patient given a total of 3 epi, 1 bicarb, 100NS bolus prior to arrival. 7.5 ETT secured at 24cm at the lip, placed by EMS.

## 2025-07-18 NOTE — PROCEDURES
[facility]    Procedure: *Cath with PCI    Date/Time: 7/18/2025 6:34 PM    Performed by: Nick Kendall MD  Authorized by: Nick Kendall MD      UNIVERSAL PROTOCOL   Site Marked: Yes  Prior Images Obtained and Reviewed:  Yes  Required items: Required blood products, implants, devices and special equipment available    Patient identity confirmed:  Hospital-assigned identification number  NA - No sedation, light sedation, or local anesthesia  Confirmation Checklist:  Patient's identity using two indicators  Time out: Immediately prior to the procedure a time out was called    Universal Protocol: the Joint Commission Universal Protocol was followed    Preparation: Patient was prepped and draped in usual sterile fashion    Anesthesia was administered and monitored by anesthesiology.  See anesthesia documentation for details.     ANESTHESIA    Anesthesia was administered and monitored by anesthesiology.  See anesthesia documentation for details.    SEDATION  Patient Sedated: Yes    Vital signs: Vital signs monitored during sedation

## 2025-07-18 NOTE — ED NOTES
[facility] called for emergent transfer to 4A ICU [facility]. ETA 20 mins

## 2025-07-18 NOTE — ED PROVIDER NOTES
Emergency Department Note      History of Present Illness     Chief Complaint   Cardiac Arrest      HPI   Bob Echols is a 70 year old male anticoagulated on Eliquis, transported via EMS with history of type 2 diabetes, recent STEMI with PCI to the circumflex artery, who presents to the ED for evaluation of cardiac arrest. EMS reports that the patient was worked 5 minutes before ECMO team was activated. EMS states that the patient was intubated at the scene. Initial rhythm was coarse ventricular fibrillation. EMS reports that the family called 911 at 1537 when they found the patient unresponsive on the couch. EMS states that upon arrival they started ACLS/CPR at 1522 when the patient presented with no pulse. EMS reports that the patient was initial v-fib, with ACLS started by paramedics. The paramedics states that the patient had ROSC several times before re-arresting prior to arrival. EMS administered three rounds of Epinephrine to the patient and 1 amp of bicarb prior to arrival. EMS states that the patient was in the hospital this week already for a heart attack. EMS reports that the patient rearrested in route three times.     Independent Historian   EMS as detailed above.    Review of External Notes   I have reviewed recent hospital discharge summary.  Patient was hospitalized for STEMI requiring PCI with stenting of the circumflex artery.  He also had additional multifocal coronary artery disease.  Patient had a high-grade heart block periprocedurally and required a temporary pacer which was removed prior to discharge.  He was discharged on Plavix and apixaban.    Past Medical History     Medical History and Problem List   Benign prostatic hyperplasia  Bipolar affective disorder  Bursitis  Diabetes mellitus  Erectile dysfunction of organic origin  Hyperlipidemia  Hypertension   Polyp of colon   Type 2 diabetes  Posterior vitreous detachment  PSA elevation  Depression  Percutaneous transluminal  coronary angioplasty status  Unstable angina     Medications   Flexeril  Glucophage  Crestor  Eliquis  Norvasc  Viagra   Lipitor  Colcrys  Jardiance  Neurontin  Novolog  Lopressor  Nitrostat  Demadex     Surgical History   Phacoemulsification clear cornea with standard intraocular lens implant   Eye surgery  Coronary angiogram  Cholecystectomy     Physical Exam     Patient Vitals for the past 24 hrs:   Pulse Resp SpO2   07/18/25 1755 -- 18 --   07/18/25 1745 -- 18 --   07/18/25 1734 -- 18 --   07/18/25 1703 62 (!) 4 100 %   07/18/25 1701 -- -- 100 %   07/18/25 1700 (!) 44 (!) 0 100 %   07/18/25 1658 -- -- 100 %   07/18/25 1657 (!) 44 (!) 8 100 %   07/18/25 1655 (!) 44 12 100 %   07/18/25 1654 (!) 43 19 100 %   07/18/25 1653 -- -- 100 %   07/18/25 1650 -- -- 100 %   07/18/25 1640 -- -- 98 %   07/18/25 1638 -- -- 98 %   07/18/25 1637 -- -- 96 %   07/18/25 1636 -- -- 100 %   07/18/25 1635 -- -- 100 %   07/18/25 1632 -- -- 100 %   07/18/25 1631 -- -- (!) 91 %   07/18/25 1630 (!) 176 (!) 0 100 %   07/18/25 1629 (!) 142 (!) 9 100 %   07/18/25 1627 (!) 133 (!) 8 100 %   07/18/25 1626 112 (!) 8 100 %   07/18/25 1624 (!) 47 (!) 6 (!) 80 %   07/18/25 1623 (!) 42 (!) 7 (!) 81 %   07/18/25 1622 (!) 25 (!) 102 (!) 80 %     Physical Exam  General: Unresponsive, CPR in progress. GCS 3T.  Head:  Scalp, face, and head appear normal, atraumatic   Eyes:  Pupils are equal, round, 4mm and reactive.    Conjunctivae non-injected and sclerae white  ENT:    The external nose is normal    Pinnae are normal  Neck:  Normal range of motion    There is no rigidity noted    Trachea is in the midline  CV:  Generalized pallor. Extremities cool. No palpable pulses.  Resp:  Breath sounds present bilaterally with BVM ventilation via ETT.  GI:  Abdomen is soft    No distension, or mass  MS:  Flaccid muscular tone    No external signs of trauma.   Skin:  No rash or acute skin lesions noted  Neuro:  Comatose GCS3T, unresponsive to painful stimuli.    Psych: No agitation      Diagnostics     Lab Results   Labs Ordered and Resulted from Time of ED Arrival to Time of ED Departure   ISTAT GASES LACTATE ARTERIAL POCT - Abnormal       Result Value    Bicarbonate Arterial POCT 22      Lactic Acid POCT 8.6 (*)     O2 Sat, Arterial POCT 76 (*)     pCO2 Arterial POCT 74 (*)     pH Arterial POCT 7.08 (*)     pO2 Arterial POCT 58 (*)     Base Excess/Deficit (+/-) POCT -9.0 (*)    ISTAT GASES ELECTROLYTES ARTERIAL POCT - Abnormal    CPB Applied No      Hematocrit POCT 39 (*)     Bicarbonate Arterial POCT 21      Hemoglobin POCT 13.3      Potassium POCT 3.8      Sodium POCT 139      pCO2 Arterial POCT 69 (*)     pH Arterial POCT 7.08 (*)     pO2 Arterial POCT 61 (*)     O2 Sat, Arterial POCT 79 (*)     Base Excess/Deficit (+/-) POCT -10.0 (*)    COMPREHENSIVE METABOLIC PANEL - Abnormal    Sodium 139      Potassium 3.2 (*)     Carbon Dioxide (CO2) 22      Anion Gap 20 (*)     Urea Nitrogen 28.0 (*)     Creatinine 1.82 (*)     GFR Estimate 40 (*)     Calcium 8.9      Chloride 97 (*)     Glucose 328 (*)     Alkaline Phosphatase 113       (*)      (*)     Protein Total 6.7      Albumin 3.2 (*)     Bilirubin Total 0.3     INR - Abnormal    INR 1.40 (*)     PT 16.8 (*)    MAGNESIUM - Abnormal    Magnesium 2.8 (*)    CBC WITH PLATELETS AND DIFFERENTIAL - Abnormal    WBC Count 14.8 (*)     RBC Count 4.67      Hemoglobin 13.0 (*)     Hematocrit 41.2      MCV 88      MCH 27.8      MCHC 31.6      RDW 12.9      Platelet Count 379     ISTAT GASES LACTATE ARTERIAL POCT - Abnormal    Bicarbonate Arterial POCT 19 (*)     Lactic Acid POCT 6.7 (*)     O2 Sat, Arterial POCT 97 (*)     pCO2 Arterial POCT 46 (*)     pH Arterial POCT 7.23 (*)     pO2 Arterial POCT 107 (*)     Base Excess/Deficit (+/-) POCT -8.0 (*)    ISTAT GASES ELECTROLYTES ARTERIAL POCT - Abnormal    CPB Applied Yes      Hematocrit POCT 33 (*)     Bicarbonate Arterial POCT 19 (*)     Hemoglobin POCT 11.2 (*)      Potassium POCT 3.7      Sodium POCT 139      pCO2 Arterial POCT 45      pH Arterial POCT 7.24 (*)     pO2 Arterial POCT 105      O2 Sat, Arterial POCT 97 (*)     Base Excess/Deficit (+/-) POCT -8.0 (*)    ACTIVATED CLOTTING TIME CELITE POCT - Abnormal    Activated Clotting Time (Celite) POCT 210 (*)    PARTIAL THROMBOPLASTIN TIME - Normal    aPTT 33     TYPE AND SCREEN, ADULT    ABO/RH(D) A POS      Antibody Screen Negative      SPECIMEN EXPIRATION DATE 7/21/2025 11:59:00 PM CDT         Imaging   Cardiac Catheterization   Final Result      XR Chest Port 1 View   Final Result   IMPRESSION: Endotracheal tube is approximately 6.8 cm above the davis. Heart is moderately enlarged, unchanged. Low lung volumes. Lungs are otherwise clear.      XR Surgery ALDAIR L/T 5 Min Fluoro w Stills   Final Result          EKG   ECG results from 07/18/25   EKG 12-lead, tracing only     Value    Systolic Blood Pressure     Diastolic Blood Pressure     Ventricular Rate 45    Atrial Rate 45    NC Interval 232    QRS Duration 88        QTc 487    P Axis 95    R AXIS -49    T Axis 86    Interpretation ECG      ** Critical Test Result: STEMI  Sinus bradycardia with 1st degree A-V block  Left axis deviation  Pulmonary disease pattern  Inferior infarct , possibly acute  QTcB >= 480 msec  ** ** ACUTE MI / STEMI ** **  Consider right ventricular involvement in acute inferior infarct  Abnormal ECG  When compared with ECG of 13-Jul-2025 17:14,  Sinus rhythm has replaced Atrial fibrillation  Vent. rate has decreased by  43 bpm  Inferior infarct is now Present  ST depression has replaced ST elevation in Lateral leads  Reviewed by Jasper Reyna MD at 1705          Independent Interpretation   None    ED Course      Medications Administered   Medications   midazolam (VERSED) 1 MG/ML injection (2 mg  $Given 7/18/25 1631)   midazolam (VERSED) 1 MG/ML injection (2 mg  $Given 7/18/25 1626)   sodium chloride 0.9% BOLUS 1,000 mL (1,000 mLs  Intravenous $New Bag 7/18/25 1633)   heparin (porcine) injection 10,000 Units (10,000 Units Intravenous $Given 7/18/25 1621)   cangrelor ANTIPLATELET (KENGREAL) 50 mg in 250 mL loading dose from SYRINGE/BAG (3.46 mg Intravenous $Given 7/18/25 1835)       Procedures   Procedures     Discussion of Management   None    ED Course   ED Course as of 07/18/25 2203   Fri Jul 18, 2025   1612 I obtained history and examined the patient as noted above.    1731 I updated the wife of this patient regarding findings and next steps.       Additional Documentation  None    Medical Decision Making / Diagnosis     CMS Diagnoses: None    MIPS   None               MDM   Bob Echols is a 70 year old male with recent STEMI requiring PCI and stenting of the circumflex with multifocal multivessel coronary artery disease presents via EMS in cardiac arrest.  ECMO team was activated from the field.  Initial rhythm was reported to be ventricular fibrillation.  ROSC was reportedly achieved several times then route however patient rearrested and he presented with CPR in progress.  Dr. Villaseñor with the ECMO team was present on patient arrival.  Endotracheal tube was confirmed to be in place by myself via video laryngoscopy.  Patient was immediately cannulated for ECMO on arrival.  Once he was on the ECMO circuit CPR was stopped and patient was confirmed to have good perfusion on the ECMO circuit.  EKG shows inferior STEMI with reciprocal changes.  Cath Lab was activated.  When the patient was stabilized on ECMO he was transferred to the Cath Lab for emergent coronary angiography.  Initial troponin elevated at 761.  Creatinine and LFTs mildly elevated due to shock and cardiac arrest.  ABGs were improving prior to transfer to the Cath Lab.  Patient was started on fentanyl and Versed for sedation and transferred to the Cath Lab in critical condition.  I discussed the patient's course and prognosis with the patient's wife. After cath lab plan  is for transfer to [facility] ECMO unit[facility]ICU.     Disposition   The patient was transferred emergently to the cardiac Cath Lab.    Critical Care Time:   Upon my evaluation, this patient had a critical illness with high probability of imminent or life-threatening deterioration due to cardiac arrest, which required my direct attention, intervention, and personal management.    I have personally provided 65 minutes of critical care time exclusive of time spent on separately billable procedures. Time includes review of laboratory data, radiology results, discussion with consultants, reassessment of the patient and monitoring for potential decompensation. Interventions were performed as documented above.      Diagnosis     ICD-10-CM    1. Acute ST elevation myocardial infarction (STEMI) of inferolateral wall (H)  I21.19 Case Request Cath Lab     *Cath with PCI      2. ST elevation MI (STEMI) (H)  I21.3 Cardiac Catheterization     Cardiac Catheterization      3. Cardiac arrest with ventricular fibrillation (H)  I46.9 Case Request Cath Lab    I49.01 *Cath with PCI               Scribe Disclosure:  I, Belkis Mi, am serving as a scribe at 5:04 PM on 7/18/2025 to document services personally performed by Jasper Reyna MD based on my observations and the provider's statements to me.        Jasper Reyna MD  07/18/25 1286

## 2025-07-18 NOTE — PROGRESS NOTES
[facility] Mobile ECMO Initiation Note    Clinical Summary  Bob Echols is a 70 year old male who was transported to [facility] with out-of-hospital refractory cardiac arrest.    Initial rhythm in the ED:  ABG: pH 7.08  PaCO2 74  PaO2 60  Bicarb 22  SaO2 79%  Lactate: 8.6    [facility] Team  -- Kasi Tracy MD  -- Robert Nguyen LL3    Procedures Performed  Emergent consent was performed due to the critical nature of the patient's condition. The patient was positioned in a supine position. The groin sites were prepped by shaving, betadine, and drying followed by application of the sterile drape. Maximum sterile barriers were used including cap, sterile gown, gloves, US probe cover, and sterile US gel. See the MAR for medications given during these procedures.    # V-A EXTRACORPOREAL MEMBRANE OXYGENATION CATHETER PLACEMENT AND INITIATION  -- Under ultrasound guidance, a standard 18 ga. needle was used to establish vascular access in the R CFA and R CFV. Using a modified Seldinger technique, a 17-Fr arterial ECMO cannula and 25-Fr venous ECMO cannula were inserted over a wire into the respective vessels. The arterial cannula was positioned in the distal aorta/R iliac artery and the venous canula was positioned across the RA using fluoroscopy guidance. The pre-primed ECMO circuit was connected to the cannulas via wet-to-wet connections. The circuit was closely inspected to ensure no bubbles present. ECMO flow was initiated and Heparin 10,000 units was given IV at this time. Estimated blood loss of about 200 mL.    # PERIPHERAL ANTEROGRADE ARTERIAL PERFUSION CATHETER PLACEMENT AND INITIATION  -- An 8-Fr 24 cm Super Arrow-Flex sheath was inserted into the R SFA distal to the arterial ECMO cannula for anterograde arterial perfusion. The sheath integral side port was connected via high-pressure tubing to the side-port of the arterial ECMO cannula using wet-to-wet connection. Distal perfusion was then initiated without  difficulty. Estimated blood loss of about 50 mL.    # CENTRAL VENOUS CATHETER INSERTION FOR CENTRAL VASCULAR ACCESS  -- Under ultrasound guidance, a standard 18 ga. needle was used to establish vascular access in the L CFV. Using a modified Seldinger technique, a 6-Fr 25cm length sheath was placed in the vessel over a wire. The sheath was secured with sutures and covered with a sterile dressing. Estimated blood loss of about 10 mL.    # ARTERIAL LINE INSERTION FOR HEMODYNAMIC MONITORING  -- Under ultrasound guidance, a standard 18 ga. needle was used to establish vascular access in the L CFA. Using a modified Seldinger technique, a 6-Fr 25cm length sheath was placed in the vessel over a wire. The sheath was secured with sutures and covered with a sterile dressing. Estimated blood loss of about 10 mL.    Complications  None immediate.    Problem List  # Refractory out-of-hospital cardiac arrest.  # Cardiogenic shock (SCAI E).  # Acute hypoxic/hypercapnic respiratory failure.  # High anion gap metabolic acidosis/Lactic acidosis.  # Acute encephalopathy.    Plan  -- Goal MAP 65-70 mmHg. Titrate vasopressors/vasodilators as needed.  -- Continue V-A ECMO support. Maintain EBF 3-5 Lpm.  -- Goal pulsatility >10 mmHg. Titrate inotropes as needed.  -- Continue invasive mechanical ventilation. Goal Vt 6-8 cc/kg IBW, PaCO2 35-45 mmHg, PaO2 100-150 mmHg.  -- Goal -200 sec. UFH therapeutic anticoagulation.  -- Correct electrolyte derangements.  -- Goal moderate sedation (RASS -2 to -3). Midazolam/Fentanyl as needed.  -- Fentanyl/Hydromorphone PRN for treatment of acute pain.  -- Goal normothermia. Fever avoidance.  -- Proceed to cardiac catheterization laboratory for further diagnostic evaluation.  -- Non-contrast CT head and CT chest/abdomen/pelvis.  -- Transfer to [facility]  -- Admit to CCU.    Kasi Tracy MD  [facility]    Date of Service  7/18/2025

## 2025-07-19 ENCOUNTER — APPOINTMENT (OUTPATIENT)
Dept: ULTRASOUND IMAGING | Facility: CLINIC | Age: 70
End: 2025-07-19
Attending: STUDENT IN AN ORGANIZED HEALTH CARE EDUCATION/TRAINING PROGRAM
Payer: COMMERCIAL

## 2025-07-19 ENCOUNTER — APPOINTMENT (OUTPATIENT)
Dept: NEUROLOGY | Facility: CLINIC | Age: 70
End: 2025-07-19
Attending: STUDENT IN AN ORGANIZED HEALTH CARE EDUCATION/TRAINING PROGRAM
Payer: COMMERCIAL

## 2025-07-19 LAB
ACT BLD: 166 SECONDS (ref 74–150)
ACT BLD: 174 SECONDS (ref 74–150)
ACT BLD: 178 SECONDS (ref 74–150)
ACT BLD: 182 SECONDS (ref 74–150)
ALBUMIN SERPL BCG-MCNC: 2.9 G/DL (ref 3.5–5.2)
ALBUMIN SERPL BCG-MCNC: 3 G/DL (ref 3.5–5.2)
ALLEN'S TEST: ABNORMAL
ALP SERPL-CCNC: 85 U/L (ref 40–150)
ALP SERPL-CCNC: 89 U/L (ref 40–150)
ALP SERPL-CCNC: 90 U/L (ref 40–150)
ALP SERPL-CCNC: 90 U/L (ref 40–150)
ALP SERPL-CCNC: 96 U/L (ref 40–150)
ALT SERPL W P-5'-P-CCNC: 108 U/L (ref 0–70)
ALT SERPL W P-5'-P-CCNC: 111 U/L (ref 0–70)
ALT SERPL W P-5'-P-CCNC: 117 U/L (ref 0–70)
ALT SERPL W P-5'-P-CCNC: 131 U/L (ref 0–70)
ALT SERPL W P-5'-P-CCNC: 99 U/L (ref 0–70)
ANION GAP SERPL CALCULATED.3IONS-SCNC: 10 MMOL/L (ref 7–15)
ANION GAP SERPL CALCULATED.3IONS-SCNC: 11 MMOL/L (ref 7–15)
ANION GAP SERPL CALCULATED.3IONS-SCNC: 12 MMOL/L (ref 7–15)
ANION GAP SERPL CALCULATED.3IONS-SCNC: 13 MMOL/L (ref 7–15)
ANION GAP SERPL CALCULATED.3IONS-SCNC: 9 MMOL/L (ref 7–15)
APTT PPP: 49 SECONDS (ref 22–38)
APTT PPP: 61 SECONDS (ref 22–38)
APTT PPP: 71 SECONDS (ref 22–38)
APTT PPP: 95 SECONDS (ref 22–38)
AST SERPL W P-5'-P-CCNC: 126 U/L (ref 0–45)
AST SERPL W P-5'-P-CCNC: 73 U/L (ref 0–45)
AST SERPL W P-5'-P-CCNC: 79 U/L (ref 0–45)
AST SERPL W P-5'-P-CCNC: 83 U/L (ref 0–45)
AST SERPL W P-5'-P-CCNC: 97 U/L (ref 0–45)
AT III ACT/NOR PPP CHRO: 91 % (ref 85–135)
BASE EXCESS BLDA CALC-SCNC: -0.5 MMOL/L (ref -3–3)
BASE EXCESS BLDA CALC-SCNC: -0.7 MMOL/L (ref -3–3)
BASE EXCESS BLDA CALC-SCNC: -1 MMOL/L (ref -3–3)
BASE EXCESS BLDA CALC-SCNC: -1.2 MMOL/L (ref -3–3)
BASE EXCESS BLDA CALC-SCNC: -1.3 MMOL/L (ref -3–3)
BASE EXCESS BLDA CALC-SCNC: -1.6 MMOL/L (ref -3–3)
BASE EXCESS BLDA CALC-SCNC: -1.7 MMOL/L (ref -3–3)
BASE EXCESS BLDA CALC-SCNC: -1.9 MMOL/L (ref -3–3)
BASE EXCESS BLDA CALC-SCNC: -2 MMOL/L (ref -3–3)
BASE EXCESS BLDA CALC-SCNC: -2.4 MMOL/L (ref -3–3)
BASE EXCESS BLDA CALC-SCNC: -2.8 MMOL/L (ref -3–3)
BASE EXCESS BLDA CALC-SCNC: -2.9 MMOL/L (ref -3–3)
BASE EXCESS BLDA CALC-SCNC: -3 MMOL/L (ref -3–3)
BASE EXCESS BLDA CALC-SCNC: -3.3 MMOL/L (ref -3–3)
BASE EXCESS BLDA CALC-SCNC: -4 MMOL/L (ref -3–3)
BASE EXCESS BLDV CALC-SCNC: -1.3 MMOL/L (ref -3–3)
BASE EXCESS BLDV CALC-SCNC: -2.7 MMOL/L (ref -3–3)
BILIRUB SERPL-MCNC: 0.2 MG/DL
BILIRUB SERPL-MCNC: 0.3 MG/DL
BUN SERPL-MCNC: 39.2 MG/DL (ref 8–23)
BUN SERPL-MCNC: 40.1 MG/DL (ref 8–23)
BUN SERPL-MCNC: 40.7 MG/DL (ref 8–23)
BUN SERPL-MCNC: 40.9 MG/DL (ref 8–23)
BUN SERPL-MCNC: 42.1 MG/DL (ref 8–23)
CA-I BLD-MCNC: 4.3 MG/DL (ref 4.4–5.2)
CA-I BLD-MCNC: 4.4 MG/DL (ref 4.4–5.2)
CA-I BLD-MCNC: 4.6 MG/DL (ref 4.4–5.2)
CA-I BLD-MCNC: 4.7 MG/DL (ref 4.4–5.2)
CA-I BLD-MCNC: 4.8 MG/DL (ref 4.4–5.2)
CALCIUM SERPL-MCNC: 8.2 MG/DL (ref 8.8–10.4)
CALCIUM SERPL-MCNC: 8.4 MG/DL (ref 8.8–10.4)
CALCIUM SERPL-MCNC: 8.9 MG/DL (ref 8.8–10.4)
CALCIUM SERPL-MCNC: 9 MG/DL (ref 8.8–10.4)
CALCIUM SERPL-MCNC: 9.2 MG/DL (ref 8.8–10.4)
CF REDUC 30M P MA P HEP LENFR BLD TEG: 0 % (ref 0–8)
CF REDUC 60M P MA P HEPASE LENFR BLD TEG: 0.2 % (ref 0–15)
CFT P HPASE BLD TEG: 1.2 MINUTE (ref 1–3)
CHLORIDE SERPL-SCNC: 104 MMOL/L (ref 98–107)
CHLORIDE SERPL-SCNC: 106 MMOL/L (ref 98–107)
CHLORIDE SERPL-SCNC: 107 MMOL/L (ref 98–107)
CI (COAGULATION INDEX)(Z): 0.3 (ref -3–3)
CLOT ANGLE P HPASE BLD TEG: 73.4 DEGREES (ref 53–72)
CLOT INIT P HPASE BLD TEG: 10.2 MINUTE (ref 5–10)
CLOT STRENGTH P HPASE BLD TEG: 17.7 KD/SC (ref 4.5–11)
CREAT SERPL-MCNC: 2.19 MG/DL (ref 0.67–1.17)
CREAT SERPL-MCNC: 2.28 MG/DL (ref 0.67–1.17)
CREAT SERPL-MCNC: 2.4 MG/DL (ref 0.67–1.17)
CREAT SERPL-MCNC: 2.41 MG/DL (ref 0.67–1.17)
CREAT SERPL-MCNC: 2.45 MG/DL (ref 0.67–1.17)
CRP SERPL-MCNC: 57.1 MG/L
D DIMER PPP FEU-MCNC: 4.81 UG/ML FEU (ref 0–0.5)
D DIMER PPP FEU-MCNC: 9.47 UG/ML FEU (ref 0–0.5)
EGFRCR SERPLBLD CKD-EPI 2021: 28 ML/MIN/1.73M2
EGFRCR SERPLBLD CKD-EPI 2021: 30 ML/MIN/1.73M2
EGFRCR SERPLBLD CKD-EPI 2021: 32 ML/MIN/1.73M2
ERYTHROCYTE [DISTWIDTH] IN BLOOD BY AUTOMATED COUNT: 13.2 % (ref 10–15)
ERYTHROCYTE [DISTWIDTH] IN BLOOD BY AUTOMATED COUNT: 13.3 % (ref 10–15)
ERYTHROCYTE [DISTWIDTH] IN BLOOD BY AUTOMATED COUNT: 13.3 % (ref 10–15)
ERYTHROCYTE [DISTWIDTH] IN BLOOD BY AUTOMATED COUNT: 13.4 % (ref 10–15)
ERYTHROCYTE [SEDIMENTATION RATE] IN BLOOD BY WESTERGREN METHOD: 68 MM/HR (ref 0–20)
FIBRINOGEN PPP-MCNC: 754 MG/DL (ref 170–510)
FIBRINOGEN PPP-MCNC: 784 MG/DL (ref 170–510)
GLUCOSE BLD-MCNC: 121 MG/DL (ref 70–99)
GLUCOSE BLD-MCNC: 129 MG/DL (ref 70–99)
GLUCOSE BLD-MCNC: 133 MG/DL (ref 70–99)
GLUCOSE BLD-MCNC: 134 MG/DL (ref 70–99)
GLUCOSE BLDC GLUCOMTR-MCNC: 100 MG/DL (ref 70–99)
GLUCOSE BLDC GLUCOMTR-MCNC: 101 MG/DL (ref 70–99)
GLUCOSE BLDC GLUCOMTR-MCNC: 107 MG/DL (ref 70–99)
GLUCOSE BLDC GLUCOMTR-MCNC: 107 MG/DL (ref 70–99)
GLUCOSE BLDC GLUCOMTR-MCNC: 111 MG/DL (ref 70–99)
GLUCOSE BLDC GLUCOMTR-MCNC: 112 MG/DL (ref 70–99)
GLUCOSE BLDC GLUCOMTR-MCNC: 124 MG/DL (ref 70–99)
GLUCOSE BLDC GLUCOMTR-MCNC: 126 MG/DL (ref 70–99)
GLUCOSE BLDC GLUCOMTR-MCNC: 127 MG/DL (ref 70–99)
GLUCOSE BLDC GLUCOMTR-MCNC: 130 MG/DL (ref 70–99)
GLUCOSE BLDC GLUCOMTR-MCNC: 133 MG/DL (ref 70–99)
GLUCOSE BLDC GLUCOMTR-MCNC: 135 MG/DL (ref 70–99)
GLUCOSE BLDC GLUCOMTR-MCNC: 156 MG/DL (ref 70–99)
GLUCOSE BLDC GLUCOMTR-MCNC: 178 MG/DL (ref 70–99)
GLUCOSE BLDC GLUCOMTR-MCNC: 188 MG/DL (ref 70–99)
GLUCOSE BLDC GLUCOMTR-MCNC: 244 MG/DL (ref 70–99)
GLUCOSE SERPL-MCNC: 124 MG/DL (ref 70–99)
GLUCOSE SERPL-MCNC: 130 MG/DL (ref 70–99)
GLUCOSE SERPL-MCNC: 132 MG/DL (ref 70–99)
GLUCOSE SERPL-MCNC: 135 MG/DL (ref 70–99)
GLUCOSE SERPL-MCNC: 137 MG/DL (ref 70–99)
HCO3 BLD-SCNC: 19 MMOL/L (ref 21–28)
HCO3 BLD-SCNC: 21 MMOL/L (ref 21–28)
HCO3 BLD-SCNC: 21 MMOL/L (ref 21–28)
HCO3 BLD-SCNC: 22 MMOL/L (ref 21–28)
HCO3 BLD-SCNC: 23 MMOL/L (ref 21–28)
HCO3 BLD-SCNC: 25 MMOL/L (ref 21–28)
HCO3 BLDA-SCNC: 24 MMOL/L (ref 21–28)
HCO3 BLDA-SCNC: 24 MMOL/L (ref 21–28)
HCO3 BLDA-SCNC: 26 MMOL/L (ref 21–28)
HCO3 BLDV-SCNC: 25 MMOL/L (ref 21–28)
HCO3 BLDV-SCNC: 27 MMOL/L (ref 21–28)
HCO3 SERPL-SCNC: 18 MMOL/L (ref 22–29)
HCO3 SERPL-SCNC: 18 MMOL/L (ref 22–29)
HCO3 SERPL-SCNC: 19 MMOL/L (ref 22–29)
HCO3 SERPL-SCNC: 20 MMOL/L (ref 22–29)
HCO3 SERPL-SCNC: 22 MMOL/L (ref 22–29)
HCT VFR BLD AUTO: 30.1 % (ref 40–53)
HCT VFR BLD AUTO: 31.3 % (ref 40–53)
HCT VFR BLD AUTO: 32.4 % (ref 40–53)
HCT VFR BLD AUTO: 32.5 % (ref 40–53)
HGB BLD-MCNC: 10.1 G/DL (ref 13.3–17.7)
HGB BLD-MCNC: 10.4 G/DL (ref 13.3–17.7)
HGB BLD-MCNC: 10.7 G/DL (ref 13.3–17.7)
HGB BLD-MCNC: 9.7 G/DL (ref 13.3–17.7)
HGB FREE PLAS-MCNC: 30 MG/DL
INR PPP: 1.31 (ref 0.85–1.15)
INR PPP: 1.32 (ref 0.85–1.15)
INR PPP: 1.36 (ref 0.85–1.15)
INR PPP: 1.37 (ref 0.85–1.15)
LACTATE SERPL-SCNC: 1 MMOL/L (ref 0.7–2)
LACTATE SERPL-SCNC: 1.1 MMOL/L (ref 0.7–2)
LACTATE SERPL-SCNC: 1.1 MMOL/L (ref 0.7–2)
LACTATE SERPL-SCNC: 1.2 MMOL/L (ref 0.7–2)
LDH SERPL L TO P-CCNC: 388 U/L (ref 0–250)
LVEF ECHO: NORMAL
MAGNESIUM SERPL-MCNC: 2.3 MG/DL (ref 1.7–2.3)
MAGNESIUM SERPL-MCNC: 2.4 MG/DL (ref 1.7–2.3)
MAGNESIUM SERPL-MCNC: 2.4 MG/DL (ref 1.7–2.3)
MCF P HPASE BLD TEG: 78 MM (ref 50–70)
MCH RBC QN AUTO: 27 PG (ref 26.5–33)
MCH RBC QN AUTO: 27.2 PG (ref 26.5–33)
MCH RBC QN AUTO: 27.2 PG (ref 26.5–33)
MCH RBC QN AUTO: 27.3 PG (ref 26.5–33)
MCHC RBC AUTO-ENTMCNC: 32 G/DL (ref 31.5–36.5)
MCHC RBC AUTO-ENTMCNC: 32.2 G/DL (ref 31.5–36.5)
MCHC RBC AUTO-ENTMCNC: 32.3 G/DL (ref 31.5–36.5)
MCHC RBC AUTO-ENTMCNC: 33 G/DL (ref 31.5–36.5)
MCV RBC AUTO: 82 FL (ref 78–100)
MCV RBC AUTO: 84 FL (ref 78–100)
MCV RBC AUTO: 84 FL (ref 78–100)
MCV RBC AUTO: 85 FL (ref 78–100)
O2/TOTAL GAS SETTING VFR VENT: 50 %
O2/TOTAL GAS SETTING VFR VENT: 60 %
OXYHGB MFR BLDA: 96 % (ref 92–100)
OXYHGB MFR BLDA: 96 % (ref 92–100)
OXYHGB MFR BLDA: 97 % (ref 75–100)
OXYHGB MFR BLDA: 97 % (ref 92–100)
OXYHGB MFR BLDA: 98 % (ref 75–100)
OXYHGB MFR BLDA: 98 % (ref 92–100)
OXYHGB MFR BLDA: 99 % (ref 75–100)
OXYHGB MFR BLDA: 99 % (ref 92–100)
OXYHGB MFR BLDV: 69 %
OXYHGB MFR BLDV: 79 %
PCO2 BLD: 20 MM HG (ref 35–45)
PCO2 BLD: 27 MM HG (ref 35–45)
PCO2 BLD: 28 MM HG (ref 35–45)
PCO2 BLD: 29 MM HG (ref 35–45)
PCO2 BLD: 31 MM HG (ref 35–45)
PCO2 BLD: 34 MM HG (ref 35–45)
PCO2 BLD: 37 MM HG (ref 35–45)
PCO2 BLD: 43 MM HG (ref 35–45)
PCO2 BLD: 48 MM HG (ref 35–45)
PCO2 BLDA: 43 MM HG (ref 35–45)
PCO2 BLDA: 43 MM HG (ref 35–45)
PCO2 BLDA: 70 MM HG (ref 35–45)
PCO2 BLDV: 46 MM HG (ref 40–50)
PCO2 BLDV: 71 MM HG (ref 40–50)
PH BLD: 7.32 [PH] (ref 7.35–7.45)
PH BLD: 7.32 [PH] (ref 7.35–7.45)
PH BLD: 7.38 [PH] (ref 7.35–7.45)
PH BLD: 7.38 [PH] (ref 7.35–7.45)
PH BLD: 7.39 [PH] (ref 7.35–7.45)
PH BLD: 7.4 [PH] (ref 7.35–7.45)
PH BLD: 7.4 [PH] (ref 7.35–7.45)
PH BLD: 7.46 [PH] (ref 7.35–7.45)
PH BLD: 7.48 [PH] (ref 7.35–7.45)
PH BLD: 7.5 [PH] (ref 7.35–7.45)
PH BLD: 7.51 [PH] (ref 7.35–7.45)
PH BLD: 7.59 [PH] (ref 7.35–7.45)
PH BLDA: 7.18 [PH] (ref 7.35–7.45)
PH BLDA: 7.35 [PH] (ref 7.35–7.45)
PH BLDA: 7.36 [PH] (ref 7.35–7.45)
PH BLDV: 7.18 [PH] (ref 7.32–7.43)
PH BLDV: 7.34 [PH] (ref 7.32–7.43)
PHOSPHATE SERPL-MCNC: 4 MG/DL (ref 2.5–4.5)
PHOSPHATE SERPL-MCNC: 6.1 MG/DL (ref 2.5–4.5)
PLATELET # BLD AUTO: 301 10E3/UL (ref 150–450)
PLATELET # BLD AUTO: 361 10E3/UL (ref 150–450)
PLATELET # BLD AUTO: 390 10E3/UL (ref 150–450)
PLATELET # BLD AUTO: 393 10E3/UL (ref 150–450)
PO2 BLD: 104 MM HG (ref 80–105)
PO2 BLD: 109 MM HG (ref 80–105)
PO2 BLD: 114 MM HG (ref 80–105)
PO2 BLD: 116 MM HG (ref 80–105)
PO2 BLD: 118 MM HG (ref 80–105)
PO2 BLD: 122 MM HG (ref 80–105)
PO2 BLD: 134 MM HG (ref 80–105)
PO2 BLD: 135 MM HG (ref 80–105)
PO2 BLD: 146 MM HG (ref 80–105)
PO2 BLD: 176 MM HG (ref 80–105)
PO2 BLD: 176 MM HG (ref 80–105)
PO2 BLD: 92 MM HG (ref 80–105)
PO2 BLDA: 154 MM HG (ref 80–105)
PO2 BLDA: 158 MM HG (ref 80–105)
PO2 BLDA: 178 MM HG (ref 80–105)
PO2 BLDV: 39 MM HG (ref 25–47)
PO2 BLDV: 54 MM HG (ref 25–47)
POTASSIUM SERPL-SCNC: 4.2 MMOL/L (ref 3.4–5.3)
POTASSIUM SERPL-SCNC: 4.2 MMOL/L (ref 3.4–5.3)
POTASSIUM SERPL-SCNC: 4.5 MMOL/L (ref 3.4–5.3)
POTASSIUM SERPL-SCNC: 4.6 MMOL/L (ref 3.4–5.3)
POTASSIUM SERPL-SCNC: 4.9 MMOL/L (ref 3.4–5.3)
PROCALCITONIN SERPL IA-MCNC: 4.22 NG/ML
PROT SERPL-MCNC: 5.7 G/DL (ref 6.4–8.3)
PROT SERPL-MCNC: 5.7 G/DL (ref 6.4–8.3)
PROT SERPL-MCNC: 5.9 G/DL (ref 6.4–8.3)
PROTHROMBIN TIME: 16.2 SECONDS (ref 11.8–14.8)
PROTHROMBIN TIME: 16.3 SECONDS (ref 11.8–14.8)
PROTHROMBIN TIME: 16.7 SECONDS (ref 11.8–14.8)
PROTHROMBIN TIME: 16.8 SECONDS (ref 11.8–14.8)
RBC # BLD AUTO: 3.57 10E6/UL (ref 4.4–5.9)
RBC # BLD AUTO: 3.74 10E6/UL (ref 4.4–5.9)
RBC # BLD AUTO: 3.81 10E6/UL (ref 4.4–5.9)
RBC # BLD AUTO: 3.94 10E6/UL (ref 4.4–5.9)
SAO2 % BLDA: 97.4 % (ref 95–96)
SAO2 % BLDA: 98.2 % (ref 95–96)
SAO2 % BLDA: 99 % (ref 95–96)
SAO2 % BLDA: 99.1 % (ref 95–96)
SAO2 % BLDA: 99.3 % (ref 95–96)
SAO2 % BLDA: 99.5 % (ref 95–96)
SAO2 % BLDA: 99.5 % (ref 95–96)
SAO2 % BLDA: 99.7 % (ref 95–96)
SAO2 % BLDA: 99.8 % (ref 95–96)
SAO2 % BLDA: >100 % (ref 95–96)
SAO2 % BLDV: 69.9 % (ref 70–75)
SAO2 % BLDV: 80.3 % (ref 70–75)
SODIUM SERPL-SCNC: 135 MMOL/L (ref 135–145)
SODIUM SERPL-SCNC: 135 MMOL/L (ref 135–145)
SODIUM SERPL-SCNC: 137 MMOL/L (ref 135–145)
SODIUM SERPL-SCNC: 138 MMOL/L (ref 135–145)
SODIUM SERPL-SCNC: 138 MMOL/L (ref 135–145)
T3 SERPL-MCNC: 109 NG/DL (ref 85–202)
T3FREE SERPL-MCNC: 2.7 PG/ML (ref 2–4.4)
T4 FREE SERPL-MCNC: 1.36 NG/DL (ref 0.9–1.7)
TROPONIN T SERPL HS-MCNC: 1782 NG/L
TROPONIN T SERPL HS-MCNC: 2099 NG/L
TROPONIN T SERPL HS-MCNC: 2281 NG/L
TROPONIN T SERPL HS-MCNC: 2355 NG/L
TSH SERPL DL<=0.005 MIU/L-ACNC: 0.32 UIU/ML (ref 0.3–4.2)
UFH PPP CHRO-ACNC: >1.1 IU/ML (ref ?–1.1)
WBC # BLD AUTO: 10.6 10E3/UL (ref 4–11)
WBC # BLD AUTO: 14.1 10E3/UL (ref 4–11)
WBC # BLD AUTO: 14.6 10E3/UL (ref 4–11)
WBC # BLD AUTO: 15.9 10E3/UL (ref 4–11)

## 2025-07-19 PROCEDURE — 95714 VEEG EA 12-26 HR UNMNTR: CPT

## 2025-07-19 PROCEDURE — 84484 ASSAY OF TROPONIN QUANT: CPT | Performed by: STUDENT IN AN ORGANIZED HEALTH CARE EDUCATION/TRAINING PROGRAM

## 2025-07-19 PROCEDURE — 93925 LOWER EXTREMITY STUDY: CPT

## 2025-07-19 PROCEDURE — 85379 FIBRIN DEGRADATION QUANT: CPT | Performed by: STUDENT IN AN ORGANIZED HEALTH CARE EDUCATION/TRAINING PROGRAM

## 2025-07-19 PROCEDURE — 83735 ASSAY OF MAGNESIUM: CPT | Performed by: STUDENT IN AN ORGANIZED HEALTH CARE EDUCATION/TRAINING PROGRAM

## 2025-07-19 PROCEDURE — 82330 ASSAY OF CALCIUM: CPT | Performed by: STUDENT IN AN ORGANIZED HEALTH CARE EDUCATION/TRAINING PROGRAM

## 2025-07-19 PROCEDURE — 82805 BLOOD GASES W/O2 SATURATION: CPT | Performed by: STUDENT IN AN ORGANIZED HEALTH CARE EDUCATION/TRAINING PROGRAM

## 2025-07-19 PROCEDURE — 85610 PROTHROMBIN TIME: CPT | Performed by: STUDENT IN AN ORGANIZED HEALTH CARE EDUCATION/TRAINING PROGRAM

## 2025-07-19 PROCEDURE — 82805 BLOOD GASES W/O2 SATURATION: CPT | Performed by: INTERNAL MEDICINE

## 2025-07-19 PROCEDURE — 82247 BILIRUBIN TOTAL: CPT | Performed by: STUDENT IN AN ORGANIZED HEALTH CARE EDUCATION/TRAINING PROGRAM

## 2025-07-19 PROCEDURE — 84443 ASSAY THYROID STIM HORMONE: CPT | Performed by: STUDENT IN AN ORGANIZED HEALTH CARE EDUCATION/TRAINING PROGRAM

## 2025-07-19 PROCEDURE — 4A133B1 MONITORING OF ARTERIAL PRESSURE, PERIPHERAL, PERCUTANEOUS APPROACH: ICD-10-PCS | Performed by: INTERNAL MEDICINE

## 2025-07-19 PROCEDURE — 99152 MOD SED SAME PHYS/QHP 5/>YRS: CPT | Performed by: NURSE PRACTITIONER

## 2025-07-19 PROCEDURE — 84100 ASSAY OF PHOSPHORUS: CPT | Performed by: STUDENT IN AN ORGANIZED HEALTH CARE EDUCATION/TRAINING PROGRAM

## 2025-07-19 PROCEDURE — 85384 FIBRINOGEN ACTIVITY: CPT | Performed by: STUDENT IN AN ORGANIZED HEALTH CARE EDUCATION/TRAINING PROGRAM

## 2025-07-19 PROCEDURE — 33949 ECMO/ECLS DAILY MGMT ARTERY: CPT

## 2025-07-19 PROCEDURE — 92960 CARDIOVERSION ELECTRIC EXT: CPT | Performed by: NURSE PRACTITIONER

## 2025-07-19 PROCEDURE — 84481 FREE ASSAY (FT-3): CPT | Performed by: STUDENT IN AN ORGANIZED HEALTH CARE EDUCATION/TRAINING PROGRAM

## 2025-07-19 PROCEDURE — 93925 LOWER EXTREMITY STUDY: CPT | Mod: 26 | Performed by: STUDENT IN AN ORGANIZED HEALTH CARE EDUCATION/TRAINING PROGRAM

## 2025-07-19 PROCEDURE — 85652 RBC SED RATE AUTOMATED: CPT | Performed by: STUDENT IN AN ORGANIZED HEALTH CARE EDUCATION/TRAINING PROGRAM

## 2025-07-19 PROCEDURE — 85347 COAGULATION TIME ACTIVATED: CPT

## 2025-07-19 PROCEDURE — 250N000009 HC RX 250: Performed by: INTERNAL MEDICINE

## 2025-07-19 PROCEDURE — 83615 LACTATE (LD) (LDH) ENZYME: CPT | Performed by: STUDENT IN AN ORGANIZED HEALTH CARE EDUCATION/TRAINING PROGRAM

## 2025-07-19 PROCEDURE — 95720 EEG PHY/QHP EA INCR W/VEEG: CPT | Performed by: INTERNAL MEDICINE

## 2025-07-19 PROCEDURE — 85300 ANTITHROMBIN III ACTIVITY: CPT | Performed by: STUDENT IN AN ORGANIZED HEALTH CARE EDUCATION/TRAINING PROGRAM

## 2025-07-19 PROCEDURE — 93010 ELECTROCARDIOGRAM REPORT: CPT | Mod: XU | Performed by: INTERNAL MEDICINE

## 2025-07-19 PROCEDURE — 86316 IMMUNOASSAY TUMOR OTHER: CPT | Performed by: STUDENT IN AN ORGANIZED HEALTH CARE EDUCATION/TRAINING PROGRAM

## 2025-07-19 PROCEDURE — 250N000011 HC RX IP 250 OP 636: Performed by: STUDENT IN AN ORGANIZED HEALTH CARE EDUCATION/TRAINING PROGRAM

## 2025-07-19 PROCEDURE — 272N000237 HC CARDIOHELP CIRCUIT

## 2025-07-19 PROCEDURE — 999N000157 HC STATISTIC RCP TIME EA 10 MIN

## 2025-07-19 PROCEDURE — 82947 ASSAY GLUCOSE BLOOD QUANT: CPT | Performed by: STUDENT IN AN ORGANIZED HEALTH CARE EDUCATION/TRAINING PROGRAM

## 2025-07-19 PROCEDURE — 5A2204Z RESTORATION OF CARDIAC RHYTHM, SINGLE: ICD-10-PCS | Performed by: NURSE PRACTITIONER

## 2025-07-19 PROCEDURE — 258N000003 HC RX IP 258 OP 636: Performed by: STUDENT IN AN ORGANIZED HEALTH CARE EDUCATION/TRAINING PROGRAM

## 2025-07-19 PROCEDURE — 85730 THROMBOPLASTIN TIME PARTIAL: CPT | Performed by: STUDENT IN AN ORGANIZED HEALTH CARE EDUCATION/TRAINING PROGRAM

## 2025-07-19 PROCEDURE — 87205 SMEAR GRAM STAIN: CPT | Performed by: STUDENT IN AN ORGANIZED HEALTH CARE EDUCATION/TRAINING PROGRAM

## 2025-07-19 PROCEDURE — 250N000013 HC RX MED GY IP 250 OP 250 PS 637: Performed by: STUDENT IN AN ORGANIZED HEALTH CARE EDUCATION/TRAINING PROGRAM

## 2025-07-19 PROCEDURE — 5A2204Z RESTORATION OF CARDIAC RHYTHM, SINGLE: ICD-10-PCS | Performed by: INTERNAL MEDICINE

## 2025-07-19 PROCEDURE — 84155 ASSAY OF PROTEIN SERUM: CPT | Performed by: INTERNAL MEDICINE

## 2025-07-19 PROCEDURE — 84480 ASSAY TRIIODOTHYRONINE (T3): CPT | Performed by: STUDENT IN AN ORGANIZED HEALTH CARE EDUCATION/TRAINING PROGRAM

## 2025-07-19 PROCEDURE — 85014 HEMATOCRIT: CPT | Performed by: STUDENT IN AN ORGANIZED HEALTH CARE EDUCATION/TRAINING PROGRAM

## 2025-07-19 PROCEDURE — 94003 VENT MGMT INPAT SUBQ DAY: CPT

## 2025-07-19 PROCEDURE — 83051 HEMOGLOBIN PLASMA: CPT | Performed by: STUDENT IN AN ORGANIZED HEALTH CARE EDUCATION/TRAINING PROGRAM

## 2025-07-19 PROCEDURE — 85520 HEPARIN ASSAY: CPT | Performed by: STUDENT IN AN ORGANIZED HEALTH CARE EDUCATION/TRAINING PROGRAM

## 2025-07-19 PROCEDURE — 85027 COMPLETE CBC AUTOMATED: CPT | Performed by: STUDENT IN AN ORGANIZED HEALTH CARE EDUCATION/TRAINING PROGRAM

## 2025-07-19 PROCEDURE — 03HY32Z INSERTION OF MONITORING DEVICE INTO UPPER ARTERY, PERCUTANEOUS APPROACH: ICD-10-PCS | Performed by: INTERNAL MEDICINE

## 2025-07-19 PROCEDURE — 200N000002 HC R&B ICU UMMC

## 2025-07-19 PROCEDURE — 83605 ASSAY OF LACTIC ACID: CPT | Performed by: STUDENT IN AN ORGANIZED HEALTH CARE EDUCATION/TRAINING PROGRAM

## 2025-07-19 PROCEDURE — 85396 CLOTTING ASSAY WHOLE BLOOD: CPT | Performed by: STUDENT IN AN ORGANIZED HEALTH CARE EDUCATION/TRAINING PROGRAM

## 2025-07-19 PROCEDURE — 33949 ECMO/ECLS DAILY MGMT ARTERY: CPT | Performed by: INTERNAL MEDICINE

## 2025-07-19 PROCEDURE — 84439 ASSAY OF FREE THYROXINE: CPT | Performed by: STUDENT IN AN ORGANIZED HEALTH CARE EDUCATION/TRAINING PROGRAM

## 2025-07-19 PROCEDURE — 93005 ELECTROCARDIOGRAM TRACING: CPT

## 2025-07-19 PROCEDURE — 86140 C-REACTIVE PROTEIN: CPT | Performed by: STUDENT IN AN ORGANIZED HEALTH CARE EDUCATION/TRAINING PROGRAM

## 2025-07-19 PROCEDURE — 84145 PROCALCITONIN (PCT): CPT | Performed by: STUDENT IN AN ORGANIZED HEALTH CARE EDUCATION/TRAINING PROGRAM

## 2025-07-19 PROCEDURE — 99291 CRITICAL CARE FIRST HOUR: CPT | Mod: 25 | Performed by: INTERNAL MEDICINE

## 2025-07-19 PROCEDURE — 250N000009 HC RX 250: Performed by: STUDENT IN AN ORGANIZED HEALTH CARE EDUCATION/TRAINING PROGRAM

## 2025-07-19 PROCEDURE — 84155 ASSAY OF PROTEIN SERUM: CPT | Performed by: STUDENT IN AN ORGANIZED HEALTH CARE EDUCATION/TRAINING PROGRAM

## 2025-07-19 RX ORDER — NOREPINEPHRINE BITARTRATE 0.06 MG/ML
.01-.6 INJECTION, SOLUTION INTRAVENOUS CONTINUOUS
Status: DISCONTINUED | OUTPATIENT
Start: 2025-07-19 | End: 2025-07-22

## 2025-07-19 RX ORDER — CALCIUM CHLORIDE 100 MG/ML
1 INJECTION INTRAVENOUS; INTRAVENTRICULAR ONCE
Status: COMPLETED | OUTPATIENT
Start: 2025-07-19 | End: 2025-07-19

## 2025-07-19 RX ORDER — LIDOCAINE HYDROCHLORIDE ANHYDROUS AND DEXTROSE MONOHYDRATE .8; 5 G/100ML; G/100ML
0.5 INJECTION, SOLUTION INTRAVENOUS CONTINUOUS
Status: DISCONTINUED | OUTPATIENT
Start: 2025-07-19 | End: 2025-07-22

## 2025-07-19 RX ADMIN — Medication 10 MG: at 00:22

## 2025-07-19 RX ADMIN — PROPOFOL 40 MCG/KG/MIN: 10 INJECTION, EMULSION INTRAVENOUS at 07:21

## 2025-07-19 RX ADMIN — PROPOFOL 35 MCG/KG/MIN: 10 INJECTION, EMULSION INTRAVENOUS at 21:52

## 2025-07-19 RX ADMIN — Medication 10 MG: at 06:11

## 2025-07-19 RX ADMIN — CEFTRIAXONE SODIUM 2 G: 2 INJECTION, POWDER, FOR SOLUTION INTRAMUSCULAR; INTRAVENOUS at 21:52

## 2025-07-19 RX ADMIN — TICAGRELOR 90 MG: 90 TABLET ORAL at 07:54

## 2025-07-19 RX ADMIN — ASPIRIN 81 MG CHEWABLE TABLET 81 MG: 81 TABLET CHEWABLE at 07:54

## 2025-07-19 RX ADMIN — HEPARIN SODIUM 1600 UNITS/HR: 10000 INJECTION, SOLUTION INTRAVENOUS at 20:13

## 2025-07-19 RX ADMIN — CHLORHEXIDINE GLUCONATE 15 ML: 1.2 SOLUTION ORAL at 07:21

## 2025-07-19 RX ADMIN — PROPOFOL 30 MCG/KG/MIN: 10 INJECTION, EMULSION INTRAVENOUS at 12:30

## 2025-07-19 RX ADMIN — CHLORHEXIDINE GLUCONATE 15 ML: 1.2 SOLUTION ORAL at 19:56

## 2025-07-19 RX ADMIN — CALCIUM CHLORIDE 1 G: 100 INJECTION INTRAVENOUS; INTRAVENTRICULAR at 11:59

## 2025-07-19 RX ADMIN — NOREPINEPHRINE BITARTRATE 0.03 MCG/KG/MIN: 0.06 INJECTION, SOLUTION INTRAVENOUS at 00:10

## 2025-07-19 RX ADMIN — Medication 10 MG: at 01:52

## 2025-07-19 RX ADMIN — LIDOCAINE HYDROCHLORIDE 100 MG: 20 INJECTION INTRAVENOUS at 18:05

## 2025-07-19 RX ADMIN — PANTOPRAZOLE SODIUM 40 MG: 40 INJECTION, POWDER, FOR SOLUTION INTRAVENOUS at 07:54

## 2025-07-19 RX ADMIN — LIDOCAINE HYDROCHLORIDE 1 MG/MIN: 8 INJECTION, SOLUTION INTRAVENOUS at 18:05

## 2025-07-19 RX ADMIN — TICAGRELOR 90 MG: 90 TABLET ORAL at 19:56

## 2025-07-19 RX ADMIN — PROPOFOL 35 MCG/KG/MIN: 10 INJECTION, EMULSION INTRAVENOUS at 18:02

## 2025-07-19 RX ADMIN — CALCIUM CHLORIDE 1 G: 100 INJECTION, SOLUTION INTRAVENOUS at 04:48

## 2025-07-19 RX ADMIN — PROPOFOL 30 MCG/KG/MIN: 10 INJECTION, EMULSION INTRAVENOUS at 02:35

## 2025-07-19 ASSESSMENT — ACTIVITIES OF DAILY LIVING (ADL)
ADLS_ACUITY_SCORE: 77
ADLS_ACUITY_SCORE: 81
ADLS_ACUITY_SCORE: 77

## 2025-07-19 NOTE — PROGRESS NOTES
[facility]    ECLS Shift Summary:     ECMO Equipment:  Console Serial Number: [id number]  Circuit Lot Number: [id number]  Oxygenator Lot Number: [id number]    Circuit Assessment: Free of fibrin, clot, and air    Arterial ECMO Cannula: 17 Fr in the Right Femoral Artery  Venous ECMO Cannula: 25 Fr in the Right Femoral Vein  Distal Perfusion Catheter: 8 Fr in the Right Superficial Femoral Artery    ECMO Cannula Arterial Right femoral artery-Site Assessment: Sutured, Secure, Bleeding  ECMO Cannula Venous Right femoral vein-Site Assessment: Secure, Sutured  Distal Perfusion Catheter Right superficial femoral artery-Site Assessment: WDL  ECMO Cannula Arterial Right femoral artery-Site Intervention: No intervention needed  ECMO Cannula Venous Right femoral vein-Site Intervention: No intervention needed  Distal Perfusion Catheter Right superficial femoral artery-Site Intervention: No intervention needed    Patient remains on V-A ECMO, all equipment is functioning and alarms are appropriately set. RPM's: 3700 with Blood Flow (Circuit) LPM  Avg: 3.8 LPM  Min: 3.01 LPM  Max: 4.31 LPM L/min. Sweep is at (S) 1 LPM and 60 %. Extremities are cool.     Significant Shift Events: pt arrived to unit in the evening on [facility]. No access issues or chattering. No products given. Heparin infusion started and titrated to achieve ACT goal.     Vent settings:  FiO2 (%): 50 %, Resp: 19, Vent Mode: VC/AC, Resp Rate (Set): 12 breaths/min, Tidal Volume (Set, mL): 400 mL, PEEP (cm H2O): 8 cmH2O, Resp Rate (Set): 12 breaths/min, Tidal Volume (Set, mL): 400 mL, PEEP (cm H2O): 8 cmH2O    Anticoagulation:  Dose (units/hr) HEParin: 1100 Units/hr  Rate (mL/hr) HEParin: 11 mL/hr  Concentration HEParin: 100 Units/mL        Most recent ACT  (seconds): 178 seconds    Urine output is adequate,  .  Blood loss was minimal. Product given included none.     Intake/Output Summary (Last 24 hours) at 7/19/2025 0630  Last  data filed at 7/19/2025 0600  Gross per 24 hour   Intake 868.1 ml   Output 1105 ml   Net -236.9 ml       Labs:  Recent Labs   Lab 07/19/25  0554 07/19/25  0341 07/19/25  0340 07/19/25  0147 07/18/25  2355   PH 7.50* 7.46*  --  7.40 7.39   PCO2 27* 31*  --  37 37   PO2 146* 176*  --  134* 109*   HCO3 21 22  --  23 22   O2PER 50 60 60  60 60 60       Lab Results   Component Value Date    HGB 10.7 (L) 07/19/2025    PHGB 30 (H) 07/19/2025     07/19/2025    FIBR 784 (H) 07/19/2025    INR 1.37 (H) 07/19/2025    PTT 49 (H) 07/19/2025    DD 9.47 (H) 07/19/2025    AXA 0.21 05/24/2013       Plan:  To continue with [facility]    Yancy Duncan RN  ECMO Specialist  7/19/2025 6:30 AM

## 2025-07-19 NOTE — PROGRESS NOTES
Cardiology ICU History and Physical  Date of Service: 07/19/2025  Patient: Bob Echols  MRN: [id number]  Admission Date: 7/18/2025  Hospital Day: 1             ASSESSMENT AND PLAN     Bob Echols is a 70 year old male with a past medical history of HTN, DM2, and DEMOND as well as recent admission for inferior STEMI on 7/10 (treated with percutaneous revascularization with 2.75 x 20mm Promus RENA to the distal LCx). The patient was discharged on 7/14 and had been doing well at home prior to 7/18 when he was found to be apneic without a pulse with estimated down time ~10-15 min prior to discovery. EMS was called at 1537, arrived and started compressions at 1542, cannulated for VA ECMO at 1614. Subsequently found to have subacute in-stent thrombosis of his recently placed stent without obvious mechanical causes, treated with 2.75 x 20mm Synergy XD RENA. Now admitted to [facility] for further care. His initial pH was 7.08, PcO2 74, PO2 58, Lactate 8.6.    Interval Events:  - NAEON  - this morning went into Vfib; required 1 shock with 200J, successfully converted to NSR  - corrected hypocalcemia.     Brief Daily Plan:  - EEG  - Continue MCS without changes    Neurology:     # Acute anoxic encephalopathy  # Suspected hypoxic ischemic injury     Sedated for safety and ventilator compliance. He has not yet demonstrated purposeful movement, was thrashing in CT when sedation was off. Initial head CT with mildly diminished gray-white differentiation diffusely.     Current Sedatives/Analgesia:  - Fentanyl 50 mcg/hr  - Propofol 30 mcg/kg/min    Plan:  - Continue sedation with fentanyl and propofol for now for now  - EEG, repeat CT head per protocol    Cardiovascular:     # Ventricular fibrillation-mediated cardiac arrest  # Cardiogenic shock, SCAI E  # Coronary artery disease, status post percutaneous revascularization of the left circumflex artery with sinacute in-stent thrombosis  # Paroxysmal atrial fibrillation  #  Hypertension  # VF s/p shock on 25     Presented initially to [facility] on 7/10 with inferior STEMI, found to have acute thrombotic lesion of the distal LCx treated with a 2.75 x 20mm Promus RENA, discharged on apixaban and clopidogrel. He was found down at home in VF-mediated cardiac arrest on  requiring emergent VA ECMO cannulation. Coronary angiography demonstrated in-stent thrombosis of his recently placed stent without obvious mechanical cause and therefore presumed to be clopidogrel failure. Treated with a 2.75 x 20mm Synergy XD RENA.    Suspect he had in stent thrombosis probably because he was on Plavix and Apixaban (and not Ticagrelor + ASA + Eliquis for a week). Will clarify further.      ECMO Settings:  Mode: V-A   Pump Type: Cardiohelp  RPM's: 3700  Blood Flow (Circuit) LPM: 4.26 LPM  Sweep LPM: (S) 1 LPM  Venous Pressure  mmHg: -65 mmHg  Arterial Pressure  mmH mmHg  Internal Pressure mmH mmHg  Pressure Change mmH mmHg     Additional Hemodynamic Support:  - NE low dose     Plan:  - Continue VA ECMO support, flow goal 3-5 L/min  - Nicardipine gtt to maintain MAP 65-85 mmHg; NE if hypotensive from sedation to maintain MAP 65-85   - Aspirin 81 mg daily, ticagrelor 180 mg load followed by 90 mg bid  - Heparin gtt, ACT goal 180-200 sec  - Repeat echocardiogram pending     Pulmonary:     # Acute hypoxic respiratory failure      Current Ventilator Settings:  FiO2 (%): 50 %, Resp: 19, Vent Mode: VC/AC, Resp Rate (Set): 12 breaths/min, Tidal Volume (Set, mL): 400 mL, PEEP (cm H2O): 8 cmH2O, Resp Rate (Set): 12 breaths/min, Tidal Volume (Set, mL): 400 mL, PEEP (cm H2O): 8 cmH2O     Daily CXR:      Recent Labs   Lab 25  0554 25  0341 25  0340 25  0147   PH 7.50* 7.46*  --  7.40   PCO2 27* 31*  --  37   PO2 146* 176*  --  134*   HCO3 21 22  --  23   O2PER 50 60 60  60 60     Plan:  - Lung-protective low tidal volume ventilation  - VAP prophylaxis per protocol      Renal:     # Acute kidney injury  # Lactic acidosis, improving     Baseline serum creatinine is ~1.2. Elevated to 2.1 on admission, likely ischemic acute tubular necrosis.      Recent Labs   Lab 07/19/25 0340 07/18/25 2046 07/18/25  1646 07/18/25  1644    136 139 139   POTASSIUM 4.2 4.2 3.7 3.2*   CHLORIDE 107 100  --  97*   CO2 18* 22  --  22   BUN 39.2* 36.3*  --  28.0*   CR 2.19* 2.12*  --  1.82*   GFRESTIMATED 32* 33*  --  40*   ANIONGAP 12 14  --  20*   PALMIRA 8.2* 8.5*  --  8.9   MAG 2.3 2.4*  --  2.8*   PHOS 4.0  --   --   --      No intake or output data in the 24 hours ending 07/18/25 2036    Plan:  - Monitor renal function, no current dialysis indications  - Electrolyte repletion per protocol     Gastrointestinal:     # Hepatocellular liver injury     Mild shock liver in the context of cardiac arrest as above.     Recent Labs   Lab 07/19/25 0340 07/18/25 2046 07/18/25  1644   * 199* 134*   * 169* 129*   ALKPHOS 96 118 113   BILITOTAL 0.3 0.4 0.3   PROTTOTAL 5.9* 6.6 6.7   ALBUMIN 2.9* 3.2* 3.2*     Diet: NPO for Medical/Clinical Reasons Except for: No Exceptions      There were no vitals filed for this visit.    Plan:  - Pantoprazole 40 mg daily  - Defer tube feeds pending neurologic improvement  - Bowel regimen ordered     Endocrine:     # Diabetes mellitus, type 2     Recent Labs   Lab 07/19/25  0555 07/19/25  0451 07/19/25  0345 07/19/25  0340 07/19/25  0302   * 112* 127* 133*  135* 130*     Plan:  - Insulin gtt per protocol     Infectious Disease:     # Presumed aspiration pneumonia    7-Day Micro Results       Collected Updated Procedure Result Status      07/18/2025 2341 07/18/2025 2349 Blood Culture Peripheral blood (BC) Hand, Left [[id number]]   Peripheral blood (BC) from Hand, Left    In process Component Value   No component results               07/18/2025/18/2025 2147 07/18/2025 2156 Blood Culture Peripheral blood (BC) Hand, Right [[id number]]   Peripheral blood (BC)  from Hand, Right    In process Component Value   No component results               07/18/2025 2051 07/18/2025 2251 MRSA MSSA PCR [[id number]]    Swab from Nares, Bilateral    Final result Component Value   MRSA Target DNA Negative   SA Target DNA Negative            07/18/2025 2051 07/18/2025 2215 Respiratory Aerobic Bacterial Culture with Gram Stain [[id number]]   Sputum from Endotracheal    Preliminary result Component Value   Gram Stain Result >25 PMNs/low power field  [P]     3+ Mixed christine  [P]                      Antimicrobials:   - Ceftriaxone (7/18-current)  - Vancomycin (7/18-current)    Plan:  - Will treat empirically with vancomycin, ceftriaxone  - Blood, sputum cultures, MRSA nares pending     Hematology/Oncology:     # Leukocytosis  # Normocytic anemia  # Thrombocytosis    Recent Labs   Lab 07/19/25  0340 07/18/25  2046 07/18/25  1646 07/18/25  1644   WBC 14.1* 19.2*  --  14.8*   HGB 10.7* 12.3* 11.2* 13.0*   HCT 32.4* 37.9* 33* 41.2    464*  --  379   INR 1.37* 1.52*  --  1.40*   PTT 49* 197*  --  33   FIBR 784*  --   --   --         Plan:  - Transfusions per protocol               - 1 unit pRBCs for Hgb <8               - 1 unit platelets for Plt <100               - 1 unit FFP for INR >3               - 5 units cryoprecipitate for fibrinogen <150     Checklist:  DVT: Heparin gtt  GI: PPI  VAP: Chlorhexidine  Bowel: PEG  Diet: NPO  Code: FCFT  Lines: RFA/RFV ECMO, RSFA DPC, RRA art line, LFV CVC, ETT, OGT, Fox, rectal     Discussed with Dr. Tang Romero MD  Cardiovascular Disease Fellow           HISTORY OF PRESENT ILLNESS     Bob Echols is a 70 year old male with a past medical history of HTN, DM2, and DEMOND as well as recent admission for inferior STEMI on 7/10 (treated with percutaneous revascularization with 2.75 x 20mm Promus RENA to the distal LCx). The patient was discharged on 7/14 and had been doing well at home prior to 7/18 when he was found to be apneic  without a pulse with estimated down time ~10-15 min prior to discovery. EMS was called at 1537, arrived and started compressions at 1542, cannulated for VA ECMO at 1614. Subsequently found to have subacute in-stent thrombosis of his recently placed stent without obvious mechanical causes, treated with 2.75 x 20mm Synergy XD RENA. Now admitted to [facility] for further care.     Patient was not interviewed due to current mental status.    Review of Systems:    Complete review of systems was not performed due to current mental status.             CARDIAC HISTORY AND IMAGING     12-Lead EC2025:      Transthoracic Echocardiogram(s):  2025:  1. Normal biventricular size and systolic function. Left ventricular ejection  fraction of 60-65%. Mild LVH.  2. No hemodynamically significant valvular disease.  3. Trivial pericardial effusion.    Catheterization(s):   2025:  Pre intervention : new in-stent thrombotic occlusion at site of recently placed 2.75 everolimus eluting Synergy XD stent. NO mechanical cause for the thrombosis was identified. The stent was fully expanded with IVUS guidance to 3.25 mm proximally and 3.0 mm distally with noncompliant balloons  Post intervention : No residual stenosis at stent site. Distal GIOVANI 3 flow. No change in ostial 80% narrowing in small M2    CMR and/or Additional Imaging  None            PAST MEDICAL HISTORY      Past Medical History:   Diagnosis Date    Anxiety     Bursitis, trochanteric     Cataract     Diabetes mellitus (H)     Hyperlipidaemia     Hypertension     Hypertension     Neuropathy     Obesity     Sleep difficulties     Stress at work      Active Problems:  Patient Active Problem List    Diagnosis Date Noted    Cardiac arrest (H) 2025     Priority: Medium    Percutaneous transluminal coronary angioplasty status 07/10/2025     Priority: Medium    Depression 2013     Priority: Medium    Bipolar I disorder, most recent episode depressed (H) 2013      Priority: Medium     Problem list name updated by automated process. Provider to review      Dasha (monopolar) single episode or unspecified 06/26/2013     Priority: Medium    Unstable angina (H) 05/22/2013     Priority: Medium    CARDIOVASCULAR SCREENING; LDL GOAL LESS THAN 160 07/26/2011     Priority: Medium     Social History:  Social History     Tobacco Use    Smoking status: Never   Substance Use Topics    Alcohol use: Yes     Comment: occ    Drug use: No     Family History:  No family history on file.  Medications:  Current Facility-Administered Medications   Medication Dose Route Frequency Provider Last Rate Last Admin    aspirin (ASA) chewable tablet 81 mg  81 mg Oral or Feeding Tube Daily Matt Nugent MD        cefTRIAXone (ROCEPHIN) 2 g vial to attach to  ml bag for ADULTS or NS 50 ml bag for PEDS  2 g Intravenous Q24H Matt Nugent MD   2 g at 07/18/25 2155    chlorhexidine (PERIDEX) 0.12 % solution 15 mL  15 mL Mouth/Throat Q12H Matt Nugent MD   15 mL at 07/18/25 2256    melatonin tablet 10 mg  10 mg Oral or Feeding Tube At Bedtime Matt Nugent MD        pantoprazole (PROTONIX) injection 40 mg  40 mg Intravenous Daily Matt Nugent MD        polyethylene glycol (MIRALAX) Packet 17 g  17 g Oral or Feeding Tube Daily Matt Nugent MD        ticagrelor (BRILINTA) tablet 90 mg  90 mg Oral or Feeding Tube BID Matt Nugent MD        vancomycin (VANCOCIN) 2,000 mg in sodium chloride 0.9 % 250 mL intermittent infusion  2,000 mg (central catheter) Intravenous Q24H Epifanio Beckwith MD   2,000 mg at 07/18/25 2257     Current Facility-Administered Medications   Medication Dose Route Frequency Provider Last Rate Last Admin    dextrose 10% infusion   Intravenous Continuous PRN Matt Nugent MD        fentaNYL (SUBLIMAZE) infusion   mcg/hr Intravenous Continuous Matt Nugent MD 1 mL/hr at 07/19/25 0600 50 mcg/hr at 07/19/25 0600    heparin 2  unit/mL in 0.9% NaCl (for REPERFUSION CATHETER)  3 mL/hr Intravenous Continuous Matt Nugent MD 3 mL/hr at 07/19/25 0600 3 mL/hr at 07/19/25 0600    heparin 25,000 units in 0.45% NaCl 250 mL ANTICOAGULANT infusion  10-50 Units/kg/hr (Ideal) Other Continuous Matt Nugent MD 11 mL/hr at 07/19/25 0602 1,100 Units/hr at 07/19/25 0602    insulin regular (MYXREDLIN) 1 unit/mL infusion  0-24 Units/hr Intravenous Continuous Matt Nugent MD 1 mL/hr at 07/19/25 0600 1 Units/hr at 07/19/25 0600    propofol (DIPRIVAN) infusion  5-75 mcg/kg/min (Dosing Weight) Intravenous Continuous Matt Nugent MD 27.7 mL/hr at 07/19/25 0615 40 mcg/kg/min at 07/19/25 0615    And    Medication Instruction   Does not apply Continuous PRN Matt Nugent MD        niCARdipine 40 mg in 200 mL NS (CARDENE) infusion  0.5-15 mg/hr Intravenous Continuous Matt Nugent MD   Stopped at 07/18/25 2315    norepinephrine (LEVOPHED) 16 mg in  mL infusion MAX CONC CENTRAL LINE  0.01-0.6 mcg/kg/min (Dosing Weight) Intravenous Continuous Matt Nugent MD 2.2 mL/hr at 07/19/25 0600 0.02 mcg/kg/min at 07/19/25 0600             PHYSICAL EXAM     Temp:  [98.1  F (36.7  C)-98.8  F (37.1  C)] 98.2  F (36.8  C)  Pulse:  [] 66  Resp:  [0-102] 24  MAP:  [55 mmHg-169 mmHg] 68 mmHg  Arterial Line BP: ()/(37-97) 79/65  FiO2 (%):  [50 %-100 %] 50 %  SpO2:  [80 %-100 %] 100 %  No intake or output data in the 24 hours ending 07/18/25 2035    Gen: Obtunded, in no acute distress.  HEENT: Pupils are equal, round, and symmetric. No scleral icterus.   CV: Regular rate and rhythm, normal S1, S2. No murmur. Symmetric 2+ radial pulse.  Pulm: Bilateral mechanical breath sounds.  Abd: Soft, non-tender, non-distended.  Ext: Trace ankle edema.  Neuro: Obtunded, no purposeful movements, occasional myoclonic jerks.  Skin: Warm, dry and intact.             DIAGNOSTICS     All labs and imaging were reviewed, of note:    CMP  Recent  Labs   Lab 07/19/25  0555 07/19/25  0451 07/19/25  0345 07/19/25  0340 07/18/25 2051 07/18/25 2046 07/18/25  1646 07/18/25  1644 07/18/25  1644 07/14/25  1201 07/14/25  0642   NA  --   --   --  137  --  136 139  --  139   < > 137   POTASSIUM  --   --   --  4.2  --  4.2 3.7  --  3.2*   < > 3.4   CHLORIDE  --   --   --  107  --  100  --   --  97*  --  102   CO2  --   --   --  18*  --  22  --   --  22  --  24   ANIONGAP  --   --   --  12  --  14  --   --  20*  --  11   * 112* 127* 133*  135*   < > 288*  306*  --    < > 328*   < > 89   BUN  --   --   --  39.2*  --  36.3*  --   --  28.0*  --  24.5*   CR  --   --   --  2.19*  --  2.12*  --   --  1.82*  --  1.22*   GFRESTIMATED  --   --   --  32*  --  33*  --   --  40*  --  64   PALMIRA  --   --   --  8.2*  --  8.5*  --   --  8.9  --  9.2   MAG  --   --   --  2.3  --  2.4*  --   --  2.8*  --   --    PHOS  --   --   --  4.0  --   --   --   --   --   --   --    PROTTOTAL  --   --   --  5.9*  --  6.6  --   --  6.7  --   --    ALBUMIN  --   --   --  2.9*  --  3.2*  --   --  3.2*  --   --    BILITOTAL  --   --   --  0.3  --  0.4  --   --  0.3  --   --    ALKPHOS  --   --   --  96  --  118  --   --  113  --   --    AST  --   --   --  126*  --  199*  --   --  134*  --   --    ALT  --   --   --  131*  --  169*  --   --  129*  --   --     < > = values in this interval not displayed.     CBC  Recent Labs   Lab 07/19/25 0340 07/18/25 2046 07/18/25 1646 07/18/25  1644 07/18/25  1619 07/13/25  0555   WBC 14.1* 19.2*  --  14.8*  --  9.0   RBC 3.94* 4.51  --  4.67  --  4.46   HGB 10.7* 12.3* 11.2* 13.0*   < > 12.5*   HCT 32.4* 37.9* 33* 41.2   < > 36.1*   MCV 82 84  --  88  --  81   MCH 27.2 27.3  --  27.8  --  28.0   MCHC 33.0 32.5  --  31.6  --  34.6   RDW 13.2 13.1  --  12.9  --  13.2    464*  --  379  --  241    < > = values in this interval not displayed.     INR  Recent Labs   Lab 07/19/25 0340 07/18/25 2046 07/18/25  1644   INR 1.37* 1.52* 1.40*     Arterial  Blood Gas  Recent Labs   Lab 07/19/25  0554 07/19/25  0341 07/19/25  0340 07/19/25  0147 07/18/25  2355   PH 7.50* 7.46*  --  7.40 7.39   PCO2 27* 31*  --  37 37   PO2 146* 176*  --  134* 109*   HCO3 21 22  --  23 22   O2PER 50 60 60  60 60 60     Troponin  Lab Results   Component Value Date    CTROPT 2,355 () 07/19/2025    CTROPT 1,959 () 07/18/2025    CTROPT 761 () 07/18/2025    CTROPT 936 () 07/14/2025    CTROPT 916 () 07/14/2025    TROPONINIS 8 01/15/2022    TROPONINIS 10 01/15/2022

## 2025-07-19 NOTE — PHARMACY-VANCOMYCIN DOSING SERVICE
Pharmacy Vancomycin Initial Note  Date of Service 2025  Patient's  1955  70 year old, male    Indication: Aspiration Pneumonia    Current estimated CrCl = Estimated Creatinine Clearance: 50.3 mL/min (A) (based on SCr of 1.82 mg/dL (H)).    Creatinine for last 3 days  2025:  4:44 PM Creatinine 1.82 mg/dL    Recent Vancomycin Level(s) for last 3 days  No results found for requested labs within last 3 days.      Vancomycin IV Administrations (past 72 hours)        No vancomycin orders with administrations in past 72 hours.                    Nephrotoxins and other renal medications (From now, onward)      Start     Dose/Rate Route Frequency Ordered Stop    25 2200  vancomycin (VANCOCIN) 2,000 mg in sodium chloride 0.9 % 250 mL intermittent infusion         2,000 mg (central catheter)  over 90 Minutes Intravenous EVERY 24 HOURS 25 2103              Contrast Orders - past 72 hours (72h ago, onward)      None            [employer] Prediction of Planned Initial Vancomycin Regimen  N/A -- dosed per consult for cardiac arrest - 20mg/kg q24h. Will continue with AUC monitoring/dosing if continue > 48 hours           Plan:  Start vancomycin  2000 mg IV q24h.   Vancomycin monitoring method: AUC  Vancomycin therapeutic monitoring goal: 400-600 mg*h/L  Pharmacy will check vancomycin levels as appropriate in 1-3 Days.    Serum creatinine levels will be ordered daily for the first week of therapy and at least twice weekly for subsequent weeks.      Sally Marshall [facility]

## 2025-07-19 NOTE — PLAN OF CARE
Neuro: Makes large jerking movements with body. No movements to command. +cough reflex. PERRL. Sedation interrupted= increased agitation, tachypnea, v-fib    CV: SR 70-80s. MAP 65-85. ECMO flows 4.3 , sweep 2, 60%    Pulmonary: VC/AC: 12, 400, 8, 50%.    GI: OG , rectal tube     : Adequate UO via gill    Skin: MIGUEL burn to chest, bruise to lower right lip     Drips: Levo 0-0.04, fent 25, prop 35, insulin 1, lido 1, heparin 1400    Problem: ECLS (Extracorporeal Life Support)  Goal: Optimal Coping with Therapy  Outcome: Not Progressing  Intervention: Optimize Psychosocial Response  Recent Flowsheet Documentation  Taken 7/19/2025 1600 by Nicholas Navarro, RN  Supportive Measures:   positive reinforcement provided   relaxation techniques promoted  Family/Support System Care:   self-care encouraged   presence promoted  Taken 7/19/2025 1200 by Nicholas Navarro, RN  Supportive Measures:   positive reinforcement provided   relaxation techniques promoted  Family/Support System Care:   self-care encouraged   presence promoted  Taken 7/19/2025 0800 by Nicholas Navarro, RN  Supportive Measures:   positive reinforcement provided   relaxation techniques promoted  Family/Support System Care:   self-care encouraged   presence promoted

## 2025-07-19 NOTE — PROGRESS NOTES
Shift Summary:    Pt remained intubated and mechanically ventilated on the following vent settings:     Vent Mode: (Volume Control-Assist Control)  Flow Type: Autoflow  Resp Rate (Set): 12 breaths/min  Tidal Volume (Set, mL): 400 mL   FiO2: 50 %   PEEP (cm H2O): 8 cmH2O   Inspiratory Time (sec): 1 sec  Sensitivity Flow Triggered (L/min): 2 L/min    SBT:  Weaning Assessment Complete: Yes  Safety Screen Weaning Assessment: Other (comment) (ECMO)      Assessment: BS clear and diminished bilaterally. RT suctioned small amount of thick creamy secretions.    Major Respiratory Related Events: No      Ambu bag, mask, and valve present at bedside.       Caty Chiang, RT on 7/19/2025 at 5:33 PM

## 2025-07-19 NOTE — H&P
Cardiology ICU History and Physical  Date of Service: 07/19/2025  Patient: Bob Echols  MRN: [id number]  Admission Date: 7/18/2025  Hospital Day: 1             ASSESSMENT AND PLAN     Bob Echols is a 70 year old male with a past medical history of HTN, DM2, and DEMOND as well as recent admission for inferior STEMI on 7/10 (treated with percutaneous revascularization with 2.75 x 20mm Promus RENA to the distal LCx). The patient was discharged on 7/14 and had been doing well at home prior to 7/18 when he was found to be apneic without a pulse with estimated down time ~10-15 min prior to discovery. EMS was called at 1537, arrived and started compressions at 1542, cannulated for VA ECMO at 1614. Subsequently found to have subacute in-stent thrombosis of his recently placed stent without obvious mechanical causes, treated with 2.75 x 20mm Synergy XD RENA. Now admitted to [facility] for further care.     Neurology:     # Acute anoxic encephalopathy  # Suspected hypoxic ischemic injury     Sedated for safety and ventilator compliance. He has not yet demonstrated purposeful movement, was thrashing in CT when sedation was off. Initial head CT with mildly diminished gray-white differentiation diffusely.     Current Sedatives/Analgesia:  - Fentanyl 50 mcg/hr  - Propofol 30 mcg/kg/min    Plan:  - Continue sedation with fentanyl and propofol for now for now  - EEG, repeat CT head per protocol    Cardiovascular:     # Ventricular fibrillation-mediated cardiac arrest  # Cardiogenic shock, SCAI E  # Coronary artery disease, status post percutaneous revascularization of the left circumflex artery with sinacute in-stent thrombosis  # Paroxysmal atrial fibrillation  # Hypertension     Presented initially to [facility] on 7/10 with inferior STEMI, found to have acute thrombotic lesion of the distal LCx treated with a 2.75 x 20mm Promus RENA, discharged on apixaban and clopidogrel. He was found down at home in VF-mediated cardiac  arrest on  requiring emergent VA ECMO cannulation. Coronary angiography demonstrated in-stent thrombosis of his recently placed stent without obvious mechanical cause and therefore presumed to be clopidogrel failure. Treated with a 2.75 x 20mm Synergy XD RENA.     ECMO Settings:  Mode: V-A   Pump Type: Cardiohelp  RPM's: 3775  Blood Flow (Circuit) LPM: 3.01 LPM  Sweep LPM: (S) 2 LPM (2130 increased sweep to 2)  Venous Pressure  mmHg: -71 mmHg  Arterial Pressure  mmH mmHg  Internal Pressure mmH mmHg  Pressure Change mmH mmHg     Additional Hemodynamic Support:  - Nicardipine 5 mg/hr     Plan:  - Continue VA ECMO support, flow goal 3-5 L/min  - Nicardipine gtt to maintain MAP 65-85 mmHg  - Aspirin 81 mg daily, ticagrelor 180 mg load followed by 90 mg bid  - Heparin gtt, ACT goal 180-200 sec  - Repeat echocardiogram pending  - Could consider [id number] testing to confirm reason for stent thrombosis     Pulmonary:     # Acute hypoxic respiratory failure      Current Ventilator Settings:  FiO2 (%): 60 %, Resp: 12, Vent Mode: VC/AC, Resp Rate (Set): 12 breaths/min, Tidal Volume (Set, mL): 400 mL, PEEP (cm H2O): 8 cmH2O, Resp Rate (Set): 12 breaths/min, Tidal Volume (Set, mL): 400 mL, PEEP (cm H2O): 8 cmH2O     Daily CXR:      Recent Labs   Lab 25  2242 25  2108 25  1829   PH 7.35  --   --   --  7.25* 7.36   PCO2 41  --   --   --  55* 38   PO2 162*  --   --   --  142* 272*   HCO3 23  --   --   --  24 21   O2PER 80 100  60 100   < > 100  --     < > = values in this interval not displayed.     Plan:  - Lung-protective low tidal volume ventilation  - VAP prophylaxis per protocol     Renal:     # Acute kidney injury  # Lactic acidosis, improving     Baseline serum creatinine is ~1.2. Elevated to 2.1 on admission, likely ischemic acute tubular necrosis.      Recent Labs   Lab 25  2046 25  1646 25  1644 25  1223  07/14/25  0642    139 139   < > 137   POTASSIUM 4.2 3.7 3.2*   < > 3.4   CHLORIDE 100  --  97*  --  102   CO2 22  --  22  --  24   BUN 36.3*  --  28.0*  --  24.5*   CR 2.12*  --  1.82*  --  1.22*   GFRESTIMATED 33*  --  40*  --  64   ANIONGAP 14  --  20*  --  11   PALMIRA 8.5*  --  8.9  --  9.2   MAG 2.4*  --  2.8*  --   --     < > = values in this interval not displayed.     No intake or output data in the 24 hours ending 07/18/25 2036    Plan:  - Monitor renal function, no current dialysis indications  - Electrolyte repletion per protocol     Gastrointestinal:     # Hepatocellular liver injury     Mild shock liver in the context of cardiac arrest as above.     Recent Labs   Lab 07/18/25 2046 07/18/25  1644   * 134*   * 129*   ALKPHOS 118 113   BILITOTAL 0.4 0.3   PROTTOTAL 6.6 6.7   ALBUMIN 3.2* 3.2*     Diet: NPO for Medical/Clinical Reasons Except for: No Exceptions      There were no vitals filed for this visit.    Plan:  - Pantoprazole 40 mg daily  - Defer tube feeds pending neurologic improvement  - Bowel regimen ordered     Endocrine:     # Diabetes mellitus, type 2     Recent Labs   Lab 07/18/25  2358 07/18/25  2250 07/18/25 2051 07/18/25 2046 07/18/25  1644   * 271* 271* 288*  306* 328*     Plan:  - Insulin gtt per protocol     Infectious Disease:     # Presumed aspiration pneumonia    7-Day Micro Results       Collected Updated Procedure Result Status      07/18/2025 2341 07/18/2025 2349 Blood Culture Peripheral blood (BC) Hand, Left [[id number]]   Peripheral blood (BC) from Hand, Left    In process Component Value   No component results               07/18/2025 2147 07/18/2025 2156 Blood Culture Peripheral blood (BC) Hand, Right [[id number]]   Peripheral blood (BC) from Hand, Right    In process Component Value   No component results               07/18/2025 2051 07/18/2025 2251 MRSA MSSA PCR [[id number]]    Swab from Nares, Bilateral    Final result Component Value    MRSA Target DNA Negative   SA Target DNA Negative            07/18/2025 2051 07/18/2025 2215 Respiratory Aerobic Bacterial Culture with Gram Stain [[id number]]   Sputum from Endotracheal    Preliminary result Component Value   Gram Stain Result >25 PMNs/low power field  [P]     3+ Mixed christine  [P]                      Antimicrobials:   - Ceftriaxone (7/18-current)  - Vancomycin (7/18-current)    Plan:  - Will treat empirically with vancomycin, ceftriaxone  - Blood, sputum cultures, MRSA nares pending     Hematology/Oncology:     # Leukocytosis  # Normocytic anemia  # Thrombocytosis    Recent Labs   Lab 07/18/25  2046 07/18/25  1646 07/18/25  1644 07/18/25  1619 07/13/25  0555   WBC 19.2*  --  14.8*  --  9.0   HGB 12.3* 11.2* 13.0*   < > 12.5*   HCT 37.9* 33* 41.2   < > 36.1*   *  --  379  --  241   INR 1.52*  --  1.40*  --   --    *  --  33  --   --     < > = values in this interval not displayed.        Plan:  - Transfusions per protocol               - 1 unit pRBCs for Hgb <8               - 1 unit platelets for Plt <100               - 1 unit FFP for INR >3               - 5 units cryoprecipitate for fibrinogen <150     Checklist:  DVT: Heparin gtt  GI: PPI  VAP: Chlorhexidine  Bowel: PEG  Diet: NPO  Code: FCFT  Lines: RFA/RFV ECMO, RSFA DPC, RRA art line, LFV CVC, ETT, OGT, Fox, rectal     Patient to be formally staffed with Dr. Epifanio Beckwith in the morning.     Matt Nugent MD  Cardiovascular Disease Fellow           HISTORY OF PRESENT ILLNESS     Bob Echols is a 70 year old male with a past medical history of HTN, DM2, and DEMOND as well as recent admission for inferior STEMI on 7/10 (treated with percutaneous revascularization with 2.75 x 20mm Promus RENA to the distal LCx). The patient was discharged on 7/14 and had been doing well at home prior to 7/18 when he was found to be apneic without a pulse with estimated down time ~10-15 min prior to discovery. EMS was called at 1537,  arrived and started compressions at 1542, cannulated for VA ECMO at 1614. Subsequently found to have subacute in-stent thrombosis of his recently placed stent without obvious mechanical causes, treated with 2.75 x 20mm Synergy XD RENA. Now admitted to [facility] for further care.     Patient was not interviewed due to current mental status.    Review of Systems:    Complete review of systems was not performed due to current mental status.             CARDIAC HISTORY AND IMAGING     12-Lead EC2025:      Transthoracic Echocardiogram(s):  2025:  1. Normal biventricular size and systolic function. Left ventricular ejection  fraction of 60-65%. Mild LVH.  2. No hemodynamically significant valvular disease.  3. Trivial pericardial effusion.    Catheterization(s):   2025:  Pre intervention : new in-stent thrombotic occlusion at site of recently placed 2.75 everolimus eluting Synergy XD stent. NO mechanical cause for the thrombosis was identified. The stent was fully expanded with IVUS guidance to 3.25 mm proximally and 3.0 mm distally with noncompliant balloons  Post intervention : No residual stenosis at stent site. Distal GIOVANI 3 flow. No change in ostial 80% narrowing in small M2    CMR and/or Additional Imaging  None            PAST MEDICAL HISTORY      Past Medical History:   Diagnosis Date    Anxiety     Bursitis, trochanteric     Cataract     Diabetes mellitus (H)     Hyperlipidaemia     Hypertension     Hypertension     Neuropathy     Obesity     Sleep difficulties     Stress at work      Active Problems:  Patient Active Problem List    Diagnosis Date Noted    Cardiac arrest (H) 2025     Priority: Medium    Percutaneous transluminal coronary angioplasty status 07/10/2025     Priority: Medium    Depression 2013     Priority: Medium    Bipolar I disorder, most recent episode depressed (H) 2013     Priority: Medium     Problem list name updated by automated process. Provider to review       Dasha (monopolar) single episode or unspecified 06/26/2013     Priority: Medium    Unstable angina (H) 05/22/2013     Priority: Medium    CARDIOVASCULAR SCREENING; LDL GOAL LESS THAN 160 07/26/2011     Priority: Medium     Social History:  Social History     Tobacco Use    Smoking status: Never   Substance Use Topics    Alcohol use: Yes     Comment: occ    Drug use: No     Family History:  No family history on file.  Medications:  Current Facility-Administered Medications   Medication Dose Route Frequency Provider Last Rate Last Admin    aspirin (ASA) chewable tablet 81 mg  81 mg Oral or Feeding Tube Daily Matt Nugent MD        cefTRIAXone (ROCEPHIN) 2 g vial to attach to  ml bag for ADULTS or NS 50 ml bag for PEDS  2 g Intravenous Q24H Matt Nugent MD   2 g at 07/18/25 2155    chlorhexidine (PERIDEX) 0.12 % solution 15 mL  15 mL Mouth/Throat Q12H Matt Nugent MD   15 mL at 07/18/25 2256    melatonin tablet 10 mg  10 mg Oral or Feeding Tube At Bedtime Matt Nugent MD        pantoprazole (PROTONIX) injection 40 mg  40 mg Intravenous Daily Matt Nugent MD        polyethylene glycol (MIRALAX) Packet 17 g  17 g Oral or Feeding Tube Daily Matt Nugent MD        ticagrelor (BRILINTA) tablet 90 mg  90 mg Oral or Feeding Tube BID Matt Nugent MD        vancomycin (VANCOCIN) 2,000 mg in sodium chloride 0.9 % 250 mL intermittent infusion  2,000 mg (central catheter) Intravenous Q24H Epifanio Beckwith MD   2,000 mg at 07/18/25 2257     Current Facility-Administered Medications   Medication Dose Route Frequency Provider Last Rate Last Admin    dextrose 10% infusion   Intravenous Continuous PRN Matt Nugent MD        fentaNYL (SUBLIMAZE) infusion   mcg/hr Intravenous Continuous Matt Nugent MD 1 mL/hr at 07/18/25 2300 50 mcg/hr at 07/18/25 2300    heparin 2 unit/mL in 0.9% NaCl (for REPERFUSION CATHETER)  3 mL/hr Intravenous Continuous Matt Nugent  MD 3 mL/hr at 07/18/25 2300 3 mL/hr at 07/18/25 2300    heparin 25,000 units in 0.45% NaCl 250 mL ANTICOAGULANT infusion  10-50 Units/kg/hr (Ideal) Other Continuous Matt Nugent MD 8 mL/hr at 07/18/25 2338 780 Units/hr at 07/18/25 2338    insulin regular (MYXREDLIN) 1 unit/mL infusion  0-24 Units/hr Intravenous Continuous Matt Nugent MD 4 mL/hr at 07/18/25 2300 4 Units/hr at 07/18/25 2300    propofol (DIPRIVAN) infusion  5-75 mcg/kg/min (Dosing Weight) Intravenous Continuous Matt Nugent MD 20.8 mL/hr at 07/18/25 2322 30 mcg/kg/min at 07/18/25 2322    And    Medication Instruction   Does not apply Continuous PRN Matt Nugent MD        niCARdipine 40 mg in 200 mL NS (CARDENE) infusion  0.5-15 mg/hr Intravenous Continuous Matt Nugent MD   Stopped at 07/18/25 2315    norepinephrine (LEVOPHED) 16 mg in  mL infusion MAX CONC CENTRAL LINE  0.01-0.6 mcg/kg/min (Dosing Weight) Intravenous Continuous Matt Nuegnt MD                 PHYSICAL EXAM     Temp:  [98.1  F (36.7  C)-98.8  F (37.1  C)] 98.2  F (36.8  C)  Pulse:  [] 86  Resp:  [0-102] 12  MAP:  [55 mmHg-169 mmHg] 66 mmHg  Arterial Line BP: ()/(37-97) 72/64  FiO2 (%):  [60 %-100 %] 60 %  SpO2:  [80 %-100 %] 100 %  No intake or output data in the 24 hours ending 07/18/25 2035    Gen: Obtunded, in no acute distress.  HEENT: Pupils are equal, round, and symmetric. No scleral icterus.   CV: Regular rate and rhythm, normal S1, S2. No murmur. Symmetric 2+ radial pulse.  Pulm: Bilateral mechanical breath sounds.  Abd: Soft, non-tender, non-distended.  Ext: Trace ankle edema.  Neuro: Obtunded, no purposeful movements, occasional myoclonic jerks.  Skin: Warm, dry and intact.             DIAGNOSTICS     All labs and imaging were reviewed, of note:    CMP  Recent Labs   Lab 07/18/25  2358 07/18/25  2250 07/18/25  2051 07/18/25  2046 07/18/25  1646 07/18/25  1644 07/18/25  1619 07/14/25  1201 07/14/25  0642  07/13/25  0752 07/13/25  0555   NA  --   --   --  136 139 139 139  --  137  --  137   POTASSIUM  --   --   --  4.2 3.7 3.2* 3.8   < > 3.4  --  3.3*   CHLORIDE  --   --   --  100  --  97*  --   --  102  --  98   CO2  --   --   --  22  --  22  --   --  24  --  24   ANIONGAP  --   --   --  14  --  20*  --   --  11  --  15   * 271* 271* 288*  306*  --  328*  --    < > 89   < > 84   BUN  --   --   --  36.3*  --  28.0*  --   --  24.5*  --  24.8*   CR  --   --   --  2.12*  --  1.82*  --   --  1.22*  --  1.24*   GFRESTIMATED  --   --   --  33*  --  40*  --   --  64  --  63   PALMIRA  --   --   --  8.5*  --  8.9  --   --  9.2  --  8.9   MAG  --   --   --  2.4*  --  2.8*  --   --   --   --   --    PROTTOTAL  --   --   --  6.6  --  6.7  --   --   --   --   --    ALBUMIN  --   --   --  3.2*  --  3.2*  --   --   --   --   --    BILITOTAL  --   --   --  0.4  --  0.3  --   --   --   --   --    ALKPHOS  --   --   --  118  --  113  --   --   --   --   --    AST  --   --   --  199*  --  134*  --   --   --   --   --    ALT  --   --   --  169*  --  129*  --   --   --   --   --     < > = values in this interval not displayed.     CBC  Recent Labs   Lab 07/18/25 2046 07/18/25  1646 07/18/25  1644 07/18/25  1619 07/13/25  0555 07/12/25  1544   WBC 19.2*  --  14.8*  --  9.0 11.3*   RBC 4.51  --  4.67  --  4.46 4.36*   HGB 12.3* 11.2* 13.0* 13.3 12.5* 12.2*   HCT 37.9* 33* 41.2 39* 36.1* 35.0*   MCV 84  --  88  --  81 80   MCH 27.3  --  27.8  --  28.0 28.0   MCHC 32.5  --  31.6  --  34.6 34.9   RDW 13.1  --  12.9  --  13.2 13.2   *  --  379  --  241 225     INR  Recent Labs   Lab 07/18/25 2046 07/18/25  1644   INR 1.52* 1.40*     Arterial Blood Gas  Recent Labs   Lab 07/18/25 2242 07/18/25 2108 07/18/25 2055 07/18/25  2047 07/18/25  1829 07/18/25  1757   PH 7.35  --   --  7.25* 7.36 7.41   PCO2 41  --   --  55* 38 32*   PO2 162*  --   --  142* 272* 199*   HCO3 23  --   --  24 21 20*   O2PER 80 100  60 100 100  --   --       Troponin  Lab Results   Component Value Date    CTROPT 1,959 () 07/18/2025    CTROPT 761 () 07/18/2025    CTROPT 936 () 07/14/2025    CTROPT 916 () 07/14/2025    CTROPT 1,015 () 07/11/2025    TROPONINIS 8 01/15/2022    TROPONINIS 10 01/15/2022

## 2025-07-19 NOTE — PROCEDURES
Cardioversion Procedure Note    [facility]     Procedure: *Cardioversion     Date/Time: 7/19/2025 6:01 PM     Performed by: Juan Carlos Romero MD  Authorized by: Epifanio Beckwith MD    UNIVERSAL PROTOCOL   Site Marked: NA  Prior Images Obtained and Reviewed:  NA  Required items: Required blood products, implants, devices and special equipment available    Patient identity confirmed:  Provided demographic data and arm band  Patient was reevaluated immediately before administering moderate or deep sedation or anesthesia  Confirmation Checklist:  Patient's identity using two indicators  Time out: Immediately prior to the procedure a time out was called    Universal Protocol: the Joint Commission Universal Protocol was followed    Preparation: Patient was prepped and draped in usual sterile fashion    ESBL (mL):  0     SEDATION  Patient Sedated: Yes    Sedation Type:  Moderate (conscious) sedation  Vital signs: Vital signs monitored during sedation       PROCEDURE DETAILS  Cardioversion basis: emergent  Pre-procedure rhythm: ventricular fibrillation  Patient position: patient was placed in a supine position  Chest area: chest area exposed  Electrodes: pads  Electrodes placed: anterior-posterior  Number of attempts: 1     Details of Attempts:  DCCV x 1 200J  Follow-up EKG pending  Post-procedure rhythm: normal sinus rhythm  Complications: no complications        PROCEDURE  Describe Procedure: The procedure was performed under conscious sedation for 15 minutes from 1755 to 1805. Fentanyl infusion and propofol infusion were used. Heart rate, blood pressure, respiration, oxygen saturation, and patient responses were monitored throughout the procedure by Nicholas Navarro and remained stable throughout the procedure.    Patient Tolerance:  Patient tolerated the procedure well with no immediate complications  Length of time physician/provider present for 1:1 monitoring during sedation: 15        Plan:  - give lidocaine bolus of 100 mg and start lidocaine gtt @ 1 mg

## 2025-07-19 NOTE — PROGRESS NOTES
Patient remains intubated on the following vent settings. No vent changes made. Pt is tolerating vent well. ETT #7, 24 cm at the lips. Will continue to follow.     FiO2 (%): 50 %, Resp: 22, Vent Mode: VC/AC, Resp Rate (Set): 12 breaths/min, Tidal Volume (Set, mL): 400 mL, PEEP (cm H2O): 8 cmH2O, Resp Rate (Set): 12 breaths/min, Tidal Volume (Set, mL): 400 mL, PEEP (cm H2O): 8 cmH2O    Adelia Solo, RT on 7/19/2025 at 6:50 AM

## 2025-07-19 NOTE — PLAN OF CARE
Major Shift Events:  Care taken over at 2300. Sedation on for muscle jerking. Other than the involuntary jerking, no movement of extremities. +cough reflex. PERRLA. SR 60-80s. MAP 65-85, initially on nicardipine but transitioned to norepi. ECMO flows 4.3L, sweep 2, 60%, no chugging. UO adequate. Rectal tube in place. Insulin gtt down to 1 unit/hr. Bruising to right lower lip assessed at start of shift.    Plan: EEG, decrease sedation    For vital signs and complete assessments, please see documentation flowsheets.

## 2025-07-19 NOTE — PROGRESS NOTES
[facility]    ECLS Cannulation Note:     Date on: 7/18/2025  Time on: 1628  Cannulating Physician: Demarcus  Pre-cannulation ABG: pH 7.08/PaCO2 74/ PaO2 79/HCO3 22/Lactate 8.6  ABG Time: 1919    Arterial Cannula: 17 Fr. In the Right Femoral Artery  Venous Cannula: 25 Fr. In the Right Femoral Artery  Distal Perfusion Catheter: 8 Fr. In the Right Superficial Femoral Artery    ECMO components include:  Console Serial Number: [id number]  Circuit Lot Number: [id number]  Oxygenator Lot Number: [id number]    Cannulation was performed in the Cath Lab, placement was verified by fluoroscopy, cannula position is acceptable.    Patient's blood type is pending.    Cecilia Garcia RT  ECMO Specialist  7/18/2025 10:54 PM

## 2025-07-19 NOTE — PROCEDURES
[facility]    Procedure: *Cardioversion    Date/Time: 7/19/2025 11:57 AM    Performed by: Shane Chavarria NP  Authorized by: Shane Chavarria NP      UNIVERSAL PROTOCOL   Site Marked: NA  Prior Images Obtained and Reviewed:  NA  Required items: Required blood products, implants, devices and special equipment available    Patient identity confirmed:  Provided demographic data and arm band  Patient was reevaluated immediately before administering moderate or deep sedation or anesthesia  Confirmation Checklist:  Patient's identity using two indicators  Time out: Immediately prior to the procedure a time out was called    Universal Protocol: the Joint Commission Universal Protocol was followed    Preparation: Patient was prepped and draped in usual sterile fashion    ESBL (mL):  0    SEDATION  Patient Sedated: Yes    Sedation Type:  Moderate (conscious) sedation  Vital signs: Vital signs monitored during sedation      PROCEDURE DETAILS  Cardioversion basis: emergent  Pre-procedure rhythm: ventricular fibrillation  Patient position: patient was placed in a supine position  Chest area: chest area exposed  Electrodes: pads  Electrodes placed: anterior-posterior  Number of attempts: 1    Details of Attempts:  DCCV x 1 200J  Follow-up EKG ordered/performed without acute ischemia.   Post-procedure rhythm: normal sinus rhythm  Complications: no complications      PROCEDURE  Describe Procedure: The procedure was performed under conscious sedation for 15 minutes from 1150 to 1205. Fentanyl infusion and propofol infusion were used. Heart rate, blood pressure, respiration, oxygen saturation, and patient responses were monitored throughout the procedure by Nicholas Navarro and remained stable throughout the procedure.    Patient Tolerance:  Patient tolerated the procedure well with no immediate complications  Length of time physician/provider present for 1:1 monitoring during  sedation: 15

## 2025-07-19 NOTE — PROCEDURES
Cardiac ICU Procedure Note    Procedure: Arterial Line Insertion    Patient's Name: Bob Echols  Service performing procedure: Cardiac ICU  Attending Physician: Dr. Epifanio Beckwith  Indication for procedure: Hemodynamic Monitoring    Acuity: Urgent   Procedure date: July 19, 2025    A Time Out was performed immediately prior to the procedure to confirm correct patient, procedure, and site (site marked if applicable): Yes     Preparation for Procedure:   Hand hygiene performed prior to insertion: yes   Barrier precautions used (large sterile drape, gown, mask, sterile gloves, cap): yes   Skin preparation with chlorhexidine gluconate: yes   Skin preparation agent was allowed to dry: yes   Anesthesia used? Local     An arterial line was inserted in the right radial artery with ultrasound guidance.  Correct placement was confirmed by blood return and the arterial waveform.  The arterial line was sutured in place.    The patient tolerated the procedure without any immediate complications.     Matt Nugent MD  Cardiovascular Disease Fellow    July 19, 2025

## 2025-07-19 NOTE — PROGRESS NOTES
ECMO Attending Progress Note  7/19/2025    Bob Echols is a 70 year old male who was cannulated for ECMO 7/18/2025 due to refractory VF arrest in setting of acute stent thrombosis.    Cannulation Site:  17 Fr in the R femoral artery  25 Fr in the R femoral vein    Interval events: hemodynamically stable, myoclonic jerks    Pulsatilty (IABP paused if applicable):40 mmHG     Physical Exam:  Temp:  [97.5  F (36.4  C)-98.8  F (37.1  C)] 97.5  F (36.4  C)  Pulse:  [] 61  Resp:  [0-102] 12  MAP:  [55 mmHg-169 mmHg] 68 mmHg  Arterial Line BP: ()/(37-97) 92/61  FiO2 (%):  [50 %-100 %] 50 %  SpO2:  [80 %-100 %] 98 %    Intake/Output Summary (Last 24 hours) at 7/19/2025 1310  Last data filed at 7/19/2025 1200  Gross per 24 hour   Intake 1098.15 ml   Output 1415 ml   Net -316.85 ml    FiO2 (%): 50 %, Resp: 12, Vent Mode: VC/AC, Resp Rate (Set): 12 breaths/min, Tidal Volume (Set, mL): 400 mL, PEEP (cm H2O): 8 cmH2O, Resp Rate (Set): 12 breaths/min, Tidal Volume (Set, mL): 400 mL, PEEP (cm H2O): 8 cmH2O     Labs:  Recent Labs   Lab 07/19/25  1218 07/19/25  0949 07/19/25  0755 07/19/25  0554   PH 7.32* 7.40 7.59* 7.50*   PCO2 43 34* 20* 27*   PO2 92 135* 176* 146*   HCO3 22 21 19* 21   O2PER 50 50 50 50      Recent Labs   Lab 07/19/25  0948 07/19/25  0340 07/18/25  2046 07/18/25  1646 07/18/25  1644   WBC 14.6* 14.1* 19.2*  --  14.8*   HGB 10.1* 10.7* 12.3* 11.2* 13.0*     Creatinine   Date Value Ref Range Status   07/19/2025 2.28 (H) 0.67 - 1.17 mg/dL Final   07/19/2025 2.19 (H) 0.67 - 1.17 mg/dL Final   07/18/2025 2.12 (H) 0.67 - 1.17 mg/dL Final   07/18/2025 1.82 (H) 0.67 - 1.17 mg/dL Final   07/06/2013 0.90 0.66 - 1.25 mg/dL Final   06/27/2013 1.00 0.66 - 1.25 mg/dL Final   05/23/2013 1.06 0.66 - 1.25 mg/dL Final   05/21/2013 1.13 0.66 - 1.25 mg/dL Final     Blood Flow (Circuit) LPM: 4.32 LPM  Sweep LPM: 0.5 LPM  Sweep FiO2   %: 60 %  ACT  (seconds): 174 seconds  Pulse Oximetry  (SpO2%): 100 %  Arterial  Pressure  mmH mmHg    ECMO Issues including assessments and plan on DOS 2025:  Neuro: Sedated for mechanical ventilation and ECMO.  No acute distress.  NIRS stable b/l  RASS goal: -3  CV: Cardiogenic shock.  Hemodynamically stable on low dose norepi and   Pulm: Keep vent settings at rest settings as above.  FEN/Renal: Electrolytes stable w/ replacement protocols in place, Cr stable, UOP stable  Heme: ACT goal: 180-200, Hemoglobin stable.  Minimal oozing around the ECMO cannulas.  ID: Receiving empiric antibiotics  Cannulae: Position is acceptable on exam and the available imaging.  Distal perfusion cannula is in place and patent.  Extremities are well-perfused.     I have personally reviewed the ECMO flows, oxygenation and CO2 clearance, anticoagulation, and cannula position.  I have also personally assessed the patient's systemic response with hemodynamics, oxygenation, ventilation, and bleeding.       The patient requires continued ECMO support and management in the ICU.  I have discussed patient care and treatment plan with the primary team.      Epifanio Beckwith MD, PhD  Interventional/Critical Care Cardiology  [phone]    2025

## 2025-07-19 NOTE — PROGRESS NOTES
[facility]    ECLS Shift Summary:     ECMO Equipment:  Console Serial Number: [id number]  Circuit Lot Number: [id number]  Oxygenator Lot Number: [id number]    Circuit Assessment: Free of fibrin, clot, and air    Arterial ECMO Cannula: 17 Fr in the Right Femoral Artery  Venous ECMO Cannula: 25 Fr in the Right Femoral Vein  Distal Perfusion Catheter: 8 Fr in the Right Superficial Femoral Artery            Patient remains on V-A ECMO, all equipment is functioning and alarms are appropriately set. RPM's: 3699 with Blood Flow (Circuit) LPM  Av.32 LPM  Min: 4.32 LPM  Max: 4.32 LPM L/min. Sweep is at (S) 2 LPM and 60 %. Extremities are warm.     Significant Shift Events: Patient was shocked x2 for VF.    Vent settings:  FiO2 (%): 50 %, Resp: 12, Vent Mode: VC/AC, Resp Rate (Set): 12 breaths/min, Tidal Volume (Set, mL): 400 mL, PEEP (cm H2O): 8 cmH2O, Resp Rate (Set): 12 breaths/min, Tidal Volume (Set, mL): 400 mL, PEEP (cm H2O): 8 cmH2O    Anticoagulation:  Dose (units/hr) HEParin: 1400 Units/hr  Rate (mL/hr) HEParin: 14 mL/hr  Concentration HEParin: 100 Units/mL        Most recent ACT  (seconds): 174 seconds    Urine output is as per charted,  .  Blood loss was minimal. Product was not given.     Intake/Output Summary (Last 24 hours) at 2025 1814  Last data filed at 2025 1600  Gross per 24 hour   Intake 1416.74 ml   Output 1615 ml   Net -198.26 ml       Labs:  Recent Labs   Lab 25  1749 25  1534 25  1533 25  1437 25  1218   PH 7.32*  --  7.38 7.38 7.32*   PCO2 48*  --  37 37 43   PO2 104  --  118* 122* 92   HCO3 25  --  22 22 22   O2PER 50 60  50 50 50 50       Lab Results   Component Value Date    HGB 10.4 (L) 2025    PHGB 30 (H) 2025     2025    FIBR 754 (H) 2025    INR 1.31 (H) 2025    PTT 71 (H) 2025    DD 4.81 (H) 2025    AXA 0.21 2013    ANTCH 91 2025       Plan:  To  continue [facility] ECMO support    Ada Alexander RT  ECMO Specialist  7/19/2025 6:14 PM

## 2025-07-19 NOTE — PROCEDURES
Cardiac ICU Procedure Note  CVC Line Placement   Patient's Name: Bob Echols  Service performing procedure: Cardiac ICU  Attending Physician: Dr. Epifanio Beckwith  Indication for procedure: Access     Acuity: Elective   Procedure date: July 19, 2025    A Time Out was performed immediately prior to the procedure to confirm correct patient, procedure, and site (site marked if applicable): Yes     Preparation for Procedure:   Hand hygiene performed prior to insertion: yes   Barrier precautions used (large sterile drape, gown, mask, sterile gloves, cap): yes   Skin preparation with chlorhexidine gluconate: yes   Skin preparation agent was allowed to dry: yes   Anesthesia used? Local     CVC line was placed.   Side: left   Site: femoral  Ultrasound used? yes    Type of catheter placed: triple lumen   Insertion depth (cm): 1.5 cm   Securement: sutured in place  Post-venous cannulation confirmed by: Appropriate venous blood return  Number of attempts: 1     The patient tolerated the procedure well except for complications as noted. Immediate complications:NONE     Matt Nugent MD  Cardiovascular Disease Fellow  July 19, 2025

## 2025-07-20 ENCOUNTER — APPOINTMENT (OUTPATIENT)
Dept: GENERAL RADIOLOGY | Facility: CLINIC | Age: 70
End: 2025-07-20
Attending: STUDENT IN AN ORGANIZED HEALTH CARE EDUCATION/TRAINING PROGRAM
Payer: COMMERCIAL

## 2025-07-20 LAB
ACT BLD: 182 SECONDS (ref 74–150)
ACT BLD: 186 SECONDS (ref 74–150)
ACT BLD: 190 SECONDS (ref 74–150)
ACT BLD: 190 SECONDS (ref 74–150)
ALBUMIN SERPL BCG-MCNC: 2.7 G/DL (ref 3.5–5.2)
ALBUMIN SERPL BCG-MCNC: 2.8 G/DL (ref 3.5–5.2)
ALBUMIN SERPL BCG-MCNC: 2.9 G/DL (ref 3.5–5.2)
ALBUMIN SERPL BCG-MCNC: 2.9 G/DL (ref 3.5–5.2)
ALLEN'S TEST: ABNORMAL
ALP SERPL-CCNC: 195 U/L (ref 40–150)
ALP SERPL-CCNC: 212 U/L (ref 40–150)
ALP SERPL-CCNC: 83 U/L (ref 40–150)
ALP SERPL-CCNC: 93 U/L (ref 40–150)
ALT SERPL W P-5'-P-CCNC: 83 U/L (ref 0–70)
ALT SERPL W P-5'-P-CCNC: 89 U/L (ref 0–70)
ALT SERPL W P-5'-P-CCNC: 90 U/L (ref 0–70)
ALT SERPL W P-5'-P-CCNC: 94 U/L (ref 0–70)
ANION GAP SERPL CALCULATED.3IONS-SCNC: 12 MMOL/L (ref 7–15)
ANION GAP SERPL CALCULATED.3IONS-SCNC: 12 MMOL/L (ref 7–15)
ANION GAP SERPL CALCULATED.3IONS-SCNC: 14 MMOL/L (ref 7–15)
ANION GAP SERPL CALCULATED.3IONS-SCNC: 15 MMOL/L (ref 7–15)
APTT PPP: 105 SECONDS (ref 22–38)
APTT PPP: 106 SECONDS (ref 22–38)
APTT PPP: 150 SECONDS (ref 22–38)
APTT PPP: 152 SECONDS (ref 22–38)
AST SERPL W P-5'-P-CCNC: 70 U/L (ref 0–45)
AST SERPL W P-5'-P-CCNC: 76 U/L (ref 0–45)
AST SERPL W P-5'-P-CCNC: 78 U/L (ref 0–45)
AST SERPL W P-5'-P-CCNC: 91 U/L (ref 0–45)
AT III ACT/NOR PPP CHRO: 90 % (ref 85–135)
BACTERIA SPT CULT: NORMAL
BASE EXCESS BLDA CALC-SCNC: -1.6 MMOL/L (ref -3–3)
BASE EXCESS BLDA CALC-SCNC: -2.1 MMOL/L (ref -3–3)
BASE EXCESS BLDA CALC-SCNC: -2.1 MMOL/L (ref -3–3)
BASE EXCESS BLDA CALC-SCNC: -2.2 MMOL/L (ref -3–3)
BASE EXCESS BLDA CALC-SCNC: -2.5 MMOL/L (ref -3–3)
BASE EXCESS BLDA CALC-SCNC: -2.6 MMOL/L (ref -3–3)
BASE EXCESS BLDA CALC-SCNC: -3.1 MMOL/L (ref -3–3)
BASE EXCESS BLDA CALC-SCNC: -3.3 MMOL/L (ref -3–3)
BASE EXCESS BLDA CALC-SCNC: -3.6 MMOL/L (ref -3–3)
BASE EXCESS BLDA CALC-SCNC: -3.9 MMOL/L (ref -3–3)
BASE EXCESS BLDA CALC-SCNC: -3.9 MMOL/L (ref -3–3)
BASE EXCESS BLDA CALC-SCNC: -4.4 MMOL/L (ref -3–3)
BASE EXCESS BLDA CALC-SCNC: -5 MMOL/L (ref -3–3)
BASE EXCESS BLDA CALC-SCNC: -6.6 MMOL/L (ref -3–3)
BASE EXCESS BLDV CALC-SCNC: -2.1 MMOL/L (ref -3–3)
BASE EXCESS BLDV CALC-SCNC: -4.1 MMOL/L (ref -3–3)
BILIRUB SERPL-MCNC: 0.2 MG/DL
BILIRUB SERPL-MCNC: 0.2 MG/DL
BILIRUB SERPL-MCNC: 0.3 MG/DL
BILIRUB SERPL-MCNC: 0.3 MG/DL
BUN SERPL-MCNC: 43 MG/DL (ref 8–23)
BUN SERPL-MCNC: 43.6 MG/DL (ref 8–23)
BUN SERPL-MCNC: 45.2 MG/DL (ref 8–23)
BUN SERPL-MCNC: 47 MG/DL (ref 8–23)
CA-I BLD-MCNC: 4.5 MG/DL (ref 4.4–5.2)
CA-I BLD-MCNC: 4.7 MG/DL (ref 4.4–5.2)
CALCIUM SERPL-MCNC: 8.4 MG/DL (ref 8.8–10.4)
CALCIUM SERPL-MCNC: 8.6 MG/DL (ref 8.8–10.4)
CALCIUM SERPL-MCNC: 8.7 MG/DL (ref 8.8–10.4)
CALCIUM SERPL-MCNC: 8.7 MG/DL (ref 8.8–10.4)
CF REDUC 30M P MA P HEP LENFR BLD TEG: 0 % (ref 0–8)
CF REDUC 60M P MA P HEPASE LENFR BLD TEG: 1.4 % (ref 0–15)
CFT P HPASE BLD TEG: 1 MINUTE (ref 1–3)
CHLORIDE SERPL-SCNC: 105 MMOL/L (ref 98–107)
CHLORIDE SERPL-SCNC: 105 MMOL/L (ref 98–107)
CHLORIDE SERPL-SCNC: 107 MMOL/L (ref 98–107)
CHLORIDE SERPL-SCNC: 108 MMOL/L (ref 98–107)
CI (COAGULATION INDEX)(Z): 3.2 (ref -3–3)
CLOT ANGLE P HPASE BLD TEG: 75.4 DEGREES (ref 53–72)
CLOT INIT P HPASE BLD TEG: 5.8 MINUTE (ref 5–10)
CLOT STRENGTH P HPASE BLD TEG: 17 KD/SC (ref 4.5–11)
CREAT SERPL-MCNC: 2.05 MG/DL (ref 0.67–1.17)
CREAT SERPL-MCNC: 2.22 MG/DL (ref 0.67–1.17)
CREAT SERPL-MCNC: 2.37 MG/DL (ref 0.67–1.17)
CREAT SERPL-MCNC: 2.52 MG/DL (ref 0.67–1.17)
CRP SERPL-MCNC: 118 MG/L
D DIMER PPP FEU-MCNC: 2.46 UG/ML FEU (ref 0–0.5)
D DIMER PPP FEU-MCNC: 2.75 UG/ML FEU (ref 0–0.5)
EGFRCR SERPLBLD CKD-EPI 2021: 27 ML/MIN/1.73M2
EGFRCR SERPLBLD CKD-EPI 2021: 29 ML/MIN/1.73M2
EGFRCR SERPLBLD CKD-EPI 2021: 31 ML/MIN/1.73M2
EGFRCR SERPLBLD CKD-EPI 2021: 34 ML/MIN/1.73M2
ERYTHROCYTE [DISTWIDTH] IN BLOOD BY AUTOMATED COUNT: 13.2 % (ref 10–15)
ERYTHROCYTE [DISTWIDTH] IN BLOOD BY AUTOMATED COUNT: 13.3 % (ref 10–15)
ERYTHROCYTE [DISTWIDTH] IN BLOOD BY AUTOMATED COUNT: 13.5 % (ref 10–15)
ERYTHROCYTE [DISTWIDTH] IN BLOOD BY AUTOMATED COUNT: 13.6 % (ref 10–15)
ERYTHROCYTE [SEDIMENTATION RATE] IN BLOOD BY WESTERGREN METHOD: 89 MM/HR (ref 0–20)
FIBRINOGEN PPP-MCNC: 792 MG/DL (ref 170–510)
FIBRINOGEN PPP-MCNC: 908 MG/DL (ref 170–510)
GLUCOSE BLD-MCNC: 124 MG/DL (ref 70–99)
GLUCOSE BLD-MCNC: 155 MG/DL (ref 70–99)
GLUCOSE BLD-MCNC: 181 MG/DL (ref 70–99)
GLUCOSE BLD-MCNC: 189 MG/DL (ref 70–99)
GLUCOSE BLDC GLUCOMTR-MCNC: 115 MG/DL (ref 70–99)
GLUCOSE BLDC GLUCOMTR-MCNC: 116 MG/DL (ref 70–99)
GLUCOSE BLDC GLUCOMTR-MCNC: 119 MG/DL (ref 70–99)
GLUCOSE BLDC GLUCOMTR-MCNC: 120 MG/DL (ref 70–99)
GLUCOSE BLDC GLUCOMTR-MCNC: 121 MG/DL (ref 70–99)
GLUCOSE BLDC GLUCOMTR-MCNC: 147 MG/DL (ref 70–99)
GLUCOSE BLDC GLUCOMTR-MCNC: 148 MG/DL (ref 70–99)
GLUCOSE BLDC GLUCOMTR-MCNC: 151 MG/DL (ref 70–99)
GLUCOSE BLDC GLUCOMTR-MCNC: 158 MG/DL (ref 70–99)
GLUCOSE BLDC GLUCOMTR-MCNC: 165 MG/DL (ref 70–99)
GLUCOSE BLDC GLUCOMTR-MCNC: 175 MG/DL (ref 70–99)
GLUCOSE BLDC GLUCOMTR-MCNC: 177 MG/DL (ref 70–99)
GLUCOSE SERPL-MCNC: 127 MG/DL (ref 70–99)
GLUCOSE SERPL-MCNC: 160 MG/DL (ref 70–99)
GLUCOSE SERPL-MCNC: 185 MG/DL (ref 70–99)
GLUCOSE SERPL-MCNC: 197 MG/DL (ref 70–99)
GRAM STAIN RESULT: NORMAL
GRAM STAIN RESULT: NORMAL
HCO3 BLD-SCNC: 19 MMOL/L (ref 21–28)
HCO3 BLD-SCNC: 20 MMOL/L (ref 21–28)
HCO3 BLD-SCNC: 21 MMOL/L (ref 21–28)
HCO3 BLD-SCNC: 21 MMOL/L (ref 21–28)
HCO3 BLD-SCNC: 22 MMOL/L (ref 21–28)
HCO3 BLD-SCNC: 23 MMOL/L (ref 21–28)
HCO3 BLDA-SCNC: 22 MMOL/L (ref 21–28)
HCO3 BLDA-SCNC: 24 MMOL/L (ref 21–28)
HCO3 BLDV-SCNC: 22 MMOL/L (ref 21–28)
HCO3 BLDV-SCNC: 25 MMOL/L (ref 21–28)
HCO3 SERPL-SCNC: 19 MMOL/L (ref 22–29)
HCO3 SERPL-SCNC: 20 MMOL/L (ref 22–29)
HCT VFR BLD AUTO: 29.3 % (ref 40–53)
HCT VFR BLD AUTO: 30 % (ref 40–53)
HCT VFR BLD AUTO: 32.4 % (ref 40–53)
HCT VFR BLD AUTO: 32.4 % (ref 40–53)
HGB BLD-MCNC: 10.2 G/DL (ref 13.3–17.7)
HGB BLD-MCNC: 10.3 G/DL (ref 13.3–17.7)
HGB BLD-MCNC: 9.4 G/DL (ref 13.3–17.7)
HGB BLD-MCNC: 9.8 G/DL (ref 13.3–17.7)
HGB FREE PLAS-MCNC: 40 MG/DL
INR PPP: 1.29 (ref 0.85–1.15)
INR PPP: 1.32 (ref 0.85–1.15)
INR PPP: 1.32 (ref 0.85–1.15)
INR PPP: 1.34 (ref 0.85–1.15)
LACTATE SERPL-SCNC: 0.9 MMOL/L (ref 0.7–2)
LACTATE SERPL-SCNC: 0.9 MMOL/L (ref 0.7–2)
LACTATE SERPL-SCNC: 1.7 MMOL/L (ref 0.7–2)
LACTATE SERPL-SCNC: 1.8 MMOL/L (ref 0.7–2)
LDH SERPL L TO P-CCNC: 355 U/L (ref 0–250)
MAGNESIUM SERPL-MCNC: 2.3 MG/DL (ref 1.7–2.3)
MAGNESIUM SERPL-MCNC: 2.3 MG/DL (ref 1.7–2.3)
MAGNESIUM SERPL-MCNC: 2.4 MG/DL (ref 1.7–2.3)
MAGNESIUM SERPL-MCNC: 2.4 MG/DL (ref 1.7–2.3)
MCF P HPASE BLD TEG: 77.3 MM (ref 50–70)
MCH RBC QN AUTO: 27.3 PG (ref 26.5–33)
MCH RBC QN AUTO: 27.5 PG (ref 26.5–33)
MCHC RBC AUTO-ENTMCNC: 31.5 G/DL (ref 31.5–36.5)
MCHC RBC AUTO-ENTMCNC: 31.8 G/DL (ref 31.5–36.5)
MCHC RBC AUTO-ENTMCNC: 32.1 G/DL (ref 31.5–36.5)
MCHC RBC AUTO-ENTMCNC: 32.7 G/DL (ref 31.5–36.5)
MCV RBC AUTO: 84 FL (ref 78–100)
MCV RBC AUTO: 85 FL (ref 78–100)
MCV RBC AUTO: 86 FL (ref 78–100)
MCV RBC AUTO: 87 FL (ref 78–100)
O2/TOTAL GAS SETTING VFR VENT: 50 %
O2/TOTAL GAS SETTING VFR VENT: 60 %
OXYHGB MFR BLDA: 92 % (ref 92–100)
OXYHGB MFR BLDA: 92 % (ref 92–100)
OXYHGB MFR BLDA: 95 % (ref 92–100)
OXYHGB MFR BLDA: 96 % (ref 92–100)
OXYHGB MFR BLDA: 97 % (ref 92–100)
OXYHGB MFR BLDA: 97 % (ref 92–100)
OXYHGB MFR BLDA: 98 % (ref 75–100)
OXYHGB MFR BLDA: 98 % (ref 75–100)
OXYHGB MFR BLDV: 77 %
OXYHGB MFR BLDV: 81 %
PCO2 BLD: 34 MM HG (ref 35–45)
PCO2 BLD: 35 MM HG (ref 35–45)
PCO2 BLD: 37 MM HG (ref 35–45)
PCO2 BLD: 38 MM HG (ref 35–45)
PCO2 BLD: 39 MM HG (ref 35–45)
PCO2 BLD: 39 MM HG (ref 35–45)
PCO2 BLDA: 43 MM HG (ref 35–45)
PCO2 BLDA: 51 MM HG (ref 35–45)
PCO2 BLDV: 45 MM HG (ref 40–50)
PCO2 BLDV: 53 MM HG (ref 40–50)
PH BLD: 7.32 [PH] (ref 7.35–7.45)
PH BLD: 7.34 [PH] (ref 7.35–7.45)
PH BLD: 7.35 [PH] (ref 7.35–7.45)
PH BLD: 7.36 [PH] (ref 7.35–7.45)
PH BLD: 7.36 [PH] (ref 7.35–7.45)
PH BLD: 7.37 [PH] (ref 7.35–7.45)
PH BLD: 7.37 [PH] (ref 7.35–7.45)
PH BLD: 7.38 [PH] (ref 7.35–7.45)
PH BLD: 7.41 [PH] (ref 7.35–7.45)
PH BLD: 7.42 [PH] (ref 7.35–7.45)
PH BLD: 7.42 [PH] (ref 7.35–7.45)
PH BLD: 7.43 [PH] (ref 7.35–7.45)
PH BLDA: 7.29 [PH] (ref 7.35–7.45)
PH BLDA: 7.31 [PH] (ref 7.35–7.45)
PH BLDV: 7.28 [PH] (ref 7.32–7.43)
PH BLDV: 7.3 [PH] (ref 7.32–7.43)
PHOSPHATE SERPL-MCNC: 6.2 MG/DL (ref 2.5–4.5)
PLATELET # BLD AUTO: 279 10E3/UL (ref 150–450)
PLATELET # BLD AUTO: 301 10E3/UL (ref 150–450)
PLATELET # BLD AUTO: 308 10E3/UL (ref 150–450)
PLATELET # BLD AUTO: 320 10E3/UL (ref 150–450)
PO2 BLD: 100 MM HG (ref 80–105)
PO2 BLD: 64 MM HG (ref 80–105)
PO2 BLD: 72 MM HG (ref 80–105)
PO2 BLD: 81 MM HG (ref 80–105)
PO2 BLD: 81 MM HG (ref 80–105)
PO2 BLD: 83 MM HG (ref 80–105)
PO2 BLD: 83 MM HG (ref 80–105)
PO2 BLD: 86 MM HG (ref 80–105)
PO2 BLD: 87 MM HG (ref 80–105)
PO2 BLD: 89 MM HG (ref 80–105)
PO2 BLD: 90 MM HG (ref 80–105)
PO2 BLD: 92 MM HG (ref 80–105)
PO2 BLDA: 196 MM HG (ref 80–105)
PO2 BLDA: 228 MM HG (ref 80–105)
PO2 BLDV: 48 MM HG (ref 25–47)
PO2 BLDV: 51 MM HG (ref 25–47)
POTASSIUM SERPL-SCNC: 4.2 MMOL/L (ref 3.4–5.3)
POTASSIUM SERPL-SCNC: 4.3 MMOL/L (ref 3.4–5.3)
POTASSIUM SERPL-SCNC: 4.4 MMOL/L (ref 3.4–5.3)
POTASSIUM SERPL-SCNC: 4.5 MMOL/L (ref 3.4–5.3)
PROT SERPL-MCNC: 5.8 G/DL (ref 6.4–8.3)
PROT SERPL-MCNC: 6.1 G/DL (ref 6.4–8.3)
PROT SERPL-MCNC: 6.2 G/DL (ref 6.4–8.3)
PROT SERPL-MCNC: 6.4 G/DL (ref 6.4–8.3)
PROTHROMBIN TIME: 16.1 SECONDS (ref 11.8–14.8)
PROTHROMBIN TIME: 16.3 SECONDS (ref 11.8–14.8)
PROTHROMBIN TIME: 16.3 SECONDS (ref 11.8–14.8)
PROTHROMBIN TIME: 16.4 SECONDS (ref 11.8–14.8)
RBC # BLD AUTO: 3.44 10E6/UL (ref 4.4–5.9)
RBC # BLD AUTO: 3.57 10E6/UL (ref 4.4–5.9)
RBC # BLD AUTO: 3.73 10E6/UL (ref 4.4–5.9)
RBC # BLD AUTO: 3.77 10E6/UL (ref 4.4–5.9)
SAO2 % BLDA: 100 % (ref 95–96)
SAO2 % BLDA: 100 % (ref 95–96)
SAO2 % BLDA: 93.4 % (ref 95–96)
SAO2 % BLDA: 94.8 % (ref 95–96)
SAO2 % BLDA: 96.7 % (ref 95–96)
SAO2 % BLDA: 96.8 % (ref 95–96)
SAO2 % BLDA: 96.9 % (ref 95–96)
SAO2 % BLDA: 97 % (ref 95–96)
SAO2 % BLDA: 97.5 % (ref 95–96)
SAO2 % BLDA: 97.7 % (ref 95–96)
SAO2 % BLDA: 97.8 % (ref 95–96)
SAO2 % BLDA: 98.2 % (ref 95–96)
SAO2 % BLDA: 98.9 % (ref 95–96)
SAO2 % BLDA: 99.1 % (ref 95–96)
SAO2 % BLDV: 79 % (ref 70–75)
SAO2 % BLDV: 82.9 % (ref 70–75)
SODIUM SERPL-SCNC: 138 MMOL/L (ref 135–145)
SODIUM SERPL-SCNC: 139 MMOL/L (ref 135–145)
T3 SERPL-MCNC: 84 NG/DL (ref 85–202)
T3FREE SERPL-MCNC: 2 PG/ML (ref 2–4.4)
T4 FREE SERPL-MCNC: 1.13 NG/DL (ref 0.9–1.7)
TROPONIN T SERPL HS-MCNC: 1050 NG/L
TROPONIN T SERPL HS-MCNC: 1174 NG/L
TROPONIN T SERPL HS-MCNC: 1465 NG/L
TROPONIN T SERPL HS-MCNC: 1746 NG/L
TSH SERPL DL<=0.005 MIU/L-ACNC: 0.13 UIU/ML (ref 0.3–4.2)
UFH PPP CHRO-ACNC: 1.07 IU/ML (ref ?–1.1)
UFH PPP CHRO-ACNC: >1.1 IU/ML (ref ?–1.1)
WBC # BLD AUTO: 11.3 10E3/UL (ref 4–11)
WBC # BLD AUTO: 14.2 10E3/UL (ref 4–11)
WBC # BLD AUTO: 14.4 10E3/UL (ref 4–11)
WBC # BLD AUTO: 16 10E3/UL (ref 4–11)

## 2025-07-20 PROCEDURE — 250N000011 HC RX IP 250 OP 636: Performed by: STUDENT IN AN ORGANIZED HEALTH CARE EDUCATION/TRAINING PROGRAM

## 2025-07-20 PROCEDURE — 85396 CLOTTING ASSAY WHOLE BLOOD: CPT | Performed by: STUDENT IN AN ORGANIZED HEALTH CARE EDUCATION/TRAINING PROGRAM

## 2025-07-20 PROCEDURE — 99291 CRITICAL CARE FIRST HOUR: CPT | Performed by: NURSE PRACTITIONER

## 2025-07-20 PROCEDURE — 83605 ASSAY OF LACTIC ACID: CPT | Performed by: STUDENT IN AN ORGANIZED HEALTH CARE EDUCATION/TRAINING PROGRAM

## 2025-07-20 PROCEDURE — 82947 ASSAY GLUCOSE BLOOD QUANT: CPT | Performed by: STUDENT IN AN ORGANIZED HEALTH CARE EDUCATION/TRAINING PROGRAM

## 2025-07-20 PROCEDURE — 250N000013 HC RX MED GY IP 250 OP 250 PS 637: Performed by: STUDENT IN AN ORGANIZED HEALTH CARE EDUCATION/TRAINING PROGRAM

## 2025-07-20 PROCEDURE — 250N000009 HC RX 250: Performed by: STUDENT IN AN ORGANIZED HEALTH CARE EDUCATION/TRAINING PROGRAM

## 2025-07-20 PROCEDURE — 82330 ASSAY OF CALCIUM: CPT | Performed by: STUDENT IN AN ORGANIZED HEALTH CARE EDUCATION/TRAINING PROGRAM

## 2025-07-20 PROCEDURE — 85652 RBC SED RATE AUTOMATED: CPT | Performed by: STUDENT IN AN ORGANIZED HEALTH CARE EDUCATION/TRAINING PROGRAM

## 2025-07-20 PROCEDURE — 250N000011 HC RX IP 250 OP 636: Mod: JZ | Performed by: STUDENT IN AN ORGANIZED HEALTH CARE EDUCATION/TRAINING PROGRAM

## 2025-07-20 PROCEDURE — 86140 C-REACTIVE PROTEIN: CPT | Performed by: STUDENT IN AN ORGANIZED HEALTH CARE EDUCATION/TRAINING PROGRAM

## 2025-07-20 PROCEDURE — 84481 FREE ASSAY (FT-3): CPT | Performed by: STUDENT IN AN ORGANIZED HEALTH CARE EDUCATION/TRAINING PROGRAM

## 2025-07-20 PROCEDURE — 71045 X-RAY EXAM CHEST 1 VIEW: CPT | Mod: 26 | Performed by: RADIOLOGY

## 2025-07-20 PROCEDURE — 85520 HEPARIN ASSAY: CPT | Performed by: STUDENT IN AN ORGANIZED HEALTH CARE EDUCATION/TRAINING PROGRAM

## 2025-07-20 PROCEDURE — 200N000002 HC R&B ICU UMMC

## 2025-07-20 PROCEDURE — 85014 HEMATOCRIT: CPT | Performed by: STUDENT IN AN ORGANIZED HEALTH CARE EDUCATION/TRAINING PROGRAM

## 2025-07-20 PROCEDURE — 84439 ASSAY OF FREE THYROXINE: CPT | Performed by: STUDENT IN AN ORGANIZED HEALTH CARE EDUCATION/TRAINING PROGRAM

## 2025-07-20 PROCEDURE — 85300 ANTITHROMBIN III ACTIVITY: CPT | Performed by: STUDENT IN AN ORGANIZED HEALTH CARE EDUCATION/TRAINING PROGRAM

## 2025-07-20 PROCEDURE — 84484 ASSAY OF TROPONIN QUANT: CPT | Performed by: STUDENT IN AN ORGANIZED HEALTH CARE EDUCATION/TRAINING PROGRAM

## 2025-07-20 PROCEDURE — 83615 LACTATE (LD) (LDH) ENZYME: CPT | Performed by: STUDENT IN AN ORGANIZED HEALTH CARE EDUCATION/TRAINING PROGRAM

## 2025-07-20 PROCEDURE — 85384 FIBRINOGEN ACTIVITY: CPT | Performed by: STUDENT IN AN ORGANIZED HEALTH CARE EDUCATION/TRAINING PROGRAM

## 2025-07-20 PROCEDURE — 84443 ASSAY THYROID STIM HORMONE: CPT | Performed by: STUDENT IN AN ORGANIZED HEALTH CARE EDUCATION/TRAINING PROGRAM

## 2025-07-20 PROCEDURE — 83735 ASSAY OF MAGNESIUM: CPT | Performed by: STUDENT IN AN ORGANIZED HEALTH CARE EDUCATION/TRAINING PROGRAM

## 2025-07-20 PROCEDURE — 93005 ELECTROCARDIOGRAM TRACING: CPT

## 2025-07-20 PROCEDURE — 33949 ECMO/ECLS DAILY MGMT ARTERY: CPT | Performed by: INTERNAL MEDICINE

## 2025-07-20 PROCEDURE — 83051 HEMOGLOBIN PLASMA: CPT | Performed by: STUDENT IN AN ORGANIZED HEALTH CARE EDUCATION/TRAINING PROGRAM

## 2025-07-20 PROCEDURE — 250N000013 HC RX MED GY IP 250 OP 250 PS 637: Performed by: INTERNAL MEDICINE

## 2025-07-20 PROCEDURE — 82805 BLOOD GASES W/O2 SATURATION: CPT | Performed by: STUDENT IN AN ORGANIZED HEALTH CARE EDUCATION/TRAINING PROGRAM

## 2025-07-20 PROCEDURE — 71045 X-RAY EXAM CHEST 1 VIEW: CPT

## 2025-07-20 PROCEDURE — 33949 ECMO/ECLS DAILY MGMT ARTERY: CPT

## 2025-07-20 PROCEDURE — 258N000003 HC RX IP 258 OP 636: Performed by: STUDENT IN AN ORGANIZED HEALTH CARE EDUCATION/TRAINING PROGRAM

## 2025-07-20 PROCEDURE — 85379 FIBRIN DEGRADATION QUANT: CPT | Performed by: STUDENT IN AN ORGANIZED HEALTH CARE EDUCATION/TRAINING PROGRAM

## 2025-07-20 PROCEDURE — 87205 SMEAR GRAM STAIN: CPT | Performed by: STUDENT IN AN ORGANIZED HEALTH CARE EDUCATION/TRAINING PROGRAM

## 2025-07-20 PROCEDURE — 84480 ASSAY TRIIODOTHYRONINE (T3): CPT | Performed by: STUDENT IN AN ORGANIZED HEALTH CARE EDUCATION/TRAINING PROGRAM

## 2025-07-20 PROCEDURE — 85730 THROMBOPLASTIN TIME PARTIAL: CPT | Performed by: STUDENT IN AN ORGANIZED HEALTH CARE EDUCATION/TRAINING PROGRAM

## 2025-07-20 PROCEDURE — 94003 VENT MGMT INPAT SUBQ DAY: CPT

## 2025-07-20 PROCEDURE — 999N000157 HC STATISTIC RCP TIME EA 10 MIN

## 2025-07-20 PROCEDURE — 85610 PROTHROMBIN TIME: CPT | Performed by: STUDENT IN AN ORGANIZED HEALTH CARE EDUCATION/TRAINING PROGRAM

## 2025-07-20 PROCEDURE — 84155 ASSAY OF PROTEIN SERUM: CPT | Performed by: STUDENT IN AN ORGANIZED HEALTH CARE EDUCATION/TRAINING PROGRAM

## 2025-07-20 PROCEDURE — 99291 CRITICAL CARE FIRST HOUR: CPT | Mod: GC | Performed by: INTERNAL MEDICINE

## 2025-07-20 PROCEDURE — 84100 ASSAY OF PHOSPHORUS: CPT | Performed by: STUDENT IN AN ORGANIZED HEALTH CARE EDUCATION/TRAINING PROGRAM

## 2025-07-20 PROCEDURE — 80048 BASIC METABOLIC PNL TOTAL CA: CPT | Performed by: STUDENT IN AN ORGANIZED HEALTH CARE EDUCATION/TRAINING PROGRAM

## 2025-07-20 PROCEDURE — 85347 COAGULATION TIME ACTIVATED: CPT

## 2025-07-20 RX ORDER — DEXTROSE MONOHYDRATE 100 MG/ML
INJECTION, SOLUTION INTRAVENOUS CONTINUOUS PRN
Status: DISCONTINUED | OUTPATIENT
Start: 2025-07-20 | End: 2025-07-27 | Stop reason: HOSPADM

## 2025-07-20 RX ORDER — GUAIFENESIN 600 MG/1
15 TABLET, EXTENDED RELEASE ORAL DAILY
Status: DISCONTINUED | OUTPATIENT
Start: 2025-07-20 | End: 2025-07-27

## 2025-07-20 RX ORDER — AMINO ACIDS/PROTEIN HYDROLYS 11G-40/45
1 LIQUID IN PACKET (ML) ORAL
Status: DISCONTINUED | OUTPATIENT
Start: 2025-07-20 | End: 2025-07-23

## 2025-07-20 RX ADMIN — INSULIN HUMAN 2 UNITS/HR: 1 INJECTION, SOLUTION INTRAVENOUS at 14:35

## 2025-07-20 RX ADMIN — TICAGRELOR 90 MG: 90 TABLET ORAL at 20:04

## 2025-07-20 RX ADMIN — Medication 40 MG: at 07:48

## 2025-07-20 RX ADMIN — PROPOFOL 40 MCG/KG/MIN: 10 INJECTION, EMULSION INTRAVENOUS at 16:48

## 2025-07-20 RX ADMIN — NICARDIPINE HYDROCHLORIDE 5 MG/HR: 0.2 INJECTION INTRAVENOUS at 14:35

## 2025-07-20 RX ADMIN — CHLORHEXIDINE GLUCONATE 15 ML: 1.2 SOLUTION ORAL at 07:48

## 2025-07-20 RX ADMIN — PROPOFOL 35 MCG/KG/MIN: 10 INJECTION, EMULSION INTRAVENOUS at 06:07

## 2025-07-20 RX ADMIN — NICARDIPINE HYDROCHLORIDE 5 MG/HR: 0.2 INJECTION INTRAVENOUS at 23:14

## 2025-07-20 RX ADMIN — PROPOFOL 35 MCG/KG/MIN: 10 INJECTION, EMULSION INTRAVENOUS at 02:07

## 2025-07-20 RX ADMIN — CHLORHEXIDINE GLUCONATE 15 ML: 1.2 SOLUTION ORAL at 20:03

## 2025-07-20 RX ADMIN — PROPOFOL 40 MCG/KG/MIN: 10 INJECTION, EMULSION INTRAVENOUS at 20:21

## 2025-07-20 RX ADMIN — PROPOFOL 40 MCG/KG/MIN: 10 INJECTION, EMULSION INTRAVENOUS at 22:52

## 2025-07-20 RX ADMIN — CEFTRIAXONE SODIUM 2 G: 2 INJECTION, POWDER, FOR SOLUTION INTRAMUSCULAR; INTRAVENOUS at 22:37

## 2025-07-20 RX ADMIN — LIDOCAINE HYDROCHLORIDE 1 MG/MIN: 8 INJECTION, SOLUTION INTRAVENOUS at 23:15

## 2025-07-20 RX ADMIN — PROPOFOL 40 MCG/KG/MIN: 10 INJECTION, EMULSION INTRAVENOUS at 10:05

## 2025-07-20 RX ADMIN — Medication 15 ML: at 14:35

## 2025-07-20 RX ADMIN — PROPOFOL 40 MCG/KG/MIN: 10 INJECTION, EMULSION INTRAVENOUS at 13:01

## 2025-07-20 RX ADMIN — Medication 60 ML: at 17:31

## 2025-07-20 RX ADMIN — TICAGRELOR 90 MG: 90 TABLET ORAL at 07:48

## 2025-07-20 RX ADMIN — PROPOFOL 40 MCG/KG/MIN: 10 INJECTION, EMULSION INTRAVENOUS at 23:21

## 2025-07-20 RX ADMIN — SODIUM CHLORIDE, SODIUM LACTATE, POTASSIUM CHLORIDE, AND CALCIUM CHLORIDE 500 ML: .6; .31; .03; .02 INJECTION, SOLUTION INTRAVENOUS at 05:02

## 2025-07-20 RX ADMIN — Medication 100 MCG/HR: at 18:29

## 2025-07-20 RX ADMIN — HEPARIN SODIUM 1800 UNITS/HR: 10000 INJECTION, SOLUTION INTRAVENOUS at 23:38

## 2025-07-20 RX ADMIN — HEPARIN SODIUM 1800 UNITS/HR: 10000 INJECTION, SOLUTION INTRAVENOUS at 10:23

## 2025-07-20 RX ADMIN — Medication 60 ML: at 14:35

## 2025-07-20 RX ADMIN — Medication 60 ML: at 22:38

## 2025-07-20 RX ADMIN — ASPIRIN 81 MG CHEWABLE TABLET 81 MG: 81 TABLET CHEWABLE at 07:48

## 2025-07-20 ASSESSMENT — ACTIVITIES OF DAILY LIVING (ADL)
ADLS_ACUITY_SCORE: 77
DEPENDENT_IADLS:: INDEPENDENT
ADLS_ACUITY_SCORE: 77

## 2025-07-20 NOTE — PROGRESS NOTES
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    Cannulation Site:  17 Fr in the R femoral artery  25 Fr in the R femoral vein    Interval events: no VT with lido, fewer myoclonic jerks    Pulsatilty (IABP paused if applicable):60 mmHG     Physical Exam:  Temp:  [97.3  F (36.3  C)-98.6  F (37  C)] 98.6  F (37  C)  Pulse:  [56-89] 88  Resp:  [10-25] 15  MAP:  [64 mmHg-102 mmHg] 78 mmHg  Arterial Line BP: ()/(58-81) 121/60  FiO2 (%):  [50 %-60 %] 60 %  SpO2:  [94 %-100 %] 96 %    Intake/Output Summary (Last 24 hours) at 2025 1249  Last data filed at 2025 1200  Gross per 24 hour   Intake 1859.61 ml   Output 1217 ml   Net 642.61 ml      FiO2 (%): 60 %, Resp: 15, Vent Mode: VC/AC, Resp Rate (Set): 12 breaths/min, Tidal Volume (Set, mL): 400 mL, PEEP (cm H2O): 8 cmH2O, Resp Rate (Set): 12 breaths/min, Tidal Volume (Set, mL): 400 mL, PEEP (cm H2O): 8 cmH2O     Labs:  Recent Labs   Lab 25  1227 25  0955 25  0746 25  0554   PH 7.32* 7.35 7.37 7.42   PCO2 37 38 39 34*   PO2 87 72* 90 83   HCO3 19* 21 23 22   O2PER 60 50 50 50      Recent Labs   Lab 25  0955 25  0338 25  2146 25  1534   WBC 14.2* 11.3* 10.6 15.9*   HGB 10.2* 9.4* 9.7* 10.4*     Creatinine   Date Value Ref Range Status   2025 2.37 (H) 0.67 - 1.17 mg/dL Final   2025 2.52 (H) 0.67 - 1.17 mg/dL Final   2025 2.41 (H) 0.67 - 1.17 mg/dL Final   2025 2.40 (H) 0.67 - 1.17 mg/dL Final   2013 0.90 0.66 - 1.25 mg/dL Final   2013 1.00 0.66 - 1.25 mg/dL Final   2013 1.06 0.66 - 1.25 mg/dL Final   2013 1.13 0.66 - 1.25 mg/dL Final   Blood Flow (Circuit) LPM: (S) 3.46 LPM  Sweep LPM: 1 LPM  Sweep FiO2   %: 60 %  ACT  (seconds): 190 seconds  Pulse Oximetry  (SpO2%): 100 %  Arterial Pressure  mmH mmHg    ECMO Issues  including assessments and plan on DOS 7/20/2025:  Neuro: Sedated for mechanical ventilation and ECMO.  No acute distress.  NIRS stable b/l  RASS goal: -3  CV: Cardiogenic shock.  Hemodynamically stable on low dose norepi and   Pulm: Keep vent settings at rest settings as above.  FEN/Renal: Electrolytes stable w/ replacement protocols in place, Cr stable, UOP stable  Heme: ACT goal: 180-200, Hemoglobin stable.  Minimal oozing around the ECMO cannulas.  ID: Receiving empiric antibiotics  Cannulae: Position is acceptable on exam and the available imaging.  Distal perfusion cannula is in place and patent.  Extremities are well-perfused.     I have personally reviewed the ECMO flows, oxygenation and CO2 clearance, anticoagulation, and cannula position.  I have also personally assessed the patient's systemic response with hemodynamics, oxygenation, ventilation, and bleeding.       The patient requires continued ECMO support and management in the ICU.  I have discussed patient care and treatment plan with the primary team.      Epifanio Beckwith MD, PhD  Interventional/Critical Care Cardiology  [phone]    July 20, 2025
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
## 2025-07-20 NOTE — PHARMACY-CONSULT NOTE
Pharmacy Tube Feeding Consult    Medication reviewed for administration by feeding tube and for potential food/drug interactions.    Recommendation: No changes are needed at this time.     Pharmacy will continue to follow as new medications are ordered.    Lito Larios, PharmD, BCIDP

## 2025-07-20 NOTE — MEDICATION SCRIBE - ADMISSION MEDICATION HISTORY
Wasted 65cc Midazolam with Annel RAE RN. No order under patient encounter, likely came from other facility/encounter.

## 2025-07-20 NOTE — PROGRESS NOTES
CLINICAL NUTRITION SERVICES - ASSESSMENT NOTE    RECOMMENDATIONS FOR MDs/PROVIDERS TO ORDER:  Notify RN if would like to advance past trophic feeds  Adjust insulin as needed with initiation of TF    Registered Dietitian Interventions:  EN access: OG   Trophic TF: Osmolite 1.5 @ 20 ml/hr. Provides 1120 kcals, 130 protein, 98 g CHO, 0 g fiber, 366 ml free water. With propofol at current rate provides 1854 kcals.     Once ok to advance feeds per team,  advance by 10 ml Q8H pending pt's tolerance to goal.  Goal TF: Osmolite 1.5 Jerzy (or equivalent) @ goal of  40ml/hr  + 5 pkts Prosource (960ml/day) provides: 1840 kcals, 160 g PRO, 731 ml free H20, 195 g CHO, and 0 g fiber daily.(May need to be adjusted pending propofol)  Do not advance TF rate unless Mg++ > 1.5, K+ is > 3, and phos > 1.9  60 ml Q4H fluid flushes for tube patency. Additional fluids and/or adjustments per MD.    Multivitamin/minerals to help ensure micronutrient needs being met with suspected hypermetabolic demands and potential interruptions to TF infusions.   Due to gastric enteral access: HOB > 30 degrees     Future/Additional Recommendations:  Monitor nutrition related findings and follow up per protocol  Monitor renal fxn, if worsening or hyperkalemia consistently noted, consider switching to renal formula  Monitor phos     REASON FOR ASSESSMENT  Provider order - Registered Dietitian to order TF per Medical Nutrition Therapy Guidelines    SUBJECTIVE INFORMATION  Assessed patient in room.    PERTINENT MEDICAL/CLINICAL HISTORY:   70 year old male with a past medical history of HTN, DM2, and DEMOND as well as recent admission for inferior STEMI on 7/10 (treated with percutaneous revascularization with 2.75 x 20mm Promus RENA to the distal LCx). The patient was discharged on 7/14 and had been doing well at home prior to 7/18 when he was found to be apneic without a pulse with estimated down time ~10-15 min prior to discovery. EMS was called at 1537, arrived  "and started compressions at 1542, cannulated for [facility] ECMO at 1614. Subsequently found to have subacute in-stent thrombosis of his recently placed stent without obvious mechanical causes, treated with 2.75 x 20mm Synergy XD RENA.     NUTRITION HISTORY  Pt remains intubated/sedated. No visitors at bedside. Per RN, slight weaning in propofol potentially but so significant changes planned at this time. Per RN, team would like to start trophic feeds via OG. Noted at time of visit norepi on hold and propofol running at 27.8 ml/hr(~734 kcals daily at current rate).     CURRENT NUTRITION ORDERS  Diet: NPO      CURRENT INTAKE/TOLERANCE  No documented intakes to assess.    LABS  Nutrition-relevant labs: CO2 19, BUN 47, cre 2.37, GFR 29, Mg 2.4, Phos 6.2, ALT 89, AST 76, , glu 160, T3 84, TSH .13, pH 7.32, bicarbonate 19    MEDICATIONS  Nutrition-relevant medications: 10 mg melatonin, protonix, miralax, fentanyl, insulin, propofol(34.7 ml/hr per MAR, provides ~ 916 kcals daily at current rate)    ANTHROPOMETRICS  Height: 1.778 m (5' 10\")    Admission Weight: 113 kg (249 lb 1.9 oz) (07/18/25 2100)   Most Recent Weight: 116.5 kg (256 lb 13.4 oz) (07/19/25 2200)  IBW: 75.5  kg, adjusted weight of 86 kg  % IBW: 154%  BMI: Body mass index is 36.85 kg/m .   Weight History: No significant weight loss noted.  Wt Readings from Last 20 Encounters:   07/19/25 116.5 kg (256 lb 13.4 oz)   07/14/25 115.5 kg (254 lb 9.6 oz)   01/15/22 104.8 kg (231 lb)   07/07/13 100.7 kg (222 lb)   07/02/13 101.2 kg (223 lb)   06/26/13 98.4 kg (217 lb)   05/21/13 100.7 kg (222 lb)     Per care everywhere:  117.8 kg on 5/23/25  119.7 kg on 8/12/24    Dosing Weight: 86 kg, based on adjusted wt    ASSESSED NUTRITION NEEDS  Estimated Energy Needs: ~1720 kcals/day (20 - 25 kcals/kg adj wt)  Justification: Obese, Vented, and Critical illness per [place] state(REE of ~ 1767 kcals * .8-1= 1304-5568) using 11-14 kcals/kg of actual wt 8289-7376 kcals, using 20-25 " kcals/kg adj weight 0196-8519 kcals  Estimated Protein Needs: 152-190 grams protein/day (2 - 2.5 grams of pro/kg IBW)  Justification: Hypercatabolism with acute illness, Increased needs, and Obesity guidelines  Estimated Fluid Needs:  (1 mL/kcal)  Justification: Maintenance    SYSTEM FINDINGS    GI symptoms: Rectal tube.. Last BM: 07/19/25 .   Skin/wounds: No issues noted    MALNUTRITION  % Intake: Unable to assess  % Weight Loss: None noted  Subcutaneous Fat Loss: None observed  Muscle Loss: None observed  Fluid Accumulation/Edema: None noted  Malnutrition Diagnosis: Patient does not meet two of the established criteria necessary for diagnosing malnutrition  Malnutrition Present on Admission: No    NUTRITION DIAGNOSIS  Inadequate oral intake related to intubated/sedated as evidenced by NPO with trophic feeds to begin.    INTERVENTIONS  See nutrition interventions above    Goals  Total avg nutritional intake to meet a minimum of 20 kcal/kg and 2 g PRO/kg daily (per dosing wt 86 kg).      Monitoring/Evaluation  Progress toward goals will be monitored and evaluated per policy.    Loulou Stauffer MS, RD, LD, [facility]    6C (beds 3195-6980) + 7C (beds 4015-7304) + ED + Obs  Available in [facility] by name or unit dietitian

## 2025-07-20 NOTE — H&P
Neurocritical Care Consultation    Reason for critical care admission: Cardiac Arrest  Consulting Team: CICU  Date of Service:  07/20/2025  Date of Admission:  7/18/2025  Hospital Day: 3    Assessment/Plan  Bob Echols is a 70 year old male with a past medical history of HTN, DM2, and DEMOND.  Admission for inferior STEMI on 7/10 RENA to the distal LCx discharged on 7/14.  Had been doing well at home then 7/18 when he was found to be apneic and pulseless with estimated down time ~10-15 min. EMS was called at 1537, arrived and started compressions at 1542, cannulated for [facility] at 1614.      24 hour events:    Neuro  #Post-arrest cerebral edema  #Encephalopathy, multifactorial  #Abnormal EEG, GPD triphasic morphology without clear evolution  #Persistent generalized muscle tremoring  Post arrest day: 2  Length of downtime: unclear, appears about 10-15 min unwitnessed and then perhaps another 5 minutes without intervention while waiting for ambulance, ECMO started 32 minutes into resuscitation (although family reported to nurse that he may have been down for an hour before being found)  -Witnessed arrest: No  -ROSC: No, [facility] ECM[facility]    Analgesics & sedation  Current sedation: fentanyl 100mcg/hr and propofol 50 mcg/kg/min    Neuroimaging  -Day 1 CTH 7/18: Mildly diminished gray-white differentiation throughout the cerebral hemispheres concerning for hypoxic ischemic injury  -Consider repeating CTH on day 3     Neurophysiology  -continue vEEG  -vEEG 7/19 (read on 7/20) shows: Moderate to severe diffuse slowing which is nonspecific but suggestive of severe diffuse cerebral dysfunction and/or anesthetic effects. Generalized periodic discharges with triphasic morphology which is on the ictal-interictal continuum and could indicate various conditions, including an increased propensity for generalized seizures or encephalopathy precipitated by metabolic or iatrogenic causes, such as medications.  There were no seizures during  "this recording.    Biomarkers  -Neuron-specific Enolase (NSE) results: pending   -S 100B protein: pending     Recommendations  -Avoid hypotonic solutions as they may worsen cerebral edema   -Avoid nitroprusside or nitroglycerin as they may also worsen cerbral edema  -Advise slow correction of hypernatremia if needed  -When safe to do so, please limit use of CNS acting medications such as benzodiazepines, opiates, anticholinergics, which will permit a more accurate neurological assessment    Clinically Significant Risk Factors        # Hypokalemia: Lowest K = 3.2 mmol/L in last 2 days, will replace as needed   # Hypochloremia: Lowest Cl = 97 mmol/L in last 2 days, will monitor as appropriate  # Hyperchloremia: Highest Cl = 108 mmol/L in last 2 days, will monitor as appropriate      # Hypocalcemia: Lowest iCa = 4.3 mg/dL in last 2 days, will monitor and replace as appropriate    # Anion Gap Metabolic Acidosis: Highest Anion Gap = 20 mmol/L in last 2 days, will monitor and treat as appropriate  # Hypoalbuminemia: Lowest albumin = 2.7 g/dL at 7/20/2025  3:38 AM, will monitor as appropriate  # Coagulation Defect: INR = 1.32 (Ref range: 0.85 - 1.15) and/or PTT = 150 Seconds (Ref range: 22 - 38 Seconds), will monitor for bleeding   # Acute Kidney Injury, unspecified: based on a >150% or 0.3 mg/dL increase in last creatinine compared to past 90 day average, will monitor renal function      # Acute Hypoxic Respiratory Failure: Based on blood gas results. Continue supplemental oxygen as needed  # Acute Hypercapnic Respiratory Failure: based on arterial blood gas results.  Continue supplemental oxygen and ventilatory support as indicated.  # Acute Hypercapnic Respiratory Failure: based on venous blood gas results.  Continue supplemental oxygen and ventilatory support as indicated.         # Obesity: Estimated body mass index is 36.85 kg/m  as calculated from the following:    Height as of this encounter: 1.778 m (5' 10\").    " Weight as of this encounter: 116.5 kg (256 lb 13.4 oz)., PRESENT ON ADMISSION             I spent 35 minutes of critical care time on the unit/floor managing the care of Bob Echols. Upon evaluation, this patient had a high probability of imminent or life-threatening deterioration due to post arrest anoxia, which required my direct attention, intervention, and personal management . Greater than 50% of my time was spent at the bedside counseling the patient and/or coordinating care including chart review, history, exam, documentation, and further activities per this note. I have personally reviewed the following data/imaging over the past 24 hours.     The patient was discussed with the NCC attending, Dr. Luna.    Darleen Concepcion, RAJESH  Neurocritical care    History of Present Illness:  Bob Echols is a 70 year old male with a past medical history of HTN, DM2, and DEMOND.  Admission for inferior STEMI on 7/10 RENA to the distal LCx discharged on 7/14.  Had been doing well at home then 7/18 when he was found to be apneic and pulseless with estimated down time ~10-15 min. EMS was called at 1537, arrived and started compressions at 1542, cannulated for [facility] ECMO at 1614.  Remains in CICU on [facility] ECMO, ventilator, sedated at this time    No Known Allergies    Current Medications:  Current Facility-Administered Medications   Medication Dose Route Frequency Provider Last Rate Last Admin    aspirin (ASA) chewable tablet 81 mg  81 mg Oral or Feeding Tube Daily Matt Nugent MD   81 mg at 07/20/25 0748    cefTRIAXone (ROCEPHIN) 2 g vial to attach to  ml bag for ADULTS or NS 50 ml bag for PEDS  2 g Intravenous Q24H Matt Nugent MD   2 g at 07/19/25 2152    chlorhexidine (PERIDEX) 0.12 % solution 15 mL  15 mL Mouth/Throat Q12H Matt Nugent MD   15 mL at 07/20/25 0748    melatonin tablet 10 mg  10 mg Oral or Feeding Tube At Bedtime Matt Nugent MD        multivitamins w/minerals liquid 15 mL   15 mL Per Feeding Tube Daily Sidney Lozada MD        pantoprazole (PROTONIX) 2 mg/mL suspension 40 mg  40 mg Oral or Feeding Tube Daily Epifanio Beckwith MD   40 mg at 07/20/25 0748    polyethylene glycol (MIRALAX) Packet 17 g  17 g Oral or Feeding Tube Daily Matt Nugent MD        Prosource TF20 ENfit Compatibl EN LIQD (PROSOURCE TF20) packet 60 mL  1 packet Per Feeding Tube 5x Daily Sidney Lozada MD        ticagrelor (BRILINTA) tablet 90 mg  90 mg Oral or Feeding Tube BID Matt Nugent MD   90 mg at 07/20/25 0748       PRN Medications:  Current Facility-Administered Medications   Medication Dose Route Frequency Provider Last Rate Last Admin    artificial tears ophthalmic ointment   Both Eyes Q8H PRN Matt Nugent MD        calcium chloride 1 g in sodium chloride 0.9 % 100 mL intermittent infusion  1 g Intravenous Q6H PRN Matt Nugent MD   1 g at 07/19/25 0448    dextrose 10% infusion   Intravenous Continuous PRN Sidney Lozada MD        dextrose 10% infusion   Intravenous Continuous PRN Matt Nugent MD        glucose gel 15-30 g  15-30 g Oral Q15 Min PRN Matt Nugent MD        Or    dextrose 50 % injection 25-50 mL  25-50 mL Intravenous Q15 Min PRN Matt Nugent MD        Or    glucagon injection 1 mg  1 mg Subcutaneous Q15 Min PRN Matt Nugent MD        heparin ANTICOAGULANT bolus dose from infusion pump 388-776 Units  5-10 Units/kg (Ideal) Other Q30 Min PRN Matt Nugent MD        lidocaine (LMX4) cream   Topical Q1H PRN Matt Nugent MD        lidocaine 1 % 0.1-1 mL  0.1-1 mL Other Q1H PRN Matt Nugent MD        propofol (DIPRIVAN) bolus from bag or syringe pump  10 mg Intravenous Q15 Min PRN Matt Nugent MD   10 mg at 07/19/25 0611    And    Medication Instruction   Does not apply Continuous PRN Matt Nugent MD        naloxone (NARCAN) injection 0.2 mg  0.2 mg Intravenous Q2 Min PRN Epifanio Beckwith MD        Or     naloxone (NARCAN) injection 0.4 mg  0.4 mg Intravenous Q2 Min PRN Epifanio Beckwith MD        Or    naloxone (NARCAN) injection 0.2 mg  0.2 mg Intramuscular Q2 Min PRN Epifanio Beckwith MD        Or    naloxone (NARCAN) injection 0.4 mg  0.4 mg Intramuscular Q2 Min PRN Epifanio Beckwith MD        senna-docusate (SENOKOT-S/PERICOLACE) 8.6-50 MG per tablet 1-2 tablet  1-2 tablet Oral or Feeding Tube BID PRN Matt Nugent MD        sodium chloride (PF) 0.9% PF flush 3 mL  3 mL Intracatheter Q8H PRN Matt Nugent MD        sodium chloride (PF) 0.9% PF flush 3 mL  3 mL Intracatheter q1 min prn Matt Nugent MD           Infusions:  Current Facility-Administered Medications   Medication Dose Route Frequency Provider Last Rate Last Admin    dextrose 10% infusion   Intravenous Continuous PRN Sidney oLzada MD        dextrose 10% infusion   Intravenous Continuous PRN Matt Nugent MD        fentaNYL (SUBLIMAZE) infusion   mcg/hr Intravenous Continuous Matt Nugent MD 2 mL/hr at 07/20/25 1300 100 mcg/hr at 07/20/25 1300    heparin 2 unit/mL in 0.9% NaCl (for REPERFUSION CATHETER)  3 mL/hr Intravenous Continuous Matt Nugent MD 3 mL/hr at 07/20/25 1300 3 mL/hr at 07/20/25 1300    heparin 25,000 units in 0.45% NaCl 250 mL ANTICOAGULANT infusion  10-50 Units/kg/hr (Ideal) Other Continuous Matt Nugent MD 18 mL/hr at 07/20/25 1300 1,800 Units/hr at 07/20/25 1300    insulin regular (MYXREDLIN) 1 unit/mL infusion  0-24 Units/hr Intravenous Continuous Matt Nugent MD 1 mL/hr at 07/20/25 1300 1 Units/hr at 07/20/25 1300    lidocaine 8 mg/mL infusion (ADULT STD)  1 mg/min Intravenous Continuous Juan Carlos Romero MD 7.5 mL/hr at 07/20/25 1300 1 mg/min at 07/20/25 1300    propofol (DIPRIVAN) infusion  5-75 mcg/kg/min (Dosing Weight) Intravenous Continuous Matt Nugent MD 27.7 mL/hr at 07/20/25 1301 40 mcg/kg/min at 07/20/25 1301    And    Medication Instruction   Does not  "apply Continuous PRN Matt Nugent MD        niCARdipine 40 mg in 200 mL NS (CARDENE) infusion  0.5-15 mg/hr Intravenous Continuous Matt Nugent MD 25 mL/hr at 07/20/25 1345 5 mg/hr at 07/20/25 1345    norepinephrine (LEVOPHED) 16 mg in  mL infusion MAX CONC CENTRAL LINE  0.01-0.6 mcg/kg/min (Dosing Weight) Intravenous Continuous Matt Nugent MD   Stopped at 07/19/25 1800       Physical Examination:  Vitals: Pulse 102   Temp 98.6  F (37  C)   Resp 14   Ht 1.778 m (5' 10\")   Wt 116.5 kg (256 lb 13.4 oz)   SpO2 95%   BMI 36.85 kg/m    General: Adult male patient, lying in bed, critically-ill  HEENT: Normocephalic, atraumatic, no icterus, oral cavity/oropharynx pink and moist, thin yellow fluid draining from left nostril  Cardiac: SR with bedside ST elevation lead 2, [facility] ECMO  Pulm: unlabored, mechanical ventilator, ETT  Abdomen: rounded, rectal tube present  Extremities: pale  Psych: Comatose/sedated  Neuro:  Mental status: Comatose, sedated, not responding  Cranial nerves: PERRL right size 2.35 NPI 3.3 and left size 2.31 NPI 3.4, conjugate gaze, +cough, overbreaths  Motor: Normal bulk and tone. Fine tremor generalized throughout body including jaw clenching, unable to elicit movement in BUE or BLE  Sensory: no response in any extremity to painful stim  Gait: RASHAUN, deferred.    Labs and Imaging:    Recent Results (from the past 24 hours)   Glucose by meter   Result Value Ref Range    GLUCOSE BY METER POCT 124 (H) 70 - 99 mg/dL   Magnesium   Result Value Ref Range    Magnesium 2.4 (H) 1.7 - 2.3 mg/dL   Ionized Calcium   Result Value Ref Range    Calcium Ionized Whole Blood 4.8 4.4 - 5.2 mg/dL   Phosphorus   Result Value Ref Range    Phosphorus 6.1 (H) 2.5 - 4.5 mg/dL   Comprehensive metabolic panel   Result Value Ref Range    Sodium 135 135 - 145 mmol/L    Potassium 4.5 3.4 - 5.3 mmol/L    Carbon Dioxide (CO2) 22 22 - 29 mmol/L    Anion Gap 9 7 - 15 mmol/L    Urea Nitrogen 40.9 (H) 8.0 - " 23.0 mg/dL    Creatinine 2.40 (H) 0.67 - 1.17 mg/dL    GFR Estimate 28 (L) >60 mL/min/1.73m2    Calcium 9.2 8.8 - 10.4 mg/dL    Chloride 104 98 - 107 mmol/L    Glucose 130 (H) 70 - 99 mg/dL    Alkaline Phosphatase 89 40 - 150 U/L    AST 79 (H) 0 - 45 U/L     (H) 0 - 70 U/L    Protein Total 5.9 (L) 6.4 - 8.3 g/dL    Albumin 3.0 (L) 3.5 - 5.2 g/dL    Bilirubin Total 0.3 <=1.2 mg/dL   Blood gas arterial   Result Value Ref Range    pH Arterial 7.32 (L) 7.35 - 7.45    pCO2 Arterial 48 (H) 35 - 45 mm Hg    pO2 Arterial 104 80 - 105 mm Hg    FIO2 50     Bicarbonate Arterial 25 21 - 28 mmol/L    Base Excess/Deficit Arterial -1.7 -3.0 - 3.0 mmol/L    Eri's Test Artline     Oxyhemoglobin Arterial 96 92 - 100 %    O2 Sat, Arterial 98.2 (H) 95.0 - 96.0 %    Narrative    In healthy individuals, oxyhemoglobin (O2Hb) and oxygen saturation (SO2) are approximately equal. In the presence of dyshemoglobins, oxyhemoglobin can be considerably lower than oxygen saturation.   EKG 12-lead, tracing only   Result Value Ref Range    Systolic Blood Pressure  mmHg    Diastolic Blood Pressure  mmHg    Ventricular Rate 72 BPM    Atrial Rate 72 BPM    GA Interval 198 ms    QRS Duration 78 ms     ms    QTc 481 ms    P Axis 54 degrees    R AXIS -23 degrees    T Axis 71 degrees    Interpretation ECG       Sinus rhythm  Low voltage QRS  Inferior infarct (cited on or before 18-Jul-2025)  QTcB >= 480 msec  ** ** ACUTE MI / STEMI ** **  Abnormal ECG  When compared with ECG of 19-Jul-2025 12:19, (unconfirmed)  T wave inversion now evident in Anterior leads     TEG with Platelet Inhibition   Result Value Ref Range    Platelet Inhibition with ADP 71 %    Platelet Inhibition with  %    Narrative    Platelet Mapping is intended to assess platelet function   in patients who have received antiplatelet therapy, such as aspirin,   clopidogrel, abciximab, etc. Results are reported in a range of 0 to 100% inhibition. A high value indicates a  response to the antiplatelet therapy.   TEG with Heparinase   Result Value Ref Range    R (Time until clot forms) 8.1 5.0 - 10.0 Minute    K ( Time to Spec. clot strength) 1.1 1.0 - 3.0 Minute    Angle (Rate of Clot Growth) 76.2 (H) 53.0 - 72.0 Degrees    MA ( Maximum Clot Strength) 83.0 (H) 50.0 - 70.0 mm    CI (coagulation index) 2.5 -3.0 - 3.0    G (actual clot strength) 24.5 (H) 4.5 - 11.0 Kd/sc    LY30 (lysis at 30 minutes) 0.0 0.0 - 8.0 %    LY60 (lysis at 60 minutes) 0.2 0.0 - 15.0 %   Blood gas arterial   Result Value Ref Range    pH Arterial 7.51 (H) 7.35 - 7.45    pCO2 Arterial 28 (L) 35 - 45 mm Hg    pO2 Arterial 116 (H) 80 - 105 mm Hg    FIO2 50     Bicarbonate Arterial 22 21 - 28 mmol/L    Base Excess/Deficit Arterial -0.5 -3.0 - 3.0 mmol/L    Rei's Test Artline     Oxyhemoglobin Arterial 98 92 - 100 %    O2 Sat, Arterial 99.5 (H) 95.0 - 96.0 %    Narrative    In healthy individuals, oxyhemoglobin (O2Hb) and oxygen saturation (SO2) are approximately equal. In the presence of dyshemoglobins, oxyhemoglobin can be considerably lower than oxygen saturation.   Glucose by meter   Result Value Ref Range    GLUCOSE BY METER POCT 101 (H) 70 - 99 mg/dL   Activated clotting time celite, POCT   Result Value Ref Range    Activated Clotting Time (Celite) POCT 174 (H) 74 - 150 seconds   Blood gas arterial   Result Value Ref Range    pH Arterial 7.48 (H) 7.35 - 7.45    pCO2 Arterial 29 (L) 35 - 45 mm Hg    pO2 Arterial 114 (H) 80 - 105 mm Hg    FIO2 50     Bicarbonate Arterial 22 21 - 28 mmol/L    Base Excess/Deficit Arterial -1.0 -3.0 - 3.0 mmol/L    Rei's Test Artline     Oxyhemoglobin Arterial 97 92 - 100 %    O2 Sat, Arterial 99.3 (H) 95.0 - 96.0 %    Narrative    In healthy individuals, oxyhemoglobin (O2Hb) and oxygen saturation (SO2) are approximately equal. In the presence of dyshemoglobins, oxyhemoglobin can be considerably lower than oxygen saturation.   CBC with platelets   Result Value Ref Range    WBC  Count 10.6 4.0 - 11.0 10e3/uL    RBC Count 3.57 (L) 4.40 - 5.90 10e6/uL    Hemoglobin 9.7 (L) 13.3 - 17.7 g/dL    Hematocrit 30.1 (L) 40.0 - 53.0 %    MCV 84 78 - 100 fL    MCH 27.2 26.5 - 33.0 pg    MCHC 32.2 31.5 - 36.5 g/dL    RDW 13.4 10.0 - 15.0 %    Platelet Count 301 150 - 450 10e3/uL   Comprehensive metabolic panel   Result Value Ref Range    Sodium 135 135 - 145 mmol/L    Potassium 4.2 3.4 - 5.3 mmol/L    Carbon Dioxide (CO2) 18 (L) 22 - 29 mmol/L    Anion Gap 10 7 - 15 mmol/L    Urea Nitrogen 42.1 (H) 8.0 - 23.0 mg/dL    Creatinine 2.41 (H) 0.67 - 1.17 mg/dL    GFR Estimate 28 (L) >60 mL/min/1.73m2    Calcium 8.9 8.8 - 10.4 mg/dL    Chloride 107 98 - 107 mmol/L    Glucose 124 (H) 70 - 99 mg/dL    Alkaline Phosphatase 85 40 - 150 U/L    AST 73 (H) 0 - 45 U/L    ALT 99 (H) 0 - 70 U/L    Protein Total 5.7 (L) 6.4 - 8.3 g/dL    Albumin 2.9 (L) 3.5 - 5.2 g/dL    Bilirubin Total 0.3 <=1.2 mg/dL   Calcium ionized whole blood   Result Value Ref Range    Calcium Ionized Whole Blood 4.6 4.4 - 5.2 mg/dL   Glucose whole blood   Result Value Ref Range    Glucose 121 (H) 70 - 99 mg/dL   Heparin Unfractionated Anti Xa Level   Result Value Ref Range    Anti Xa Unfractionated Heparin >1.10 (HH) For Reference Range, See Comment IU/mL    Narrative    Therapeutic Range: UFH: 0.25-0.50 IU/mL for low intensity dosing,  0.30-0.70 IU/mL for high intensity dosing DVT and PE.  This test is not validated for other direct factor X inhibitors (e.g. rivaroxaban, apixaban, edoxaban, betrixaban, fondaparinux) and should not be used for monitoring of other medications.   INR   Result Value Ref Range    INR 1.32 (H) 0.85 - 1.15    PT 16.3 (H) 11.8 - 14.8 Seconds   Partial thromboplastin time   Result Value Ref Range    aPTT 95 (H) 22 - 38 Seconds   Lactic acid whole blood   Result Value Ref Range    Lactic Acid 1.1 0.7 - 2.0 mmol/L   Magnesium   Result Value Ref Range    Magnesium 2.3 1.7 - 2.3 mg/dL   Troponin T, High Sensitivity   Result  Value Ref Range    Troponin T, High Sensitivity 1,782 (HH) <=22 ng/L   Procalcitonin   Result Value Ref Range    Procalcitonin 4.22 (H) <0.50 ng/mL   Blood gas ELS arterial   Result Value Ref Range    pH ELS Arterial 7.36 7.35 - 7.45    pCO2 ELS Arterial 43 35 - 45 mm Hg    pO2 ELS Arterial 178 (H) 80 - 105 mm Hg    Bicarbonate ELS Arterial 24 21 - 28 mmol/L    Base Excess/Deficit Arterial -1.6 -3.0 - 3.0 mmol/L    FIO2 60     Oxyhemoglobin ELS Arterial 98 75 - 100 %    O2 Saturation ELS Arterial >100.0 (H) 95.0 - 96.0 %    Narrative    In healthy individuals, oxyhemoglobin (O2Hb) and oxygen saturation (SO2) are approximately equal. In the presence of dyshemoglobins, oxyhemoglobin can be considerably lower than oxygen saturation.   Glucose by meter   Result Value Ref Range    GLUCOSE BY METER POCT 111 (H) 70 - 99 mg/dL   Activated clotting time celite, POCT   Result Value Ref Range    Activated Clotting Time (Celite) POCT 178 (H) 74 - 150 seconds   Blood gas arterial   Result Value Ref Range    pH Arterial 7.43 7.35 - 7.45    pCO2 Arterial 34 (L) 35 - 45 mm Hg    pO2 Arterial 100 80 - 105 mm Hg    FIO2 50     Bicarbonate Arterial 22 21 - 28 mmol/L    Base Excess/Deficit Arterial -1.6 -3.0 - 3.0 mmol/L    Rei's Test Artline     Oxyhemoglobin Arterial 97 92 - 100 %    O2 Sat, Arterial 99.1 (H) 95.0 - 96.0 %    Narrative    In healthy individuals, oxyhemoglobin (O2Hb) and oxygen saturation (SO2) are approximately equal. In the presence of dyshemoglobins, oxyhemoglobin can be considerably lower than oxygen saturation.   Activated clotting time celite, POCT   Result Value Ref Range    Activated Clotting Time (Celite) POCT 182 (H) 74 - 150 seconds   Glucose by meter   Result Value Ref Range    GLUCOSE BY METER POCT 116 (H) 70 - 99 mg/dL   EEG Video 12-26 hr Unmonitored    Narrative    EEG MONITORING UNIT DAILY EEG REPORT    VIDEO EEG DATE: 2025     VIDEO EEG [id number]     VIDEO EEG DAY#: 1     VIDEO EEG SOURCE  FILE DURATION: 15 hours and 13 mintues     INDICATION: 70 year old male with a past medical history of HTN, DM2, and   DEMOND as well as recent admission for inferior STEMI on 7/10 (treated with   percutaneous revascularization with 2.75 x 20mm Promus RENA to the distal   LCx). The patient was discharged on 7/14 and had been doing well at home   prior to 7/18 when he was found to be apneic without a pulse with   estimated down time ~10-15 min prior to discovery. ROSC was achieved. cEEG   to rule out seizures.     Report: Computer-assisted prolonged video EEG monitoring since beginning   showed the following.    TECHNICAL SUMMARY: This is a video-EEG monitoring study. EEG was recorded   from 23 scalp electrodes placed according to the 10-20 international   system. Additional electrodes were utilized for referencing, artifact   detection, and recording from other cerebral regions. Qualified   technicians attached EEG electrodes, set up the study, monitored and   reviewed EEG recordings, and disconnected EEG electrodes when appropriate.   Video was continuously recorded. Video was reviewed for clinical   correlation and to assist with EEG interpretations.     BACKGROUND: The background is characterized by low amplitude  diffuse 2 to   5 Hz delta activity with admixed faster beta frequencies. The sleep   recording contained no features of N2 and N3 sleep.    DISCHARGES AND SEIZURES  INTERICTAL DISCHARGES: There is intially a continuous train of generalized   periodic discharges of triphasic morphology at 1.5 to 2.5 Hz without clear   evolution. In the latter half of the study, it was less frequent and   occurred in 1 - 2 seconds bursts rarely at the end.    CLINICAL EVENTS:  During the monitoring session, the patient had no events    INTERPRETATION AND CLINICAL CORRELATE   This computer-assisted prolonged video EEG recording demonstrated:  Moderate to severe diffuse slowing which is nonspecific but suggestive of   severe  diffuse cerebral dysfunction and/or anesthetic effects.  Generalized periodic discharges with triphasic morphology which is on the   ictal-interictal continuum and could indicate various conditions,   including an increased propensity for generalized seizures or   encephalopathy precipitated by metabolic or iatrogenic causes, such as   medications.  There were no seizures during this recording.    The EKG was unremarkable.    Saida Hernandez MD   Blood gas arterial   Result Value Ref Range    pH Arterial 7.41 7.35 - 7.45    pCO2 Arterial 35 35 - 45 mm Hg    pO2 Arterial 92 80 - 105 mm Hg    FIO2 50     Bicarbonate Arterial 22 21 - 28 mmol/L    Base Excess/Deficit Arterial -2.2 -3.0 - 3.0 mmol/L    Rei's Test Artline     Oxyhemoglobin Arterial 97 92 - 100 %    O2 Sat, Arterial 98.9 (H) 95.0 - 96.0 %    Narrative    In healthy individuals, oxyhemoglobin (O2Hb) and oxygen saturation (SO2) are approximately equal. In the presence of dyshemoglobins, oxyhemoglobin can be considerably lower than oxygen saturation.   Glucose by meter   Result Value Ref Range    GLUCOSE BY METER POCT 119 (H) 70 - 99 mg/dL   XR Chest Port 1 View    Narrative    Exam: XR CHEST PORT 1 VIEW, 7/20/2025 3:23 AM    Comparison: 7/18/2025    History: Check endotracheal tube placement and ECLS cannula placement.  DO NOT log-roll patient.  Place film under patient using patient  safety handling process.    Findings:  Portable AP view of the supine chest. Endotracheal tube tip is 5.2 cm  above the davis. Stable cardiomegaly. Gastric tube courses beyond the  diaphragm and out of the field of view.  Trachea is midline. Perihilar  streaky opacities. No pneumothorax. Trace bilateral pleural effusions.  The visualized upper abdomen is unremarkable. No acute osseous  abnormalities.      Impression    Impression:   1. Endotracheal tube tip is 5.2 cm above the davis.  2. Perihilar streaky opacities likely representing mixture of  atelectasis and edema.  3.  Trace bilateral pleural effusions.    I have personally reviewed the examination and initial interpretation  and I agree with the findings.    SHEREEN VENTURA MD         SYSTEM ID:  [id number]   EKG 12-lead, tracing only   Result Value Ref Range    Systolic Blood Pressure  mmHg    Diastolic Blood Pressure  mmHg    Ventricular Rate 58 BPM    Atrial Rate 58 BPM    SC Interval 186 ms    QRS Duration 80 ms     ms    QTc 498 ms    P Axis  degrees    R AXIS -27 degrees    T Axis 71 degrees    Interpretation ECG       Sinus bradycardia  Low voltage QRS  Inferior infarct (cited on or before 18-Jul-2025)  QTcB >= 480 msec  ** ** ACUTE MI / STEMI ** **  Abnormal ECG  When compared with ECG of 19-Jul-2025 18:14, (unconfirmed)  Serial changes of evolving Inferior infarct Present     CBC with platelets   Result Value Ref Range    WBC Count 11.3 (H) 4.0 - 11.0 10e3/uL    RBC Count 3.44 (L) 4.40 - 5.90 10e6/uL    Hemoglobin 9.4 (L) 13.3 - 17.7 g/dL    Hematocrit 29.3 (L) 40.0 - 53.0 %    MCV 85 78 - 100 fL    MCH 27.3 26.5 - 33.0 pg    MCHC 32.1 31.5 - 36.5 g/dL    RDW 13.5 10.0 - 15.0 %    Platelet Count 301 150 - 450 10e3/uL   Comprehensive metabolic panel   Result Value Ref Range    Sodium 139 135 - 145 mmol/L    Potassium 4.4 3.4 - 5.3 mmol/L    Carbon Dioxide (CO2) 19 (L) 22 - 29 mmol/L    Anion Gap 12 7 - 15 mmol/L    Urea Nitrogen 45.2 (H) 8.0 - 23.0 mg/dL    Creatinine 2.52 (H) 0.67 - 1.17 mg/dL    GFR Estimate 27 (L) >60 mL/min/1.73m2    Calcium 8.7 (L) 8.8 - 10.4 mg/dL    Chloride 108 (H) 98 - 107 mmol/L    Glucose 127 (H) 70 - 99 mg/dL    Alkaline Phosphatase 83 40 - 150 U/L    AST 70 (H) 0 - 45 U/L    ALT 90 (H) 0 - 70 U/L    Protein Total 5.8 (L) 6.4 - 8.3 g/dL    Albumin 2.7 (L) 3.5 - 5.2 g/dL    Bilirubin Total 0.2 <=1.2 mg/dL   Glucose whole blood   Result Value Ref Range    Glucose 124 (H) 70 - 99 mg/dL   Heparin Unfractionated Anti Xa Level   Result Value Ref Range    Anti Xa Unfractionated Heparin >1.10 (HH)  For Reference Range, See Comment IU/mL    Narrative    Therapeutic Range: UFH: 0.25-0.50 IU/mL for low intensity dosing,  0.30-0.70 IU/mL for high intensity dosing DVT and PE.  This test is not validated for other direct factor X inhibitors (e.g. rivaroxaban, apixaban, edoxaban, betrixaban, fondaparinux) and should not be used for monitoring of other medications.   INR   Result Value Ref Range    INR 1.34 (H) 0.85 - 1.15    PT 16.4 (H) 11.8 - 14.8 Seconds   Partial thromboplastin time   Result Value Ref Range    aPTT 152 (HH) 22 - 38 Seconds   Lactic acid whole blood   Result Value Ref Range    Lactic Acid 0.9 0.7 - 2.0 mmol/L   Magnesium   Result Value Ref Range    Magnesium 2.4 (H) 1.7 - 2.3 mg/dL   Troponin T, High Sensitivity   Result Value Ref Range    Troponin T, High Sensitivity 1,746 (HH) <=22 ng/L   D dimer quantitative   Result Value Ref Range    D-Dimer Quantitative 2.46 (H) 0.00 - 0.50 ug/mL FEU    Narrative    This D-dimer assay is intended for use in conjunction with a clinical pretest probability assessment model to exclude pulmonary embolism (PE) and deep venous thrombosis (DVT) in outpatients suspected of PE or DVT. The cut-off value is 0.50 ug/mL FEU.    For patients 50 years of age or older, the application of age-adjusted cut-off values for D-Dimer may increase the specificity without significant effect on sensitivity. The literature suggested calculation age adjusted cut-off in ug/L = age in years x 10 ug/L. The results in this laboratory are reported as ug/mL rather than ug/L. The calculation for age adjusted cut off in ug/mL= age in years x 0.01 ug/mL. For example, the cut off for a 76 year old male is 77 x 0.01 ug/mL = 0.76 ug/mL (760 ug/L).    JOSE ALFREDO Banegas et al. Age adjusted D-dimer cut-off levels to rule out pulmonary embolism: The ADJUST-PE Study. EDGAR 2014;311:1594-9239.; HJ Shi et al. Diagnostic accuracy of conventional or age adjusted D-dimer cutoff values in older patients with  suspected venous thromboembolism. Systemic review and meta-analysis. BMJ 2013:346:f2492.   Blood gas ELS venous   Result Value Ref Range    pH ELS Venous 7.28 (L) 7.32 - 7.43    pCO2 ELS Venous 53 (H) 40 - 50 mm Hg    pO2 ELS Venous 48 (H) 25 - 47 mm Hg    Bicarbonate ELS Venous 25 21 - 28 mmol/L    Base Excess/Deficit Venous -2.1 -3.0 - 3.0 mmol/L    FIO2 50     Oxyhemoglobin ELS Venous 77 %    O2 Saturation ELS Venous 79.0 (H) 70.0 - 75.0 %    Narrative    In healthy individuals, oxyhemoglobin (O2Hb) and oxygen saturation (SO2) are approximately equal. In the presence of dyshemoglobins, oxyhemoglobin can be considerably lower than oxygen saturation.   Blood gas ELS arterial   Result Value Ref Range    pH ELS Arterial 7.29 (L) 7.35 - 7.45    pCO2 ELS Arterial 51 (H) 35 - 45 mm Hg    pO2 ELS Arterial 196 (H) 80 - 105 mm Hg    Bicarbonate ELS Arterial 24 21 - 28 mmol/L    Base Excess/Deficit Arterial -2.5 -3.0 - 3.0 mmol/L    FIO2 60     Oxyhemoglobin ELS Arterial 98 75 - 100 %    O2 Saturation ELS Arterial 100.0 (H) 95.0 - 96.0 %    Narrative    In healthy individuals, oxyhemoglobin (O2Hb) and oxygen saturation (SO2) are approximately equal. In the presence of dyshemoglobins, oxyhemoglobin can be considerably lower than oxygen saturation.   Fibrinogen activity   Result Value Ref Range    Fibrinogen Activity 792 (H) 170 - 510 mg/dL   Antithrombin III   Result Value Ref Range    Antithrombin III 90 85 - 135 %   CRP inflammation   Result Value Ref Range    CRP Inflammation 118.00 (H) <5.00 mg/L   Erythrocyte sedimentation rate auto   Result Value Ref Range    Erythrocyte Sedimentation Rate 89 (H) 0 - 20 mm/hr   Hemoglobin plasma   Result Value Ref Range    Hemoglobin Plasma 40 (H) <30 mg/dL   Lactate Dehydrogenase   Result Value Ref Range    Lactate Dehydrogenase 355 (H) 0 - 250 U/L   Phosphorus   Result Value Ref Range    Phosphorus 6.2 (H) 2.5 - 4.5 mg/dL   TSH   Result Value Ref Range    TSH 0.13 (L) 0.30 - 4.20  uIU/mL   T3 Free   Result Value Ref Range    T3 Free 2.0 2.0 - 4.4 pg/mL   T3 total   Result Value Ref Range    T3 Total 84 (L) 85 - 202 ng/dL   T4 free   Result Value Ref Range    Free T4 1.13 0.90 - 1.70 ng/dL   Blood gas arterial   Result Value Ref Range    pH Arterial 7.42 7.35 - 7.45    pCO2 Arterial 34 (L) 35 - 45 mm Hg    pO2 Arterial 81 80 - 105 mm Hg    FIO2 50     Bicarbonate Arterial 22 21 - 28 mmol/L    Base Excess/Deficit Arterial -2.1 -3.0 - 3.0 mmol/L    Rei's Test Artline     Oxyhemoglobin Arterial 96 92 - 100 %    O2 Sat, Arterial 97.5 (H) 95.0 - 96.0 %    Narrative    In healthy individuals, oxyhemoglobin (O2Hb) and oxygen saturation (SO2) are approximately equal. In the presence of dyshemoglobins, oxyhemoglobin can be considerably lower than oxygen saturation.   Calcium ionized whole blood   Result Value Ref Range    Calcium Ionized Whole Blood 4.7 4.4 - 5.2 mg/dL   Glucose by meter   Result Value Ref Range    GLUCOSE BY METER POCT 120 (H) 70 - 99 mg/dL   Activated clotting time celite, POCT   Result Value Ref Range    Activated Clotting Time (Celite) POCT 186 (H) 74 - 150 seconds   Blood gas arterial   Result Value Ref Range    pH Arterial 7.42 7.35 - 7.45    pCO2 Arterial 34 (L) 35 - 45 mm Hg    pO2 Arterial 83 80 - 105 mm Hg    FIO2 50     Bicarbonate Arterial 22 21 - 28 mmol/L    Base Excess/Deficit Arterial -2.1 -3.0 - 3.0 mmol/L    Rei's Test Artline     Oxyhemoglobin Arterial 96 92 - 100 %    O2 Sat, Arterial 97.8 (H) 95.0 - 96.0 %    Narrative    In healthy individuals, oxyhemoglobin (O2Hb) and oxygen saturation (SO2) are approximately equal. In the presence of dyshemoglobins, oxyhemoglobin can be considerably lower than oxygen saturation.   Glucose by meter   Result Value Ref Range    GLUCOSE BY METER POCT 115 (H) 70 - 99 mg/dL   Respiratory Aerobic Bacterial Culture    Specimen: Endotracheal; Sputum   Result Value Ref Range    Gram Stain Result >25 PMNs/low power field (A)     Gram  Stain Result 1+ Gram positive cocci (A)     Gram Stain Result 1+ Gram positive bacilli (A)    TEG with Platelet Inhibition   Result Value Ref Range    Platelet Inhibition with ADP 86 %    Platelet Inhibition with AA 43 %    Narrative    Platelet Mapping is intended to assess platelet function   in patients who have received antiplatelet therapy, such as aspirin,   clopidogrel, abciximab, etc. Results are reported in a range of 0 to 100% inhibition. A high value indicates a response to the antiplatelet therapy.   Blood gas arterial   Result Value Ref Range    pH Arterial 7.37 7.35 - 7.45    pCO2 Arterial 39 35 - 45 mm Hg    pO2 Arterial 90 80 - 105 mm Hg    FIO2 50     Bicarbonate Arterial 23 21 - 28 mmol/L    Base Excess/Deficit Arterial -2.6 -3.0 - 3.0 mmol/L    Rei's Test Artline     Oxyhemoglobin Arterial 96 92 - 100 %    O2 Sat, Arterial 98.2 (H) 95.0 - 96.0 %    Narrative    In healthy individuals, oxyhemoglobin (O2Hb) and oxygen saturation (SO2) are approximately equal. In the presence of dyshemoglobins, oxyhemoglobin can be considerably lower than oxygen saturation.   TEG with Heparinase   Result Value Ref Range    R (Time until clot forms) 5.8 5.0 - 10.0 Minute    K ( Time to Spec. clot strength) 1.0 1.0 - 3.0 Minute    Angle (Rate of Clot Growth) 75.4 (H) 53.0 - 72.0 Degrees    MA ( Maximum Clot Strength) 77.3 (H) 50.0 - 70.0 mm    CI (coagulation index) 3.2 (H) -3.0 - 3.0    G (actual clot strength) 17.0 (H) 4.5 - 11.0 Kd/sc    LY30 (lysis at 30 minutes) 0.0 0.0 - 8.0 %    LY60 (lysis at 60 minutes) 1.4 0.0 - 15.0 %   Glucose by meter   Result Value Ref Range    GLUCOSE BY METER POCT 121 (H) 70 - 99 mg/dL   Activated clotting time celite, POCT   Result Value Ref Range    Activated Clotting Time (Celite) POCT 190 (H) 74 - 150 seconds   Blood gas arterial   Result Value Ref Range    pH Arterial 7.35 7.35 - 7.45    pCO2 Arterial 38 35 - 45 mm Hg    pO2 Arterial 72 (L) 80 - 105 mm Hg    FIO2 50      Bicarbonate Arterial 21 21 - 28 mmol/L    Base Excess/Deficit Arterial -3.9 (L) -3.0 - 3.0 mmol/L    Rei's Test Artline     Oxyhemoglobin Arterial 92 92 - 100 %    O2 Sat, Arterial 94.8 (L) 95.0 - 96.0 %    Narrative    In healthy individuals, oxyhemoglobin (O2Hb) and oxygen saturation (SO2) are approximately equal. In the presence of dyshemoglobins, oxyhemoglobin can be considerably lower than oxygen saturation.   CBC with platelets   Result Value Ref Range    WBC Count 14.2 (H) 4.0 - 11.0 10e3/uL    RBC Count 3.73 (L) 4.40 - 5.90 10e6/uL    Hemoglobin 10.2 (L) 13.3 - 17.7 g/dL    Hematocrit 32.4 (L) 40.0 - 53.0 %    MCV 87 78 - 100 fL    MCH 27.3 26.5 - 33.0 pg    MCHC 31.5 31.5 - 36.5 g/dL    RDW 13.6 10.0 - 15.0 %    Platelet Count 308 150 - 450 10e3/uL   Comprehensive metabolic panel   Result Value Ref Range    Sodium 138 135 - 145 mmol/L    Potassium 4.2 3.4 - 5.3 mmol/L    Carbon Dioxide (CO2) 19 (L) 22 - 29 mmol/L    Anion Gap 14 7 - 15 mmol/L    Urea Nitrogen 47.0 (H) 8.0 - 23.0 mg/dL    Creatinine 2.37 (H) 0.67 - 1.17 mg/dL    GFR Estimate 29 (L) >60 mL/min/1.73m2    Calcium 8.7 (L) 8.8 - 10.4 mg/dL    Chloride 105 98 - 107 mmol/L    Glucose 160 (H) 70 - 99 mg/dL    Alkaline Phosphatase 93 40 - 150 U/L    AST 76 (H) 0 - 45 U/L    ALT 89 (H) 0 - 70 U/L    Protein Total 6.1 (L) 6.4 - 8.3 g/dL    Albumin 2.8 (L) 3.5 - 5.2 g/dL    Bilirubin Total 0.2 <=1.2 mg/dL   Calcium ionized whole blood   Result Value Ref Range    Calcium Ionized Whole Blood 4.5 4.4 - 5.2 mg/dL   Glucose whole blood   Result Value Ref Range    Glucose 155 (H) 70 - 99 mg/dL   Heparin Unfractionated Anti Xa Level   Result Value Ref Range    Anti Xa Unfractionated Heparin >1.10 (HH) For Reference Range, See Comment IU/mL    Narrative    Therapeutic Range: UFH: 0.25-0.50 IU/mL for low intensity dosing,  0.30-0.70 IU/mL for high intensity dosing DVT and PE.  This test is not validated for other direct factor X inhibitors (e.g. rivaroxaban,  apixaban, edoxaban, betrixaban, fondaparinux) and should not be used for monitoring of other medications.   INR   Result Value Ref Range    INR 1.32 (H) 0.85 - 1.15    PT 16.3 (H) 11.8 - 14.8 Seconds   Partial thromboplastin time   Result Value Ref Range    aPTT 150 (HH) 22 - 38 Seconds   Lactic acid whole blood   Result Value Ref Range    Lactic Acid 0.9 0.7 - 2.0 mmol/L   Magnesium   Result Value Ref Range    Magnesium 2.4 (H) 1.7 - 2.3 mg/dL   Troponin T, High Sensitivity   Result Value Ref Range    Troponin T, High Sensitivity 1,465 (HH) <=22 ng/L   Glucose by meter   Result Value Ref Range    GLUCOSE BY METER POCT 151 (H) 70 - 99 mg/dL   Blood gas arterial   Result Value Ref Range    pH Arterial 7.32 (L) 7.35 - 7.45    pCO2 Arterial 37 35 - 45 mm Hg    pO2 Arterial 87 80 - 105 mm Hg    FIO2 60     Bicarbonate Arterial 19 (L) 21 - 28 mmol/L    Base Excess/Deficit Arterial -6.6 (L) -3.0 - 3.0 mmol/L    Rei's Test Artline     Oxyhemoglobin Arterial 95 92 - 100 %    O2 Sat, Arterial 96.8 (H) 95.0 - 96.0 %    Narrative    In healthy individuals, oxyhemoglobin (O2Hb) and oxygen saturation (SO2) are approximately equal. In the presence of dyshemoglobins, oxyhemoglobin can be considerably lower than oxygen saturation.   Activated clotting time celite, POCT   Result Value Ref Range    Activated Clotting Time (Celite) POCT 186 (H) 74 - 150 seconds   Glucose by meter   Result Value Ref Range    GLUCOSE BY METER POCT 158 (H) 70 - 99 mg/dL   Blood gas arterial   Result Value Ref Range    pH Arterial 7.34 (L) 7.35 - 7.45    pCO2 Arterial 38 35 - 45 mm Hg    pO2 Arterial 86 80 - 105 mm Hg    FIO2 60     Bicarbonate Arterial 20 (L) 21 - 28 mmol/L    Base Excess/Deficit Arterial -5.0 (L) -3.0 - 3.0 mmol/L    Rei's Test Artline     Oxyhemoglobin Arterial 95 92 - 100 %    O2 Sat, Arterial 97.0 (H) 95.0 - 96.0 %    Narrative    In healthy individuals, oxyhemoglobin (O2Hb) and oxygen saturation (SO2) are approximately equal. In  the presence of dyshemoglobins, oxyhemoglobin can be considerably lower than oxygen saturation.   Glucose by meter   Result Value Ref Range    GLUCOSE BY METER POCT 175 (H) 70 - 99 mg/dL       All relevant imaging and laboratory values personally reviewed.

## 2025-07-20 NOTE — PLAN OF CARE
Major Shift Events:  Remains sedated on fentanyl and propofol. No major muscle jerking noticed. PERRLA. SR 50-60s, MAP maintained > 65 off pressors. No episodes of v fib. UO low, 500mL LR given.     Plan: Continue ECMO support and sedation.    For vital signs and complete assessments, please see documentation flowsheets.

## 2025-07-20 NOTE — PROGRESS NOTES
[facility]    ECLS Shift Summary:     ECMO Equipment:  Console Serial Number: [id number]  Circuit Lot Number: [id number]  Oxygenator Lot Number: [id number]    Circuit Assessment: Free of fibrin, clot, and air    Arterial ECMO Cannula: 17 Fr in the Right Femoral Artery  Venous ECMO Cannula: 25 Fr in the Right Femoral Vein  Distal Perfusion Catheter: 8 Fr in the Right Superficial Femoral Artery    ECMO Cannula Arterial Right femoral artery-Site Assessment: Sutured, Secure  ECMO Cannula Venous Right femoral vein-Site Assessment: Secure, Sutured  Distal Perfusion Catheter Right superficial femoral artery-Site Assessment: WDL  ECMO Cannula Arterial Right femoral artery-Site Intervention: No intervention needed  ECMO Cannula Venous Right femoral vein-Site Intervention: No intervention needed  Distal Perfusion Catheter Right superficial femoral artery-Site Intervention: Pressure applied    Patient remains on V-A ECMO, all equipment is functioning and alarms are appropriately set. RPM's: 3799 with Blood Flow (Circuit) LPM  Av.5 LPM  Min: 4.44 LPM  Max: 4.55 LPM L/min. Sweep is at 1 LPM and 60 %. Extremities are cool.     Significant Shift Events: no chattering overnight. ACT in goal. Dressing changed d/t bleeding and saturation. New dressing CDI with sand bag applying pressure. No products given. Decreased sweep for low Co2s on ABGs.     Vent settings:  FiO2 (%): 50 %, Resp: 14, Vent Mode: VC/AC, Resp Rate (Set): 12 breaths/min, Tidal Volume (Set, mL): 400 mL, PEEP (cm H2O): 8 cmH2O, Resp Rate (Set): 12 breaths/min, Tidal Volume (Set, mL): 400 mL, PEEP (cm H2O): 8 cmH2O    Anticoagulation:  Dose (units/hr) HEParin: 1800 Units/hr  Rate (mL/hr) HEParin: 18 mL/hr  Concentration HEParin: 100 Units/mL        Most recent ACT  (seconds): 186 seconds    Urine output is adequate, however down trending.  Blood loss was minimal. Product given included none.     Intake/Output Summary  (Last 24 hours) at 7/20/2025 0634  Last data filed at 7/20/2025 0600  Gross per 24 hour   Intake 2043.96 ml   Output 1052 ml   Net 991.96 ml       Labs:  Recent Labs   Lab 07/20/25  0554 07/20/25  0339 07/20/25  0338 07/20/25  0203 07/19/25  2353   PH 7.42 7.42  --  7.41 7.43   PCO2 34* 34*  --  35 34*   PO2 83 81  --  92 100   HCO3 22 22  --  22 22   O2PER 50 50 50  60 50 50       Lab Results   Component Value Date    HGB 9.4 (L) 07/20/2025    PHGB 30 (H) 07/19/2025     07/20/2025    FIBR 792 (H) 07/20/2025    INR 1.34 (H) 07/20/2025     (HH) 07/20/2025    DD 2.46 (H) 07/20/2025    AXA 0.21 05/24/2013    ANTCH 91 07/19/2025       Plan:  To continue with [facility].    Yancy Duncan RN  ECMO Specialist  7/20/2025 6:34 AM

## 2025-07-20 NOTE — PRE-PROCEDURE
CONTINUOUS EEG MONITORING PRELIMINARY REPORT    EEG staff: Saida Hernandez MD    BACKGROUND  Overall - Severe slowing  Posterior dominant rhythm present at any time: No  Reactivity to stimulation or spontaneous state change at any time: No  N2 sleep transients: N/A      DISCHARGES AND SEIZURES  Interictal discharges present: Yes  Periodic discharge present: Yes  Electrographic seizures present: No    INTERPRETATION AND CLINICAL CORRELATE  The first 45 minutes of prolonged EEG in this patient on fentanyl (25-50 mcg/h) and propofol (35 mcg/kg/min)who sustained a cardiac arrest is abnormal.  The background is characterized by low amplitude  diffuse 1 to 3 Hz delta activity with admixed faster beta frequencies.  This is nonspecific but suggestive of severe diffuse cerebral dysfunction and/or anesthetic effects.  There is a continuous train of generalized periodic discharges of triphasic morphology at 1.5 to 2.5 Hz without clear evolution.This is on the ictal-interictal continuum and could indicate various conditions, including an increased propensity for generalized seizures or encephalopathy precipitated by metabolic or iatrogenic causes, such as medications.  There were no definite seizures during this recording.     Saida Hernandez MD    Generalized periodic discharges of triphasic morphology at 1.5 to 2.5 Hz

## 2025-07-20 NOTE — PROGRESS NOTES
Cardiology ICU Progress Note  Date of Service: 07/20/2025  Patient: Bob Echols  MRN: [id number]  Admission Date: 7/18/2025  Hospital Day: 2             ASSESSMENT AND PLAN     Bob Echols is a 70 year old male with a past medical history of HTN, DM2, and DEMOND as well as recent admission for inferior STEMI on 7/10 (treated with percutaneous revascularization with 2.75 x 20mm Promus RENA to the distal LCx). The patient was discharged on 7/14 and had been doing well at home prior to 7/18 when he was found to be apneic without a pulse with estimated down time ~10-15 min prior to discovery. EMS was called at 1537, arrived and started compressions at 1542, cannulated for VA ECMO at 1614. Subsequently found to have subacute in-stent thrombosis of his recently placed stent without obvious mechanical causes, treated with 2.75 x 20mm Synergy XD RENA. Now admitted to [facility] for further care. His initial pH was 7.08, PcO2 74, PO2 58, Lactate 8.6.    Interval Events:  - NAEON  - No recurrent arrhythmias on lido    Brief Daily Plan:  - Turn down flows to 3.5L   - Turndown study tomorrow     Neurology:     # Acute anoxic encephalopathy  # Suspected hypoxic ischemic injury     Sedated for safety and ventilator compliance. He has not yet demonstrated purposeful movement, was thrashing in CT when sedation was off. Initial head CT with mildly diminished gray-white differentiation diffusely.     Current Sedatives/Analgesia:  - Fentanyl and propofol    Plan:  - Continue sedation with fentanyl and propofol for now for now  - RASS goal o o -1  - EEG, repeat CT head per protocol    Cardiovascular:     # Ventricular fibrillation-mediated cardiac arrest  # Cardiogenic shock, SCAI E  # Coronary artery disease, status post percutaneous revascularization of the left circumflex artery with sinacute in-stent thrombosis  # Paroxysmal atrial fibrillation  # Hypertension  # VF s/p shock on 7/19/25     Presented initially to [facility]  on 7/10 with inferior STEMI, found to have acute thrombotic lesion of the distal LCx treated with a 2.75 x 20mm Promus RENA, discharged on apixaban and clopidogrel. He was found down at home in VF-mediated cardiac arrest on  requiring emergent VA ECMO cannulation. Coronary angiography demonstrated in-stent thrombosis of his recently placed stent without obvious mechanical cause and therefore presumed to be clopidogrel failure. Treated with a 2.75 x 20mm Synergy XD RENA.    Suspect he had in stent thrombosis probably because he was on Plavix and Apixaban (and not Ticagrelor + ASA + Eliquis for a week). Will clarify further.      ECMO Settings:  Mode: V-A   Pump Type: Cardiohelp  RPM's: 3801  Blood Flow (Circuit) LPM: 4.21 LPM  Sweep LPM: 1 LPM  Venous Pressure  mmHg: -59 mmHg  Arterial Pressure  mmH mmHg  Internal Pressure mmH mmHg  Pressure Change mmH mmHg     Additional Hemodynamic Support:  - Nicardipine gtt      Plan:  - Continue VA ECMO support, flow goal 3-5 L/min  - Nicardipine gtt to maintain MAP 65-85 mmHg; NE if hypotensive from sedation to maintain MAP 65-85   - Aspirin 81 mg daily, ticagrelor 180 mg load followed by 90 mg bid  - Heparin gtt, ACT goal 180-200 sec  - Repeat echocardiogram pending     Pulmonary:     # Acute hypoxic respiratory failure      Current Ventilator Settings:  FiO2 (%): 50 %, Resp: 12, Vent Mode: VC/AC, Resp Rate (Set): 12 breaths/min, Tidal Volume (Set, mL): 400 mL, PEEP (cm H2O): 8 cmH2O, Resp Rate (Set): 12 breaths/min, Tidal Volume (Set, mL): 400 mL, PEEP (cm H2O): 8 cmH2O     Daily CXR:      Recent Labs   Lab 25  0746 25  0554 25  0339   PH 7.37 7.42 7.42   PCO2 39 34* 34*   PO2 90 83 81   HCO3 23 22 22   O2PER 50 50 50     Plan:  - Lung-protective low tidal volume ventilation  - VAP prophylaxis per protocol     Renal:     # Acute kidney injury  # Lactic acidosis, improving     Baseline serum creatinine is ~1.2. Elevated to 2.1 on admission,  likely ischemic acute tubular necrosis.      Recent Labs   Lab 07/20/25  0338 07/19/25  2146 07/19/25  1748 07/19/25  0948 07/19/25  0340    135 135   < > 137   POTASSIUM 4.4 4.2 4.5   < > 4.2   CHLORIDE 108* 107 104   < > 107   CO2 19* 18* 22   < > 18*   BUN 45.2* 42.1* 40.9*   < > 39.2*   CR 2.52* 2.41* 2.40*   < > 2.19*   GFRESTIMATED 27* 28* 28*   < > 32*   ANIONGAP 12 10 9   < > 12   PALMIRA 8.7* 8.9 9.2   < > 8.2*   MAG 2.4* 2.3 2.4*   < > 2.3   PHOS 6.2*  --  6.1*  --  4.0    < > = values in this interval not displayed.     No intake or output data in the 24 hours ending 07/18/25 2036    Plan:  - Monitor renal function, no current dialysis indications  - Electrolyte repletion per protocol     Gastrointestinal:     # Hepatocellular liver injury     Mild shock liver in the context of cardiac arrest as above.     Recent Labs   Lab 07/20/25  0338 07/19/25 2146 07/19/25  1748   AST 70* 73* 79*   ALT 90* 99* 108*   ALKPHOS 83 85 89   BILITOTAL 0.2 0.3 0.3   PROTTOTAL 5.8* 5.7* 5.9*   ALBUMIN 2.7* 2.9* 3.0*     Diet: NPO for Medical/Clinical Reasons Except for: No Exceptions      Last 36 hours of stool documentation    07/19/25 1000 07/19/25 2000   Stool Appearance: Loose;Watery Loose;Watery   Stool Color: Tai;Brown Tai;Brown       Plan:  - Pantoprazole 40 mg daily  - Defer tube feeds pending neurologic improvement  - Bowel regimen ordered     Endocrine:     # Diabetes mellitus, type 2     Recent Labs   Lab 07/20/25  0747 07/20/25  0557 07/20/25  0344 07/20/25  0338 07/20/25  0204   * 115* 120* 124*  127* 119*     Plan:  - Insulin gtt per protocol     Infectious Disease:     # Presumed aspiration pneumonia    7-Day Micro Results       Collected Updated Procedure Result Status      07/20/2025 0600 07/20/2025 0615 Respiratory Aerobic Bacterial Culture [[id number]]   Sputum from Endotracheal    In process Component Value   No component results               07/19/2025 0755 07/20/2025 0817 Respiratory  Aerobic Bacterial Culture [[id number]]   (Abnormal)   Sputum from Endotracheal    Preliminary result Component Value   Culture Culture in progress  [P]    Gram Stain Result <10 Squamous epithelial cells/low power field  [P]     >25 PMNs/low power field  [P]     2+ Gram positive cocci  [P]                07/18/2025 2341 07/20/2025 0001 Blood Culture Peripheral blood (BC) Hand, Left [[id number]]   Peripheral blood (BC) from Hand, Left    Preliminary result Component Value   Culture No growth after 1 day  [P]                07/18/2025 2147 07/19/2025 2217 Blood Culture Peripheral blood (BC) Hand, Right [[id number]]    Peripheral blood (BC) from Hand, Right    Preliminary result Component Value   Culture No growth after 1 day  [P]                07/18/2025 2051 07/18/2025 2251 MRSA MSSA PCR [[id number]]    Swab from Nares, Bilateral    Final result Component Value   MRSA Target DNA Negative   SA Target DNA Negative            07/18/2025 2051 07/19/2025 1456 Respiratory Aerobic Bacterial Culture with Gram Stain [[id number]]   Sputum from Endotracheal    Preliminary result Component Value   Culture Culture in progress  [P]    Gram Stain Result >25 PMNs/low power field  [P]     3+ Mixed christine  [P]                      Antimicrobials:   - Ceftriaxone (7/18-current)  - Vancomycin (7/18-current)    Plan:  - Will treat empirically with vancomycin, ceftriaxone  - Blood, sputum cultures, MRSA nares pending     Hematology/Oncology:     # Leukocytosis  # Normocytic anemia  # Thrombocytosis    Recent Labs   Lab 07/20/25  0338 07/19/25  2146 07/19/25  1534 07/19/25  0948 07/19/25  0340   WBC 11.3* 10.6 15.9*   < > 14.1*   HGB 9.4* 9.7* 10.4*   < > 10.7*   HCT 29.3* 30.1* 32.5*   < > 32.4*    301 361   < > 390   INR 1.34* 1.32* 1.31*   < > 1.37*   * 95* 71*   < > 49*   FIBR 792*  --  754*  --  784*    < > = values in this interval not displayed.        Plan:  - Transfusions per protocol               - 1 unit  pRBCs for Hgb <8               - 1 unit platelets for Plt <100               - 1 unit FFP for INR >3               - 5 units cryoprecipitate for fibrinogen <150     Checklist:  DVT: Heparin gtt  GI: PPI  VAP: Chlorhexidine  Bowel: PEG  Diet: NPO  Code: FCFT  Lines: RFA/RFV ECMO, RSFA DPC, RRA art line, LFV CVC, ETT, OGT, Fox, rectal     Discussed with Dr. Tang Romero MD  Cardiovascular Disease Fellow           HISTORY OF PRESENT ILLNESS     Bob Echols is a 70 year old male with a past medical history of HTN, DM2, and DEMOND as well as recent admission for inferior STEMI on 7/10 (treated with percutaneous revascularization with 2.75 x 20mm Promus RNEA to the distal LCx). The patient was discharged on  and had been doing well at home prior to  when he was found to be apneic without a pulse with estimated down time ~10-15 min prior to discovery. EMS was called at 1537, arrived and started compressions at 1542, cannulated for VA ECMO at 1614. Subsequently found to have subacute in-stent thrombosis of his recently placed stent without obvious mechanical causes, treated with 2.75 x 20mm Synergy XD RENA. Now admitted to [facility] for further care.     Patient was not interviewed due to current mental status.    Review of Systems:    Complete review of systems was not performed due to current mental status.             CARDIAC HISTORY AND IMAGING     12-Lead EC2025:      Transthoracic Echocardiogram(s):  2025:  1. Normal biventricular size and systolic function. Left ventricular ejection  fraction of 60-65%. Mild LVH.  2. No hemodynamically significant valvular disease.  3. Trivial pericardial effusion.    Catheterization(s):   2025:  Pre intervention : new in-stent thrombotic occlusion at site of recently placed 2.75 everolimus eluting Synergy XD stent. NO mechanical cause for the thrombosis was identified. The stent was fully expanded with IVUS guidance to 3.25 mm proximally and 3.0  mm distally with noncompliant balloons  Post intervention : No residual stenosis at stent site. Distal GIOVANI 3 flow. No change in ostial 80% narrowing in small M2    CMR and/or Additional Imaging  None            PAST MEDICAL HISTORY      Past Medical History:   Diagnosis Date    Anxiety     Bursitis, trochanteric     Cataract     Diabetes mellitus (H)     Hyperlipidaemia     Hypertension     Hypertension     Neuropathy     Obesity     Sleep difficulties     Stress at work      Active Problems:  Patient Active Problem List    Diagnosis Date Noted    Cardiac arrest (H) 07/18/2025     Priority: Medium    Percutaneous transluminal coronary angioplasty status 07/10/2025     Priority: Medium    Depression 07/04/2013     Priority: Medium    Bipolar I disorder, most recent episode depressed (H) 07/04/2013     Priority: Medium     Problem list name updated by automated process. Provider to review      Dasha (monopolar) single episode or unspecified 06/26/2013     Priority: Medium    Unstable angina (H) 05/22/2013     Priority: Medium    CARDIOVASCULAR SCREENING; LDL GOAL LESS THAN 160 07/26/2011     Priority: Medium     Social History:  Social History     Tobacco Use    Smoking status: Never   Substance Use Topics    Alcohol use: Yes     Comment: occ    Drug use: No     Family History:  No family history on file.  Medications:  Current Facility-Administered Medications   Medication Dose Route Frequency Provider Last Rate Last Admin    aspirin (ASA) chewable tablet 81 mg  81 mg Oral or Feeding Tube Daily Matt Nugent MD   81 mg at 07/20/25 0748    cefTRIAXone (ROCEPHIN) 2 g vial to attach to  ml bag for ADULTS or NS 50 ml bag for PEDS  2 g Intravenous Q24H Matt Nugent MD   2 g at 07/19/25 2152    chlorhexidine (PERIDEX) 0.12 % solution 15 mL  15 mL Mouth/Throat Q12H Matt Nugent MD   15 mL at 07/20/25 0748    melatonin tablet 10 mg  10 mg Oral or Feeding Tube At Bedtime Matt Nugent MD         pantoprazole (PROTONIX) 2 mg/mL suspension 40 mg  40 mg Oral or Feeding Tube Daily Epifanio Beckwith MD   40 mg at 07/20/25 0748    polyethylene glycol (MIRALAX) Packet 17 g  17 g Oral or Feeding Tube Daily Matt Nugent MD        ticagrelor (BRILINTA) tablet 90 mg  90 mg Oral or Feeding Tube BID Matt Nugent MD   90 mg at 07/20/25 0748     Current Facility-Administered Medications   Medication Dose Route Frequency Provider Last Rate Last Admin    dextrose 10% infusion   Intravenous Continuous PRN Matt Nugent MD        fentaNYL (SUBLIMAZE) infusion   mcg/hr Intravenous Continuous Matt Nugent MD 1 mL/hr at 07/20/25 0800 50 mcg/hr at 07/20/25 0800    heparin 2 unit/mL in 0.9% NaCl (for REPERFUSION CATHETER)  3 mL/hr Intravenous Continuous Matt Nugent MD 3 mL/hr at 07/20/25 0700 3 mL/hr at 07/20/25 0700    heparin 25,000 units in 0.45% NaCl 250 mL ANTICOAGULANT infusion  10-50 Units/kg/hr (Ideal) Other Continuous Matt Nugent MD 18 mL/hr at 07/20/25 0800 1,800 Units/hr at 07/20/25 0800    insulin regular (MYXREDLIN) 1 unit/mL infusion  0-24 Units/hr Intravenous Continuous Matt Nugent MD 0.5 mL/hr at 07/20/25 0700 0.5 Units/hr at 07/20/25 0700    lidocaine 8 mg/mL infusion (ADULT STD)  1 mg/min Intravenous Continuous Juan Carlos Romero MD 7.5 mL/hr at 07/20/25 0700 1 mg/min at 07/20/25 0700    propofol (DIPRIVAN) infusion  5-75 mcg/kg/min (Dosing Weight) Intravenous Continuous Matt Nugent MD 27.7 mL/hr at 07/20/25 0818 40 mcg/kg/min at 07/20/25 0818    And    Medication Instruction   Does not apply Continuous PRN Matt Nugent MD        niCARdipine 40 mg in 200 mL NS (CARDENE) infusion  0.5-15 mg/hr Intravenous Continuous Matt Nugent MD 25 mL/hr at 07/20/25 0855 5 mg/hr at 07/20/25 0855    norepinephrine (LEVOPHED) 16 mg in  mL infusion MAX CONC CENTRAL LINE  0.01-0.6 mcg/kg/min (Dosing Weight) Intravenous Continuous Matt Nugent,  MD   Stopped at 07/19/25 1800             PHYSICAL EXAM     Temp:  [97.3  F (36.3  C)-98.2  F (36.8  C)] 98.2  F (36.8  C)  Pulse:  [55-78] 73  Resp:  [10-25] 12  MAP:  [61 mmHg-102 mmHg] 96 mmHg  Arterial Line BP: ()/(56-82) 144/76  FiO2 (%):  [50 %] 50 %  SpO2:  [60 %-100 %] 96 %  No intake or output data in the 24 hours ending 07/18/25 2035    Gen: Obtunded, in no acute distress.  HEENT: Pupils are equal, round, and symmetric. No scleral icterus.   CV: Regular rate and rhythm, normal S1, S2. No murmur. Symmetric 2+ radial pulse.  Pulm: Bilateral mechanical breath sounds.  Abd: Soft, non-tender, non-distended.  Ext: Trace ankle edema.  Neuro: Obtunded, no purposeful movements, occasional myoclonic jerks.  Skin: Warm, dry and intact.             DIAGNOSTICS     All labs and imaging were reviewed, of note:    CMP  Recent Labs   Lab 07/20/25  0747 07/20/25  0557 07/20/25  0344 07/20/25  0338 07/19/25  2150 07/19/25  2146 07/19/25 2003 07/19/25  1748 07/19/25  1538 07/19/25  1534 07/19/25  0345 07/19/25  0340   NA  --   --   --  139  --  135  --  135  --  138   < > 137   POTASSIUM  --   --   --  4.4  --  4.2  --  4.5  --  4.9   < > 4.2   CHLORIDE  --   --   --  108*  --  107  --  104  --  107   < > 107   CO2  --   --   --  19*  --  18*  --  22  --  20*   < > 18*   ANIONGAP  --   --   --  12  --  10  --  9  --  11   < > 12   * 115* 120* 124*  127*   < > 121*  124*   < > 130*   < > 134*  137*   < > 133*  135*   BUN  --   --   --  45.2*  --  42.1*  --  40.9*  --  40.7*   < > 39.2*   CR  --   --   --  2.52*  --  2.41*  --  2.40*  --  2.45*   < > 2.19*   GFRESTIMATED  --   --   --  27*  --  28*  --  28*  --  28*   < > 32*   PALMIRA  --   --   --  8.7*  --  8.9  --  9.2  --  9.0   < > 8.2*   MAG  --   --   --  2.4*  --  2.3  --  2.4*  --  2.4*   < > 2.3   PHOS  --   --   --  6.2*  --   --   --  6.1*  --   --   --  4.0   PROTTOTAL  --   --   --  5.8*  --  5.7*  --  5.9*  --  5.9*   < > 5.9*   ALBUMIN  --   --    --  2.7*  --  2.9*  --  3.0*  --  2.9*   < > 2.9*   BILITOTAL  --   --   --  0.2  --  0.3  --  0.3  --  0.2   < > 0.3   ALKPHOS  --   --   --  83  --  85  --  89  --  90   < > 96   AST  --   --   --  70*  --  73*  --  79*  --  83*   < > 126*   ALT  --   --   --  90*  --  99*  --  108*  --  111*   < > 131*    < > = values in this interval not displayed.     CBC  Recent Labs   Lab 07/20/25 0338 07/19/25 2146 07/19/25  1534 07/19/25  0948   WBC 11.3* 10.6 15.9* 14.6*   RBC 3.44* 3.57* 3.81* 3.74*   HGB 9.4* 9.7* 10.4* 10.1*   HCT 29.3* 30.1* 32.5* 31.3*   MCV 85 84 85 84   MCH 27.3 27.2 27.3 27.0   MCHC 32.1 32.2 32.0 32.3   RDW 13.5 13.4 13.3 13.3    301 361 393     INR  Recent Labs   Lab 07/20/25  0338 07/19/25 2146 07/19/25  1534 07/19/25  0948   INR 1.34* 1.32* 1.31* 1.36*     Arterial Blood Gas  Recent Labs   Lab 07/20/25  0746 07/20/25  0554 07/20/25  0339 07/20/25  0338 07/20/25  0203   PH 7.37 7.42 7.42  --  7.41   PCO2 39 34* 34*  --  35   PO2 90 83 81  --  92   HCO3 23 22 22  --  22   O2PER 50 50 50 50  60 50     Troponin  Lab Results   Component Value Date    CTROPT 1,746 () 07/20/2025    CTROPT 1,782 () 07/19/2025    CTROPT 2,099 () 07/19/2025    CTROPT 2,281 () 07/19/2025    CTROPT 2,355 () 07/19/2025    TROPONINIS 8 01/15/2022    TROPONINIS 10 01/15/2022

## 2025-07-20 NOTE — PHARMACY-ADMISSION MEDICATION HISTORY
Pharmacy Intern Admission Medication History    Admission medication history is complete. The information provided in this note is only as accurate as the sources available at the time of the update.    Information Source(s): Family member, Hospital records, Facility (U/NH/) medication list/MAR, Prescription bottles, and CareEverywhere/SureScripts   via phone    Pertinent Information:   I went over the patient's home medications with his wife on the phone. She confirmed with the name and strengths by looking at the bottles, but she was not sure if he took all of them before coming to our hospital.   I also looked at the discharge summary note from [facility] on 7/14 Hospital and the MAR from 07/10 to 07/18, and documented the last dose of some medications administered at [facility].   Based on Surescript, for both atenolol 100 mg tablet and lisinopril 40 mg tablet dispensed on 06/22, she shared with me that they are currently stopped by the doctor, so he is not taking them.     Changes made to PTA medication list:  Added: None  Deleted: None  Changed: None    Allergies reviewed with patient and updates made in EHR: yes    Medication History Completed By: Ifeoma Chandra 7/20/2025 1:42 PM    PTA Med List   Medication Sig Last Dose/Taking    amLODIPine (NORVASC) 10 MG tablet Take 10 mg by mouth daily 7/14/2025 at  8:32 AM    apixaban ANTICOAGULANT (ELIQUIS) 5 MG tablet Take 1 tablet (5 mg) by mouth 2 times daily. 7/14/2025 at  5:42 PM    aspirin 81 MG EC tablet Take 1 tablet (81 mg) by mouth daily. Stop after 7 days. 7/14/2025 at  8:32 AM    atorvastatin (LIPITOR) 80 MG tablet Take 1 tablet (80 mg) by mouth daily. 7/14/2025 at  8:32 AM    clopidogrel (PLAVIX) 75 MG tablet Take 1 tablet (75 mg) by mouth daily. For heart stent. Unknown    colchicine (COLCRYS) 0.6 MG tablet Take 1 tablet (0.6 mg) by mouth 2 times daily. 7/14/2025 at 11:59 AM    empagliflozin (JARDIANCE) 10 MG TABS tablet Take 1  tablet (10 mg) by mouth daily. 7/14/2025 at  8:32 AM    gabapentin (NEURONTIN) 300 MG capsule Take 3 capsules (900 mg) by mouth 3 times daily. 7/14/2025 at  3:31 PM    insulin aspart (NOVOLOG FLEXPEN) 100 UNIT/ML pen Inject 12 Units subcutaneously 3 times daily (with meals). 7/14/2025 at  1:02 PM    insulin glargine (LANTUS PEN) 100 UNIT/ML pen Inject 24 Units subcutaneously at bedtime. 7/13/2025 at  9:21 PM    metoprolol tartrate (LOPRESSOR) 25 MG tablet Take 1 tablet (25 mg) by mouth 2 times daily. Unknown    nitroGLYcerin (NITROSTAT) 0.4 MG sublingual tablet For chest pain place 1 tablet under the tongue every 5 minutes for 3 doses. If symptoms persist 5 minutes after 1st dose call 911. Taking    torsemide (DEMADEX) 20 MG tablet Take 1 tablet (20 mg) by mouth daily. Unknown

## 2025-07-20 NOTE — PROGRESS NOTES
[facility]    ECLS Shift Summary:     ECMO Equipment:  Console Serial Number: [id number]  Circuit Lot Number: [id number]  Oxygenator Lot Number: [id number]    Circuit Assessment: Free of fibrin, clot, and air    Arterial ECMO Cannula: 17 Fr in the Right Femoral Artery  Venous ECMO Cannula: 25 Fr in the Right Femoral Vein  Distal Perfusion Catheter: 8 Fr in the Right Superficial Femoral Artery    ECMO Cannula Arterial Right femoral artery-Site Assessment: Sutured, Secure  ECMO Cannula Venous Right femoral vein-Site Assessment: Secure, Sutured  Distal Perfusion Catheter Right superficial femoral artery-Site Assessment: WDL  ECMO Cannula Arterial Right femoral artery-Site Intervention: No intervention needed  ECMO Cannula Venous Right femoral vein-Site Intervention: No intervention needed  Distal Perfusion Catheter Right superficial femoral artery-Site Intervention: No intervention needed    Patient remains on V-A ECMO, all equipment is functioning and alarms are appropriately set. RPM's: 3400 with Blood Flow (Circuit) LPM  Avg: 3.8 LPM  Min: 3.46 LPM  Max: 4.21 LPM L/min. Sweep is at 1 LPM and 60 %. Extremities are warm.     Significant Shift Events: N/A    Vent settings:  FiO2 (%): 60 %, Resp: 18, Vent Mode: VC/AC, Resp Rate (Set): 12 breaths/min, Tidal Volume (Set, mL): 400 mL, PEEP (cm H2O): 8 cmH2O, Resp Rate (Set): 12 breaths/min, Tidal Volume (Set, mL): 400 mL, PEEP (cm H2O): 8 cmH2O    Anticoagulation:  Dose (units/hr) HEParin: 1800 Units/hr  Rate (mL/hr) HEParin: 18 mL/hr  Concentration HEParin: 100 Units/mL        Most recent ACT  (seconds): 186 seconds    Urine output is as per charted  .  Blood loss was minimal. Product was not given.     Intake/Output Summary (Last 24 hours) at 7/20/2025 1810  Last data filed at 7/20/2025 1700  Gross per 24 hour   Intake 2526.81 ml   Output 1292 ml   Net 1234.81 ml       Labs:  Recent Labs   Lab 07/20/25  1745 07/20/25  1558  07/20/25  1432 07/20/25  1227   PH 7.37 7.36 7.34* 7.32*   PCO2 37 38 38 37   PO2 89 81 86 87   HCO3 22 21 20* 19*   O2PER 60 60  60  60 60 60       Lab Results   Component Value Date    HGB 10.3 (L) 07/20/2025    PHGB 40 (H) 07/20/2025     07/20/2025    FIBR 908 (H) 07/20/2025    INR 1.32 (H) 07/20/2025     (H) 07/20/2025    DD 2.75 (H) 07/20/2025    AXA 0.21 05/24/2013    ANTCH 90 07/20/2025       Plan:  For CT and turndown tomorrow.    Ada Alexander RT  ECMO Specialist  7/20/2025 6:10 PM

## 2025-07-20 NOTE — CONSULTS
Care Management Initial Consult    General Information  Assessment completed with: Spouse or significant other,    Type of CM/SW Visit: Initial Assessment    Primary Care Provider verified and updated as needed: Yes   Readmission within the last 30 days: other (see comments), previous discharge plan unsuccessful (2nd heart attack)      Reason for Consult: discharge planning  Advance Care Planning: Advance Care Planning Reviewed: no concerns identified          Communication Assessment  Patient's communication style: spoken language (English or Bilingual)             Cognitive  Cognitive/Neuro/Behavioral: .WDL except  Level of Consciousness: unresponsive, sedated  Arousal Level: unresponsive  Orientation: other (see comments) (RASHAUN)  Mood/Behavior: other (see comments) (RASHAUN)  Best Language:  (RASHAUN)  Speech: endotracheal tube    Living Environment:   People in home: grandchild(irlanda), child(irlanda), adult, significant other     Current living Arrangements: house      Able to return to prior arrangements: yes       Family/Social Support:  Care provided by: self, spouse/significant other  Provides care for: no one  Marital Status:   Support system: Children, Wife          Description of Support System: Supportive, Involved    Support Assessment: Adequate family and caregiver support, Adequate social supports    Current Resources:   Patient receiving home care services: No        Community Resources: None  Equipment currently used at home: none  Supplies currently used at home: None    Employment/Financial:  Employment Status: other (see comments) (pt hadn't retired yet, but informed employee he needs ot quit)        Financial Concerns: none   Referral to Financial Worker: No       Does the patient's insurance plan have a 3 day qualifying hospital stay waiver?  No    Lifestyle & Psychosocial Needs:  Social Drivers of Health     Food Insecurity: Low Risk  (7/10/2025)    Food Insecurity     Within the past 12 months, did  you worry that your food would run out before you got money to buy more?: No     Within the past 12 months, did the food you bought just not last and you didn t have money to get more?: No   Depression: Not at risk (7/18/2025)    Received from [facility]    PHQ-2     PHQ-2 Score: 0   Housing Stability: Low Risk  (7/10/2025)    Housing Stability     Do you have housing? : Yes     Are you worried about losing your housing?: No   Tobacco Use: Low Risk  (7/18/2025)    Received from [facility]    Patient History     Smoking Tobacco Use: Never     Smokeless Tobacco Use: Never     Passive Exposure: Not on file   Financial Resource Strain: Low Risk  (7/10/2025)    Financial Resource Strain     Within the past 12 months, have you or your family members you live with been unable to get utilities (heat, electricity) when it was really needed?: No   Alcohol Use: Not on file   Transportation Needs: Low Risk  (7/10/2025)    Transportation Needs     Within the past 12 months, has lack of transportation kept you from medical appointments, getting your medicines, non-medical meetings or appointments, work, or from getting things that you need?: No   Physical Activity: Not on file   Interpersonal Safety: Low Risk  (7/10/2025)    Interpersonal Safety     Do you feel physically and emotionally safe where you currently live?: Yes     Within the past 12 months, have you been hit, slapped, kicked or otherwise physically hurt by someone?: No     Within the past 12 months, have you been humiliated or emotionally abused in other ways by your partner or ex-partner?: No   Stress: Not on file   Social Connections: Not on file   Health Literacy: Not on file       Functional Status:  Prior to admission patient needed assistance:   Dependent ADLs:: Independent  Dependent IADLs:: Independent  Assesssment of Functional Status: Not at baseline with ADL Functioning, Not at baseline with mobility    Mental Health Status:  Mental Health Status:  "No Current Concerns       Chemical Dependency Status:  Chemical Dependency Status: No Current Concerns             Values/Beliefs:  Spiritual, Cultural Beliefs, Anabaptist Practices, Values that affect care: other (see comments) (tonie)               Discussed  Partnership in Safe Discharge Planning  document with patient/family: No    Additional Information:  Per provider \" past medical history of HTN, DM2, and DEMOND as well as recent admission for inferior STEMI on 7/10 (treated with percutaneous revascularization with 2.75 x 20mm Promus RENA to the distal LCx). The patient was discharged on 7/14 and had been doing well at home prior to 7/18 when he was found to be apneic without a pulse with estimated down time ~10-15 min prior to discovery. EMS was called at 1537, arrived and started compressions at 1542, cannulated for [facility] ECMO at 1614. Subsequently found to have subacute in-stent thrombosis of his recently placed stent without obvious mechanical causes, treated with 2.75 x 20mm Synergy XD RENA\"    RNCC contacted wife via telephone to complete CMA. Wife verified home address, pcp and insurance. Wife states patient was independent with all ADLs/IADLs prior to hospitalization. Patient lives in house with wife, their daughter and grandson temporarily living with them as well. Patient was not receiving any services. Patient after last heart attack informed his employer he needed to quit, wife unsure if pt is retired or not as employer and pt have talked since. Wife denies any mental health or chemical dependency concerns.     Will continue to follow for any discharge planning needs.    Next Steps:     -Follow for discharge needs.    -Send O    -[facility]    Christine Moreno V RN  Weekend Covering Nurse Care Coordinator  [facility] Acute Care Management  Searchable on Vocera: 6A Neuro RNCC, 4A CVICU RNCC, 4C MICU RNCC, 4E SICU RNCC                  "

## 2025-07-21 ENCOUNTER — APPOINTMENT (OUTPATIENT)
Dept: GENERAL RADIOLOGY | Facility: CLINIC | Age: 70
End: 2025-07-21
Attending: STUDENT IN AN ORGANIZED HEALTH CARE EDUCATION/TRAINING PROGRAM
Payer: COMMERCIAL

## 2025-07-21 ENCOUNTER — APPOINTMENT (OUTPATIENT)
Dept: CT IMAGING | Facility: CLINIC | Age: 70
End: 2025-07-21
Attending: STUDENT IN AN ORGANIZED HEALTH CARE EDUCATION/TRAINING PROGRAM
Payer: COMMERCIAL

## 2025-07-21 LAB
ABO + RH BLD: NORMAL
ACT BLD: 174 SECONDS (ref 74–150)
ACT BLD: 174 SECONDS (ref 74–150)
ACT BLD: 178 SECONDS (ref 74–150)
ACT BLD: 178 SECONDS (ref 74–150)
ACT BLD: 182 SECONDS (ref 74–150)
ACT BLD: 186 SECONDS (ref 74–150)
ACT BLD: 186 SECONDS (ref 74–150)
ACT BLD: 202 SECONDS (ref 74–150)
ACT BLD: 203 SECONDS (ref 74–150)
ALBUMIN SERPL BCG-MCNC: 2.7 G/DL (ref 3.5–5.2)
ALBUMIN SERPL BCG-MCNC: 2.8 G/DL (ref 3.5–5.2)
ALLEN'S TEST: ABNORMAL
ALP SERPL-CCNC: 141 U/L (ref 40–150)
ALP SERPL-CCNC: 147 U/L (ref 40–150)
ALP SERPL-CCNC: 160 U/L (ref 40–150)
ALP SERPL-CCNC: 166 U/L (ref 40–150)
ALT SERPL W P-5'-P-CCNC: 54 U/L (ref 0–70)
ALT SERPL W P-5'-P-CCNC: 58 U/L (ref 0–70)
ALT SERPL W P-5'-P-CCNC: 64 U/L (ref 0–70)
ALT SERPL W P-5'-P-CCNC: 70 U/L (ref 0–70)
ANION GAP SERPL CALCULATED.3IONS-SCNC: 12 MMOL/L (ref 7–15)
ANION GAP SERPL CALCULATED.3IONS-SCNC: 14 MMOL/L (ref 7–15)
APTT PPP: 122 SECONDS (ref 22–38)
APTT PPP: 70 SECONDS (ref 22–38)
APTT PPP: 79 SECONDS (ref 22–38)
APTT PPP: 82 SECONDS (ref 22–38)
AST SERPL W P-5'-P-CCNC: 66 U/L (ref 0–45)
AST SERPL W P-5'-P-CCNC: 69 U/L (ref 0–45)
AST SERPL W P-5'-P-CCNC: 70 U/L (ref 0–45)
AST SERPL W P-5'-P-CCNC: 76 U/L (ref 0–45)
AT III ACT/NOR PPP CHRO: 92 % (ref 85–135)
BACTERIA SPT CULT: ABNORMAL
BASE EXCESS BLDA CALC-SCNC: -0.9 MMOL/L (ref -3–3)
BASE EXCESS BLDA CALC-SCNC: -1.3 MMOL/L (ref -3–3)
BASE EXCESS BLDA CALC-SCNC: -1.6 MMOL/L (ref -3–3)
BASE EXCESS BLDA CALC-SCNC: -1.7 MMOL/L (ref -3–3)
BASE EXCESS BLDA CALC-SCNC: -1.8 MMOL/L (ref -3–3)
BASE EXCESS BLDA CALC-SCNC: -2 MMOL/L (ref -3–3)
BASE EXCESS BLDA CALC-SCNC: -2.1 MMOL/L (ref -3–3)
BASE EXCESS BLDA CALC-SCNC: -2.2 MMOL/L (ref -3–3)
BASE EXCESS BLDA CALC-SCNC: -2.4 MMOL/L (ref -3–3)
BASE EXCESS BLDA CALC-SCNC: -2.7 MMOL/L (ref -3–3)
BASE EXCESS BLDA CALC-SCNC: -2.8 MMOL/L (ref -3–3)
BASE EXCESS BLDA CALC-SCNC: -2.8 MMOL/L (ref -3–3)
BASE EXCESS BLDA CALC-SCNC: -3.6 MMOL/L (ref -3–3)
BASE EXCESS BLDA CALC-SCNC: -4 MMOL/L (ref -3–3)
BASE EXCESS BLDV CALC-SCNC: -1.3 MMOL/L (ref -3–3)
BASE EXCESS BLDV CALC-SCNC: -2.1 MMOL/L (ref -3–3)
BASOPHILS # BLD MANUAL: 0.3 10E3/UL (ref 0–0.2)
BASOPHILS NFR BLD MANUAL: 2 %
BILIRUB SERPL-MCNC: 0.2 MG/DL
BILIRUB SERPL-MCNC: 0.3 MG/DL
BLD GP AB SCN SERPL QL: NEGATIVE
BUN SERPL-MCNC: 46.7 MG/DL (ref 8–23)
BUN SERPL-MCNC: 47 MG/DL (ref 8–23)
BUN SERPL-MCNC: 47.5 MG/DL (ref 8–23)
BUN SERPL-MCNC: 52.4 MG/DL (ref 8–23)
CA-I BLD-MCNC: 4.5 MG/DL (ref 4.4–5.2)
CALCIUM SERPL-MCNC: 8.3 MG/DL (ref 8.8–10.4)
CALCIUM SERPL-MCNC: 8.4 MG/DL (ref 8.8–10.4)
CALCIUM SERPL-MCNC: 8.5 MG/DL (ref 8.8–10.4)
CALCIUM SERPL-MCNC: 8.6 MG/DL (ref 8.8–10.4)
CHLORIDE SERPL-SCNC: 107 MMOL/L (ref 98–107)
CHLORIDE SERPL-SCNC: 107 MMOL/L (ref 98–107)
CHLORIDE SERPL-SCNC: 108 MMOL/L (ref 98–107)
CHLORIDE SERPL-SCNC: 108 MMOL/L (ref 98–107)
CREAT SERPL-MCNC: 1.74 MG/DL (ref 0.67–1.17)
CREAT SERPL-MCNC: 1.75 MG/DL (ref 0.67–1.17)
CREAT SERPL-MCNC: 1.75 MG/DL (ref 0.67–1.17)
CREAT SERPL-MCNC: 1.89 MG/DL (ref 0.67–1.17)
CRP SERPL-MCNC: 176 MG/L
D DIMER PPP FEU-MCNC: 2.12 UG/ML FEU (ref 0–0.5)
D DIMER PPP FEU-MCNC: 2.41 UG/ML FEU (ref 0–0.5)
EGFRCR SERPLBLD CKD-EPI 2021: 38 ML/MIN/1.73M2
EGFRCR SERPLBLD CKD-EPI 2021: 42 ML/MIN/1.73M2
EOSINOPHIL # BLD MANUAL: 0 10E3/UL (ref 0–0.7)
EOSINOPHIL NFR BLD MANUAL: 0 %
ERYTHROCYTE [DISTWIDTH] IN BLOOD BY AUTOMATED COUNT: 12.9 % (ref 10–15)
ERYTHROCYTE [DISTWIDTH] IN BLOOD BY AUTOMATED COUNT: 13.3 % (ref 10–15)
ERYTHROCYTE [DISTWIDTH] IN BLOOD BY AUTOMATED COUNT: 13.5 % (ref 10–15)
ERYTHROCYTE [DISTWIDTH] IN BLOOD BY AUTOMATED COUNT: 13.5 % (ref 10–15)
ERYTHROCYTE [DISTWIDTH] IN BLOOD BY AUTOMATED COUNT: 13.6 % (ref 10–15)
ERYTHROCYTE [SEDIMENTATION RATE] IN BLOOD BY WESTERGREN METHOD: 94 MM/HR (ref 0–20)
FIBRINOGEN PPP-MCNC: 1018 MG/DL (ref 170–510)
FIBRINOGEN PPP-MCNC: 864 MG/DL (ref 170–510)
GLUCOSE BLD-MCNC: 104 MG/DL (ref 70–99)
GLUCOSE BLD-MCNC: 142 MG/DL (ref 70–99)
GLUCOSE BLD-MCNC: 146 MG/DL (ref 70–99)
GLUCOSE BLD-MCNC: 160 MG/DL (ref 70–99)
GLUCOSE BLDC GLUCOMTR-MCNC: 101 MG/DL (ref 70–99)
GLUCOSE BLDC GLUCOMTR-MCNC: 107 MG/DL (ref 70–99)
GLUCOSE BLDC GLUCOMTR-MCNC: 113 MG/DL (ref 70–99)
GLUCOSE BLDC GLUCOMTR-MCNC: 117 MG/DL (ref 70–99)
GLUCOSE BLDC GLUCOMTR-MCNC: 130 MG/DL (ref 70–99)
GLUCOSE BLDC GLUCOMTR-MCNC: 130 MG/DL (ref 70–99)
GLUCOSE BLDC GLUCOMTR-MCNC: 134 MG/DL (ref 70–99)
GLUCOSE BLDC GLUCOMTR-MCNC: 134 MG/DL (ref 70–99)
GLUCOSE BLDC GLUCOMTR-MCNC: 144 MG/DL (ref 70–99)
GLUCOSE BLDC GLUCOMTR-MCNC: 158 MG/DL (ref 70–99)
GLUCOSE BLDC GLUCOMTR-MCNC: 159 MG/DL (ref 70–99)
GLUCOSE BLDC GLUCOMTR-MCNC: 161 MG/DL (ref 70–99)
GLUCOSE BLDC GLUCOMTR-MCNC: 163 MG/DL (ref 70–99)
GLUCOSE BLDC GLUCOMTR-MCNC: 169 MG/DL (ref 70–99)
GLUCOSE SERPL-MCNC: 109 MG/DL (ref 70–99)
GLUCOSE SERPL-MCNC: 148 MG/DL (ref 70–99)
GLUCOSE SERPL-MCNC: 148 MG/DL (ref 70–99)
GLUCOSE SERPL-MCNC: 162 MG/DL (ref 70–99)
GRAM STAIN RESULT: ABNORMAL
HCO3 BLD-SCNC: 21 MMOL/L (ref 21–28)
HCO3 BLD-SCNC: 21 MMOL/L (ref 21–28)
HCO3 BLD-SCNC: 22 MMOL/L (ref 21–28)
HCO3 BLD-SCNC: 23 MMOL/L (ref 21–28)
HCO3 BLD-SCNC: 24 MMOL/L (ref 21–28)
HCO3 BLDA-SCNC: 23 MMOL/L (ref 21–28)
HCO3 BLDA-SCNC: 24 MMOL/L (ref 21–28)
HCO3 BLDV-SCNC: 24 MMOL/L (ref 21–28)
HCO3 BLDV-SCNC: 25 MMOL/L (ref 21–28)
HCO3 SERPL-SCNC: 19 MMOL/L (ref 22–29)
HCO3 SERPL-SCNC: 20 MMOL/L (ref 22–29)
HCO3 SERPL-SCNC: 21 MMOL/L (ref 22–29)
HCO3 SERPL-SCNC: 21 MMOL/L (ref 22–29)
HCT VFR BLD AUTO: 28 % (ref 40–53)
HCT VFR BLD AUTO: 28.3 % (ref 40–53)
HCT VFR BLD AUTO: 28.3 % (ref 40–53)
HCT VFR BLD AUTO: 29.2 % (ref 40–53)
HCT VFR BLD AUTO: 41.2 % (ref 40–53)
HGB BLD-MCNC: 13 G/DL (ref 13.3–17.7)
HGB BLD-MCNC: 8.9 G/DL (ref 13.3–17.7)
HGB BLD-MCNC: 9 G/DL (ref 13.3–17.7)
HGB BLD-MCNC: 9.1 G/DL (ref 13.3–17.7)
HGB BLD-MCNC: 9.3 G/DL (ref 13.3–17.7)
HGB FREE PLAS-MCNC: 50 MG/DL
INR PPP: 1.21 (ref 0.85–1.15)
INR PPP: 1.26 (ref 0.85–1.15)
INR PPP: 1.26 (ref 0.85–1.15)
INR PPP: 1.29 (ref 0.85–1.15)
INTERPRETATION TEGPIA: NORMAL
LACTATE SERPL-SCNC: 0.8 MMOL/L (ref 0.7–2)
LACTATE SERPL-SCNC: 1.1 MMOL/L (ref 0.7–2)
LDH SERPL L TO P-CCNC: 403 U/L (ref 0–250)
LIDOCAIN SERPL-MCNC: 2.5 UG/ML
LYMPHOCYTES # BLD MANUAL: 5.8 10E3/UL (ref 0.8–5.3)
LYMPHOCYTES NFR BLD MANUAL: 39 %
MAGNESIUM SERPL-MCNC: 2.3 MG/DL (ref 1.7–2.3)
MAGNESIUM SERPL-MCNC: 2.4 MG/DL (ref 1.7–2.3)
MCH RBC QN AUTO: 26.9 PG (ref 26.5–33)
MCH RBC QN AUTO: 26.9 PG (ref 26.5–33)
MCH RBC QN AUTO: 27.4 PG (ref 26.5–33)
MCH RBC QN AUTO: 27.4 PG (ref 26.5–33)
MCH RBC QN AUTO: 27.8 PG (ref 26.5–33)
MCHC RBC AUTO-ENTMCNC: 31.6 G/DL (ref 31.5–36.5)
MCHC RBC AUTO-ENTMCNC: 31.8 G/DL (ref 31.5–36.5)
MCHC RBC AUTO-ENTMCNC: 32.2 G/DL (ref 31.5–36.5)
MCV RBC AUTO: 85 FL (ref 78–100)
MCV RBC AUTO: 86 FL (ref 78–100)
MCV RBC AUTO: 88 FL (ref 78–100)
METAMYELOCYTES # BLD MANUAL: 0.4 10E3/UL
METAMYELOCYTES NFR BLD MANUAL: 3 %
MONOCYTES # BLD MANUAL: 1.2 10E3/UL (ref 0–1.3)
MONOCYTES NFR BLD MANUAL: 8 %
MYELOCYTES # BLD MANUAL: 0.1 10E3/UL
MYELOCYTES NFR BLD MANUAL: 1 %
NEUTROPHILS # BLD MANUAL: 7 10E3/UL (ref 1.6–8.3)
NEUTROPHILS NFR BLD MANUAL: 47 %
NSE SERPL IA-MCNC: 105 NG/ML
NSE SERPL IA-MCNC: 27 NG/ML
NSE SERPL IA-MCNC: 91.7 NG/ML
O2/TOTAL GAS SETTING VFR VENT: 60 %
O2/TOTAL GAS SETTING VFR VENT: 70 %
O2/TOTAL GAS SETTING VFR VENT: 80 %
O2/TOTAL GAS SETTING VFR VENT: 80 %
O2/TOTAL GAS SETTING VFR VENT: 90 %
OXYHGB MFR BLDA: 91 % (ref 92–100)
OXYHGB MFR BLDA: 91 % (ref 92–100)
OXYHGB MFR BLDA: 92 % (ref 92–100)
OXYHGB MFR BLDA: 93 % (ref 92–100)
OXYHGB MFR BLDA: 93 % (ref 92–100)
OXYHGB MFR BLDA: 94 % (ref 92–100)
OXYHGB MFR BLDA: 95 % (ref 92–100)
OXYHGB MFR BLDA: 96 % (ref 92–100)
OXYHGB MFR BLDA: 98 % (ref 75–100)
OXYHGB MFR BLDA: 98 % (ref 92–100)
OXYHGB MFR BLDA: 99 % (ref 75–100)
OXYHGB MFR BLDV: 79 %
OXYHGB MFR BLDV: 86 %
PA AA BLD-ACNC: 43 %
PA ADP BLD-ACNC: 86 %
PATH REV: ABNORMAL
PCO2 BLD: 34 MM HG (ref 35–45)
PCO2 BLD: 34 MM HG (ref 35–45)
PCO2 BLD: 35 MM HG (ref 35–45)
PCO2 BLD: 36 MM HG (ref 35–45)
PCO2 BLD: 37 MM HG (ref 35–45)
PCO2 BLD: 37 MM HG (ref 35–45)
PCO2 BLD: 38 MM HG (ref 35–45)
PCO2 BLD: 39 MM HG (ref 35–45)
PCO2 BLD: 40 MM HG (ref 35–45)
PCO2 BLDA: 42 MM HG (ref 35–45)
PCO2 BLDA: 43 MM HG (ref 35–45)
PCO2 BLDV: 45 MM HG (ref 40–50)
PCO2 BLDV: 45 MM HG (ref 40–50)
PEEP: 8 CM H2O
PH BLD: 7.36 [PH] (ref 7.35–7.45)
PH BLD: 7.37 [PH] (ref 7.35–7.45)
PH BLD: 7.37 [PH] (ref 7.35–7.45)
PH BLD: 7.38 [PH] (ref 7.35–7.45)
PH BLD: 7.39 [PH] (ref 7.35–7.45)
PH BLD: 7.4 [PH] (ref 7.35–7.45)
PH BLD: 7.4 [PH] (ref 7.35–7.45)
PH BLD: 7.41 [PH] (ref 7.35–7.45)
PH BLDA: 7.34 [PH] (ref 7.35–7.45)
PH BLDA: 7.36 [PH] (ref 7.35–7.45)
PH BLDV: 7.33 [PH] (ref 7.32–7.43)
PH BLDV: 7.35 [PH] (ref 7.32–7.43)
PHOSPHATE SERPL-MCNC: 3.5 MG/DL (ref 2.5–4.5)
PHOSPHATE SERPL-MCNC: 4.2 MG/DL (ref 2.5–4.5)
PLAT MORPH BLD: NORMAL
PLATELET # BLD AUTO: 254 10E3/UL (ref 150–450)
PLATELET # BLD AUTO: 258 10E3/UL (ref 150–450)
PLATELET # BLD AUTO: 261 10E3/UL (ref 150–450)
PLATELET # BLD AUTO: 268 10E3/UL (ref 150–450)
PLATELET # BLD AUTO: 379 10E3/UL (ref 150–450)
PO2 BLD: 127 MM HG (ref 80–105)
PO2 BLD: 60 MM HG (ref 80–105)
PO2 BLD: 62 MM HG (ref 80–105)
PO2 BLD: 66 MM HG (ref 80–105)
PO2 BLD: 69 MM HG (ref 80–105)
PO2 BLD: 70 MM HG (ref 80–105)
PO2 BLD: 72 MM HG (ref 80–105)
PO2 BLD: 75 MM HG (ref 80–105)
PO2 BLD: 75 MM HG (ref 80–105)
PO2 BLD: 79 MM HG (ref 80–105)
PO2 BLD: 79 MM HG (ref 80–105)
PO2 BLD: 83 MM HG (ref 80–105)
PO2 BLDA: 232 MM HG (ref 80–105)
PO2 BLDA: 381 MM HG (ref 80–105)
PO2 BLDV: 48 MM HG (ref 25–47)
PO2 BLDV: 55 MM HG (ref 25–47)
POTASSIUM SERPL-SCNC: 3.8 MMOL/L (ref 3.4–5.3)
POTASSIUM SERPL-SCNC: 3.8 MMOL/L (ref 3.4–5.3)
POTASSIUM SERPL-SCNC: 4.1 MMOL/L (ref 3.4–5.3)
POTASSIUM SERPL-SCNC: 4.1 MMOL/L (ref 3.4–5.3)
PROT SERPL-MCNC: 5.9 G/DL (ref 6.4–8.3)
PROT SERPL-MCNC: 6.1 G/DL (ref 6.4–8.3)
PROT SERPL-MCNC: 6.2 G/DL (ref 6.4–8.3)
PROT SERPL-MCNC: 6.2 G/DL (ref 6.4–8.3)
PROTHROMBIN TIME: 15.6 SECONDS (ref 11.8–14.8)
PROTHROMBIN TIME: 16.1 SECONDS (ref 11.8–14.8)
PROTHROMBIN TIME: 16.1 SECONDS (ref 11.8–14.8)
PROTHROMBIN TIME: 16.4 SECONDS (ref 11.8–14.8)
RBC # BLD AUTO: 3.31 10E6/UL (ref 4.4–5.9)
RBC # BLD AUTO: 3.32 10E6/UL (ref 4.4–5.9)
RBC # BLD AUTO: 3.34 10E6/UL (ref 4.4–5.9)
RBC # BLD AUTO: 3.39 10E6/UL (ref 4.4–5.9)
RBC # BLD AUTO: 4.67 10E6/UL (ref 4.4–5.9)
RBC MORPH BLD: NORMAL
SAO2 % BLDA: 92.4 % (ref 95–96)
SAO2 % BLDA: 92.9 % (ref 95–96)
SAO2 % BLDA: 94.3 % (ref 95–96)
SAO2 % BLDA: 95 % (ref 95–96)
SAO2 % BLDA: 95.2 % (ref 95–96)
SAO2 % BLDA: 95.8 % (ref 95–96)
SAO2 % BLDA: 96.2 % (ref 95–96)
SAO2 % BLDA: 96.3 % (ref 95–96)
SAO2 % BLDA: 96.5 % (ref 95–96)
SAO2 % BLDA: 96.7 % (ref 95–96)
SAO2 % BLDA: 97.3 % (ref 95–96)
SAO2 % BLDA: 99.6 % (ref 95–96)
SAO2 % BLDA: >100 % (ref 95–96)
SAO2 % BLDA: >100 % (ref 95–96)
SAO2 % BLDV: 81.2 % (ref 70–75)
SAO2 % BLDV: 87.6 % (ref 70–75)
SODIUM SERPL-SCNC: 139 MMOL/L (ref 135–145)
SODIUM SERPL-SCNC: 140 MMOL/L (ref 135–145)
SODIUM SERPL-SCNC: 141 MMOL/L (ref 135–145)
SODIUM SERPL-SCNC: 141 MMOL/L (ref 135–145)
SPECIMEN EXP DATE BLD: NORMAL
T3 SERPL-MCNC: 65 NG/DL (ref 85–202)
T3FREE SERPL-MCNC: 1.7 PG/ML (ref 2–4.4)
T4 FREE SERPL-MCNC: 1.08 NG/DL (ref 0.9–1.7)
TROPONIN T SERPL HS-MCNC: 1004 NG/L
TROPONIN T SERPL HS-MCNC: 1019 NG/L
TROPONIN T SERPL HS-MCNC: 1098 NG/L
TROPONIN T SERPL HS-MCNC: 973 NG/L
TSH SERPL DL<=0.005 MIU/L-ACNC: 0.05 UIU/ML (ref 0.3–4.2)
UFH PPP CHRO-ACNC: 0.61 IU/ML (ref ?–1.1)
UFH PPP CHRO-ACNC: 0.67 IU/ML (ref ?–1.1)
UFH PPP CHRO-ACNC: 0.68 IU/ML (ref ?–1.1)
UFH PPP CHRO-ACNC: 0.79 IU/ML (ref ?–1.1)
WBC # BLD AUTO: 13.4 10E3/UL (ref 4–11)
WBC # BLD AUTO: 14.5 10E3/UL (ref 4–11)
WBC # BLD AUTO: 14.8 10E3/UL (ref 4–11)
WBC # BLD AUTO: 15 10E3/UL (ref 4–11)
WBC # BLD AUTO: 15.1 10E3/UL (ref 4–11)

## 2025-07-21 PROCEDURE — 82805 BLOOD GASES W/O2 SATURATION: CPT | Performed by: STUDENT IN AN ORGANIZED HEALTH CARE EDUCATION/TRAINING PROGRAM

## 2025-07-21 PROCEDURE — 85730 THROMBOPLASTIN TIME PARTIAL: CPT | Performed by: STUDENT IN AN ORGANIZED HEALTH CARE EDUCATION/TRAINING PROGRAM

## 2025-07-21 PROCEDURE — 85027 COMPLETE CBC AUTOMATED: CPT | Performed by: STUDENT IN AN ORGANIZED HEALTH CARE EDUCATION/TRAINING PROGRAM

## 2025-07-21 PROCEDURE — 85652 RBC SED RATE AUTOMATED: CPT | Performed by: STUDENT IN AN ORGANIZED HEALTH CARE EDUCATION/TRAINING PROGRAM

## 2025-07-21 PROCEDURE — 80053 COMPREHEN METABOLIC PANEL: CPT | Performed by: STUDENT IN AN ORGANIZED HEALTH CARE EDUCATION/TRAINING PROGRAM

## 2025-07-21 PROCEDURE — 82805 BLOOD GASES W/O2 SATURATION: CPT | Performed by: INTERNAL MEDICINE

## 2025-07-21 PROCEDURE — 83605 ASSAY OF LACTIC ACID: CPT | Performed by: STUDENT IN AN ORGANIZED HEALTH CARE EDUCATION/TRAINING PROGRAM

## 2025-07-21 PROCEDURE — 84481 FREE ASSAY (FT-3): CPT | Performed by: STUDENT IN AN ORGANIZED HEALTH CARE EDUCATION/TRAINING PROGRAM

## 2025-07-21 PROCEDURE — 94003 VENT MGMT INPAT SUBQ DAY: CPT

## 2025-07-21 PROCEDURE — 86923 COMPATIBILITY TEST ELECTRIC: CPT | Performed by: STUDENT IN AN ORGANIZED HEALTH CARE EDUCATION/TRAINING PROGRAM

## 2025-07-21 PROCEDURE — 83051 HEMOGLOBIN PLASMA: CPT | Performed by: STUDENT IN AN ORGANIZED HEALTH CARE EDUCATION/TRAINING PROGRAM

## 2025-07-21 PROCEDURE — 200N000002 HC R&B ICU UMMC

## 2025-07-21 PROCEDURE — 85014 HEMATOCRIT: CPT | Performed by: STUDENT IN AN ORGANIZED HEALTH CARE EDUCATION/TRAINING PROGRAM

## 2025-07-21 PROCEDURE — 82947 ASSAY GLUCOSE BLOOD QUANT: CPT | Performed by: STUDENT IN AN ORGANIZED HEALTH CARE EDUCATION/TRAINING PROGRAM

## 2025-07-21 PROCEDURE — 70450 CT HEAD/BRAIN W/O DYE: CPT

## 2025-07-21 PROCEDURE — 87070 CULTURE OTHR SPECIMN AEROBIC: CPT | Performed by: STUDENT IN AN ORGANIZED HEALTH CARE EDUCATION/TRAINING PROGRAM

## 2025-07-21 PROCEDURE — 85379 FIBRIN DEGRADATION QUANT: CPT | Performed by: STUDENT IN AN ORGANIZED HEALTH CARE EDUCATION/TRAINING PROGRAM

## 2025-07-21 PROCEDURE — 85610 PROTHROMBIN TIME: CPT | Performed by: STUDENT IN AN ORGANIZED HEALTH CARE EDUCATION/TRAINING PROGRAM

## 2025-07-21 PROCEDURE — 85520 HEPARIN ASSAY: CPT | Performed by: STUDENT IN AN ORGANIZED HEALTH CARE EDUCATION/TRAINING PROGRAM

## 2025-07-21 PROCEDURE — 86140 C-REACTIVE PROTEIN: CPT | Performed by: STUDENT IN AN ORGANIZED HEALTH CARE EDUCATION/TRAINING PROGRAM

## 2025-07-21 PROCEDURE — 85347 COAGULATION TIME ACTIVATED: CPT

## 2025-07-21 PROCEDURE — 83735 ASSAY OF MAGNESIUM: CPT | Performed by: STUDENT IN AN ORGANIZED HEALTH CARE EDUCATION/TRAINING PROGRAM

## 2025-07-21 PROCEDURE — 33949 ECMO/ECLS DAILY MGMT ARTERY: CPT

## 2025-07-21 PROCEDURE — 99291 CRITICAL CARE FIRST HOUR: CPT | Mod: 25 | Performed by: NURSE PRACTITIONER

## 2025-07-21 PROCEDURE — 84100 ASSAY OF PHOSPHORUS: CPT | Performed by: HOSPITALIST

## 2025-07-21 PROCEDURE — 82330 ASSAY OF CALCIUM: CPT | Performed by: STUDENT IN AN ORGANIZED HEALTH CARE EDUCATION/TRAINING PROGRAM

## 2025-07-21 PROCEDURE — 85300 ANTITHROMBIN III ACTIVITY: CPT | Performed by: STUDENT IN AN ORGANIZED HEALTH CARE EDUCATION/TRAINING PROGRAM

## 2025-07-21 PROCEDURE — 999N000157 HC STATISTIC RCP TIME EA 10 MIN

## 2025-07-21 PROCEDURE — 250N000013 HC RX MED GY IP 250 OP 250 PS 637: Performed by: STUDENT IN AN ORGANIZED HEALTH CARE EDUCATION/TRAINING PROGRAM

## 2025-07-21 PROCEDURE — 84484 ASSAY OF TROPONIN QUANT: CPT | Performed by: STUDENT IN AN ORGANIZED HEALTH CARE EDUCATION/TRAINING PROGRAM

## 2025-07-21 PROCEDURE — 84100 ASSAY OF PHOSPHORUS: CPT | Performed by: STUDENT IN AN ORGANIZED HEALTH CARE EDUCATION/TRAINING PROGRAM

## 2025-07-21 PROCEDURE — 80176 ASSAY OF LIDOCAINE: CPT | Performed by: INTERNAL MEDICINE

## 2025-07-21 PROCEDURE — 85384 FIBRINOGEN ACTIVITY: CPT | Performed by: STUDENT IN AN ORGANIZED HEALTH CARE EDUCATION/TRAINING PROGRAM

## 2025-07-21 PROCEDURE — 99291 CRITICAL CARE FIRST HOUR: CPT | Mod: FS | Performed by: NURSE PRACTITIONER

## 2025-07-21 PROCEDURE — 70450 CT HEAD/BRAIN W/O DYE: CPT | Mod: 26 | Performed by: RADIOLOGY

## 2025-07-21 PROCEDURE — 250N000011 HC RX IP 250 OP 636: Performed by: NURSE PRACTITIONER

## 2025-07-21 PROCEDURE — 84155 ASSAY OF PROTEIN SERUM: CPT | Performed by: STUDENT IN AN ORGANIZED HEALTH CARE EDUCATION/TRAINING PROGRAM

## 2025-07-21 PROCEDURE — 84480 ASSAY TRIIODOTHYRONINE (T3): CPT | Performed by: STUDENT IN AN ORGANIZED HEALTH CARE EDUCATION/TRAINING PROGRAM

## 2025-07-21 PROCEDURE — 84439 ASSAY OF FREE THYROXINE: CPT | Performed by: STUDENT IN AN ORGANIZED HEALTH CARE EDUCATION/TRAINING PROGRAM

## 2025-07-21 PROCEDURE — 99292 CRITICAL CARE ADDL 30 MIN: CPT | Performed by: PSYCHIATRY & NEUROLOGY

## 2025-07-21 PROCEDURE — 999N000185 HC STATISTIC TRANSPORT TIME EA 15 MIN

## 2025-07-21 PROCEDURE — 71045 X-RAY EXAM CHEST 1 VIEW: CPT

## 2025-07-21 PROCEDURE — 86900 BLOOD TYPING SEROLOGIC ABO: CPT | Performed by: STUDENT IN AN ORGANIZED HEALTH CARE EDUCATION/TRAINING PROGRAM

## 2025-07-21 PROCEDURE — 250N000011 HC RX IP 250 OP 636: Performed by: STUDENT IN AN ORGANIZED HEALTH CARE EDUCATION/TRAINING PROGRAM

## 2025-07-21 PROCEDURE — 250N000013 HC RX MED GY IP 250 OP 250 PS 637: Performed by: NURSE PRACTITIONER

## 2025-07-21 PROCEDURE — 250N000013 HC RX MED GY IP 250 OP 250 PS 637: Performed by: INTERNAL MEDICINE

## 2025-07-21 PROCEDURE — 71045 X-RAY EXAM CHEST 1 VIEW: CPT | Mod: 26 | Performed by: RADIOLOGY

## 2025-07-21 PROCEDURE — 83615 LACTATE (LD) (LDH) ENZYME: CPT | Performed by: STUDENT IN AN ORGANIZED HEALTH CARE EDUCATION/TRAINING PROGRAM

## 2025-07-21 PROCEDURE — 84443 ASSAY THYROID STIM HORMONE: CPT | Performed by: STUDENT IN AN ORGANIZED HEALTH CARE EDUCATION/TRAINING PROGRAM

## 2025-07-21 RX ORDER — POTASSIUM CHLORIDE 29.8 MG/ML
20 INJECTION INTRAVENOUS ONCE
Status: COMPLETED | OUTPATIENT
Start: 2025-07-21 | End: 2025-07-21

## 2025-07-21 RX ORDER — BUMETANIDE 0.25 MG/ML
1 INJECTION, SOLUTION INTRAMUSCULAR; INTRAVENOUS ONCE
Status: COMPLETED | OUTPATIENT
Start: 2025-07-21 | End: 2025-07-21

## 2025-07-21 RX ORDER — METOLAZONE 5 MG/1
5 TABLET ORAL ONCE
Status: COMPLETED | OUTPATIENT
Start: 2025-07-21 | End: 2025-07-21

## 2025-07-21 RX ORDER — POTASSIUM CHLORIDE 1.5 G/1.58G
20 POWDER, FOR SOLUTION ORAL ONCE
Status: COMPLETED | OUTPATIENT
Start: 2025-07-21 | End: 2025-07-21

## 2025-07-21 RX ORDER — BUMETANIDE 0.25 MG/ML
2 INJECTION, SOLUTION INTRAMUSCULAR; INTRAVENOUS ONCE
Status: COMPLETED | OUTPATIENT
Start: 2025-07-21 | End: 2025-07-21

## 2025-07-21 RX ADMIN — TICAGRELOR 90 MG: 90 TABLET ORAL at 19:58

## 2025-07-21 RX ADMIN — PROPOFOL 40 MCG/KG/MIN: 10 INJECTION, EMULSION INTRAVENOUS at 03:06

## 2025-07-21 RX ADMIN — Medication 60 ML: at 21:52

## 2025-07-21 RX ADMIN — NICARDIPINE HYDROCHLORIDE 5 MG/HR: 0.2 INJECTION INTRAVENOUS at 11:53

## 2025-07-21 RX ADMIN — CHLORHEXIDINE GLUCONATE 15 ML: 1.2 SOLUTION ORAL at 07:42

## 2025-07-21 RX ADMIN — CEFTRIAXONE SODIUM 2 G: 2 INJECTION, POWDER, FOR SOLUTION INTRAMUSCULAR; INTRAVENOUS at 21:51

## 2025-07-21 RX ADMIN — PROPOFOL 30 MCG/KG/MIN: 10 INJECTION, EMULSION INTRAVENOUS at 10:58

## 2025-07-21 RX ADMIN — Medication 60 ML: at 14:01

## 2025-07-21 RX ADMIN — PROPOFOL 40 MCG/KG/MIN: 10 INJECTION, EMULSION INTRAVENOUS at 07:12

## 2025-07-21 RX ADMIN — TICAGRELOR 90 MG: 90 TABLET ORAL at 07:43

## 2025-07-21 RX ADMIN — POTASSIUM CHLORIDE 20 MEQ: 1.5 POWDER, FOR SOLUTION ORAL at 17:51

## 2025-07-21 RX ADMIN — Medication 40 MG: at 07:44

## 2025-07-21 RX ADMIN — NICARDIPINE HYDROCHLORIDE 5 MG/HR: 0.2 INJECTION INTRAVENOUS at 07:05

## 2025-07-21 RX ADMIN — Medication 60 ML: at 10:59

## 2025-07-21 RX ADMIN — Medication 15 ML: at 07:42

## 2025-07-21 RX ADMIN — BUMETANIDE 2 MG: 0.25 INJECTION INTRAMUSCULAR; INTRAVENOUS at 15:05

## 2025-07-21 RX ADMIN — PROPOFOL 30 MCG/KG/MIN: 10 INJECTION, EMULSION INTRAVENOUS at 16:42

## 2025-07-21 RX ADMIN — PROPOFOL 30 MCG/KG/MIN: 10 INJECTION, EMULSION INTRAVENOUS at 20:22

## 2025-07-21 RX ADMIN — ASPIRIN 81 MG CHEWABLE TABLET 81 MG: 81 TABLET CHEWABLE at 07:43

## 2025-07-21 RX ADMIN — NICARDIPINE HYDROCHLORIDE 10 MG/HR: 0.2 INJECTION INTRAVENOUS at 20:22

## 2025-07-21 RX ADMIN — HEPARIN SODIUM 1800 UNITS/HR: 10000 INJECTION, SOLUTION INTRAVENOUS at 15:05

## 2025-07-21 RX ADMIN — Medication 60 ML: at 07:44

## 2025-07-21 RX ADMIN — METOLAZONE 5 MG: 5 TABLET ORAL at 14:44

## 2025-07-21 RX ADMIN — CHLORHEXIDINE GLUCONATE 15 ML: 1.2 SOLUTION ORAL at 19:58

## 2025-07-21 RX ADMIN — Medication 60 ML: at 17:51

## 2025-07-21 RX ADMIN — POTASSIUM CHLORIDE 20 MEQ: 29.8 INJECTION, SOLUTION INTRAVENOUS at 11:57

## 2025-07-21 RX ADMIN — BUMETANIDE 1 MG: 0.25 INJECTION INTRAMUSCULAR; INTRAVENOUS at 09:09

## 2025-07-21 ASSESSMENT — ACTIVITIES OF DAILY LIVING (ADL)
ADLS_ACUITY_SCORE: 77

## 2025-07-21 NOTE — PROGRESS NOTES
[employer] [employer] Note:      Initial Referral:       Thank you for consulting [facility] to evaluate this patient for potential organ, tissue, and eye donation. [facility] will connect in person daily for updates and review the Electronic Medical Record until either the patient improves, or end of life decisions are made. Please connect with [facility] ([phone]) for the following:       Patient has a neuro decline or becomes unstable       Patient is to be tested for brain death        Family mentions donation       Family Care Conference anticipated       Family expresses end of life decision       Prior to extubation or moves to comfort cares       Currently there is no contraindication to donation. As always, a referral to [facility] is not an indication of prognosis but satisfies CMS conditions of participation for timely referral.       Referral Number:   [id number]    This patient is FPA (First person authorized) (Y/N/Unk): No     Thank you for the referral of this patient

## 2025-07-21 NOTE — PROGRESS NOTES
ECMO Attending Progress Note  2025    oBb Echols is a 70 year old male who was cannulated for ECMO 2025 due to refractory VF arrest in setting of acute stent thrombosis.     Cannulation Site:  17 Fr in the R femoral artery  25 Fr in the R femoral vein    Interval events: no VT, Lidocaine down to 0.5    Pulsatilty (IABP paused if applicable):60 mmHG     Physical Exam:  Temp:  [97.9  F (36.6  C)-99.9  F (37.7  C)] 98.8  F (37.1  C)  Pulse:  [] 91  Resp:  [10-27] 14  MAP:  [73 mmHg-92 mmHg] 78 mmHg  Arterial Line BP: (118-150)/(53-69) 126/59  FiO2 (%):  [60 %-70 %] 70 %  SpO2:  [91 %-99 %] 97 %    Intake/Output Summary (Last 24 hours) at 2025 1450  Last data filed at 2025 1400  Gross per 24 hour   Intake 3748.1 ml   Output 2545 ml   Net 1203.1 ml    FiO2 (%): 70 %, Resp: 14, Vent Mode: VC/AC, Resp Rate (Set): 12 breaths/min, Tidal Volume (Set, mL): 400 mL, PEEP (cm H2O): 8 cmH2O, Resp Rate (Set): 12 breaths/min, Tidal Volume (Set, mL): 400 mL, PEEP (cm H2O): 8 cmH2O     Labs:  Recent Labs   Lab 25  1359 25  1152 25  1022 25  0757   PH 7.41 7.40 7.39 7.39   PCO2 35 34* 34* 37   PO2 69* 66* 72* 60*   HCO3 22    O2PER 70 60 60 60      Recent Labs   Lab 25  1022 25  0352 25  2157 25  1558   WBC 15.0* 14.5* 16.0* 14.4*   HGB 9.3* 9.1* 9.8* 10.3*     Creatinine   Date Value Ref Range Status   2025 1.75 (H) 0.67 - 1.17 mg/dL Final   2025 1.89 (H) 0.67 - 1.17 mg/dL Final   2025 2.05 (H) 0.67 - 1.17 mg/dL Final   2025 2.22 (H) 0.67 - 1.17 mg/dL Final   2013 0.90 0.66 - 1.25 mg/dL Final   2013 1.00 0.66 - 1.25 mg/dL Final   2013 1.06 0.66 - 1.25 mg/dL Final   2013 1.13 0.66 - 1.25 mg/dL Final       Blood Flow (Circuit) LPM: 3.75 LPM  Sweep LPM: 1 LPM  Sweep FiO2   %: (S) 80 %  ACT  (seconds): 174 seconds  Pulse Oximetry  (SpO2%): 95 %  Arterial Pressure  mmH mmHg      ECMO Issues  including assessments and plan on DOS 7/21/2025:  Neuro: Sedated for mechanical ventilation and ECMO.  No acute distress.  NIRS stable b/l  RASS goal: -3  CV: Cardiogenic shock.  Hemodynamically stable off pressors  Pulm: Keep vent settings at rest settings as above.  FEN/Renal: Electrolytes stable w/ replacement protocols in place, Cr stable, UOP stable  Heme: ACT goal: 180-200, Hemoglobin stable.  Minimal oozing around the ECMO cannulas.  ID: Receiving empiric antibiotics  Cannulae: Position is acceptable on exam and the available imaging.  Distal perfusion cannula is in place and patent.  Extremities are well-perfused.     I have personally reviewed the ECMO flows, oxygenation and CO2 clearance, anticoagulation, and cannula position.  I have also personally assessed the patient's systemic response with hemodynamics, oxygenation, ventilation, and bleeding.       The patient requires continued ECMO support and management in the ICU.  I have discussed patient care and treatment plan with the primary team.      Ilhwan Yeo, MD  Critical Care Cardiology    July 21, 2025

## 2025-07-21 NOTE — PROGRESS NOTES
Neurocritical Care Progress Note    Reason for critical care admission: Cardiac Arrest  Consulting Team: CICU  Date of Service:  07/21/2025  Date of Admission:  7/18/2025  Hospital Day: 4    Assessment/Plan  Bob Echols is a 70 year old male with a past medical history of HTN, DM2, and DEMOND.  Admission for inferior STEMI on 7/10 RENA to the distal LCx discharged on 7/14.  Had been doing well at home then 7/18 when he was found to be apneic and pulseless. EMS was called at 1537, arrived and started compressions at 1542, cannulated for [facility] ECMO at 1614.      24 hour events:    Neuro  #Post-arrest cerebral edema  #Encephalopathy, multifactorial  #Abnormal EEG, GPD without clear evolution  #Persistent generalized muscle tremoring  Post arrest day: 3  Length of downtime: unclear, appears about 10-15 min unwitnessed and then perhaps another 5 minutes without intervention while waiting for ambulance, ECMO started 32 minutes into resuscitation (although family reported to nurse that he may have been down for an hour before being found)  -Witnessed arrest: No  -ROSC: No, [facility] ECMO  -recommend weaning sedation to off if tolerates    Analgesics & sedation  Current sedation: fentanyl 50mcg/hr and propofol 30 mcg/kg/min (being weaned)    Neuroimaging  -Day 1 CTH 7/18: Mildly diminished gray-white differentiation throughout the cerebral hemispheres concerning for hypoxic ischemic injury  -Day 3 CT head 7/21-await formal read but appears to have worse blurring of gray-white differentiation as well as worse cerebral edema, do not see herniation    Neurophysiology  -continue vEEG  -vEEG 7/20 (read on 7/21) shows: EEG supports the presence of a severe encephalopathy. Generalized periodic discharges are variably present and are consistent with anoxic encephalopathy. No seizure occur.     Biomarkers  -Neuron-specific Enolase (NSE) results: pending   -S 100B protein: pending     Recommendations  -Avoid hypotonic solutions as they may  "worsen cerebral edema   -Avoid nitroprusside or nitroglycerin as they may also worsen cerbral edema  -Advise slow correction of hypernatremia if needed  -When safe to do so, please limit use of CNS acting medications such as benzodiazepines, opiates, anticholinergics, which will permit a more accurate neurological assessment    Clinically Significant Risk Factors          # Hyperchloremia: Highest Cl = 108 mmol/L in last 2 days, will monitor as appropriate          # Hypoalbuminemia: Lowest albumin = 2.7 g/dL at 7/21/2025  3:52 AM, will monitor as appropriate    # Coagulation Defect: INR = 1.26 (Ref range: 0.85 - 1.15) and/or PTT = 122 Seconds (Ref range: 22 - 38 Seconds), will monitor for bleeding        # Acute Hypoxic Respiratory Failure: Based on blood gas results. Continue supplemental oxygen as needed  # Acute Hypercapnic Respiratory Failure: based on arterial blood gas results.  Continue supplemental oxygen and ventilatory support as indicated.  # Acute Hypercapnic Respiratory Failure: based on venous blood gas results.  Continue supplemental oxygen and ventilatory support as indicated.         # Obesity: Estimated body mass index is 36.5 kg/m  as calculated from the following:    Height as of this encounter: 1.778 m (5' 10\").    Weight as of this encounter: 115.4 kg (254 lb 6.6 oz)., PRESENT ON ADMISSION     # Financial/Environmental Concerns: none          I spent 35 minutes of critical care time on the unit/floor managing the care of Bob Echols. Upon evaluation, this patient had a high probability of imminent or life-threatening deterioration due to post arrest anoxia, which required my direct attention, intervention, and personal management . Greater than 50% of my time was spent at the bedside counseling the patient and/or coordinating care including chart review, history, exam, documentation, and further activities per this note. I have personally reviewed the following data/imaging over the " past 24 hours.     The patient was discussed with the NCC attending, Dr. Luna.    Darleen Concepcion CNP  Neurocritical care    History of Present Illness:  Bob Echols is a 70 year old male with a past medical history of HTN, DM2, and DEMOND.  Admission for inferior STEMI on 7/10 RENA to the distal LCx discharged on 7/14.  Had been doing well at home then 7/18 when he was found to be apneic and pulseless with estimated down time ~10-15 min. EMS was called at 1537, arrived and started compressions at 1542, cannulated for [facility] ECMO at 1614.  Remains in CICU on [facility] ECMO, ventilator, sedated at this time    No Known Allergies    Current Medications:  Current Facility-Administered Medications   Medication Dose Route Frequency Provider Last Rate Last Admin    aspirin (ASA) chewable tablet 81 mg  81 mg Oral or Feeding Tube Daily Matt Nugent MD   81 mg at 07/21/25 0743    cefTRIAXone (ROCEPHIN) 2 g vial to attach to  ml bag for ADULTS or NS 50 ml bag for PEDS  2 g Intravenous Q24H Matt Nugent MD   2 g at 07/20/25 2237    chlorhexidine (PERIDEX) 0.12 % solution 15 mL  15 mL Mouth/Throat Q12H Matt Nugent MD   15 mL at 07/21/25 0742    multivitamins w/minerals liquid 15 mL  15 mL Per Feeding Tube Daily Sidney Lozada MD   15 mL at 07/21/25 0742    pantoprazole (PROTONIX) 2 mg/mL suspension 40 mg  40 mg Oral or Feeding Tube Daily Epifanio Beckwith MD   40 mg at 07/21/25 0744    polyethylene glycol (MIRALAX) Packet 17 g  17 g Oral or Feeding Tube Daily Matt Nugent MD        Prosource TF20 ENfit Compatibl EN LIQD (PROSOURCE TF20) packet 60 mL  1 packet Per Feeding Tube 5x Daily Sidney Lozada MD   60 mL at 07/21/25 0744    ticagrelor (BRILINTA) tablet 90 mg  90 mg Oral or Feeding Tube BID Matt Nugent MD   90 mg at 07/21/25 0743       PRN Medications:  Current Facility-Administered Medications   Medication Dose Route Frequency Provider Last Rate Last Admin    artificial tears  ophthalmic ointment   Both Eyes Q8H PRN Matt Nugent MD        calcium chloride 1 g in sodium chloride 0.9 % 100 mL intermittent infusion  1 g Intravenous Q6H PRN Matt Nugent MD   1 g at 07/19/25 0448    dextrose 10% infusion   Intravenous Continuous PRN Sidney Lozada MD        dextrose 10% infusion   Intravenous Continuous PRN Matt Nugent MD        glucose gel 15-30 g  15-30 g Oral Q15 Min PRN Matt Nugent MD        Or    dextrose 50 % injection 25-50 mL  25-50 mL Intravenous Q15 Min PRN Matt Nugent MD        Or    glucagon injection 1 mg  1 mg Subcutaneous Q15 Min PRN Mtat Nugent MD        heparin ANTICOAGULANT bolus dose from infusion pump 388-776 Units  5-10 Units/kg (Ideal) Other Q30 Min PRN Matt Nugent MD        lidocaine (LMX4) cream   Topical Q1H PRN Matt Nugent MD        lidocaine 1 % 0.1-1 mL  0.1-1 mL Other Q1H PRN Matt Nugent MD        propofol (DIPRIVAN) bolus from bag or syringe pump  10 mg Intravenous Q15 Min PRN Matt Nugent MD   10 mg at 07/19/25 0611    And    Medication Instruction   Does not apply Continuous PRN Matt Nugent MD        melatonin tablet 10 mg  10 mg Oral or Feeding Tube At Bedtime PRN Sidney Lozada MD        naloxone (NARCAN) injection 0.2 mg  0.2 mg Intravenous Q2 Min PRN Epifanio Beckwith MD        Or    naloxone (NARCAN) injection 0.4 mg  0.4 mg Intravenous Q2 Min PRN Epifanio Beckwith MD        Or    naloxone (NARCAN) injection 0.2 mg  0.2 mg Intramuscular Q2 Min PRN Epifanio Beckwith MD        Or    naloxone (NARCAN) injection 0.4 mg  0.4 mg Intramuscular Q2 Min PRN Epifanio Beckwith MD        senna-docusate (SENOKOT-S/PERICOLACE) 8.6-50 MG per tablet 1-2 tablet  1-2 tablet Oral or Feeding Tube BID PRN Matt Nugent MD        sodium chloride (PF) 0.9% PF flush 3 mL  3 mL Intracatheter Q8H PRN Raffi, Matt, MD        sodium chloride (PF) 0.9% PF flush 3 mL  3 mL Intracatheter q1  "min prn Matt Nugent MD           Infusions:  Current Facility-Administered Medications   Medication Dose Route Frequency Provider Last Rate Last Admin    dextrose 10% infusion   Intravenous Continuous PRN Sidney Lozada MD        dextrose 10% infusion   Intravenous Continuous PRN Matt Nugent MD        fentaNYL (SUBLIMAZE) infusion   mcg/hr Intravenous Continuous Matt Nugent MD 1 mL/hr at 07/21/25 1000 50 mcg/hr at 07/21/25 1000    heparin 2 unit/mL in 0.9% NaCl (for REPERFUSION CATHETER)  3 mL/hr Intravenous Continuous Matt Nugent MD 3 mL/hr at 07/21/25 1000 3 mL/hr at 07/21/25 1000    heparin 25,000 units in 0.45% NaCl 250 mL ANTICOAGULANT infusion  10-50 Units/kg/hr (Ideal) Other Continuous Matt Nugent MD 17.5 mL/hr at 07/21/25 1000 1,750 Units/hr at 07/21/25 1000    insulin regular (MYXREDLIN) 1 unit/mL infusion  0-24 Units/hr Intravenous Continuous Matt Nugent MD 2 mL/hr at 07/21/25 1000 2 Units/hr at 07/21/25 1000    lidocaine 8 mg/mL infusion (ADULT STD)  0.5 mg/min Intravenous Continuous Sidney Lozada MD 3.8 mL/hr at 07/21/25 1044 0.5 mg/min at 07/21/25 1044    propofol (DIPRIVAN) infusion  5-75 mcg/kg/min (Dosing Weight) Intravenous Continuous Matt Nugent MD 20.8 mL/hr at 07/21/25 1000 30 mcg/kg/min at 07/21/25 1000    And    Medication Instruction   Does not apply Continuous PRN Matt Nugent MD        niCARdipine 40 mg in 200 mL NS (CARDENE) infusion  0.5-15 mg/hr Intravenous Continuous Matt Nugent MD 25 mL/hr at 07/21/25 1000 5 mg/hr at 07/21/25 1000    norepinephrine (LEVOPHED) 16 mg in  mL infusion MAX CONC CENTRAL LINE  0.01-0.6 mcg/kg/min (Dosing Weight) Intravenous Continuous Matt Nugent MD   Stopped at 07/19/25 1800     Physical Examination:  Vitals: Pulse 96   Temp 98.8  F (37.1  C)   Resp 21   Ht 1.778 m (5' 10\")   Wt 115.4 kg (254 lb 6.6 oz)   SpO2 96%   BMI 36.50 kg/m    General: Adult male " patient, lying in bed, critically-ill  HEENT: Normocephalic, atraumatic, no icterus, oral cavity/oropharynx pink and moist  Cardiac: SR with bedside ST elevation lead 2, [facility] ECMO  Pulm: unlabored, mechanical ventilator, ETT, overbreathing  Abdomen: rounded, rectal tube present  Extremities: pale  Psych: Comatose/sedated  Neuro:  Mental status: Comatose, sedated, not responding  Cranial nerves: PERRL right size 2.37 NPI 3.7 and left size 2.99 NPI 3.9, conjugate gaze, +cough, overbreaths  Motor: Normal bulk and tone. Fine tremor generalized throughout body including jaw clenching, unable to elicit movement in BUE or BLE  Sensory: no response in any extremity to painful stim  Gait: RASHAUN, deferred.    Labs and Imaging:    Recent Results (from the past 24 hours)   Blood gas arterial   Result Value Ref Range    pH Arterial 7.32 (L) 7.35 - 7.45    pCO2 Arterial 37 35 - 45 mm Hg    pO2 Arterial 87 80 - 105 mm Hg    FIO2 60     Bicarbonate Arterial 19 (L) 21 - 28 mmol/L    Base Excess/Deficit Arterial -6.6 (L) -3.0 - 3.0 mmol/L    Rei's Test Artline     Oxyhemoglobin Arterial 95 92 - 100 %    O2 Sat, Arterial 96.8 (H) 95.0 - 96.0 %    Narrative    In healthy individuals, oxyhemoglobin (O2Hb) and oxygen saturation (SO2) are approximately equal. In the presence of dyshemoglobins, oxyhemoglobin can be considerably lower than oxygen saturation.   Activated clotting time celite, POCT   Result Value Ref Range    Activated Clotting Time (Celite) POCT 186 (H) 74 - 150 seconds   Glucose by meter   Result Value Ref Range    GLUCOSE BY METER POCT 158 (H) 70 - 99 mg/dL   Blood gas arterial   Result Value Ref Range    pH Arterial 7.34 (L) 7.35 - 7.45    pCO2 Arterial 38 35 - 45 mm Hg    pO2 Arterial 86 80 - 105 mm Hg    FIO2 60     Bicarbonate Arterial 20 (L) 21 - 28 mmol/L    Base Excess/Deficit Arterial -5.0 (L) -3.0 - 3.0 mmol/L    Rei's Test Artline     Oxyhemoglobin Arterial 95 92 - 100 %    O2 Sat, Arterial 97.0 (H) 95.0 - 96.0 %     Narrative    In healthy individuals, oxyhemoglobin (O2Hb) and oxygen saturation (SO2) are approximately equal. In the presence of dyshemoglobins, oxyhemoglobin can be considerably lower than oxygen saturation.   Glucose by meter   Result Value Ref Range    GLUCOSE BY METER POCT 175 (H) 70 - 99 mg/dL   Blood gas arterial   Result Value Ref Range    pH Arterial 7.36 7.35 - 7.45    pCO2 Arterial 38 35 - 45 mm Hg    pO2 Arterial 81 80 - 105 mm Hg    FIO2 60     Bicarbonate Arterial 21 21 - 28 mmol/L    Base Excess/Deficit Arterial -3.9 (L) -3.0 - 3.0 mmol/L    Rei's Test Artline     Oxyhemoglobin Arterial 95 92 - 100 %    O2 Sat, Arterial 96.9 (H) 95.0 - 96.0 %    Narrative    In healthy individuals, oxyhemoglobin (O2Hb) and oxygen saturation (SO2) are approximately equal. In the presence of dyshemoglobins, oxyhemoglobin can be considerably lower than oxygen saturation.   CBC with platelets   Result Value Ref Range    WBC Count 14.4 (H) 4.0 - 11.0 10e3/uL    RBC Count 3.77 (L) 4.40 - 5.90 10e6/uL    Hemoglobin 10.3 (L) 13.3 - 17.7 g/dL    Hematocrit 32.4 (L) 40.0 - 53.0 %    MCV 86 78 - 100 fL    MCH 27.3 26.5 - 33.0 pg    MCHC 31.8 31.5 - 36.5 g/dL    RDW 13.3 10.0 - 15.0 %    Platelet Count 320 150 - 450 10e3/uL   Comprehensive metabolic panel   Result Value Ref Range    Sodium 139 135 - 145 mmol/L    Potassium 4.3 3.4 - 5.3 mmol/L    Carbon Dioxide (CO2) 19 (L) 22 - 29 mmol/L    Anion Gap 15 7 - 15 mmol/L    Urea Nitrogen 43.0 (H) 8.0 - 23.0 mg/dL    Creatinine 2.22 (H) 0.67 - 1.17 mg/dL    GFR Estimate 31 (L) >60 mL/min/1.73m2    Calcium 8.6 (L) 8.8 - 10.4 mg/dL    Chloride 105 98 - 107 mmol/L    Glucose 197 (H) 70 - 99 mg/dL    Alkaline Phosphatase 212 (H) 40 - 150 U/L    AST 91 (H) 0 - 45 U/L    ALT 94 (H) 0 - 70 U/L    Protein Total 6.4 6.4 - 8.3 g/dL    Albumin 2.9 (L) 3.5 - 5.2 g/dL    Bilirubin Total 0.3 <=1.2 mg/dL   Calcium ionized whole blood   Result Value Ref Range    Calcium Ionized Whole Blood 4.5  4.4 - 5.2 mg/dL   Glucose whole blood   Result Value Ref Range    Glucose 189 (H) 70 - 99 mg/dL   Heparin Unfractionated Anti Xa Level   Result Value Ref Range    Anti Xa Unfractionated Heparin >1.10 (HH) For Reference Range, See Comment IU/mL    Narrative    Therapeutic Range: UFH: 0.25-0.50 IU/mL for low intensity dosing,  0.30-0.70 IU/mL for high intensity dosing DVT and PE.  This test is not validated for other direct factor X inhibitors (e.g. rivaroxaban, apixaban, edoxaban, betrixaban, fondaparinux) and should not be used for monitoring of other medications.   INR   Result Value Ref Range    INR 1.32 (H) 0.85 - 1.15    PT 16.3 (H) 11.8 - 14.8 Seconds   Partial thromboplastin time   Result Value Ref Range    aPTT 106 (H) 22 - 38 Seconds   Lactic acid whole blood   Result Value Ref Range    Lactic Acid 1.8 0.7 - 2.0 mmol/L   Magnesium   Result Value Ref Range    Magnesium 2.3 1.7 - 2.3 mg/dL   Troponin T, High Sensitivity   Result Value Ref Range    Troponin T, High Sensitivity 1,174 (HH) <=22 ng/L   D dimer quantitative   Result Value Ref Range    D-Dimer Quantitative 2.75 (H) 0.00 - 0.50 ug/mL FEU    Narrative    This D-dimer assay is intended for use in conjunction with a clinical pretest probability assessment model to exclude pulmonary embolism (PE) and deep venous thrombosis (DVT) in outpatients suspected of PE or DVT. The cut-off value is 0.50 ug/mL FEU.    For patients 50 years of age or older, the application of age-adjusted cut-off values for D-Dimer may increase the specificity without significant effect on sensitivity. The literature suggested calculation age adjusted cut-off in ug/L = age in years x 10 ug/L. The results in this laboratory are reported as ug/mL rather than ug/L. The calculation for age adjusted cut off in ug/mL= age in years x 0.01 ug/mL. For example, the cut off for a 76 year old male is 77 x 0.01 ug/mL = 0.76 ug/mL (760 ug/L).    JOSE ALFREDO Banegas et al. Age adjusted D-dimer cut-off levels  to rule out pulmonary embolism: The ADJUST-PE Study. EDGAR 2014;311:1702-4621.; HJ Shi et al. Diagnostic accuracy of conventional or age adjusted D-dimer cutoff values in older patients with suspected venous thromboembolism. Systemic review and meta-analysis. BMJ 2013:346:f2492.   Blood gas ELS venous   Result Value Ref Range    pH ELS Venous 7.30 (L) 7.32 - 7.43    pCO2 ELS Venous 45 40 - 50 mm Hg    pO2 ELS Venous 51 (H) 25 - 47 mm Hg    Bicarbonate ELS Venous 22 21 - 28 mmol/L    Base Excess/Deficit Venous -4.1 (L) -3.0 - 3.0 mmol/L    FIO2 60     Oxyhemoglobin ELS Venous 81 %    O2 Saturation ELS Venous 82.9 (H) 70.0 - 75.0 %    Narrative    In healthy individuals, oxyhemoglobin (O2Hb) and oxygen saturation (SO2) are approximately equal. In the presence of dyshemoglobins, oxyhemoglobin can be considerably lower than oxygen saturation.   Blood gas ELS arterial   Result Value Ref Range    pH ELS Arterial 7.31 (L) 7.35 - 7.45    pCO2 ELS Arterial 43 35 - 45 mm Hg    pO2 ELS Arterial 228 (H) 80 - 105 mm Hg    Bicarbonate ELS Arterial 22 21 - 28 mmol/L    Base Excess/Deficit Arterial -4.4 (L) -3.0 - 3.0 mmol/L    FIO2 60     Oxyhemoglobin ELS Arterial 98 75 - 100 %    O2 Saturation ELS Arterial 100.0 (H) 95.0 - 96.0 %    Narrative    In healthy individuals, oxyhemoglobin (O2Hb) and oxygen saturation (SO2) are approximately equal. In the presence of dyshemoglobins, oxyhemoglobin can be considerably lower than oxygen saturation.   Fibrinogen activity   Result Value Ref Range    Fibrinogen Activity 908 (H) 170 - 510 mg/dL   Glucose by meter   Result Value Ref Range    GLUCOSE BY METER POCT 177 (H) 70 - 99 mg/dL   Activated clotting time celite, POCT   Result Value Ref Range    Activated Clotting Time (Celite) POCT 186 (H) 74 - 150 seconds   Blood gas arterial   Result Value Ref Range    pH Arterial 7.37 7.35 - 7.45    pCO2 Arterial 37 35 - 45 mm Hg    pO2 Arterial 89 80 - 105 mm Hg    FIO2 60     Bicarbonate  Arterial 22 21 - 28 mmol/L    Base Excess/Deficit Arterial -3.1 (L) -3.0 - 3.0 mmol/L    Rei's Test Artline     Oxyhemoglobin Arterial 96 92 - 100 %    O2 Sat, Arterial 97.7 (H) 95.0 - 96.0 %    Narrative    In healthy individuals, oxyhemoglobin (O2Hb) and oxygen saturation (SO2) are approximately equal. In the presence of dyshemoglobins, oxyhemoglobin can be considerably lower than oxygen saturation.   Glucose by meter   Result Value Ref Range    GLUCOSE BY METER POCT 165 (H) 70 - 99 mg/dL   Activated clotting time celite, POCT   Result Value Ref Range    Activated Clotting Time (Celite) POCT 186 (H) 74 - 150 seconds   Blood gas arterial   Result Value Ref Range    pH Arterial 7.36 7.35 - 7.45    pCO2 Arterial 39 35 - 45 mm Hg    pO2 Arterial 83 80 - 105 mm Hg    FIO2 60     Bicarbonate Arterial 22 21 - 28 mmol/L    Base Excess/Deficit Arterial -3.6 (L) -3.0 - 3.0 mmol/L    Rei's Test Artline     Oxyhemoglobin Arterial 95 92 - 100 %    O2 Sat, Arterial 96.7 (H) 95.0 - 96.0 %    Narrative    In healthy individuals, oxyhemoglobin (O2Hb) and oxygen saturation (SO2) are approximately equal. In the presence of dyshemoglobins, oxyhemoglobin can be considerably lower than oxygen saturation.   Glucose by meter   Result Value Ref Range    GLUCOSE BY METER POCT 148 (H) 70 - 99 mg/dL   CBC with platelets   Result Value Ref Range    WBC Count 16.0 (H) 4.0 - 11.0 10e3/uL    RBC Count 3.57 (L) 4.40 - 5.90 10e6/uL    Hemoglobin 9.8 (L) 13.3 - 17.7 g/dL    Hematocrit 30.0 (L) 40.0 - 53.0 %    MCV 84 78 - 100 fL    MCH 27.5 26.5 - 33.0 pg    MCHC 32.7 31.5 - 36.5 g/dL    RDW 13.2 10.0 - 15.0 %    Platelet Count 279 150 - 450 10e3/uL   Comprehensive metabolic panel   Result Value Ref Range    Sodium 139 135 - 145 mmol/L    Potassium 4.5 3.4 - 5.3 mmol/L    Carbon Dioxide (CO2) 20 (L) 22 - 29 mmol/L    Anion Gap 12 7 - 15 mmol/L    Urea Nitrogen 43.6 (H) 8.0 - 23.0 mg/dL    Creatinine 2.05 (H) 0.67 - 1.17 mg/dL    GFR Estimate 34  (L) >60 mL/min/1.73m2    Calcium 8.4 (L) 8.8 - 10.4 mg/dL    Chloride 107 98 - 107 mmol/L    Glucose 185 (H) 70 - 99 mg/dL    Alkaline Phosphatase 195 (H) 40 - 150 U/L    AST 78 (H) 0 - 45 U/L    ALT 83 (H) 0 - 70 U/L    Protein Total 6.2 (L) 6.4 - 8.3 g/dL    Albumin 2.9 (L) 3.5 - 5.2 g/dL    Bilirubin Total 0.3 <=1.2 mg/dL   Calcium ionized whole blood   Result Value Ref Range    Calcium Ionized Whole Blood 4.5 4.4 - 5.2 mg/dL   Glucose whole blood   Result Value Ref Range    Glucose 181 (H) 70 - 99 mg/dL   Heparin Unfractionated Anti Xa Level   Result Value Ref Range    Anti Xa Unfractionated Heparin 1.07 For Reference Range, See Comment IU/mL    Narrative    Therapeutic Range: UFH: 0.25-0.50 IU/mL for low intensity dosing,  0.30-0.70 IU/mL for high intensity dosing DVT and PE.  This test is not validated for other direct factor X inhibitors (e.g. rivaroxaban, apixaban, edoxaban, betrixaban, fondaparinux) and should not be used for monitoring of other medications.   INR   Result Value Ref Range    INR 1.29 (H) 0.85 - 1.15    PT 16.1 (H) 11.8 - 14.8 Seconds   Partial thromboplastin time   Result Value Ref Range    aPTT 105 (H) 22 - 38 Seconds   Lactic acid whole blood   Result Value Ref Range    Lactic Acid 1.7 0.7 - 2.0 mmol/L   Magnesium   Result Value Ref Range    Magnesium 2.3 1.7 - 2.3 mg/dL   Troponin T, High Sensitivity   Result Value Ref Range    Troponin T, High Sensitivity 1,050 (HH) <=22 ng/L   Blood gas arterial   Result Value Ref Range    pH Arterial 7.38 7.35 - 7.45    pCO2 Arterial 37 35 - 45 mm Hg    pO2 Arterial 64 (L) 80 - 105 mm Hg    FIO2 60     Bicarbonate Arterial 22 21 - 28 mmol/L    Base Excess/Deficit Arterial -3.3 (L) -3.0 - 3.0 mmol/L    Rei's Test Artline     Oxyhemoglobin Arterial 92 92 - 100 %    O2 Sat, Arterial 93.4 (L) 95.0 - 96.0 %    Narrative    In healthy individuals, oxyhemoglobin (O2Hb) and oxygen saturation (SO2) are approximately equal. In the presence of dyshemoglobins,  oxyhemoglobin can be considerably lower than oxygen saturation.   Glucose by meter   Result Value Ref Range    GLUCOSE BY METER POCT 147 (H) 70 - 99 mg/dL   Activated clotting time celite, POCT   Result Value Ref Range    Activated Clotting Time (Celite) POCT 190 (H) 74 - 150 seconds   EEG Video 12-26 hr Unmonitored    Narrative    EEG Video 12-26 hr Unmonitored Result    VIDEO EEG DATE: 25  VIDEO EEG [id number]  VIDEO EEG DAY#: 2  VIDEO EEG SOURCE FILE DURATION: 23 hours 57 min    PATIENT INFORMATION: Bob Echols is a 70 year old year old male who   presents with cardiac arrest and ECMO. EEG is being done to evaluate for   monitoring for seizures.     MEDICATIONS: propofol    TECHNICAL SUMMARY: EEG was recorded from 23 scalp electrodes placed   according to the 10-20 international system. Additional electrodes were   used for referencing, EKG, and to record from other cerebral regions as   appropriate. Video was continuously recorded and was reviewed for clinical   correlation. Electrodes were attached and both video and EEG were   monitored and annotated by qualified EEG technologists. Video-EEG was   reviewed and report generated by qualified physician.    BACKGROUND ACTIVITY:  Low-amplitude mixed frequency, symmetric and   unreactive. Later in the recording there is an abundance of myogenic   artifact which largely obscures the EEG tracing.     ACTIVATION PROCEDURE: STIMS.    PERIODIC DISCHARGES: Generalized periodic discharges, sharp morphology;   variably present with a period of 3-4 seconds.     ICTAL: No clinical events or electrographic seizures were recorded. Video   was reviewed intermittently by EEG technologist and physician for clinical   seizures.     IMPRESSION OF VIDEO EEG DAY # 2: This EEG supports the presence of a   severe encephalopathy. Generalized periodic discharges are variably   present and are consistent with anoxic encephalopathy. No seizure occur.      Eulalio OLSON  MD Jonathan  EPILEPSY STAFF    ABO/Rh type and screen    Narrative    The following orders were created for panel order ABO/Rh type and screen.  Procedure                               Abnormality         Status                     ---------                               -----------         ------                     Adult Type and Screen[[id number]]                           Final result                 Please view results for these tests on the individual orders.   Blood gas arterial   Result Value Ref Range    pH Arterial 7.36 7.35 - 7.45    pCO2 Arterial 40 35 - 45 mm Hg    pO2 Arterial 127 (H) 80 - 105 mm Hg    FIO2 60     Bicarbonate Arterial 22 21 - 28 mmol/L    Base Excess/Deficit Arterial -2.8 -3.0 - 3.0 mmol/L    Rei's Test Artline     Oxyhemoglobin Arterial 98 92 - 100 %    O2 Sat, Arterial 99.6 (H) 95.0 - 96.0 %    Narrative    In healthy individuals, oxyhemoglobin (O2Hb) and oxygen saturation (SO2) are approximately equal. In the presence of dyshemoglobins, oxyhemoglobin can be considerably lower than oxygen saturation.   Adult Type and Screen   Result Value Ref Range    ABO/RH(D) A POS     Antibody Screen Negative Negative    SPECIMEN EXPIRATION DATE 7/24/2025 11:59:00 PM CDT    Glucose by meter   Result Value Ref Range    GLUCOSE BY METER POCT 158 (H) 70 - 99 mg/dL   Glucose by meter   Result Value Ref Range    GLUCOSE BY METER POCT 159 (H) 70 - 99 mg/dL   Blood gas arterial   Result Value Ref Range    pH Arterial 7.37 7.35 - 7.45    pCO2 Arterial 38 35 - 45 mm Hg    pO2 Arterial 83 80 - 105 mm Hg    FIO2 60     Bicarbonate Arterial 22 21 - 28 mmol/L    Base Excess/Deficit Arterial -2.8 -3.0 - 3.0 mmol/L    Rei's Test Artline     Oxyhemoglobin Arterial 96 92 - 100 %    O2 Sat, Arterial 97.3 (H) 95.0 - 96.0 %    Narrative    In healthy individuals, oxyhemoglobin (O2Hb) and oxygen saturation (SO2) are approximately equal. In the presence of dyshemoglobins, oxyhemoglobin can be considerably lower  than oxygen saturation.   XR Chest Port 1 View    Narrative    Exam: XR CHEST PORT 1 VIEW, 7/21/2025 4:20 AM    Indication: Check endotracheal tube placement and ECLS cannula  placement. DO NOT log-roll patient.  Place film under patient using  patient safety handling process.    Comparison: X-ray chest 7/20/2025. Multiple priors.     Findings:   Endotracheal tube tip is not well visualized, partially obscured by  spinous process appears to be in the high thoracic trachea  approximately 7.8 cm above the level of the davis. Stable placement  of the gastric tube, midline drain, inferior approach ECMO cannula.    Midline trachea. Stable cardiac silhouette. Decreased trace right,  stable small left pleural effusion. No pneumothorax. Mild left hilar  and basilar atelectasis.      Impression    Impression:   1. Stable support devices. Endotracheal tube tip is not  well-visualized being partially obscured by a spinous process, appears  to be the upper thoracic trachea approximately 7.8 cm above the davis  at the level of T2.  2. Stable left perihilar and basilar atelectasis.  3. No significant right effusion, small left effusion persists.    I have personally reviewed the examination and initial interpretation  and I agree with the findings.    DARIEN ARENAS DO         SYSTEM ID:  [id number]   Glucose by meter   Result Value Ref Range    GLUCOSE BY METER POCT 134 (H) 70 - 99 mg/dL   CBC with platelets   Result Value Ref Range    WBC Count 14.5 (H) 4.0 - 11.0 10e3/uL    RBC Count 3.32 (L) 4.40 - 5.90 10e6/uL    Hemoglobin 9.1 (L) 13.3 - 17.7 g/dL    Hematocrit 28.3 (L) 40.0 - 53.0 %    MCV 85 78 - 100 fL    MCH 27.4 26.5 - 33.0 pg    MCHC 32.2 31.5 - 36.5 g/dL    RDW 13.3 10.0 - 15.0 %    Platelet Count 268 150 - 450 10e3/uL   Comprehensive metabolic panel   Result Value Ref Range    Sodium 139 135 - 145 mmol/L    Potassium 4.1 3.4 - 5.3 mmol/L    Carbon Dioxide (CO2) 20 (L) 22 - 29 mmol/L    Anion Gap 12 7 - 15 mmol/L     Urea Nitrogen 47.0 (H) 8.0 - 23.0 mg/dL    Creatinine 1.89 (H) 0.67 - 1.17 mg/dL    GFR Estimate 38 (L) >60 mL/min/1.73m2    Calcium 8.3 (L) 8.8 - 10.4 mg/dL    Chloride 107 98 - 107 mmol/L    Glucose 148 (H) 70 - 99 mg/dL    Alkaline Phosphatase 166 (H) 40 - 150 U/L    AST 70 (H) 0 - 45 U/L    ALT 70 0 - 70 U/L    Protein Total 5.9 (L) 6.4 - 8.3 g/dL    Albumin 2.7 (L) 3.5 - 5.2 g/dL    Bilirubin Total 0.2 <=1.2 mg/dL   Calcium ionized whole blood   Result Value Ref Range    Calcium Ionized Whole Blood 4.5 4.4 - 5.2 mg/dL   Heparin Unfractionated Anti Xa Level   Result Value Ref Range    Anti Xa Unfractionated Heparin 0.79 For Reference Range, See Comment IU/mL    Narrative    Therapeutic Range: UFH: 0.25-0.50 IU/mL for low intensity dosing,  0.30-0.70 IU/mL for high intensity dosing DVT and PE.  This test is not validated for other direct factor X inhibitors (e.g. rivaroxaban, apixaban, edoxaban, betrixaban, fondaparinux) and should not be used for monitoring of other medications.   INR   Result Value Ref Range    INR 1.26 (H) 0.85 - 1.15    PT 16.1 (H) 11.8 - 14.8 Seconds   Partial thromboplastin time   Result Value Ref Range    aPTT 122 (HH) 22 - 38 Seconds   Magnesium   Result Value Ref Range    Magnesium 2.3 1.7 - 2.3 mg/dL   Troponin T, High Sensitivity   Result Value Ref Range    Troponin T, High Sensitivity 973 (HH) <=22 ng/L   D dimer quantitative   Result Value Ref Range    D-Dimer Quantitative 2.41 (H) 0.00 - 0.50 ug/mL FEU    Narrative    This D-dimer assay is intended for use in conjunction with a clinical pretest probability assessment model to exclude pulmonary embolism (PE) and deep venous thrombosis (DVT) in outpatients suspected of PE or DVT. The cut-off value is 0.50 ug/mL FEU.    For patients 50 years of age or older, the application of age-adjusted cut-off values for D-Dimer may increase the specificity without significant effect on sensitivity. The literature suggested calculation age adjusted  cut-off in ug/L = age in years x 10 ug/L. The results in this laboratory are reported as ug/mL rather than ug/L. The calculation for age adjusted cut off in ug/mL= age in years x 0.01 ug/mL. For example, the cut off for a 76 year old male is 77 x 0.01 ug/mL = 0.76 ug/mL (760 ug/L).    M Bassam et al. Age adjusted D-dimer cut-off levels to rule out pulmonary embolism: The ADJUST-PE Study. EDGAR 2014;311:2558-9172.; HJ Shi et al. Diagnostic accuracy of conventional or age adjusted D-dimer cutoff values in older patients with suspected venous thromboembolism. Systemic review and meta-analysis. BMJ 2013:346:f2492.   Fibrinogen activity   Result Value Ref Range    Fibrinogen Activity 864 (H) 170 - 510 mg/dL   Antithrombin III   Result Value Ref Range    Antithrombin III 92 85 - 135 %   CRP inflammation   Result Value Ref Range    CRP Inflammation 176.00 (H) <5.00 mg/L   Erythrocyte sedimentation rate auto   Result Value Ref Range    Erythrocyte Sedimentation Rate 94 (H) 0 - 20 mm/hr   Hemoglobin plasma   Result Value Ref Range    Hemoglobin Plasma 50 (H) <30 mg/dL   Lactate Dehydrogenase   Result Value Ref Range    Lactate Dehydrogenase 403 (H) 0 - 250 U/L   Phosphorus   Result Value Ref Range    Phosphorus 4.2 2.5 - 4.5 mg/dL   TSH   Result Value Ref Range    TSH 0.05 (L) 0.30 - 4.20 uIU/mL   T3 Free   Result Value Ref Range    T3 Free 1.7 (L) 2.0 - 4.4 pg/mL   T3 total   Result Value Ref Range    T3 Total 65 (L) 85 - 202 ng/dL   T4 free   Result Value Ref Range    Free T4 1.08 0.90 - 1.70 ng/dL   Lidocaine Level, STAT Only   Result Value Ref Range    Lidocaine 2.5 1.2 - 6.0 ug/ml    Narrative    Performed at:  01 - [facility]  [address] [place] [address]  [id number]  : [facility], Phone:  [phone]   Blood gas arterial   Result Value Ref Range    pH Arterial 7.38 7.35 - 7.45    pCO2 Arterial 38 35 - 45 mm Hg    pO2 Arterial 75 (L) 80 - 105 mm Hg    FIO2 60     Bicarbonate  Arterial 22 21 - 28 mmol/L    Base Excess/Deficit Arterial -2.4 -3.0 - 3.0 mmol/L    Rei's Test Artline     Oxyhemoglobin Arterial 95 92 - 100 %    O2 Sat, Arterial 96.3 (H) 95.0 - 96.0 %    Narrative    In healthy individuals, oxyhemoglobin (O2Hb) and oxygen saturation (SO2) are approximately equal. In the presence of dyshemoglobins, oxyhemoglobin can be considerably lower than oxygen saturation.   Glucose whole blood   Result Value Ref Range    Glucose 142 (H) 70 - 99 mg/dL   Lactic acid whole blood   Result Value Ref Range    Lactic Acid 1.1 0.7 - 2.0 mmol/L   Respiratory Aerobic Bacterial Culture    Specimen: Endotracheal; Sputum   Result Value Ref Range    Gram Stain Result >25 PMNs/low power field     Gram Stain Result 2+ Mixed christine    Blood gas ELS venous   Result Value Ref Range    pH ELS Venous 7.33 7.32 - 7.43    pCO2 ELS Venous 45 40 - 50 mm Hg    pO2 ELS Venous 48 (H) 25 - 47 mm Hg    Bicarbonate ELS Venous 24 21 - 28 mmol/L    Base Excess/Deficit Venous -2.1 -3.0 - 3.0 mmol/L    FIO2 60     Oxyhemoglobin ELS Venous 79 %    O2 Saturation ELS Venous 81.2 (H) 70.0 - 75.0 %    Narrative    In healthy individuals, oxyhemoglobin (O2Hb) and oxygen saturation (SO2) are approximately equal. In the presence of dyshemoglobins, oxyhemoglobin can be considerably lower than oxygen saturation.   Blood gas ELS arterial   Result Value Ref Range    pH ELS Arterial 7.34 (L) 7.35 - 7.45    pCO2 ELS Arterial 43 35 - 45 mm Hg    pO2 ELS Arterial 232 (H) 80 - 105 mm Hg    Bicarbonate ELS Arterial 23 21 - 28 mmol/L    Base Excess/Deficit Arterial -2.7 -3.0 - 3.0 mmol/L    FIO2 60     Oxyhemoglobin ELS Arterial 98 75 - 100 %    O2 Saturation ELS Arterial >100.0 (H) 95.0 - 96.0 %    Narrative    In healthy individuals, oxyhemoglobin (O2Hb) and oxygen saturation (SO2) are approximately equal. In the presence of dyshemoglobins, oxyhemoglobin can be considerably lower than oxygen saturation.   Glucose by meter   Result Value Ref  Range    GLUCOSE BY METER POCT 130 (H) 70 - 99 mg/dL   Activated clotting time celite, POCT   Result Value Ref Range    Activated Clotting Time (Celite) POCT 203 (H) 74 - 150 seconds   EKG 12-lead, tracing only   Result Value Ref Range    Systolic Blood Pressure  mmHg    Diastolic Blood Pressure  mmHg    Ventricular Rate 83 BPM    Atrial Rate 83 BPM    AK Interval 182 ms    QRS Duration 88 ms     ms    QTc 439 ms    P Axis 69 degrees    R AXIS 16 degrees    T Axis 70 degrees    Interpretation ECG       Sinus rhythm  Low voltage QRS  Inferior infarct (cited on or before 18-Jul-2025)  ** ** ACUTE MI / STEMI ** **  Abnormal ECG  When compared with ECG of 20-Jul-2025 03:30, (unconfirmed)  QRS axis Shifted right  QT has shortened     Activated clotting time celite, POCT   Result Value Ref Range    Activated Clotting Time (Celite) POCT 186 (H) 74 - 150 seconds   Glucose by meter   Result Value Ref Range    GLUCOSE BY METER POCT 113 (H) 70 - 99 mg/dL   Blood gas arterial   Result Value Ref Range    pH Arterial 7.37 7.35 - 7.45    pCO2 Arterial 39 35 - 45 mm Hg    pO2 Arterial 79 (L) 80 - 105 mm Hg    FIO2 60     Bicarbonate Arterial 23 21 - 28 mmol/L    Base Excess/Deficit Arterial -2.2 -3.0 - 3.0 mmol/L    Rei's Test Artline     Oxyhemoglobin Arterial 95 92 - 100 %    O2 Sat, Arterial 96.5 (H) 95.0 - 96.0 %    Narrative    In healthy individuals, oxyhemoglobin (O2Hb) and oxygen saturation (SO2) are approximately equal. In the presence of dyshemoglobins, oxyhemoglobin can be considerably lower than oxygen saturation.   Activated clotting time celite, POCT   Result Value Ref Range    Activated Clotting Time (Celite) POCT 202 (H) 74 - 150 seconds   Blood gas arterial   Result Value Ref Range    pH Arterial 7.39 7.35 - 7.45    pCO2 Arterial 37 35 - 45 mm Hg    pO2 Arterial 60 (L) 80 - 105 mm Hg    FIO2 60     Bicarbonate Arterial 22 21 - 28 mmol/L    Base Excess/Deficit Arterial -2.1 -3.0 - 3.0 mmol/L    Rei's Test  Artline     Oxyhemoglobin Arterial 91 (L) 92 - 100 %    O2 Sat, Arterial 92.4 (L) 95.0 - 96.0 %    Peep 8 cm H2O    Narrative    In healthy individuals, oxyhemoglobin (O2Hb) and oxygen saturation (SO2) are approximately equal. In the presence of dyshemoglobins, oxyhemoglobin can be considerably lower than oxygen saturation.   Glucose by meter   Result Value Ref Range    GLUCOSE BY METER POCT 130 (H) 70 - 99 mg/dL   Glucose by meter   Result Value Ref Range    GLUCOSE BY METER POCT 144 (H) 70 - 99 mg/dL   CBC with platelets   Result Value Ref Range    WBC Count 15.0 (H) 4.0 - 11.0 10e3/uL    RBC Count 3.39 (L) 4.40 - 5.90 10e6/uL    Hemoglobin 9.3 (L) 13.3 - 17.7 g/dL    Hematocrit 29.2 (L) 40.0 - 53.0 %    MCV 86 78 - 100 fL    MCH 27.4 26.5 - 33.0 pg    MCHC 31.8 31.5 - 36.5 g/dL    RDW 13.5 10.0 - 15.0 %    Platelet Count 258 150 - 450 10e3/uL   Calcium ionized whole blood   Result Value Ref Range    Calcium Ionized Whole Blood 4.5 4.4 - 5.2 mg/dL   Glucose whole blood   Result Value Ref Range    Glucose 160 (H) 70 - 99 mg/dL   Lactic acid whole blood   Result Value Ref Range    Lactic Acid 0.8 0.7 - 2.0 mmol/L   Blood gas arterial   Result Value Ref Range    pH Arterial 7.39 7.35 - 7.45    pCO2 Arterial 34 (L) 35 - 45 mm Hg    pO2 Arterial 72 (L) 80 - 105 mm Hg    FIO2 60     Bicarbonate Arterial 21 21 - 28 mmol/L    Base Excess/Deficit Arterial -4.0 (L) -3.0 - 3.0 mmol/L    Rei's Test Artline     Oxyhemoglobin Arterial 94 92 - 100 %    O2 Sat, Arterial 95.8 95.0 - 96.0 %    Peep 8 cm H2O    Narrative    In healthy individuals, oxyhemoglobin (O2Hb) and oxygen saturation (SO2) are approximately equal. In the presence of dyshemoglobins, oxyhemoglobin can be considerably lower than oxygen saturation.       All relevant imaging and laboratory values personally reviewed.

## 2025-07-21 NOTE — PROGRESS NOTES
Major Shift Events:  no change to neuro status. NpI greater than 3 bilateral with slightly unequal pupil 2.9 vs 3.5. day 3 head CT today. SR to ST with no ectopy noted. Lido gtt @ 1.  Elevated T waves baseline per previous EKGs. Lytes wdl and did not require replacement. TF trickle feeds and insulin titrated to maintain glucose goals. Ecmo without issues. No blood or volume given overnight. Nicardpine for BP goal MAP less than 85.       Plan: obatain head CT and potential turndown today    For vital signs and complete assessments, please see documentation flowsheets.

## 2025-07-21 NOTE — PROGRESS NOTES
CLINICAL NUTRITION SERVICES - BRIEF NOTE      Reason for RD note: Following up on ability to advance to goal rate feeds.    New Findings/Chart Review:  Tolerating feeds of Osmolite 1.5 @ 20 mL/hr.    + BMs (rectal tube)    Lactic acid 1.1 (WNL).     Lidocaine, insulin, and propofol gtts    Interventions:  [employer]-messaged primary team to see if ok to advance to goal feeds.    Updated EN support:  Osmolite 1.5 Jerzy (or equivalent) @ goal of  40ml/hr  + 5 pkts Prosource (960ml/day) provides: 1840 kcals, 160 g PRO, 731 ml free H20, 195 g CHO, and 0 g fiber daily.   - Increase to 30 mL/hr and advance by 10 mL q8hr as tolerated to goal rate.   - Do not advance unless K+ >/= 3, Mg++ >/=1.5, and phos >/=1.9    Future/Additional Recommendations:  Monitor tolerance/lytes with advancement to goal TF rate.  Once at goal rate, monitor kcal from propofol and adjust goal TF if indicated.    Nutrition will continue to follow per protocol.    Leana Garrett RD, LD  Available on [place] - can search by name or unit Dietitian

## 2025-07-21 NOTE — PROGRESS NOTES
Cardiology ICU Progress Note    Brief HPI:  Bob Echols is a 70 year old male with a past medical history of HTN, DM2, and DEMOND as well as recent admission for inferior STEMI on 7/10 (treated with percutaneous revascularization with 2.75 x 20mm Promus RENA to the distal LCx). The patient was discharged on 7/14 and had been doing well at home prior to 7/18 when he was found to be apneic without a pulse with estimated down time ~10-15 min prior to discovery. EMS was called at 1537, arrived and started compressions at 1542, cannulated for VA ECMO at 1614. Subsequently found to have subacute in-stent thrombosis of his recently placed stent without obvious mechanical causes, treated with 2.75 x 20mm Synergy XD RENA. Now admitted to [facility] for further care. His initial pH was 7.08, PcO2 74, PO2 58, Lactate 8.6.     Subjective and Interval:  NAEO    Assessment and plan by system:  ==Today's changes ==  -Turndown study tomorrow  -dose of bumex, FBG -1L  -decrease lidocaine    == Neurology ==   #?anoxic brain injury    #hypoxic encephalopathy 2/2 cardiac arrest  -Intubated, sedated. Avoiding hyperthermia  -NCC consulted and appreciate recommendations  -vEEG   -Palliative care consulted    CT head shows NAICP    Current sedation:    Plan:  -Continue sedation with a RASS goal -2 to 3  -Spontaneous awakening trial as tolerated  -Permissive hypernatremia for neuroprotection    == Cardiovascular ==  #Refractory VF cardiac arrest   #cardiogenic shock requiring VA ECMO ([facility] E)  #CAD, status post percutaneous revascularization of the left circumflex artery with sinacute in-stent thrombosis   #PAH  #HTN  Presented initially to [facility] on 7/10 with inferior STEMI, found to have acute thrombotic lesion of the distal LCx treated with a 2.75 x 20mm Promus RENA, discharged on apixaban and clopidogrel. He was found down at home in VF-mediated cardiac arrest on 7/18 requiring emergent VA ECMO cannulation. Coronary angiography  demonstrated in-stent thrombosis of his recently placed stent without obvious mechanical cause and therefore presumed to be clopidogrel failure. Treated with a 2.75 x 20mm Synergy XD RENA.     Peripheral V-A ECMO inserted via RCFA and RCFV   Angiogram:     Prox LAD to Mid LAD lesion is 55% stenosed.    Prox RCA lesion is 30% stenosed.    1st Diag lesion is 40% stenosed.    2nd Mrg lesion is 70% stenosed.    RPAV lesion is 70% stenosed.    Mid Cx lesion is 100% stenosed.    TTE:   Left ventricular size is normal. Left ventricular function is decreased. The  ejection fraction is 5-10% (severely reduced). Mild to moderate right  ventricular dilation is present.  Global right ventricular function is severely reduced.  Trace to mild mitral insufficiency is present.  AV opens with each systole No aortic regurgitation is present.  The inferior vena cava is normal. Trivial to small circumferential pericardial  effusion with no hemodynamic consequences    ECG Rhythm: Sinus rhythm    ECMO :  Mode: V-A   Pump Type: Cardiohelp  RPM's: 3400  Blood Flow (Circuit) LPM: 3.77 LPM  Sweep LPM: 1 LPM  Sweep FiO2   %: 60 %  Venous Pressure  mmHg: -45 mmHg  Arterial Pressure  mmH mmHg  Internal Pressure mmH mmHg  Pressure Change mmH mmHg    Current Pressors/inotropes/antiarrythmic:    Hemodynamics:  Art Line  Arterial Line BP: 134/65  Arterial Line MAP (mmHg): 85 mmHg  Arterial Line Location: Right radial  Art Line Wave Form: Appropriate wave forms    Invasive Hemodynamic Monitoring:                            Plan:  -Continue ASA 81mg and ticagrelor 90mg BID   -Hold statin given shock liver, resume when leaving ICU  -Hold ACE/ARB for now given likely reduced renal fxn after arrest  -Hold beta blocker given shock  -Telemetry monitoring  -Trend troponin and lactic acid  -Continue ECMO support  -FBG negative 1L, Diuresis with bumex and metolazone  -decrease lidocaine infusion to 0.5 mg/hr    == Pulmonary ==  #Acute  hypoxemic respiratory failure requiring intubation  #DEMOND    Vent Settings:   FiO2 (%): 60 %, Resp: 18, Vent Mode: VC/AC, Resp Rate (Set): 12 breaths/min, Tidal Volume (Set, mL): 400 mL, PEEP (cm H2O): 8 cmH2O, Resp Rate (Set): 12 breaths/min, Tidal Volume (Set, mL): 400 mL, PEEP (cm H2O): 8 cmH2O    Plan:  -Wean vent as able  -Daily CXR  -Serial ABGs  -Peridex BID  -FBG and diuresis as above        == Gastrointestinal, Nutrition ==  #?shock liver 2/2 cardiac arrest  #Hypoalbuminemia     Diet Advance tube feeds to goal    Plan:  -Monitor LFTs  -Bowel regimen in place  -GI Prophylaxis: PPI; Protonix 40 mg daily     == Renal, Electrolytes ==  #?acute renal injury 2/2 cardiac arrest  #Lactic acidosis    Plan:  -Avoid nephrotoxins, renally dose meds  -Monitor urine output  -FBG and diuresis as above    == Infectious Disease ==  #Aspiration pneumonia  #Leukocytosis    Plan:  -Continue CTX x 5 days for aspiration coverage  -Monitor for signs of infection  -Avoid fevers     == Hematology ==  # Anemia of critical illness    Receiving heparin for ECMO with ACT goal 180-200    Plan:  -Continue Heparin with ACT goal as above  -Transfusion parameters:   -1u PRBC for hbg < 8.0   -1u platelet for plt < 100   -1u FFP for INR > 3   -5u cryoprecipitate for fibrinogen <150    ASA/ticagrelor for RENA  US LE w/ arterial duplex with no evidence of acute thrombus  DVT PPX: Heparin as above    == Endocrinology ==  #Hyperglycemia   #DM2     Plan:  -Insulin infusion as needed    == Integumentary ==  -No skin issues    Plan:  -CTM cannula sites  -WOCN consult    == Lines/Tubes/Drains ==  Cannula: right fem  Airway: ETT  Enteric: OGT  Central access: left fem  Arterial access: radial  Urinary: yes  Rectal Tube: yes    ICU Checklist  Feeding: yes  Analgesia: fentanyl  Sedation: propofol   Thrombopx: heparin infusion  Head of bed: RT  Ulcers: PPI  Glucose: CTM  SBT: not today  Bowel regimen: yes  Indwelling cath: yes  De-escalate meds: no  Code  "Status: Full Code , eCPR candidate? Yes, on support  Family updated by team. Patient seen and discussed with staff physician, Dr. Ilhwan Yeo.     ROSALBA Serrano, CNP  Critical Care Cardiology  Securely message with the Vocera Web Console (learn more here)    Objective:  Most recent vital signs:  Pulse 86   Temp 99  F (37.2  C)   Resp 18   Ht 1.778 m (5' 10\")   Wt 115.4 kg (254 lb 6.6 oz)   SpO2 95%   BMI 36.50 kg/m    Temp:  [97.9  F (36.6  C)-99.9  F (37.7  C)] 99  F (37.2  C)  Pulse:  [] 86  Resp:  [10-25] 18  MAP:  [74 mmHg-102 mmHg] 85 mmHg  Arterial Line BP: (116-149)/(55-81) 134/65  FiO2 (%):  [60 %] 60 %  SpO2:  [92 %-99 %] 95 %    Physical exam:  General: critically ill, supine in bed, in NAD  HEENT: PERRL, no scleral icterus or injection  CARDIAC: RRR, no m/r/g appreciated. Peripheral pulses dopplered  RESP: synchronous with mechanical ventilation, CTAB, no wheezes, rhonchi or crackles appreciated.  GI: soft, non-distended, BS hypoactive  : Urinary catheter present  EXTREMITIES: No BLE edema, pulses 2+. Femoral access site w/o bleeding, dressing C/D/I.   SKIN: No acute lesions appreciated on exposed skin.  NEURO: Intubated and sedated. Unable to assess language or mentation. Unresponsive to noxious stim x 4 extremities. No focal deficit.     Imaging/procedure results:  Recent Results (from the past 24 hours)   Blood gas arterial   Result Value Ref Range    pH Arterial 7.32 (L) 7.35 - 7.45    pCO2 Arterial 37 35 - 45 mm Hg    pO2 Arterial 87 80 - 105 mm Hg    FIO2 60     Bicarbonate Arterial 19 (L) 21 - 28 mmol/L    Base Excess/Deficit Arterial -6.6 (L) -3.0 - 3.0 mmol/L    Rei's Test Artline     Oxyhemoglobin Arterial 95 92 - 100 %    O2 Sat, Arterial 96.8 (H) 95.0 - 96.0 %    Narrative    In healthy individuals, oxyhemoglobin (O2Hb) and oxygen saturation (SO2) are approximately equal. In the presence of dyshemoglobins, oxyhemoglobin can be considerably lower than oxygen " saturation.   Activated clotting time celite, POCT   Result Value Ref Range    Activated Clotting Time (Celite) POCT 186 (H) 74 - 150 seconds   Glucose by meter   Result Value Ref Range    GLUCOSE BY METER POCT 158 (H) 70 - 99 mg/dL   Blood gas arterial   Result Value Ref Range    pH Arterial 7.34 (L) 7.35 - 7.45    pCO2 Arterial 38 35 - 45 mm Hg    pO2 Arterial 86 80 - 105 mm Hg    FIO2 60     Bicarbonate Arterial 20 (L) 21 - 28 mmol/L    Base Excess/Deficit Arterial -5.0 (L) -3.0 - 3.0 mmol/L    Rei's Test Artline     Oxyhemoglobin Arterial 95 92 - 100 %    O2 Sat, Arterial 97.0 (H) 95.0 - 96.0 %    Narrative    In healthy individuals, oxyhemoglobin (O2Hb) and oxygen saturation (SO2) are approximately equal. In the presence of dyshemoglobins, oxyhemoglobin can be considerably lower than oxygen saturation.   Glucose by meter   Result Value Ref Range    GLUCOSE BY METER POCT 175 (H) 70 - 99 mg/dL   Blood gas arterial   Result Value Ref Range    pH Arterial 7.36 7.35 - 7.45    pCO2 Arterial 38 35 - 45 mm Hg    pO2 Arterial 81 80 - 105 mm Hg    FIO2 60     Bicarbonate Arterial 21 21 - 28 mmol/L    Base Excess/Deficit Arterial -3.9 (L) -3.0 - 3.0 mmol/L    Rei's Test Artline     Oxyhemoglobin Arterial 95 92 - 100 %    O2 Sat, Arterial 96.9 (H) 95.0 - 96.0 %    Narrative    In healthy individuals, oxyhemoglobin (O2Hb) and oxygen saturation (SO2) are approximately equal. In the presence of dyshemoglobins, oxyhemoglobin can be considerably lower than oxygen saturation.   CBC with platelets   Result Value Ref Range    WBC Count 14.4 (H) 4.0 - 11.0 10e3/uL    RBC Count 3.77 (L) 4.40 - 5.90 10e6/uL    Hemoglobin 10.3 (L) 13.3 - 17.7 g/dL    Hematocrit 32.4 (L) 40.0 - 53.0 %    MCV 86 78 - 100 fL    MCH 27.3 26.5 - 33.0 pg    MCHC 31.8 31.5 - 36.5 g/dL    RDW 13.3 10.0 - 15.0 %    Platelet Count 320 150 - 450 10e3/uL   Comprehensive metabolic panel   Result Value Ref Range    Sodium 139 135 - 145 mmol/L    Potassium 4.3  3.4 - 5.3 mmol/L    Carbon Dioxide (CO2) 19 (L) 22 - 29 mmol/L    Anion Gap 15 7 - 15 mmol/L    Urea Nitrogen 43.0 (H) 8.0 - 23.0 mg/dL    Creatinine 2.22 (H) 0.67 - 1.17 mg/dL    GFR Estimate 31 (L) >60 mL/min/1.73m2    Calcium 8.6 (L) 8.8 - 10.4 mg/dL    Chloride 105 98 - 107 mmol/L    Glucose 197 (H) 70 - 99 mg/dL    Alkaline Phosphatase 212 (H) 40 - 150 U/L    AST 91 (H) 0 - 45 U/L    ALT 94 (H) 0 - 70 U/L    Protein Total 6.4 6.4 - 8.3 g/dL    Albumin 2.9 (L) 3.5 - 5.2 g/dL    Bilirubin Total 0.3 <=1.2 mg/dL   Calcium ionized whole blood   Result Value Ref Range    Calcium Ionized Whole Blood 4.5 4.4 - 5.2 mg/dL   Glucose whole blood   Result Value Ref Range    Glucose 189 (H) 70 - 99 mg/dL   Heparin Unfractionated Anti Xa Level   Result Value Ref Range    Anti Xa Unfractionated Heparin >1.10 (HH) For Reference Range, See Comment IU/mL    Narrative    Therapeutic Range: UFH: 0.25-0.50 IU/mL for low intensity dosing,  0.30-0.70 IU/mL for high intensity dosing DVT and PE.  This test is not validated for other direct factor X inhibitors (e.g. rivaroxaban, apixaban, edoxaban, betrixaban, fondaparinux) and should not be used for monitoring of other medications.   INR   Result Value Ref Range    INR 1.32 (H) 0.85 - 1.15    PT 16.3 (H) 11.8 - 14.8 Seconds   Partial thromboplastin time   Result Value Ref Range    aPTT 106 (H) 22 - 38 Seconds   Lactic acid whole blood   Result Value Ref Range    Lactic Acid 1.8 0.7 - 2.0 mmol/L   Magnesium   Result Value Ref Range    Magnesium 2.3 1.7 - 2.3 mg/dL   Troponin T, High Sensitivity   Result Value Ref Range    Troponin T, High Sensitivity 1,174 (HH) <=22 ng/L   D dimer quantitative   Result Value Ref Range    D-Dimer Quantitative 2.75 (H) 0.00 - 0.50 ug/mL FEU    Narrative    This D-dimer assay is intended for use in conjunction with a clinical pretest probability assessment model to exclude pulmonary embolism (PE) and deep venous thrombosis (DVT) in outpatients suspected of  PE or DVT. The cut-off value is 0.50 ug/mL FEU.    For patients 50 years of age or older, the application of age-adjusted cut-off values for D-Dimer may increase the specificity without significant effect on sensitivity. The literature suggested calculation age adjusted cut-off in ug/L = age in years x 10 ug/L. The results in this laboratory are reported as ug/mL rather than ug/L. The calculation for age adjusted cut off in ug/mL= age in years x 0.01 ug/mL. For example, the cut off for a 76 year old male is 77 x 0.01 ug/mL = 0.76 ug/mL (760 ug/L).    M Bassam et al. Age adjusted D-dimer cut-off levels to rule out pulmonary embolism: The ADJUST-PE Study. EDGAR 2014;311:7612-7061.; HJ Shi et al. Diagnostic accuracy of conventional or age adjusted D-dimer cutoff values in older patients with suspected venous thromboembolism. Systemic review and meta-analysis. BMJ 2013:346:f2492.   Blood gas ELS venous   Result Value Ref Range    pH ELS Venous 7.30 (L) 7.32 - 7.43    pCO2 ELS Venous 45 40 - 50 mm Hg    pO2 ELS Venous 51 (H) 25 - 47 mm Hg    Bicarbonate ELS Venous 22 21 - 28 mmol/L    Base Excess/Deficit Venous -4.1 (L) -3.0 - 3.0 mmol/L    FIO2 60     Oxyhemoglobin ELS Venous 81 %    O2 Saturation ELS Venous 82.9 (H) 70.0 - 75.0 %    Narrative    In healthy individuals, oxyhemoglobin (O2Hb) and oxygen saturation (SO2) are approximately equal. In the presence of dyshemoglobins, oxyhemoglobin can be considerably lower than oxygen saturation.   Blood gas ELS arterial   Result Value Ref Range    pH ELS Arterial 7.31 (L) 7.35 - 7.45    pCO2 ELS Arterial 43 35 - 45 mm Hg    pO2 ELS Arterial 228 (H) 80 - 105 mm Hg    Bicarbonate ELS Arterial 22 21 - 28 mmol/L    Base Excess/Deficit Arterial -4.4 (L) -3.0 - 3.0 mmol/L    FIO2 60     Oxyhemoglobin ELS Arterial 98 75 - 100 %    O2 Saturation ELS Arterial 100.0 (H) 95.0 - 96.0 %    Narrative    In healthy individuals, oxyhemoglobin (O2Hb) and oxygen saturation (SO2) are  approximately equal. In the presence of dyshemoglobins, oxyhemoglobin can be considerably lower than oxygen saturation.   Fibrinogen activity   Result Value Ref Range    Fibrinogen Activity 908 (H) 170 - 510 mg/dL   Glucose by meter   Result Value Ref Range    GLUCOSE BY METER POCT 177 (H) 70 - 99 mg/dL   Activated clotting time celite, POCT   Result Value Ref Range    Activated Clotting Time (Celite) POCT 186 (H) 74 - 150 seconds   Blood gas arterial   Result Value Ref Range    pH Arterial 7.37 7.35 - 7.45    pCO2 Arterial 37 35 - 45 mm Hg    pO2 Arterial 89 80 - 105 mm Hg    FIO2 60     Bicarbonate Arterial 22 21 - 28 mmol/L    Base Excess/Deficit Arterial -3.1 (L) -3.0 - 3.0 mmol/L    Rei's Test Artline     Oxyhemoglobin Arterial 96 92 - 100 %    O2 Sat, Arterial 97.7 (H) 95.0 - 96.0 %    Narrative    In healthy individuals, oxyhemoglobin (O2Hb) and oxygen saturation (SO2) are approximately equal. In the presence of dyshemoglobins, oxyhemoglobin can be considerably lower than oxygen saturation.   Glucose by meter   Result Value Ref Range    GLUCOSE BY METER POCT 165 (H) 70 - 99 mg/dL   Activated clotting time celite, POCT   Result Value Ref Range    Activated Clotting Time (Celite) POCT 186 (H) 74 - 150 seconds   Blood gas arterial   Result Value Ref Range    pH Arterial 7.36 7.35 - 7.45    pCO2 Arterial 39 35 - 45 mm Hg    pO2 Arterial 83 80 - 105 mm Hg    FIO2 60     Bicarbonate Arterial 22 21 - 28 mmol/L    Base Excess/Deficit Arterial -3.6 (L) -3.0 - 3.0 mmol/L    Rei's Test Artline     Oxyhemoglobin Arterial 95 92 - 100 %    O2 Sat, Arterial 96.7 (H) 95.0 - 96.0 %    Narrative    In healthy individuals, oxyhemoglobin (O2Hb) and oxygen saturation (SO2) are approximately equal. In the presence of dyshemoglobins, oxyhemoglobin can be considerably lower than oxygen saturation.   Glucose by meter   Result Value Ref Range    GLUCOSE BY METER POCT 148 (H) 70 - 99 mg/dL   CBC with platelets   Result Value Ref  Range    WBC Count 16.0 (H) 4.0 - 11.0 10e3/uL    RBC Count 3.57 (L) 4.40 - 5.90 10e6/uL    Hemoglobin 9.8 (L) 13.3 - 17.7 g/dL    Hematocrit 30.0 (L) 40.0 - 53.0 %    MCV 84 78 - 100 fL    MCH 27.5 26.5 - 33.0 pg    MCHC 32.7 31.5 - 36.5 g/dL    RDW 13.2 10.0 - 15.0 %    Platelet Count 279 150 - 450 10e3/uL   Comprehensive metabolic panel   Result Value Ref Range    Sodium 139 135 - 145 mmol/L    Potassium 4.5 3.4 - 5.3 mmol/L    Carbon Dioxide (CO2) 20 (L) 22 - 29 mmol/L    Anion Gap 12 7 - 15 mmol/L    Urea Nitrogen 43.6 (H) 8.0 - 23.0 mg/dL    Creatinine 2.05 (H) 0.67 - 1.17 mg/dL    GFR Estimate 34 (L) >60 mL/min/1.73m2    Calcium 8.4 (L) 8.8 - 10.4 mg/dL    Chloride 107 98 - 107 mmol/L    Glucose 185 (H) 70 - 99 mg/dL    Alkaline Phosphatase 195 (H) 40 - 150 U/L    AST 78 (H) 0 - 45 U/L    ALT 83 (H) 0 - 70 U/L    Protein Total 6.2 (L) 6.4 - 8.3 g/dL    Albumin 2.9 (L) 3.5 - 5.2 g/dL    Bilirubin Total 0.3 <=1.2 mg/dL   Calcium ionized whole blood   Result Value Ref Range    Calcium Ionized Whole Blood 4.5 4.4 - 5.2 mg/dL   Glucose whole blood   Result Value Ref Range    Glucose 181 (H) 70 - 99 mg/dL   Heparin Unfractionated Anti Xa Level   Result Value Ref Range    Anti Xa Unfractionated Heparin 1.07 For Reference Range, See Comment IU/mL    Narrative    Therapeutic Range: UFH: 0.25-0.50 IU/mL for low intensity dosing,  0.30-0.70 IU/mL for high intensity dosing DVT and PE.  This test is not validated for other direct factor X inhibitors (e.g. rivaroxaban, apixaban, edoxaban, betrixaban, fondaparinux) and should not be used for monitoring of other medications.   INR   Result Value Ref Range    INR 1.29 (H) 0.85 - 1.15    PT 16.1 (H) 11.8 - 14.8 Seconds   Partial thromboplastin time   Result Value Ref Range    aPTT 105 (H) 22 - 38 Seconds   Lactic acid whole blood   Result Value Ref Range    Lactic Acid 1.7 0.7 - 2.0 mmol/L   Magnesium   Result Value Ref Range    Magnesium 2.3 1.7 - 2.3 mg/dL   Troponin T, High  Sensitivity   Result Value Ref Range    Troponin T, High Sensitivity 1,050 (HH) <=22 ng/L   Blood gas arterial   Result Value Ref Range    pH Arterial 7.38 7.35 - 7.45    pCO2 Arterial 37 35 - 45 mm Hg    pO2 Arterial 64 (L) 80 - 105 mm Hg    FIO2 60     Bicarbonate Arterial 22 21 - 28 mmol/L    Base Excess/Deficit Arterial -3.3 (L) -3.0 - 3.0 mmol/L    Rei's Test Artline     Oxyhemoglobin Arterial 92 92 - 100 %    O2 Sat, Arterial 93.4 (L) 95.0 - 96.0 %    Narrative    In healthy individuals, oxyhemoglobin (O2Hb) and oxygen saturation (SO2) are approximately equal. In the presence of dyshemoglobins, oxyhemoglobin can be considerably lower than oxygen saturation.   Glucose by meter   Result Value Ref Range    GLUCOSE BY METER POCT 147 (H) 70 - 99 mg/dL   Activated clotting time celite, POCT   Result Value Ref Range    Activated Clotting Time (Celite) POCT 190 (H) 74 - 150 seconds   EEG Video 12-26 hr Unmonitored    Narrative    EEG Video 12-26 hr Unmonitored Result    VIDEO EEG DATE: 25  VIDEO EEG [id number]  VIDEO EEG DAY#: 2  VIDEO EEG SOURCE FILE DURATION: 23 hours 57 min    PATIENT INFORMATION: Bob Echols is a 70 year old year old male who   presents with cardiac arrest and ECMO. EEG is being done to evaluate for   monitoring for seizures.     MEDICATIONS: propofol    TECHNICAL SUMMARY: EEG was recorded from 23 scalp electrodes placed   according to the 10-20 international system. Additional electrodes were   used for referencing, EKG, and to record from other cerebral regions as   appropriate. Video was continuously recorded and was reviewed for clinical   correlation. Electrodes were attached and both video and EEG were   monitored and annotated by qualified EEG technologists. Video-EEG was   reviewed and report generated by qualified physician.    BACKGROUND ACTIVITY:  Low-amplitude mixed frequency, symmetric and   unreactive. Later in the recording there is an abundance of myogenic    artifact which largely obscures the EEG tracing.     ACTIVATION PROCEDURE: STIMS.    PERIODIC DISCHARGES: Generalized periodic discharges, sharp morphology;   variably present with a period of 3-4 seconds.     ICTAL: No clinical events or electrographic seizures were recorded. Video   was reviewed intermittently by EEG technologist and physician for clinical   seizures.     IMPRESSION OF VIDEO EEG DAY # 2: This EEG supports the presence of a   severe encephalopathy. Generalized periodic discharges are variably   present and are consistent with anoxic encephalopathy. No seizure occur.      Eulalio Castillo MD  EPILEPSY STAFF    ABO/Rh type and screen    Narrative    The following orders were created for panel order ABO/Rh type and screen.  Procedure                               Abnormality         Status                     ---------                               -----------         ------                     Adult Type and Screen[[id number]]                           Final result                 Please view results for these tests on the individual orders.   Blood gas arterial   Result Value Ref Range    pH Arterial 7.36 7.35 - 7.45    pCO2 Arterial 40 35 - 45 mm Hg    pO2 Arterial 127 (H) 80 - 105 mm Hg    FIO2 60     Bicarbonate Arterial 22 21 - 28 mmol/L    Base Excess/Deficit Arterial -2.8 -3.0 - 3.0 mmol/L    Rei's Test Artline     Oxyhemoglobin Arterial 98 92 - 100 %    O2 Sat, Arterial 99.6 (H) 95.0 - 96.0 %    Narrative    In healthy individuals, oxyhemoglobin (O2Hb) and oxygen saturation (SO2) are approximately equal. In the presence of dyshemoglobins, oxyhemoglobin can be considerably lower than oxygen saturation.   Adult Type and Screen   Result Value Ref Range    ABO/RH(D) A POS     Antibody Screen Negative Negative    SPECIMEN EXPIRATION DATE 7/24/2025 11:59:00 PM CDT    Glucose by meter   Result Value Ref Range    GLUCOSE BY METER POCT 158 (H) 70 - 99 mg/dL   Glucose by meter   Result Value  Ref Range    GLUCOSE BY METER POCT 159 (H) 70 - 99 mg/dL   Blood gas arterial   Result Value Ref Range    pH Arterial 7.37 7.35 - 7.45    pCO2 Arterial 38 35 - 45 mm Hg    pO2 Arterial 83 80 - 105 mm Hg    FIO2 60     Bicarbonate Arterial 22 21 - 28 mmol/L    Base Excess/Deficit Arterial -2.8 -3.0 - 3.0 mmol/L    Rei's Test Artline     Oxyhemoglobin Arterial 96 92 - 100 %    O2 Sat, Arterial 97.3 (H) 95.0 - 96.0 %    Narrative    In healthy individuals, oxyhemoglobin (O2Hb) and oxygen saturation (SO2) are approximately equal. In the presence of dyshemoglobins, oxyhemoglobin can be considerably lower than oxygen saturation.   XR Chest Port 1 View    Narrative    Exam: XR CHEST PORT 1 VIEW, 7/21/2025 4:20 AM    Indication: Check endotracheal tube placement and ECLS cannula  placement. DO NOT log-roll patient.  Place film under patient using  patient safety handling process.    Comparison: X-ray chest 7/20/2025. Multiple priors.     Findings:   Endotracheal tube tip is not well visualized, partially obscured by  spinous process appears to be in the high thoracic trachea  approximately 7.8 cm above the level of the davis. Stable placement  of the gastric tube, midline drain, inferior approach ECMO cannula.    Midline trachea. Stable cardiac silhouette. Decreased trace right,  stable small left pleural effusion. No pneumothorax. Mild left hilar  and basilar atelectasis.      Impression    Impression:   1. Stable support devices. Endotracheal tube tip is not  well-visualized being partially obscured by a spinous process, appears  to be the upper thoracic trachea approximately 7.8 cm above the davis  at the level of T2.  2. Stable left perihilar and basilar atelectasis.  3. No significant right effusion, small left effusion persists.    I have personally reviewed the examination and initial interpretation  and I agree with the findings.    DARIEN ARENAS DO         SYSTEM ID:  [id number]   Glucose by meter   Result  Value Ref Range    GLUCOSE BY METER POCT 134 (H) 70 - 99 mg/dL   CBC with platelets   Result Value Ref Range    WBC Count 14.5 (H) 4.0 - 11.0 10e3/uL    RBC Count 3.32 (L) 4.40 - 5.90 10e6/uL    Hemoglobin 9.1 (L) 13.3 - 17.7 g/dL    Hematocrit 28.3 (L) 40.0 - 53.0 %    MCV 85 78 - 100 fL    MCH 27.4 26.5 - 33.0 pg    MCHC 32.2 31.5 - 36.5 g/dL    RDW 13.3 10.0 - 15.0 %    Platelet Count 268 150 - 450 10e3/uL   Comprehensive metabolic panel   Result Value Ref Range    Sodium 139 135 - 145 mmol/L    Potassium 4.1 3.4 - 5.3 mmol/L    Carbon Dioxide (CO2) 20 (L) 22 - 29 mmol/L    Anion Gap 12 7 - 15 mmol/L    Urea Nitrogen 47.0 (H) 8.0 - 23.0 mg/dL    Creatinine 1.89 (H) 0.67 - 1.17 mg/dL    GFR Estimate 38 (L) >60 mL/min/1.73m2    Calcium 8.3 (L) 8.8 - 10.4 mg/dL    Chloride 107 98 - 107 mmol/L    Glucose 148 (H) 70 - 99 mg/dL    Alkaline Phosphatase 166 (H) 40 - 150 U/L    AST 70 (H) 0 - 45 U/L    ALT 70 0 - 70 U/L    Protein Total 5.9 (L) 6.4 - 8.3 g/dL    Albumin 2.7 (L) 3.5 - 5.2 g/dL    Bilirubin Total 0.2 <=1.2 mg/dL   Calcium ionized whole blood   Result Value Ref Range    Calcium Ionized Whole Blood 4.5 4.4 - 5.2 mg/dL   Heparin Unfractionated Anti Xa Level   Result Value Ref Range    Anti Xa Unfractionated Heparin 0.79 For Reference Range, See Comment IU/mL    Narrative    Therapeutic Range: UFH: 0.25-0.50 IU/mL for low intensity dosing,  0.30-0.70 IU/mL for high intensity dosing DVT and PE.  This test is not validated for other direct factor X inhibitors (e.g. rivaroxaban, apixaban, edoxaban, betrixaban, fondaparinux) and should not be used for monitoring of other medications.   INR   Result Value Ref Range    INR 1.26 (H) 0.85 - 1.15    PT 16.1 (H) 11.8 - 14.8 Seconds   Partial thromboplastin time   Result Value Ref Range    aPTT 122 (HH) 22 - 38 Seconds   Magnesium   Result Value Ref Range    Magnesium 2.3 1.7 - 2.3 mg/dL   Troponin T, High Sensitivity   Result Value Ref Range    Troponin T, High  Sensitivity 973 (HH) <=22 ng/L   D dimer quantitative   Result Value Ref Range    D-Dimer Quantitative 2.41 (H) 0.00 - 0.50 ug/mL FEU    Narrative    This D-dimer assay is intended for use in conjunction with a clinical pretest probability assessment model to exclude pulmonary embolism (PE) and deep venous thrombosis (DVT) in outpatients suspected of PE or DVT. The cut-off value is 0.50 ug/mL FEU.    For patients 50 years of age or older, the application of age-adjusted cut-off values for D-Dimer may increase the specificity without significant effect on sensitivity. The literature suggested calculation age adjusted cut-off in ug/L = age in years x 10 ug/L. The results in this laboratory are reported as ug/mL rather than ug/L. The calculation for age adjusted cut off in ug/mL= age in years x 0.01 ug/mL. For example, the cut off for a 76 year old male is 77 x 0.01 ug/mL = 0.76 ug/mL (760 ug/L).    M Bassam et al. Age adjusted D-dimer cut-off levels to rule out pulmonary embolism: The ADJUST-PE Study. EDGAR 2014;311:2967-7031.; HJ Shi et al. Diagnostic accuracy of conventional or age adjusted D-dimer cutoff values in older patients with suspected venous thromboembolism. Systemic review and meta-analysis. BMJ 2013:346:f2492.   Fibrinogen activity   Result Value Ref Range    Fibrinogen Activity 864 (H) 170 - 510 mg/dL   Antithrombin III   Result Value Ref Range    Antithrombin III 92 85 - 135 %   CRP inflammation   Result Value Ref Range    CRP Inflammation 176.00 (H) <5.00 mg/L   Erythrocyte sedimentation rate auto   Result Value Ref Range    Erythrocyte Sedimentation Rate 94 (H) 0 - 20 mm/hr   Hemoglobin plasma   Result Value Ref Range    Hemoglobin Plasma 50 (H) <30 mg/dL   Lactate Dehydrogenase   Result Value Ref Range    Lactate Dehydrogenase 403 (H) 0 - 250 U/L   Phosphorus   Result Value Ref Range    Phosphorus 4.2 2.5 - 4.5 mg/dL   TSH   Result Value Ref Range    TSH 0.05 (L) 0.30 - 4.20 uIU/mL   T3 Free    Result Value Ref Range    T3 Free 1.7 (L) 2.0 - 4.4 pg/mL   T3 total   Result Value Ref Range    T3 Total 65 (L) 85 - 202 ng/dL   T4 free   Result Value Ref Range    Free T4 1.08 0.90 - 1.70 ng/dL   Lidocaine Level, STAT Only   Result Value Ref Range    Lidocaine 2.5 1.2 - 6.0 ug/ml    Narrative    Performed at:  01 - [facility] [address]  [id number]  : [facility], Phone:  [phone]   Blood gas arterial   Result Value Ref Range    pH Arterial 7.38 7.35 - 7.45    pCO2 Arterial 38 35 - 45 mm Hg    pO2 Arterial 75 (L) 80 - 105 mm Hg    FIO2 60     Bicarbonate Arterial 22 21 - 28 mmol/L    Base Excess/Deficit Arterial -2.4 -3.0 - 3.0 mmol/L    Rei's Test Artline     Oxyhemoglobin Arterial 95 92 - 100 %    O2 Sat, Arterial 96.3 (H) 95.0 - 96.0 %    Narrative    In healthy individuals, oxyhemoglobin (O2Hb) and oxygen saturation (SO2) are approximately equal. In the presence of dyshemoglobins, oxyhemoglobin can be considerably lower than oxygen saturation.   Glucose whole blood   Result Value Ref Range    Glucose 142 (H) 70 - 99 mg/dL   Lactic acid whole blood   Result Value Ref Range    Lactic Acid 1.1 0.7 - 2.0 mmol/L   Respiratory Aerobic Bacterial Culture    Specimen: Endotracheal; Sputum   Result Value Ref Range    Gram Stain Result >25 PMNs/low power field     Gram Stain Result 2+ Mixed christine    Blood gas ELS venous   Result Value Ref Range    pH ELS Venous 7.33 7.32 - 7.43    pCO2 ELS Venous 45 40 - 50 mm Hg    pO2 ELS Venous 48 (H) 25 - 47 mm Hg    Bicarbonate ELS Venous 24 21 - 28 mmol/L    Base Excess/Deficit Venous -2.1 -3.0 - 3.0 mmol/L    FIO2 60     Oxyhemoglobin ELS Venous 79 %    O2 Saturation ELS Venous 81.2 (H) 70.0 - 75.0 %    Narrative    In healthy individuals, oxyhemoglobin (O2Hb) and oxygen saturation (SO2) are approximately equal. In the presence of dyshemoglobins, oxyhemoglobin can be considerably lower than oxygen saturation.   Blood gas  ELS arterial   Result Value Ref Range    pH ELS Arterial 7.34 (L) 7.35 - 7.45    pCO2 ELS Arterial 43 35 - 45 mm Hg    pO2 ELS Arterial 232 (H) 80 - 105 mm Hg    Bicarbonate ELS Arterial 23 21 - 28 mmol/L    Base Excess/Deficit Arterial -2.7 -3.0 - 3.0 mmol/L    FIO2 60     Oxyhemoglobin ELS Arterial 98 75 - 100 %    O2 Saturation ELS Arterial >100.0 (H) 95.0 - 96.0 %    Narrative    In healthy individuals, oxyhemoglobin (O2Hb) and oxygen saturation (SO2) are approximately equal. In the presence of dyshemoglobins, oxyhemoglobin can be considerably lower than oxygen saturation.   Glucose by meter   Result Value Ref Range    GLUCOSE BY METER POCT 130 (H) 70 - 99 mg/dL   Activated clotting time celite, POCT   Result Value Ref Range    Activated Clotting Time (Celite) POCT 203 (H) 74 - 150 seconds   EKG 12-lead, tracing only   Result Value Ref Range    Systolic Blood Pressure  mmHg    Diastolic Blood Pressure  mmHg    Ventricular Rate 83 BPM    Atrial Rate 83 BPM    NY Interval 182 ms    QRS Duration 88 ms     ms    QTc 439 ms    P Axis 69 degrees    R AXIS 16 degrees    T Axis 70 degrees    Interpretation ECG       Sinus rhythm  Low voltage QRS  Inferior infarct (cited on or before 18-Jul-2025)  ** ** ACUTE MI / STEMI ** **  Abnormal ECG  When compared with ECG of 20-Jul-2025 03:30, (unconfirmed)  QRS axis Shifted right  QT has shortened     Activated clotting time celite, POCT   Result Value Ref Range    Activated Clotting Time (Celite) POCT 186 (H) 74 - 150 seconds   Glucose by meter   Result Value Ref Range    GLUCOSE BY METER POCT 113 (H) 70 - 99 mg/dL   Blood gas arterial   Result Value Ref Range    pH Arterial 7.37 7.35 - 7.45    pCO2 Arterial 39 35 - 45 mm Hg    pO2 Arterial 79 (L) 80 - 105 mm Hg    FIO2 60     Bicarbonate Arterial 23 21 - 28 mmol/L    Base Excess/Deficit Arterial -2.2 -3.0 - 3.0 mmol/L    Rei's Test Artline     Oxyhemoglobin Arterial 95 92 - 100 %    O2 Sat, Arterial 96.5 (H) 95.0 -  96.0 %    Narrative    In healthy individuals, oxyhemoglobin (O2Hb) and oxygen saturation (SO2) are approximately equal. In the presence of dyshemoglobins, oxyhemoglobin can be considerably lower than oxygen saturation.   Activated clotting time celite, POCT   Result Value Ref Range    Activated Clotting Time (Celite) POCT 202 (H) 74 - 150 seconds   Blood gas arterial   Result Value Ref Range    pH Arterial 7.39 7.35 - 7.45    pCO2 Arterial 37 35 - 45 mm Hg    pO2 Arterial 60 (L) 80 - 105 mm Hg    FIO2 60     Bicarbonate Arterial 22 21 - 28 mmol/L    Base Excess/Deficit Arterial -2.1 -3.0 - 3.0 mmol/L    Rei's Test Artline     Oxyhemoglobin Arterial 91 (L) 92 - 100 %    O2 Sat, Arterial 92.4 (L) 95.0 - 96.0 %    Peep 8 cm H2O    Narrative    In healthy individuals, oxyhemoglobin (O2Hb) and oxygen saturation (SO2) are approximately equal. In the presence of dyshemoglobins, oxyhemoglobin can be considerably lower than oxygen saturation.   Glucose by meter   Result Value Ref Range    GLUCOSE BY METER POCT 130 (H) 70 - 99 mg/dL   Glucose by meter   Result Value Ref Range    GLUCOSE BY METER POCT 144 (H) 70 - 99 mg/dL   CBC with platelets   Result Value Ref Range    WBC Count 15.0 (H) 4.0 - 11.0 10e3/uL    RBC Count 3.39 (L) 4.40 - 5.90 10e6/uL    Hemoglobin 9.3 (L) 13.3 - 17.7 g/dL    Hematocrit 29.2 (L) 40.0 - 53.0 %    MCV 86 78 - 100 fL    MCH 27.4 26.5 - 33.0 pg    MCHC 31.8 31.5 - 36.5 g/dL    RDW 13.5 10.0 - 15.0 %    Platelet Count 258 150 - 450 10e3/uL   Comprehensive metabolic panel   Result Value Ref Range    Sodium 140 135 - 145 mmol/L    Potassium 3.8 3.4 - 5.3 mmol/L    Carbon Dioxide (CO2) 19 (L) 22 - 29 mmol/L    Anion Gap 14 7 - 15 mmol/L    Urea Nitrogen 46.7 (H) 8.0 - 23.0 mg/dL    Creatinine 1.75 (H) 0.67 - 1.17 mg/dL    GFR Estimate 42 (L) >60 mL/min/1.73m2    Calcium 8.4 (L) 8.8 - 10.4 mg/dL    Chloride 107 98 - 107 mmol/L    Glucose 162 (H) 70 - 99 mg/dL    Alkaline Phosphatase 160 (H) 40 - 150  U/L    AST 76 (H) 0 - 45 U/L    ALT 64 0 - 70 U/L    Protein Total 6.1 (L) 6.4 - 8.3 g/dL    Albumin 2.8 (L) 3.5 - 5.2 g/dL    Bilirubin Total 0.3 <=1.2 mg/dL   Calcium ionized whole blood   Result Value Ref Range    Calcium Ionized Whole Blood 4.5 4.4 - 5.2 mg/dL   Glucose whole blood   Result Value Ref Range    Glucose 160 (H) 70 - 99 mg/dL   Heparin Unfractionated Anti Xa Level   Result Value Ref Range    Anti Xa Unfractionated Heparin 0.61 For Reference Range, See Comment IU/mL    Narrative    Therapeutic Range: UFH: 0.25-0.50 IU/mL for low intensity dosing,  0.30-0.70 IU/mL for high intensity dosing DVT and PE.  This test is not validated for other direct factor X inhibitors (e.g. rivaroxaban, apixaban, edoxaban, betrixaban, fondaparinux) and should not be used for monitoring of other medications.   INR   Result Value Ref Range    INR 1.26 (H) 0.85 - 1.15    PT 16.1 (H) 11.8 - 14.8 Seconds   Partial thromboplastin time   Result Value Ref Range    aPTT 70 (H) 22 - 38 Seconds   Lactic acid whole blood   Result Value Ref Range    Lactic Acid 0.8 0.7 - 2.0 mmol/L   Magnesium   Result Value Ref Range    Magnesium 2.3 1.7 - 2.3 mg/dL   Troponin T, High Sensitivity   Result Value Ref Range    Troponin T, High Sensitivity 1,004 (HH) <=22 ng/L   Blood gas arterial   Result Value Ref Range    pH Arterial 7.39 7.35 - 7.45    pCO2 Arterial 34 (L) 35 - 45 mm Hg    pO2 Arterial 72 (L) 80 - 105 mm Hg    FIO2 60     Bicarbonate Arterial 21 21 - 28 mmol/L    Base Excess/Deficit Arterial -4.0 (L) -3.0 - 3.0 mmol/L    Rei's Test Artline     Oxyhemoglobin Arterial 94 92 - 100 %    O2 Sat, Arterial 95.8 95.0 - 96.0 %    Peep 8 cm H2O    Narrative    In healthy individuals, oxyhemoglobin (O2Hb) and oxygen saturation (SO2) are approximately equal. In the presence of dyshemoglobins, oxyhemoglobin can be considerably lower than oxygen saturation.   Activated clotting time celite, POCT   Result Value Ref Range    Activated Clotting  "Time (Celite) POCT 174 (H) 74 - 150 seconds       Clinically Significant Risk Factors          # Hyperchloremia: Highest Cl = 108 mmol/L in last 2 days, will monitor as appropriate          # Hypoalbuminemia: Lowest albumin = 2.7 g/dL at 7/21/2025  3:52 AM, will monitor as appropriate  # Coagulation Defect: INR = 1.26 (Ref range: 0.85 - 1.15) and/or PTT = 122 Seconds (Ref range: 22 - 38 Seconds), will monitor for bleeding        # Acute Hypoxic Respiratory Failure: Based on blood gas results. Continue supplemental oxygen as needed  # Acute Hypercapnic Respiratory Failure: based on arterial blood gas results.  Continue supplemental oxygen and ventilatory support as indicated.  # Acute Hypercapnic Respiratory Failure: based on venous blood gas results.  Continue supplemental oxygen and ventilatory support as indicated.         # Obesity: Estimated body mass index is 36.5 kg/m  as calculated from the following:    Height as of this encounter: 1.778 m (5' 10\").    Weight as of this encounter: 115.4 kg (254 lb 6.6 oz)., PRESENT ON ADMISSION     # Financial/Environmental Concerns: none              "

## 2025-07-21 NOTE — PLAN OF CARE
Goal Outcome Evaluation:      Plan of Care Reviewed With: patient, spouse    Overall Patient Progress: no changeOverall Patient Progress: no change    Outcome Evaluation: Remains on [facility] ECMO. Able to wean sedation      Major Shift Events:  Continues with muscle jerking and subsequent hypoxia with sedation vacation. Able to wean sedation, NPIs >3. Repeat head CT completed. Does not follow commands, not observed making purposeful movement. No cough. No gag. Overbreathes vent. SR/ST with ST elevation rare ectopy. Weaned lido to 0.5. Continues on cardene for MAP 65-85. TF increased to goal of 40, tolerating. Diuresed for goal net negative 1L. Fox catheter replaced, adequate response to diuresis. Wife updated at bedside.     Plan: Turndown tomorrow. Wean sedation as tolerated.   For vital signs and complete assessments, please see documentation flowsheets.

## 2025-07-21 NOTE — PROGRESS NOTES
[facility]    ECLS Shift Summary:     ECMO Equipment:  Console Serial Number: [id number]  Circuit Lot Number: [id number]  Oxygenator Lot Number: [id number]    Circuit Assessment: Fibrin  Fibrin Location: Connectors    Arterial ECMO Cannula: 17 Fr in the Right Femoral Artery  Venous ECMO Cannula: 25 Fr in the Right Femoral Vein  Distal Perfusion Catheter: 8 Fr in the Right Superficial Femoral Artery    ECMO Cannula Arterial Right femoral artery-Site Assessment: Sutured, Secure  ECMO Cannula Venous Right femoral vein-Site Assessment: Secure, Sutured  Distal Perfusion Catheter Right superficial femoral artery-Site Assessment: WDL  ECMO Cannula Arterial Right femoral artery-Site Intervention: No intervention needed  ECMO Cannula Venous Right femoral vein-Site Intervention: No intervention needed  Distal Perfusion Catheter Right superficial femoral artery-Site Intervention: No intervention needed    Patient remains on V-A ECMO, all equipment is functioning and alarms are appropriately set. RPM's: 3406 with Blood Flow (Circuit) LPM  Avg: 3.7 LPM  Min: 3.66 LPM  Max: 3.75 LPM L/min. Sweep is at 1 LPM and 80 %. Extremities are warm.     Significant Shift Events: Follow up head CT done this morning. Heparin titrated to obtain ACT goal.    Vent settings:  FiO2 (%): 80 %, Resp: 19, Vent Mode: VC/AC, Resp Rate (Set): 12 breaths/min, Tidal Volume (Set, mL): 400 mL, PEEP (cm H2O): 8 cmH2O, Resp Rate (Set): 12 breaths/min, Tidal Volume (Set, mL): 400 mL, PEEP (cm H2O): 8 cmH2O    Anticoagulation:  Dose (units/hr) HEParin: 1900 Units/hr  Rate (mL/hr) HEParin: 19 mL/hr  Concentration HEParin: 100 Units/mL        Most recent ACT  (seconds): 186 seconds    See I/O flowsheet for urine output.  Blood loss was not appreciated. No product given this shift.     Intake/Output Summary (Last 24 hours) at 7/21/2025 1830  Last data filed at 7/21/2025 1800  Gross per 24 hour   Intake 3745.09 ml    Output 2290 ml   Net 1455.09 ml       Labs:  Recent Labs   Lab 07/21/25  1759 07/21/25  1603 07/21/25  1359 07/21/25  1152   PH 7.41 7.41 7.41 7.40   PCO2 38 36 35 34*   PO2 79* 70* 69* 66*   HCO3 24 23 22 21   O2PER 80 70  70  70 70 60       Lab Results   Component Value Date    HGB 8.9 (L) 07/21/2025    PHGB 50 (H) 07/21/2025     07/21/2025    FIBR 1,018 (H) 07/21/2025    INR 1.29 (H) 07/21/2025    PTT 82 (H) 07/21/2025    DD 2.12 (H) 07/21/2025    AXA 0.21 05/24/2013    ANTCH 92 07/21/2025       Plan:  Continue VA ECMO support.    Rosenda Messina, RT  ECMO Specialist  7/21/2025 6:30 PM

## 2025-07-21 NOTE — PROGRESS NOTES
[facility]    ECLS Shift Summary:     ECMO Equipment:  Console Serial Number: [id number]  Circuit Lot Number: [id number]  Oxygenator Lot Number: [id number]    Circuit Assessment: Free of fibrin, clot, and air    Arterial ECMO Cannula: 17 Fr in the Right Femoral Artery  Venous ECMO Cannula: 25 Fr in the Right Femoral Vein  Distal Perfusion Catheter: 8 Fr in the Right Superficial Femoral Artery    ECMO Cannula Arterial Right femoral artery-Site Assessment: Sutured, Secure  ECMO Cannula Venous Right femoral vein-Site Assessment: Secure, Sutured  Distal Perfusion Catheter Right superficial femoral artery-Site Assessment: WDL  ECMO Cannula Arterial Right femoral artery-Site Intervention: No intervention needed  ECMO Cannula Venous Right femoral vein-Site Intervention: No intervention needed  Distal Perfusion Catheter Right superficial femoral artery-Site Intervention: No intervention needed    Patient remains on V-A ECMO, all equipment is functioning and alarms are appropriately set. RPM's: 3400 with Blood Flow (Circuit) LPM  Avg: 3.7 LPM  Min: 3.57 LPM  Max: 3.77 LPM L/min. Sweep is at 1 LPM and 60 %. Extremities are warm.     Significant Shift Events: decreased heparin for ACT goal. No products needed, no chattering overnight. Dressing changed d/t bleeding from both cannula sites.     Vent settings:  FiO2 (%): 60 %, Resp: 16, Vent Mode: VC/AC, Resp Rate (Set): 12 breaths/min, Tidal Volume (Set, mL): 400 mL, PEEP (cm H2O): 8 cmH2O, Resp Rate (Set): 12 breaths/min, Tidal Volume (Set, mL): 400 mL, PEEP (cm H2O): 8 cmH2O    Anticoagulation:  Dose (units/hr) HEParin: 1750 Units/hr  Rate (mL/hr) HEParin: 17.5 mL/hr  Concentration HEParin: 100 Units/mL        Most recent ACT  (seconds): 186 seconds    Urine output is adequate.  Blood loss was minimal. Product given included none.     Intake/Output Summary (Last 24 hours) at 7/21/2025 0609  Last data filed at 7/21/2025 0600  Gross  per 24 hour   Intake 3057.29 ml   Output 2025 ml   Net 1032.29 ml       Labs:  Recent Labs   Lab 07/21/25  0356 07/21/25  0353 07/21/25  0207 07/21/25  0011 07/20/25  2158   PH  --  7.38 7.37 7.36 7.38   PCO2  --  38 38 40 37   PO2  --  75* 83 127* 64*   HCO3  --  22 22 22 22   O2PER 60  60 60 60 60 60       Lab Results   Component Value Date    HGB 9.1 (L) 07/21/2025    PHGB 50 (H) 07/21/2025     07/21/2025    FIBR 864 (H) 07/21/2025    INR 1.26 (H) 07/21/2025     (HH) 07/21/2025    DD 2.41 (H) 07/21/2025    AXA 0.21 05/24/2013    ANTCH 90 07/20/2025       Plan:  VA ECMO with turn down today    Yancy Duncan RN  ECMO Specialist  7/21/2025 6:09 AM

## 2025-07-22 ENCOUNTER — APPOINTMENT (OUTPATIENT)
Dept: GENERAL RADIOLOGY | Facility: CLINIC | Age: 70
DRG: 003 | End: 2025-07-22
Attending: STUDENT IN AN ORGANIZED HEALTH CARE EDUCATION/TRAINING PROGRAM
Payer: COMMERCIAL

## 2025-07-22 ENCOUNTER — APPOINTMENT (OUTPATIENT)
Dept: CT IMAGING | Facility: CLINIC | Age: 70
End: 2025-07-22
Attending: STUDENT IN AN ORGANIZED HEALTH CARE EDUCATION/TRAINING PROGRAM
Payer: COMMERCIAL

## 2025-07-22 LAB
1OH-MIDAZOLAM UR QL SCN: PRESENT
ACT BLD: 182 SECONDS (ref 74–150)
ACT BLD: 186 SECONDS (ref 74–150)
ALBUMIN SERPL BCG-MCNC: 2.6 G/DL (ref 3.5–5.2)
ALBUMIN SERPL BCG-MCNC: 2.8 G/DL (ref 3.5–5.2)
ALBUMIN SERPL BCG-MCNC: 2.9 G/DL (ref 3.5–5.2)
ALBUMIN SERPL BCG-MCNC: 2.9 G/DL (ref 3.5–5.2)
ALLEN'S TEST: ABNORMAL
ALP SERPL-CCNC: 122 U/L (ref 40–150)
ALP SERPL-CCNC: 137 U/L (ref 40–150)
ALP SERPL-CCNC: 140 U/L (ref 40–150)
ALP SERPL-CCNC: 143 U/L (ref 40–150)
ALT SERPL W P-5'-P-CCNC: 42 U/L (ref 0–70)
ALT SERPL W P-5'-P-CCNC: 44 U/L (ref 0–70)
ALT SERPL W P-5'-P-CCNC: 44 U/L (ref 0–70)
ALT SERPL W P-5'-P-CCNC: 45 U/L (ref 0–70)
ANION GAP SERPL CALCULATED.3IONS-SCNC: 13 MMOL/L (ref 7–15)
ANION GAP SERPL CALCULATED.3IONS-SCNC: 13 MMOL/L (ref 7–15)
ANION GAP SERPL CALCULATED.3IONS-SCNC: 16 MMOL/L (ref 7–15)
ANION GAP SERPL CALCULATED.3IONS-SCNC: 17 MMOL/L (ref 7–15)
APTT PPP: 96 SECONDS (ref 22–38)
APTT PPP: 98 SECONDS (ref 22–38)
APTT PPP: 98 SECONDS (ref 22–38)
APTT PPP: 99 SECONDS (ref 22–38)
AST SERPL W P-5'-P-CCNC: 58 U/L (ref 0–45)
AST SERPL W P-5'-P-CCNC: 62 U/L (ref 0–45)
AT III ACT/NOR PPP CHRO: 89 % (ref 85–135)
ATRIAL RATE - MUSE: 58 BPM
ATRIAL RATE - MUSE: 72 BPM
ATRIAL RATE - MUSE: 74 BPM
ATRIAL RATE - MUSE: 80 BPM
ATRIAL RATE - MUSE: 81 BPM
ATRIAL RATE - MUSE: 83 BPM
ATRIAL RATE - MUSE: 86 BPM
ATRIAL RATE - MUSE: NORMAL BPM
BACTERIA SPT CULT: ABNORMAL
BASE EXCESS BLDA CALC-SCNC: -0.1 MMOL/L (ref -3–3)
BASE EXCESS BLDA CALC-SCNC: -0.3 MMOL/L (ref -3–3)
BASE EXCESS BLDA CALC-SCNC: -0.8 MMOL/L (ref -3–3)
BASE EXCESS BLDA CALC-SCNC: -1.2 MMOL/L (ref -3–3)
BASE EXCESS BLDA CALC-SCNC: -1.5 MMOL/L (ref -3–3)
BASE EXCESS BLDA CALC-SCNC: -2 MMOL/L (ref -3–3)
BASE EXCESS BLDA CALC-SCNC: -2.1 MMOL/L (ref -3–3)
BASE EXCESS BLDA CALC-SCNC: -2.8 MMOL/L (ref -3–3)
BASE EXCESS BLDA CALC-SCNC: -2.9 MMOL/L (ref -3–3)
BASE EXCESS BLDA CALC-SCNC: -3.3 MMOL/L (ref -3–3)
BASE EXCESS BLDA CALC-SCNC: -3.3 MMOL/L (ref -3–3)
BASE EXCESS BLDA CALC-SCNC: 0 MMOL/L (ref -3–3)
BASE EXCESS BLDA CALC-SCNC: 0.1 MMOL/L (ref -3–3)
BASE EXCESS BLDA CALC-SCNC: 0.7 MMOL/L (ref -3–3)
BASE EXCESS BLDV CALC-SCNC: -3 MMOL/L (ref -3–3)
BASE EXCESS BLDV CALC-SCNC: 0 MMOL/L (ref -3–3)
BILIRUB SERPL-MCNC: 0.2 MG/DL
BILIRUB SERPL-MCNC: 0.3 MG/DL
BLD PROD TYP BPU: NORMAL
BLOOD COMPONENT TYPE: NORMAL
BUN SERPL-MCNC: 57.5 MG/DL (ref 8–23)
BUN SERPL-MCNC: 57.6 MG/DL (ref 8–23)
BUN SERPL-MCNC: 57.9 MG/DL (ref 8–23)
BUN SERPL-MCNC: 60.5 MG/DL (ref 8–23)
CA-I BLD-MCNC: 4.4 MG/DL (ref 4.4–5.2)
CA-I BLD-MCNC: 4.6 MG/DL (ref 4.4–5.2)
CA-I BLD-MCNC: 4.6 MG/DL (ref 4.4–5.2)
CA-I BLD-MCNC: 4.8 MG/DL (ref 4.4–5.2)
CALCIUM SERPL-MCNC: 8.2 MG/DL (ref 8.8–10.4)
CALCIUM SERPL-MCNC: 9 MG/DL (ref 8.8–10.4)
CALCIUM SERPL-MCNC: 9.3 MG/DL (ref 8.8–10.4)
CALCIUM SERPL-MCNC: 9.4 MG/DL (ref 8.8–10.4)
CF REDUC 30M P MA P HEP LENFR BLD TEG: NORMAL %
CF REDUC 60M P MA P HEPASE LENFR BLD TEG: NORMAL %
CFT P HPASE BLD TEG: NORMAL MIN
CHLORIDE SERPL-SCNC: 106 MMOL/L (ref 98–107)
CHLORIDE SERPL-SCNC: 106 MMOL/L (ref 98–107)
CHLORIDE SERPL-SCNC: 108 MMOL/L (ref 98–107)
CHLORIDE SERPL-SCNC: 109 MMOL/L (ref 98–107)
CI (COAGULATION INDEX)(Z): NORMAL
CLOT ANGLE P HPASE BLD TEG: NORMAL DEG
CLOT INIT P HPASE BLD TEG: NORMAL MIN
CLOT STRENGTH P HPASE BLD TEG: NORMAL
CODING SYSTEM: NORMAL
CREAT SERPL-MCNC: 1.67 MG/DL (ref 0.67–1.17)
CREAT SERPL-MCNC: 1.7 MG/DL (ref 0.67–1.17)
CREAT SERPL-MCNC: 1.74 MG/DL (ref 0.67–1.17)
CREAT SERPL-MCNC: 1.78 MG/DL (ref 0.67–1.17)
CROSSMATCH: NORMAL
CRP SERPL-MCNC: 236 MG/L
D DIMER PPP FEU-MCNC: 2.05 UG/ML FEU (ref 0–0.5)
D DIMER PPP FEU-MCNC: 2.32 UG/ML FEU (ref 0–0.5)
DIASTOLIC BLOOD PRESSURE - MUSE: NORMAL MMHG
EGFRCR SERPLBLD CKD-EPI 2021: 41 ML/MIN/1.73M2
EGFRCR SERPLBLD CKD-EPI 2021: 42 ML/MIN/1.73M2
EGFRCR SERPLBLD CKD-EPI 2021: 43 ML/MIN/1.73M2
EGFRCR SERPLBLD CKD-EPI 2021: 44 ML/MIN/1.73M2
ERYTHROCYTE [DISTWIDTH] IN BLOOD BY AUTOMATED COUNT: 14.1 % (ref 10–15)
ERYTHROCYTE [DISTWIDTH] IN BLOOD BY AUTOMATED COUNT: 14.1 % (ref 10–15)
ERYTHROCYTE [DISTWIDTH] IN BLOOD BY AUTOMATED COUNT: 14.2 % (ref 10–15)
ERYTHROCYTE [DISTWIDTH] IN BLOOD BY AUTOMATED COUNT: 14.2 % (ref 10–15)
ERYTHROCYTE [SEDIMENTATION RATE] IN BLOOD BY WESTERGREN METHOD: 90 MM/HR (ref 0–20)
FENTANYL UR CFM-MCNC: 4 NG/ML
FENTANYL/CREAT UR: 8 NG/MG {CREAT}
FIBRINOGEN PPP-MCNC: 958 MG/DL (ref 170–510)
FIBRINOGEN PPP-MCNC: >1200 MG/DL (ref 170–510)
GABAPENTIN UR QL CFM: PRESENT
GLUCOSE BLD-MCNC: 122 MG/DL (ref 70–99)
GLUCOSE BLD-MCNC: 153 MG/DL (ref 70–99)
GLUCOSE BLD-MCNC: 153 MG/DL (ref 70–99)
GLUCOSE BLD-MCNC: 217 MG/DL (ref 70–99)
GLUCOSE BLDC GLUCOMTR-MCNC: 115 MG/DL (ref 70–99)
GLUCOSE BLDC GLUCOMTR-MCNC: 123 MG/DL (ref 70–99)
GLUCOSE BLDC GLUCOMTR-MCNC: 128 MG/DL (ref 70–99)
GLUCOSE BLDC GLUCOMTR-MCNC: 132 MG/DL (ref 70–99)
GLUCOSE BLDC GLUCOMTR-MCNC: 135 MG/DL (ref 70–99)
GLUCOSE BLDC GLUCOMTR-MCNC: 142 MG/DL (ref 70–99)
GLUCOSE BLDC GLUCOMTR-MCNC: 142 MG/DL (ref 70–99)
GLUCOSE BLDC GLUCOMTR-MCNC: 148 MG/DL (ref 70–99)
GLUCOSE BLDC GLUCOMTR-MCNC: 151 MG/DL (ref 70–99)
GLUCOSE BLDC GLUCOMTR-MCNC: 163 MG/DL (ref 70–99)
GLUCOSE BLDC GLUCOMTR-MCNC: 172 MG/DL (ref 70–99)
GLUCOSE BLDC GLUCOMTR-MCNC: 189 MG/DL (ref 70–99)
GLUCOSE BLDC GLUCOMTR-MCNC: 209 MG/DL (ref 70–99)
GLUCOSE BLDC GLUCOMTR-MCNC: 211 MG/DL (ref 70–99)
GLUCOSE SERPL-MCNC: 126 MG/DL (ref 70–99)
GLUCOSE SERPL-MCNC: 156 MG/DL (ref 70–99)
GLUCOSE SERPL-MCNC: 159 MG/DL (ref 70–99)
GLUCOSE SERPL-MCNC: 222 MG/DL (ref 70–99)
GRAM STAIN RESULT: ABNORMAL
HCO3 BLD-SCNC: 22 MMOL/L (ref 21–28)
HCO3 BLD-SCNC: 23 MMOL/L (ref 21–28)
HCO3 BLD-SCNC: 24 MMOL/L (ref 21–28)
HCO3 BLDA-SCNC: 22 MMOL/L (ref 21–28)
HCO3 BLDA-SCNC: 23 MMOL/L (ref 21–28)
HCO3 BLDV-SCNC: 23 MMOL/L (ref 21–28)
HCO3 BLDV-SCNC: 24 MMOL/L (ref 21–28)
HCO3 SERPL-SCNC: 20 MMOL/L (ref 22–29)
HCO3 SERPL-SCNC: 20 MMOL/L (ref 22–29)
HCO3 SERPL-SCNC: 21 MMOL/L (ref 22–29)
HCO3 SERPL-SCNC: 21 MMOL/L (ref 22–29)
HCT VFR BLD AUTO: 27.2 % (ref 40–53)
HCT VFR BLD AUTO: 29.1 % (ref 40–53)
HCT VFR BLD AUTO: 29.3 % (ref 40–53)
HCT VFR BLD AUTO: 30.2 % (ref 40–53)
HGB BLD-MCNC: 10.1 G/DL (ref 13.3–17.7)
HGB BLD-MCNC: 8.8 G/DL (ref 13.3–17.7)
HGB BLD-MCNC: 9.4 G/DL (ref 13.3–17.7)
HGB BLD-MCNC: 9.8 G/DL (ref 13.3–17.7)
HGB FREE PLAS-MCNC: 30 MG/DL
INR PPP: 1.27 (ref 0.85–1.15)
INR PPP: 1.32 (ref 0.85–1.15)
INR PPP: 1.33 (ref 0.85–1.15)
INR PPP: 1.36 (ref 0.85–1.15)
INTERPRETATION ECG - MUSE: NORMAL
ISSUE DATE AND TIME: NORMAL
LACTATE SERPL-SCNC: 0.9 MMOL/L (ref 0.7–2)
LACTATE SERPL-SCNC: 0.9 MMOL/L (ref 0.7–2)
LACTATE SERPL-SCNC: 1.1 MMOL/L (ref 0.7–2)
LACTATE SERPL-SCNC: 1.2 MMOL/L (ref 0.7–2)
LDH SERPL L TO P-CCNC: 377 U/L (ref 0–250)
LVEF ECHO: NORMAL
MAGNESIUM SERPL-MCNC: 2.3 MG/DL (ref 1.7–2.3)
MAGNESIUM SERPL-MCNC: 2.3 MG/DL (ref 1.7–2.3)
MAGNESIUM SERPL-MCNC: 2.4 MG/DL (ref 1.7–2.3)
MAGNESIUM SERPL-MCNC: 3.6 MG/DL (ref 1.7–2.3)
MCF P HPASE BLD TEG: NORMAL MM
MCH RBC QN AUTO: 27.2 PG (ref 26.5–33)
MCH RBC QN AUTO: 28.1 PG (ref 26.5–33)
MCH RBC QN AUTO: 28.1 PG (ref 26.5–33)
MCH RBC QN AUTO: 28.3 PG (ref 26.5–33)
MCHC RBC AUTO-ENTMCNC: 32.1 G/DL (ref 31.5–36.5)
MCHC RBC AUTO-ENTMCNC: 32.4 G/DL (ref 31.5–36.5)
MCHC RBC AUTO-ENTMCNC: 33.4 G/DL (ref 31.5–36.5)
MCHC RBC AUTO-ENTMCNC: 33.7 G/DL (ref 31.5–36.5)
MCV RBC AUTO: 83 FL (ref 78–100)
MCV RBC AUTO: 84 FL (ref 78–100)
MCV RBC AUTO: 85 FL (ref 78–100)
MCV RBC AUTO: 88 FL (ref 78–100)
O2/TOTAL GAS SETTING VFR VENT: 70 %
O2/TOTAL GAS SETTING VFR VENT: 70 %
O2/TOTAL GAS SETTING VFR VENT: 80 %
O2/TOTAL GAS SETTING VFR VENT: 90 %
OXYHGB MFR BLDA: 92 % (ref 92–100)
OXYHGB MFR BLDA: 94 % (ref 92–100)
OXYHGB MFR BLDA: 95 % (ref 92–100)
OXYHGB MFR BLDA: 96 % (ref 92–100)
OXYHGB MFR BLDA: 98 % (ref 75–100)
OXYHGB MFR BLDA: 98 % (ref 92–100)
OXYHGB MFR BLDA: 99 % (ref 75–100)
OXYHGB MFR BLDV: 88 %
OXYHGB MFR BLDV: 91 %
P AXIS - MUSE: 43 DEGREES
P AXIS - MUSE: 53 DEGREES
P AXIS - MUSE: 54 DEGREES
P AXIS - MUSE: 66 DEGREES
P AXIS - MUSE: 69 DEGREES
P AXIS - MUSE: NORMAL DEGREES
PA AA BLD-ACNC: NORMAL
PA ADP BLD-ACNC: NORMAL
PCO2 BLD: 33 MM HG (ref 35–45)
PCO2 BLD: 35 MM HG (ref 35–45)
PCO2 BLD: 36 MM HG (ref 35–45)
PCO2 BLD: 37 MM HG (ref 35–45)
PCO2 BLD: 38 MM HG (ref 35–45)
PCO2 BLDA: 34 MM HG (ref 35–45)
PCO2 BLDA: 40 MM HG (ref 35–45)
PCO2 BLDV: 36 MM HG (ref 40–50)
PCO2 BLDV: 42 MM HG (ref 40–50)
PEEP: 8 CM H2O
PH BLD: 7.37 [PH] (ref 7.35–7.45)
PH BLD: 7.37 [PH] (ref 7.35–7.45)
PH BLD: 7.38 [PH] (ref 7.35–7.45)
PH BLD: 7.39 [PH] (ref 7.35–7.45)
PH BLD: 7.41 [PH] (ref 7.35–7.45)
PH BLD: 7.42 [PH] (ref 7.35–7.45)
PH BLD: 7.42 [PH] (ref 7.35–7.45)
PH BLD: 7.45 [PH] (ref 7.35–7.45)
PH BLD: 7.45 [PH] (ref 7.35–7.45)
PH BLD: 7.46 [PH] (ref 7.35–7.45)
PH BLDA: 7.35 [PH] (ref 7.35–7.45)
PH BLDA: 7.45 [PH] (ref 7.35–7.45)
PH BLDV: 7.34 [PH] (ref 7.32–7.43)
PH BLDV: 7.43 [PH] (ref 7.32–7.43)
PHOSPHATE SERPL-MCNC: 2.6 MG/DL (ref 2.5–4.5)
PLATELET # BLD AUTO: 211 10E3/UL (ref 150–450)
PLATELET # BLD AUTO: 231 10E3/UL (ref 150–450)
PLATELET # BLD AUTO: 233 10E3/UL (ref 150–450)
PLATELET # BLD AUTO: 248 10E3/UL (ref 150–450)
PO2 BLD: 102 MM HG (ref 80–105)
PO2 BLD: 63 MM HG (ref 80–105)
PO2 BLD: 71 MM HG (ref 80–105)
PO2 BLD: 74 MM HG (ref 80–105)
PO2 BLD: 77 MM HG (ref 80–105)
PO2 BLD: 78 MM HG (ref 80–105)
PO2 BLD: 79 MM HG (ref 80–105)
PO2 BLD: 81 MM HG (ref 80–105)
PO2 BLD: 83 MM HG (ref 80–105)
PO2 BLD: 85 MM HG (ref 80–105)
PO2 BLD: 89 MM HG (ref 80–105)
PO2 BLD: 99 MM HG (ref 80–105)
PO2 BLDA: 312 MM HG (ref 80–105)
PO2 BLDA: 427 MM HG (ref 80–105)
PO2 BLDV: 56 MM HG (ref 25–47)
PO2 BLDV: 65 MM HG (ref 25–47)
POTASSIUM SERPL-SCNC: 3.3 MMOL/L (ref 3.4–5.3)
POTASSIUM SERPL-SCNC: 3.6 MMOL/L (ref 3.4–5.3)
POTASSIUM SERPL-SCNC: 3.7 MMOL/L (ref 3.4–5.3)
POTASSIUM SERPL-SCNC: 4.1 MMOL/L (ref 3.4–5.3)
POTASSIUM SERPL-SCNC: 4.1 MMOL/L (ref 3.4–5.3)
PR INTERVAL - MUSE: 172 MS
PR INTERVAL - MUSE: 176 MS
PR INTERVAL - MUSE: 182 MS
PR INTERVAL - MUSE: 186 MS
PR INTERVAL - MUSE: 198 MS
PR INTERVAL - MUSE: NORMAL MS
PROT SERPL-MCNC: 5.6 G/DL (ref 6.4–8.3)
PROT SERPL-MCNC: 6.3 G/DL (ref 6.4–8.3)
PROT SERPL-MCNC: 6.7 G/DL (ref 6.4–8.3)
PROT SERPL-MCNC: 6.8 G/DL (ref 6.4–8.3)
PROTHROMBIN TIME: 16.2 SECONDS (ref 11.8–14.8)
PROTHROMBIN TIME: 16.6 SECONDS (ref 11.8–14.8)
PROTHROMBIN TIME: 16.8 SECONDS (ref 11.8–14.8)
PROTHROMBIN TIME: 17.1 SECONDS (ref 11.8–14.8)
QRS DURATION - MUSE: 78 MS
QRS DURATION - MUSE: 78 MS
QRS DURATION - MUSE: 80 MS
QRS DURATION - MUSE: 86 MS
QRS DURATION - MUSE: 88 MS
QRS DURATION - MUSE: 92 MS
QT - MUSE: 368 MS
QT - MUSE: 374 MS
QT - MUSE: 410 MS
QT - MUSE: 410 MS
QT - MUSE: 432 MS
QT - MUSE: 440 MS
QT - MUSE: 446 MS
QT - MUSE: 508 MS
QTC - MUSE: 439 MS
QTC - MUSE: 476 MS
QTC - MUSE: 481 MS
QTC - MUSE: 490 MS
QTC - MUSE: 495 MS
QTC - MUSE: 498 MS
QTC - MUSE: 498 MS
QTC - MUSE: 509 MS
R AXIS - MUSE: -19 DEGREES
R AXIS - MUSE: -23 DEGREES
R AXIS - MUSE: -27 DEGREES
R AXIS - MUSE: -3 DEGREES
R AXIS - MUSE: -3 DEGREES
R AXIS - MUSE: 1 DEGREES
R AXIS - MUSE: 13 DEGREES
R AXIS - MUSE: 16 DEGREES
RBC # BLD AUTO: 3.11 10E6/UL (ref 4.4–5.9)
RBC # BLD AUTO: 3.45 10E6/UL (ref 4.4–5.9)
RBC # BLD AUTO: 3.49 10E6/UL (ref 4.4–5.9)
RBC # BLD AUTO: 3.6 10E6/UL (ref 4.4–5.9)
S100 CA BINDING PROTEIN B SER-MCNC: 115 NG/L
S100 CA BINDING PROTEIN B SER-MCNC: 189 NG/L
S100 CA BINDING PROTEIN B SER-MCNC: 314 NG/L
SAO2 % BLDA: 93.6 % (ref 95–96)
SAO2 % BLDA: 95.8 % (ref 95–96)
SAO2 % BLDA: 96.5 % (ref 95–96)
SAO2 % BLDA: 96.6 % (ref 95–96)
SAO2 % BLDA: 96.8 % (ref 95–96)
SAO2 % BLDA: 96.9 % (ref 95–96)
SAO2 % BLDA: 97 % (ref 95–96)
SAO2 % BLDA: 97.2 % (ref 95–96)
SAO2 % BLDA: 97.5 % (ref 95–96)
SAO2 % BLDA: 97.9 % (ref 95–96)
SAO2 % BLDA: 98.3 % (ref 95–96)
SAO2 % BLDA: 99.1 % (ref 95–96)
SAO2 % BLDA: 99.6 % (ref 95–96)
SAO2 % BLDA: >100 % (ref 95–96)
SAO2 % BLDV: 90.3 % (ref 70–75)
SAO2 % BLDV: 92.9 % (ref 70–75)
SODIUM SERPL-SCNC: 141 MMOL/L (ref 135–145)
SODIUM SERPL-SCNC: 142 MMOL/L (ref 135–145)
SODIUM SERPL-SCNC: 143 MMOL/L (ref 135–145)
SODIUM SERPL-SCNC: 144 MMOL/L (ref 135–145)
SYSTOLIC BLOOD PRESSURE - MUSE: NORMAL MMHG
T AXIS - MUSE: 56 DEGREES
T AXIS - MUSE: 58 DEGREES
T AXIS - MUSE: 58 DEGREES
T AXIS - MUSE: 70 DEGREES
T AXIS - MUSE: 71 DEGREES
T AXIS - MUSE: 76 DEGREES
T3 SERPL-MCNC: 64 NG/DL (ref 85–202)
T3FREE SERPL-MCNC: 1.4 PG/ML (ref 2–4.4)
T4 FREE SERPL-MCNC: 0.93 NG/DL (ref 0.9–1.7)
TROPONIN T SERPL HS-MCNC: 1069 NG/L
TROPONIN T SERPL HS-MCNC: 1075 NG/L
TROPONIN T SERPL HS-MCNC: 964 NG/L
TROPONIN T SERPL HS-MCNC: 965 NG/L
TSH SERPL DL<=0.005 MIU/L-ACNC: 0.06 UIU/ML (ref 0.3–4.2)
UFH PPP CHRO-ACNC: 0.59 IU/ML (ref ?–1.1)
UFH PPP CHRO-ACNC: 0.62 IU/ML (ref ?–1.1)
UFH PPP CHRO-ACNC: 0.69 IU/ML (ref ?–1.1)
UFH PPP CHRO-ACNC: 0.72 IU/ML (ref ?–1.1)
UNIT ABO/RH: NORMAL
UNIT NUMBER: NORMAL
UNIT STATUS: NORMAL
UNIT TYPE ISBT: 6200
VENTRICULAR RATE- MUSE: 115 BPM
VENTRICULAR RATE- MUSE: 58 BPM
VENTRICULAR RATE- MUSE: 72 BPM
VENTRICULAR RATE- MUSE: 74 BPM
VENTRICULAR RATE- MUSE: 80 BPM
VENTRICULAR RATE- MUSE: 81 BPM
VENTRICULAR RATE- MUSE: 83 BPM
VENTRICULAR RATE- MUSE: 86 BPM
WBC # BLD AUTO: 13.4 10E3/UL (ref 4–11)
WBC # BLD AUTO: 14.4 10E3/UL (ref 4–11)
WBC # BLD AUTO: 15.9 10E3/UL (ref 4–11)
WBC # BLD AUTO: 16.4 10E3/UL (ref 4–11)

## 2025-07-22 PROCEDURE — 250N000013 HC RX MED GY IP 250 OP 250 PS 637: Performed by: INTERNAL MEDICINE

## 2025-07-22 PROCEDURE — 85520 HEPARIN ASSAY: CPT | Performed by: STUDENT IN AN ORGANIZED HEALTH CARE EDUCATION/TRAINING PROGRAM

## 2025-07-22 PROCEDURE — 71250 CT THORAX DX C-: CPT | Mod: 26 | Performed by: STUDENT IN AN ORGANIZED HEALTH CARE EDUCATION/TRAINING PROGRAM

## 2025-07-22 PROCEDURE — 82947 ASSAY GLUCOSE BLOOD QUANT: CPT | Performed by: STUDENT IN AN ORGANIZED HEALTH CARE EDUCATION/TRAINING PROGRAM

## 2025-07-22 PROCEDURE — 258N000003 HC RX IP 258 OP 636: Performed by: STUDENT IN AN ORGANIZED HEALTH CARE EDUCATION/TRAINING PROGRAM

## 2025-07-22 PROCEDURE — 94003 VENT MGMT INPAT SUBQ DAY: CPT

## 2025-07-22 PROCEDURE — 85347 COAGULATION TIME ACTIVATED: CPT

## 2025-07-22 PROCEDURE — 999N000185 HC STATISTIC TRANSPORT TIME EA 15 MIN

## 2025-07-22 PROCEDURE — 82310 ASSAY OF CALCIUM: CPT | Performed by: STUDENT IN AN ORGANIZED HEALTH CARE EDUCATION/TRAINING PROGRAM

## 2025-07-22 PROCEDURE — 84443 ASSAY THYROID STIM HORMONE: CPT | Performed by: STUDENT IN AN ORGANIZED HEALTH CARE EDUCATION/TRAINING PROGRAM

## 2025-07-22 PROCEDURE — 71250 CT THORAX DX C-: CPT

## 2025-07-22 PROCEDURE — P9016 RBC LEUKOCYTES REDUCED: HCPCS | Performed by: STUDENT IN AN ORGANIZED HEALTH CARE EDUCATION/TRAINING PROGRAM

## 2025-07-22 PROCEDURE — 93005 ELECTROCARDIOGRAM TRACING: CPT

## 2025-07-22 PROCEDURE — 71045 X-RAY EXAM CHEST 1 VIEW: CPT

## 2025-07-22 PROCEDURE — 83605 ASSAY OF LACTIC ACID: CPT | Performed by: STUDENT IN AN ORGANIZED HEALTH CARE EDUCATION/TRAINING PROGRAM

## 2025-07-22 PROCEDURE — 84480 ASSAY TRIIODOTHYRONINE (T3): CPT | Performed by: STUDENT IN AN ORGANIZED HEALTH CARE EDUCATION/TRAINING PROGRAM

## 2025-07-22 PROCEDURE — 85027 COMPLETE CBC AUTOMATED: CPT | Performed by: STUDENT IN AN ORGANIZED HEALTH CARE EDUCATION/TRAINING PROGRAM

## 2025-07-22 PROCEDURE — 85384 FIBRINOGEN ACTIVITY: CPT | Performed by: STUDENT IN AN ORGANIZED HEALTH CARE EDUCATION/TRAINING PROGRAM

## 2025-07-22 PROCEDURE — 86140 C-REACTIVE PROTEIN: CPT | Performed by: STUDENT IN AN ORGANIZED HEALTH CARE EDUCATION/TRAINING PROGRAM

## 2025-07-22 PROCEDURE — 85379 FIBRIN DEGRADATION QUANT: CPT | Performed by: STUDENT IN AN ORGANIZED HEALTH CARE EDUCATION/TRAINING PROGRAM

## 2025-07-22 PROCEDURE — 84484 ASSAY OF TROPONIN QUANT: CPT | Performed by: STUDENT IN AN ORGANIZED HEALTH CARE EDUCATION/TRAINING PROGRAM

## 2025-07-22 PROCEDURE — 85652 RBC SED RATE AUTOMATED: CPT | Performed by: STUDENT IN AN ORGANIZED HEALTH CARE EDUCATION/TRAINING PROGRAM

## 2025-07-22 PROCEDURE — 250N000013 HC RX MED GY IP 250 OP 250 PS 637: Performed by: STUDENT IN AN ORGANIZED HEALTH CARE EDUCATION/TRAINING PROGRAM

## 2025-07-22 PROCEDURE — 33949 ECMO/ECLS DAILY MGMT ARTERY: CPT | Performed by: HOSPITALIST

## 2025-07-22 PROCEDURE — 84481 FREE ASSAY (FT-3): CPT | Performed by: STUDENT IN AN ORGANIZED HEALTH CARE EDUCATION/TRAINING PROGRAM

## 2025-07-22 PROCEDURE — 85018 HEMOGLOBIN: CPT | Performed by: STUDENT IN AN ORGANIZED HEALTH CARE EDUCATION/TRAINING PROGRAM

## 2025-07-22 PROCEDURE — 82805 BLOOD GASES W/O2 SATURATION: CPT | Performed by: INTERNAL MEDICINE

## 2025-07-22 PROCEDURE — 250N000009 HC RX 250: Performed by: STUDENT IN AN ORGANIZED HEALTH CARE EDUCATION/TRAINING PROGRAM

## 2025-07-22 PROCEDURE — 250N000011 HC RX IP 250 OP 636: Performed by: STUDENT IN AN ORGANIZED HEALTH CARE EDUCATION/TRAINING PROGRAM

## 2025-07-22 PROCEDURE — 84100 ASSAY OF PHOSPHORUS: CPT | Performed by: STUDENT IN AN ORGANIZED HEALTH CARE EDUCATION/TRAINING PROGRAM

## 2025-07-22 PROCEDURE — 82330 ASSAY OF CALCIUM: CPT | Performed by: STUDENT IN AN ORGANIZED HEALTH CARE EDUCATION/TRAINING PROGRAM

## 2025-07-22 PROCEDURE — 83615 LACTATE (LD) (LDH) ENZYME: CPT | Performed by: STUDENT IN AN ORGANIZED HEALTH CARE EDUCATION/TRAINING PROGRAM

## 2025-07-22 PROCEDURE — 250N000013 HC RX MED GY IP 250 OP 250 PS 637: Performed by: HOSPITALIST

## 2025-07-22 PROCEDURE — 99291 CRITICAL CARE FIRST HOUR: CPT | Mod: 25 | Performed by: HOSPITALIST

## 2025-07-22 PROCEDURE — 85610 PROTHROMBIN TIME: CPT | Performed by: STUDENT IN AN ORGANIZED HEALTH CARE EDUCATION/TRAINING PROGRAM

## 2025-07-22 PROCEDURE — 83735 ASSAY OF MAGNESIUM: CPT | Performed by: STUDENT IN AN ORGANIZED HEALTH CARE EDUCATION/TRAINING PROGRAM

## 2025-07-22 PROCEDURE — G0463 HOSPITAL OUTPT CLINIC VISIT: HCPCS

## 2025-07-22 PROCEDURE — 74176 CT ABD & PELVIS W/O CONTRAST: CPT | Mod: 26 | Performed by: STUDENT IN AN ORGANIZED HEALTH CARE EDUCATION/TRAINING PROGRAM

## 2025-07-22 PROCEDURE — 87205 SMEAR GRAM STAIN: CPT | Performed by: STUDENT IN AN ORGANIZED HEALTH CARE EDUCATION/TRAINING PROGRAM

## 2025-07-22 PROCEDURE — 200N000002 HC R&B ICU UMMC

## 2025-07-22 PROCEDURE — 82805 BLOOD GASES W/O2 SATURATION: CPT | Performed by: STUDENT IN AN ORGANIZED HEALTH CARE EDUCATION/TRAINING PROGRAM

## 2025-07-22 PROCEDURE — 999N000157 HC STATISTIC RCP TIME EA 10 MIN

## 2025-07-22 PROCEDURE — 93010 ELECTROCARDIOGRAM REPORT: CPT | Mod: XU | Performed by: INTERNAL MEDICINE

## 2025-07-22 PROCEDURE — 84155 ASSAY OF PROTEIN SERUM: CPT | Performed by: STUDENT IN AN ORGANIZED HEALTH CARE EDUCATION/TRAINING PROGRAM

## 2025-07-22 PROCEDURE — 85730 THROMBOPLASTIN TIME PARTIAL: CPT | Performed by: STUDENT IN AN ORGANIZED HEALTH CARE EDUCATION/TRAINING PROGRAM

## 2025-07-22 PROCEDURE — 84132 ASSAY OF SERUM POTASSIUM: CPT | Performed by: HOSPITALIST

## 2025-07-22 PROCEDURE — 83051 HEMOGLOBIN PLASMA: CPT | Performed by: STUDENT IN AN ORGANIZED HEALTH CARE EDUCATION/TRAINING PROGRAM

## 2025-07-22 PROCEDURE — 71045 X-RAY EXAM CHEST 1 VIEW: CPT | Mod: 26 | Performed by: RADIOLOGY

## 2025-07-22 PROCEDURE — 84439 ASSAY OF FREE THYROXINE: CPT | Performed by: STUDENT IN AN ORGANIZED HEALTH CARE EDUCATION/TRAINING PROGRAM

## 2025-07-22 PROCEDURE — 33949 ECMO/ECLS DAILY MGMT ARTERY: CPT

## 2025-07-22 PROCEDURE — 85300 ANTITHROMBIN III ACTIVITY: CPT | Performed by: STUDENT IN AN ORGANIZED HEALTH CARE EDUCATION/TRAINING PROGRAM

## 2025-07-22 RX ORDER — POTASSIUM CHLORIDE 1.5 G/1.58G
20 POWDER, FOR SOLUTION ORAL ONCE
Status: COMPLETED | OUTPATIENT
Start: 2025-07-22 | End: 2025-07-22

## 2025-07-22 RX ORDER — LIDOCAINE 40 MG/G
CREAM TOPICAL
Status: CANCELLED | OUTPATIENT
Start: 2025-07-22

## 2025-07-22 RX ORDER — POTASSIUM CHLORIDE 1.5 G/1.58G
40 POWDER, FOR SOLUTION ORAL ONCE
Status: COMPLETED | OUTPATIENT
Start: 2025-07-23 | End: 2025-07-22

## 2025-07-22 RX ORDER — POTASSIUM CHLORIDE 1.5 G/1.58G
40 POWDER, FOR SOLUTION ORAL ONCE
Status: COMPLETED | OUTPATIENT
Start: 2025-07-22 | End: 2025-07-22

## 2025-07-22 RX ORDER — HYDRALAZINE HYDROCHLORIDE 50 MG/1
50 TABLET, FILM COATED ORAL EVERY 6 HOURS
Status: DISCONTINUED | OUTPATIENT
Start: 2025-07-22 | End: 2025-07-27

## 2025-07-22 RX ORDER — AMIODARONE HYDROCHLORIDE 200 MG/1
400 TABLET ORAL 2 TIMES DAILY
Status: DISCONTINUED | OUTPATIENT
Start: 2025-07-22 | End: 2025-07-22

## 2025-07-22 RX ADMIN — CHLORHEXIDINE GLUCONATE 15 ML: 1.2 SOLUTION ORAL at 08:02

## 2025-07-22 RX ADMIN — AMIODARONE HYDROCHLORIDE 150 MG: 1.5 INJECTION, SOLUTION INTRAVENOUS at 11:14

## 2025-07-22 RX ADMIN — HYDRALAZINE HYDROCHLORIDE 50 MG: 50 TABLET ORAL at 17:29

## 2025-07-22 RX ADMIN — POTASSIUM CHLORIDE 40 MEQ: 1.5 POWDER, FOR SOLUTION ORAL at 08:02

## 2025-07-22 RX ADMIN — Medication 60 ML: at 22:17

## 2025-07-22 RX ADMIN — NICARDIPINE HYDROCHLORIDE 15 MG/HR: 0.2 INJECTION INTRAVENOUS at 12:48

## 2025-07-22 RX ADMIN — INSULIN HUMAN 8 UNITS/HR: 1 INJECTION, SOLUTION INTRAVENOUS at 04:39

## 2025-07-22 RX ADMIN — PROPOFOL 30 MCG/KG/MIN: 10 INJECTION, EMULSION INTRAVENOUS at 00:20

## 2025-07-22 RX ADMIN — HYDRALAZINE HYDROCHLORIDE 50 MG: 50 TABLET ORAL at 11:46

## 2025-07-22 RX ADMIN — POTASSIUM CHLORIDE 40 MEQ: 1.5 POWDER, FOR SOLUTION ORAL at 23:59

## 2025-07-22 RX ADMIN — Medication 15 ML: at 08:02

## 2025-07-22 RX ADMIN — NICARDIPINE HYDROCHLORIDE 12.5 MG/HR: 0.2 INJECTION INTRAVENOUS at 09:48

## 2025-07-22 RX ADMIN — PROPOFOL 30 MCG/KG/MIN: 10 INJECTION, EMULSION INTRAVENOUS at 04:38

## 2025-07-22 RX ADMIN — TICAGRELOR 90 MG: 90 TABLET ORAL at 08:02

## 2025-07-22 RX ADMIN — POTASSIUM CHLORIDE 40 MEQ: 1.5 POWDER, FOR SOLUTION ORAL at 05:15

## 2025-07-22 RX ADMIN — HYDRALAZINE HYDROCHLORIDE 50 MG: 50 TABLET ORAL at 23:17

## 2025-07-22 RX ADMIN — NICARDIPINE HYDROCHLORIDE 15 MG/HR: 0.2 INJECTION INTRAVENOUS at 18:53

## 2025-07-22 RX ADMIN — Medication 60 ML: at 08:02

## 2025-07-22 RX ADMIN — POTASSIUM & SODIUM PHOSPHATES POWDER PACK 280-160-250 MG 1 PACKET: 280-160-250 PACK at 08:32

## 2025-07-22 RX ADMIN — NICARDIPINE HYDROCHLORIDE 7.5 MG/HR: 0.2 INJECTION INTRAVENOUS at 00:20

## 2025-07-22 RX ADMIN — CALCIUM CHLORIDE 1 G: 100 INJECTION, SOLUTION INTRAVENOUS at 04:55

## 2025-07-22 RX ADMIN — AMIODARONE HYDROCHLORIDE 0.5 MG/MIN: 1.8 INJECTION, SOLUTION INTRAVENOUS at 19:19

## 2025-07-22 RX ADMIN — TICAGRELOR 90 MG: 90 TABLET ORAL at 20:08

## 2025-07-22 RX ADMIN — NICARDIPINE HYDROCHLORIDE 15 MG/HR: 0.2 INJECTION INTRAVENOUS at 16:26

## 2025-07-22 RX ADMIN — BUMETANIDE 0.5 MG/HR: 0.25 INJECTION INTRAMUSCULAR; INTRAVENOUS at 08:44

## 2025-07-22 RX ADMIN — HEPARIN SODIUM 2000 UNITS/HR: 10000 INJECTION, SOLUTION INTRAVENOUS at 03:14

## 2025-07-22 RX ADMIN — Medication 60 ML: at 17:29

## 2025-07-22 RX ADMIN — CHLORHEXIDINE GLUCONATE 15 ML: 1.2 SOLUTION ORAL at 20:08

## 2025-07-22 RX ADMIN — ASPIRIN 81 MG CHEWABLE TABLET 81 MG: 81 TABLET CHEWABLE at 08:02

## 2025-07-22 RX ADMIN — AMIODARONE HYDROCHLORIDE 1 MG/MIN: 1.8 INJECTION, SOLUTION INTRAVENOUS at 12:34

## 2025-07-22 RX ADMIN — POTASSIUM CHLORIDE 20 MEQ: 1.5 POWDER, FOR SOLUTION ORAL at 17:29

## 2025-07-22 RX ADMIN — NICARDIPINE HYDROCHLORIDE 15 MG/HR: 0.2 INJECTION INTRAVENOUS at 21:29

## 2025-07-22 RX ADMIN — Medication 40 MG: at 08:02

## 2025-07-22 RX ADMIN — POTASSIUM & SODIUM PHOSPHATES POWDER PACK 280-160-250 MG 1 PACKET: 280-160-250 PACK at 05:15

## 2025-07-22 RX ADMIN — HEPARIN SODIUM 2000 UNITS/HR: 10000 INJECTION, SOLUTION INTRAVENOUS at 17:13

## 2025-07-22 RX ADMIN — CEFTRIAXONE SODIUM 2 G: 2 INJECTION, POWDER, FOR SOLUTION INTRAMUSCULAR; INTRAVENOUS at 22:17

## 2025-07-22 ASSESSMENT — ACTIVITIES OF DAILY LIVING (ADL)
ADLS_ACUITY_SCORE: 77

## 2025-07-22 NOTE — PROGRESS NOTES
Shift Summary:    Halley Ballard RT on 7/22/2025 at 5:23 AMShift Summary:    Pt remained intubated and mechanically ventilated on the following vent settings:     Vent Mode: (Volume Control-Assist Control)  Flow Type: Autoflow  Resp Rate (Set): 12 breaths/min  Tidal Volume (Set, mL): 400 mL   FiO2: 80 %   PEEP (cm H2O): 8 cmH2O   Inspiratory Time (sec): 1 sec  Sensitivity Flow Triggered (L/min): 2 L/min        Assessment: BS diminished all lobes. RT suctioned small amount of thin creamy secretions.      Major Respiratory Related Events: No          Ambu bag, mask, and valve present at bedside.       Halley Ballard RT on 7/22/2025 at 5:23 AM

## 2025-07-22 NOTE — PROGRESS NOTES
Cardiology ICU Progress Note    Brief HPI:  Bob Echols is a 70 year old male with a past medical history of HTN, DM2, and DEMOND as well as recent admission for inferior STEMI on 7/10 (treated with percutaneous revascularization with 2.75 x 20mm Promus RENA to the distal LCx). The patient was discharged on 7/14 and had been doing well at home prior to 7/18 when he was found to be apneic without a pulse with estimated down time ~10-15 min prior to discovery. EMS was called at 1537, arrived and started compressions at 1542, cannulated for VA ECMO at 1614. Subsequently found to have subacute in-stent thrombosis of his recently placed stent without obvious mechanical causes, treated with 2.75 x 20mm Synergy XD RENA. Now admitted to [facility] for further care. His initial pH was 7.08, PcO2 74, PO2 58, Lactate 8.6.     Subjective and Interval:  NAEO  Passed hemodynamic turndown   Poor neuro exam with sedation off   A fib with RVR this morning    Assessment and plan by system:  ==Today's changes ==  - Bumex gtt for pulmonary edema  - Stop lido, transition to amio given A fib with RVR   - CT chest/abd/pelv for infxn r/o  - CT head for interval changes   - Hydralazine for afterload control  - Family care conference tomorrow afternoon     == Neurology ==   #?anoxic brain injury    #hypoxic encephalopathy 2/2 cardiac arrest  -Intubated, sedated. Avoiding hyperthermia  -NCC consulted and appreciate recommendations  -vEEG   -Palliative care consulted    CT head 7/21  Continued diminished gray-white differentiation throughout  the cerebral hemispheres, concerning for hypoxic ischemic injury.    Current sedation: None    Plan:  -Continue to hold sedation pending neurologic recovery   -Spontaneous awakening trial as tolerated  -Permissive hypernatremia for neuroprotection  -CT head 7/23  -Goals of care conversation to be had with family 7/23    == Cardiovascular ==  #Refractory VF cardiac arrest   #cardiogenic shock requiring VA  ECMO ([facility] E)  #CAD, status post percutaneous revascularization of the left circumflex artery with sinacute in-stent thrombosis   #PAH  #HTN  Presented initially to [facility] on 7/10 with inferior STEMI, found to have acute thrombotic lesion of the distal LCx treated with a 2.75 x 20mm Promus RENA, discharged on apixaban and clopidogrel. He was found down at home in VF-mediated cardiac arrest on  requiring emergent VA ECMO cannulation. Coronary angiography demonstrated in-stent thrombosis of his recently placed stent without obvious mechanical cause and therefore presumed to be clopidogrel failure. Treated with a 2.75 x 20mm Synergy XD RENA.     Peripheral V-A ECMO inserted via RCFA and RCFV   Angiogram:     Prox LAD to Mid LAD lesion is 55% stenosed.    Prox RCA lesion is 30% stenosed.    1st Diag lesion is 40% stenosed.    2nd Mrg lesion is 70% stenosed.    RPAV lesion is 70% stenosed.    Mid Cx lesion is 100% stenosed.    TTE:   Left ventricular size is normal. Left ventricular function is decreased. The  ejection fraction is 5-10% (severely reduced). Mild to moderate right  ventricular dilation is present.  Global right ventricular function is severely reduced.  Trace to mild mitral insufficiency is present.  AV opens with each systole No aortic regurgitation is present.  The inferior vena cava is normal. Trivial to small circumferential pericardial  effusion with no hemodynamic consequences    ECG Rhythm: Sinus rhythm    ECMO :  Mode: V-A   Pump Type: Cardiohelp  RPM's: 3400  Blood Flow (Circuit) LPM: 3.8 LPM  Sweep LPM: 1 LPM  Sweep FiO2   %: 80 %  Venous Pressure  mmHg: -38 mmHg  Arterial Pressure  mmH mmHg  Internal Pressure mmH mmHg  Pressure Change mmH mmHg    Current Pressors/inotropes/antiarrythmic: Nicardipine gtt     Hemodynamics:  Art Line  Arterial Line BP: 133/60  Arterial Line MAP (mmHg): 80 mmHg  Arterial Line Location: Right radial  Art Line Wave Form: Appropriate wave  forms      Plan:  -Continue ASA 81mg and ticagrelor 90mg BID   -Hold statin given shock liver, resume when leaving ICU  -Hold ACE/ARB for now given likely reduced renal fxn after arrest  -Hold beta blocker given shock  -Telemetry monitoring  -Trend troponin and lactic acid  -Continue ECMO support  -FBG negative 1L, Diuresis with bumex gtt @ 1mg/hr  -DC lidocaine  -IV amiodarone bolus and gtt for A fib with RVR/VT  -Continue nicardipine gtt for MAP <90  -Hydralazine 50mg q6h     == Pulmonary ==  #Acute hypoxemic respiratory failure requiring intubation  #DEMOND    Vent Settings:   FiO2 (%): 80 %, Resp: 19, Vent Mode: VC/AC, Resp Rate (Set): 12 breaths/min, Tidal Volume (Set, mL): 400 mL, PEEP (cm H2O): 8 cmH2O, Resp Rate (Set): 12 breaths/min, Tidal Volume (Set, mL): 400 mL, PEEP (cm H2O): 8 cmH2O    Plan:  -Wean vent as able  -Daily CXR  -Serial ABGs  -Peridex BID  -FBG and diuresis as above  -CT chest/abd/pelv 7/23 for infectious workup        == Gastrointestinal, Nutrition ==  #?shock liver 2/2 cardiac arrest  #Hypoalbuminemia     Diet Advance tube feeds to goal    Plan:  -Monitor LFTs  -Bowel regimen in place  -GI Prophylaxis: PPI; Protonix 40 mg daily     == Renal, Electrolytes ==  #?acute renal injury 2/2 cardiac arrest  #Lactic acidosis    Plan:  -Avoid nephrotoxins, renally dose meds  -Monitor urine output  -FBG and diuresis as above    == Infectious Disease ==  #Aspiration pneumonia  #Leukocytosis    Plan:  -Continue CTX x 5 days for aspiration coverage  -Monitor for signs of infection  -Avoid fevers     == Hematology ==  # Anemia of critical illness    Receiving heparin for ECMO with ACT goal 180-200    Plan:  -Continue Heparin with ACT goal as above  -Transfusion parameters:   -1u PRBC for hbg < 8.0   -1u platelet for plt < 100   -1u FFP for INR > 3   -5u cryoprecipitate for fibrinogen <150    ASA/ticagrelor for RENA  US LE w/ arterial duplex with no evidence of acute thrombus  DVT PPX: Heparin as above    ==  "Endocrinology ==  #Hyperglycemia   #DM2     Plan:  -Insulin infusion as needed    == Integumentary ==  -No skin issues    Plan:  -CTM cannula sites  -WOCN consult    == Lines/Tubes/Drains ==  Cannula: right fem  Airway: ETT  Enteric: OGT  Central access: left fem  Arterial access: radial  Urinary: yes  Rectal Tube: yes    ICU Checklist  Feeding: yes  Analgesia: fentanyl  Sedation: propofol   Thrombopx: heparin infusion  Head of bed: RT  Ulcers: PPI  Glucose: CTM  SBT: not today  Bowel regimen: yes  Indwelling cath: yes  De-escalate meds: no  Code Status: Full Code , eCPR candidate? Yes, on support  Family updated by team. Patient seen and discussed with staff physician, Dr. Ilhwan Yeo.     Dilan Lozada MD  Cardiovascular Diseases Fellow      Objective:  Most recent vital signs:  Pulse 82   Temp 98.4  F (36.9  C)   Resp 19   Ht 1.778 m (5' 10\")   Wt 115.8 kg (255 lb 4.7 oz)   SpO2 98%   BMI 36.63 kg/m    Temp:  [97.7  F (36.5  C)-99.7  F (37.6  C)] 98.4  F (36.9  C)  Pulse:  [] 82  Resp:  [9-38] 19  MAP:  [73 mmHg-253 mmHg] 80 mmHg  Arterial Line BP: (117-166)/(53-82) 133/60  FiO2 (%):  [60 %-90 %] 80 %  SpO2:  [91 %-100 %] 98 %    Physical exam:  General: critically ill, supine in bed, in NAD  HEENT: PERRL, no scleral icterus or injection  CARDIAC: RRR, no m/r/g appreciated. Peripheral pulses dopplered  RESP: synchronous with mechanical ventilation, CTAB, no wheezes, rhonchi or crackles appreciated.  GI: soft, non-distended, BS hypoactive  : Urinary catheter present  EXTREMITIES: No BLE edema, pulses 2+. Femoral access site w/o bleeding, dressing C/D/I.   SKIN: No acute lesions appreciated on exposed skin.  NEURO: Intubated and sedated. Unable to assess language or mentation. Unresponsive to noxious stim x 4 extremities. No focal deficit.     Imaging/procedure results:  Recent Results (from the past 24 hours)   Activated clotting time celite, POCT   Result Value Ref Range    Activated Clotting Time " (Celite) POCT 202 (H) 74 - 150 seconds   Blood gas arterial   Result Value Ref Range    pH Arterial 7.39 7.35 - 7.45    pCO2 Arterial 37 35 - 45 mm Hg    pO2 Arterial 60 (L) 80 - 105 mm Hg    FIO2 60     Bicarbonate Arterial 22 21 - 28 mmol/L    Base Excess/Deficit Arterial -2.1 -3.0 - 3.0 mmol/L    Rei's Test Artline     Oxyhemoglobin Arterial 91 (L) 92 - 100 %    O2 Sat, Arterial 92.4 (L) 95.0 - 96.0 %    Peep 8 cm H2O    Narrative    In healthy individuals, oxyhemoglobin (O2Hb) and oxygen saturation (SO2) are approximately equal. In the presence of dyshemoglobins, oxyhemoglobin can be considerably lower than oxygen saturation.   Glucose by meter   Result Value Ref Range    GLUCOSE BY METER POCT 130 (H) 70 - 99 mg/dL   CT Head w/o Contrast    Narrative    EXAM: CT HEAD W/O CONTRAST  7/21/2025 10:16 AM     HISTORY:  cardiac arrest, interval follow-up hypoxic ischemic injury        COMPARISON:  CT head without contrast 7/18/2025    TECHNIQUE: Using multidetector thin collimation helical acquisition  technique, axial, coronal and sagittal CT images from the skull base  to the vertex were obtained without intravenous contrast.   (topogram) image(s) also obtained and reviewed.    FINDINGS:  No acute intracranial hemorrhage, mass effect, or midline shift.  Stable mildly diminished gray-white differentiation in the cerebral  cortex and deep gray matter. Ventricles are proportionate to the  cerebral sulci. Clear basal cisterns.    The bony calvaria and the bones of the skull base are normal. New  presence of opaque material in bilateral frontal, ethmoid, sphenoid,  maxillary sinuses, consistent with secretions from intubation. Further  evidence of secretions in the nasopharyngeal space. Grossly normal  orbits. EEG leads visualized.      Impression    IMPRESSION: Continued diminished gray-white differentiation throughout  the cerebral hemispheres, concerning for hypoxic ischemic injury.    I have personally reviewed  the examination and initial interpretation  and I agree with the findings.    CELIO DIAMOND MD         SYSTEM ID:  [id number]   Glucose by meter   Result Value Ref Range    GLUCOSE BY METER POCT 144 (H) 70 - 99 mg/dL   CBC with platelets   Result Value Ref Range    WBC Count 15.0 (H) 4.0 - 11.0 10e3/uL    RBC Count 3.39 (L) 4.40 - 5.90 10e6/uL    Hemoglobin 9.3 (L) 13.3 - 17.7 g/dL    Hematocrit 29.2 (L) 40.0 - 53.0 %    MCV 86 78 - 100 fL    MCH 27.4 26.5 - 33.0 pg    MCHC 31.8 31.5 - 36.5 g/dL    RDW 13.5 10.0 - 15.0 %    Platelet Count 258 150 - 450 10e3/uL   Comprehensive metabolic panel   Result Value Ref Range    Sodium 140 135 - 145 mmol/L    Potassium 3.8 3.4 - 5.3 mmol/L    Carbon Dioxide (CO2) 19 (L) 22 - 29 mmol/L    Anion Gap 14 7 - 15 mmol/L    Urea Nitrogen 46.7 (H) 8.0 - 23.0 mg/dL    Creatinine 1.75 (H) 0.67 - 1.17 mg/dL    GFR Estimate 42 (L) >60 mL/min/1.73m2    Calcium 8.4 (L) 8.8 - 10.4 mg/dL    Chloride 107 98 - 107 mmol/L    Glucose 162 (H) 70 - 99 mg/dL    Alkaline Phosphatase 160 (H) 40 - 150 U/L    AST 76 (H) 0 - 45 U/L    ALT 64 0 - 70 U/L    Protein Total 6.1 (L) 6.4 - 8.3 g/dL    Albumin 2.8 (L) 3.5 - 5.2 g/dL    Bilirubin Total 0.3 <=1.2 mg/dL   Calcium ionized whole blood   Result Value Ref Range    Calcium Ionized Whole Blood 4.5 4.4 - 5.2 mg/dL   Glucose whole blood   Result Value Ref Range    Glucose 160 (H) 70 - 99 mg/dL   Heparin Unfractionated Anti Xa Level   Result Value Ref Range    Anti Xa Unfractionated Heparin 0.61 For Reference Range, See Comment IU/mL    Narrative    Therapeutic Range: UFH: 0.25-0.50 IU/mL for low intensity dosing,  0.30-0.70 IU/mL for high intensity dosing DVT and PE.  This test is not validated for other direct factor X inhibitors (e.g. rivaroxaban, apixaban, edoxaban, betrixaban, fondaparinux) and should not be used for monitoring of other medications.   INR   Result Value Ref Range    INR 1.26 (H) 0.85 - 1.15    PT 16.1 (H) 11.8 - 14.8 Seconds   Partial  thromboplastin time   Result Value Ref Range    aPTT 70 (H) 22 - 38 Seconds   Lactic acid whole blood   Result Value Ref Range    Lactic Acid 0.8 0.7 - 2.0 mmol/L   Magnesium   Result Value Ref Range    Magnesium 2.3 1.7 - 2.3 mg/dL   Troponin T, High Sensitivity   Result Value Ref Range    Troponin T, High Sensitivity 1,004 (HH) <=22 ng/L   Blood gas arterial   Result Value Ref Range    pH Arterial 7.39 7.35 - 7.45    pCO2 Arterial 34 (L) 35 - 45 mm Hg    pO2 Arterial 72 (L) 80 - 105 mm Hg    FIO2 60     Bicarbonate Arterial 21 21 - 28 mmol/L    Base Excess/Deficit Arterial -4.0 (L) -3.0 - 3.0 mmol/L    Rei's Test Artline     Oxyhemoglobin Arterial 94 92 - 100 %    O2 Sat, Arterial 95.8 95.0 - 96.0 %    Peep 8 cm H2O    Narrative    In healthy individuals, oxyhemoglobin (O2Hb) and oxygen saturation (SO2) are approximately equal. In the presence of dyshemoglobins, oxyhemoglobin can be considerably lower than oxygen saturation.   Blood gas arterial   Result Value Ref Range    pH Arterial 7.40 7.35 - 7.45    pCO2 Arterial 34 (L) 35 - 45 mm Hg    pO2 Arterial 66 (L) 80 - 105 mm Hg    FIO2 60     Bicarbonate Arterial 21 21 - 28 mmol/L    Base Excess/Deficit Arterial -3.6 (L) -3.0 - 3.0 mmol/L    Rei's Test Artline     Oxyhemoglobin Arterial 92 92 - 100 %    O2 Sat, Arterial 94.3 (L) 95.0 - 96.0 %    Peep 8 cm H2O    Narrative    In healthy individuals, oxyhemoglobin (O2Hb) and oxygen saturation (SO2) are approximately equal. In the presence of dyshemoglobins, oxyhemoglobin can be considerably lower than oxygen saturation.   Activated clotting time celite, POCT   Result Value Ref Range    Activated Clotting Time (Celite) POCT 174 (H) 74 - 150 seconds   Glucose by meter   Result Value Ref Range    GLUCOSE BY METER POCT 163 (H) 70 - 99 mg/dL   Blood gas arterial   Result Value Ref Range    pH Arterial 7.41 7.35 - 7.45    pCO2 Arterial 35 35 - 45 mm Hg    pO2 Arterial 69 (L) 80 - 105 mm Hg    FIO2 70     Bicarbonate  Arterial 22 21 - 28 mmol/L    Base Excess/Deficit Arterial -2.0 -3.0 - 3.0 mmol/L    Rei's Test Artline     Oxyhemoglobin Arterial 93 92 - 100 %    O2 Sat, Arterial 95.2 95.0 - 96.0 %    Peep 8 cm H2O    Narrative    In healthy individuals, oxyhemoglobin (O2Hb) and oxygen saturation (SO2) are approximately equal. In the presence of dyshemoglobins, oxyhemoglobin can be considerably lower than oxygen saturation.   Glucose by meter   Result Value Ref Range    GLUCOSE BY METER POCT 161 (H) 70 - 99 mg/dL   Activated clotting time celite, POCT   Result Value Ref Range    Activated Clotting Time (Celite) POCT 178 (H) 74 - 150 seconds   Glucose by meter   Result Value Ref Range    GLUCOSE BY METER POCT 134 (H) 70 - 99 mg/dL   CBC with platelets   Result Value Ref Range    WBC Count 13.4 (H) 4.0 - 11.0 10e3/uL    RBC Count 3.31 (L) 4.40 - 5.90 10e6/uL    Hemoglobin 8.9 (L) 13.3 - 17.7 g/dL    Hematocrit 28.0 (L) 40.0 - 53.0 %    MCV 85 78 - 100 fL    MCH 26.9 26.5 - 33.0 pg    MCHC 31.8 31.5 - 36.5 g/dL    RDW 13.5 10.0 - 15.0 %    Platelet Count 254 150 - 450 10e3/uL   Comprehensive metabolic panel   Result Value Ref Range    Sodium 141 135 - 145 mmol/L    Potassium 3.8 3.4 - 5.3 mmol/L    Carbon Dioxide (CO2) 21 (L) 22 - 29 mmol/L    Anion Gap 12 7 - 15 mmol/L    Urea Nitrogen 47.5 (H) 8.0 - 23.0 mg/dL    Creatinine 1.75 (H) 0.67 - 1.17 mg/dL    GFR Estimate 42 (L) >60 mL/min/1.73m2    Calcium 8.6 (L) 8.8 - 10.4 mg/dL    Chloride 108 (H) 98 - 107 mmol/L    Glucose 148 (H) 70 - 99 mg/dL    Alkaline Phosphatase 147 40 - 150 U/L    AST 69 (H) 0 - 45 U/L    ALT 58 0 - 70 U/L    Protein Total 6.2 (L) 6.4 - 8.3 g/dL    Albumin 2.8 (L) 3.5 - 5.2 g/dL    Bilirubin Total 0.2 <=1.2 mg/dL   Calcium ionized whole blood   Result Value Ref Range    Calcium Ionized Whole Blood 4.5 4.4 - 5.2 mg/dL   Glucose whole blood   Result Value Ref Range    Glucose 146 (H) 70 - 99 mg/dL   Heparin Unfractionated Anti Xa Level   Result Value Ref  Range    Anti Xa Unfractionated Heparin 0.68 For Reference Range, See Comment IU/mL    Narrative    Therapeutic Range: UFH: 0.25-0.50 IU/mL for low intensity dosing,  0.30-0.70 IU/mL for high intensity dosing DVT and PE.  This test is not validated for other direct factor X inhibitors (e.g. rivaroxaban, apixaban, edoxaban, betrixaban, fondaparinux) and should not be used for monitoring of other medications.   INR   Result Value Ref Range    INR 1.29 (H) 0.85 - 1.15    PT 16.4 (H) 11.8 - 14.8 Seconds   Partial thromboplastin time   Result Value Ref Range    aPTT 82 (H) 22 - 38 Seconds   Lactic acid whole blood   Result Value Ref Range    Lactic Acid 0.8 0.7 - 2.0 mmol/L   Magnesium   Result Value Ref Range    Magnesium 2.4 (H) 1.7 - 2.3 mg/dL   Troponin T, High Sensitivity   Result Value Ref Range    Troponin T, High Sensitivity 1,019 (HH) <=22 ng/L   D dimer quantitative   Result Value Ref Range    D-Dimer Quantitative 2.12 (H) 0.00 - 0.50 ug/mL FEU    Narrative    This D-dimer assay is intended for use in conjunction with a clinical pretest probability assessment model to exclude pulmonary embolism (PE) and deep venous thrombosis (DVT) in outpatients suspected of PE or DVT. The cut-off value is 0.50 ug/mL FEU.    For patients 50 years of age or older, the application of age-adjusted cut-off values for D-Dimer may increase the specificity without significant effect on sensitivity. The literature suggested calculation age adjusted cut-off in ug/L = age in years x 10 ug/L. The results in this laboratory are reported as ug/mL rather than ug/L. The calculation for age adjusted cut off in ug/mL= age in years x 0.01 ug/mL. For example, the cut off for a 76 year old male is 77 x 0.01 ug/mL = 0.76 ug/mL (760 ug/L).    M Bassam et al. Age adjusted D-dimer cut-off levels to rule out pulmonary embolism: The ADJUST-PE Study. EDGAR 2014;311:5042-2387.; HJ Shi et al. Diagnostic accuracy of conventional or age adjusted  D-dimer cutoff values in older patients with suspected venous thromboembolism. Systemic review and meta-analysis. BMJ 2013:346:f2492.   Blood gas ELS venous   Result Value Ref Range    pH ELS Venous 7.35 7.32 - 7.43    pCO2 ELS Venous 45 40 - 50 mm Hg    pO2 ELS Venous 55 (H) 25 - 47 mm Hg    Bicarbonate ELS Venous 25 21 - 28 mmol/L    Base Excess/Deficit Venous -1.3 -3.0 - 3.0 mmol/L    FIO2 70     Oxyhemoglobin ELS Venous 86 %    O2 Saturation ELS Venous 87.6 (H) 70.0 - 75.0 %    Narrative    In healthy individuals, oxyhemoglobin (O2Hb) and oxygen saturation (SO2) are approximately equal. In the presence of dyshemoglobins, oxyhemoglobin can be considerably lower than oxygen saturation.   Blood gas ELS arterial   Result Value Ref Range    pH ELS Arterial 7.36 7.35 - 7.45    pCO2 ELS Arterial 42 35 - 45 mm Hg    pO2 ELS Arterial 381 (H) 80 - 105 mm Hg    Bicarbonate ELS Arterial 24 21 - 28 mmol/L    Base Excess/Deficit Arterial -1.7 -3.0 - 3.0 mmol/L    FIO2 70     Oxyhemoglobin ELS Arterial 99 75 - 100 %    O2 Saturation ELS Arterial >100.0 (H) 95.0 - 96.0 %    Narrative    In healthy individuals, oxyhemoglobin (O2Hb) and oxygen saturation (SO2) are approximately equal. In the presence of dyshemoglobins, oxyhemoglobin can be considerably lower than oxygen saturation.   Fibrinogen activity   Result Value Ref Range    Fibrinogen Activity 1,018 (H) 170 - 510 mg/dL   Blood gas arterial   Result Value Ref Range    pH Arterial 7.41 7.35 - 7.45    pCO2 Arterial 36 35 - 45 mm Hg    pO2 Arterial 70 (L) 80 - 105 mm Hg    FIO2 70     Bicarbonate Arterial 23 21 - 28 mmol/L    Base Excess/Deficit Arterial -1.8 -3.0 - 3.0 mmol/L    Rei's Test Artline     Oxyhemoglobin Arterial 93 92 - 100 %    O2 Sat, Arterial 95.0 95.0 - 96.0 %    Peep 8 cm H2O    Narrative    In healthy individuals, oxyhemoglobin (O2Hb) and oxygen saturation (SO2) are approximately equal. In the presence of dyshemoglobins, oxyhemoglobin can be considerably  lower than oxygen saturation.   Activated clotting time celite, POCT   Result Value Ref Range    Activated Clotting Time (Celite) POCT 174 (H) 74 - 150 seconds   Blood gas arterial   Result Value Ref Range    pH Arterial 7.41 7.35 - 7.45    pCO2 Arterial 38 35 - 45 mm Hg    pO2 Arterial 79 (L) 80 - 105 mm Hg    FIO2 80     Bicarbonate Arterial 24 21 - 28 mmol/L    Base Excess/Deficit Arterial -0.9 -3.0 - 3.0 mmol/L    Rei's Test Artline     Oxyhemoglobin Arterial 95 92 - 100 %    O2 Sat, Arterial 96.7 (H) 95.0 - 96.0 %    Peep 8 cm H2O    Narrative    In healthy individuals, oxyhemoglobin (O2Hb) and oxygen saturation (SO2) are approximately equal. In the presence of dyshemoglobins, oxyhemoglobin can be considerably lower than oxygen saturation.   Glucose by meter   Result Value Ref Range    GLUCOSE BY METER POCT 107 (H) 70 - 99 mg/dL   Activated clotting time celite, POCT   Result Value Ref Range    Activated Clotting Time (Celite) POCT 186 (H) 74 - 150 seconds   Activated clotting time celite, POCT   Result Value Ref Range    Activated Clotting Time (Celite) POCT 178 (H) 74 - 150 seconds   Blood gas arterial   Result Value Ref Range    pH Arterial 7.40 7.35 - 7.45    pCO2 Arterial 37 35 - 45 mm Hg    pO2 Arterial 62 (L) 80 - 105 mm Hg    FIO2 80     Bicarbonate Arterial 23 21 - 28 mmol/L    Base Excess/Deficit Arterial -1.6 -3.0 - 3.0 mmol/L    Rei's Test Artline     Oxyhemoglobin Arterial 91 (L) 92 - 100 %    O2 Sat, Arterial 92.9 (L) 95.0 - 96.0 %    Narrative    In healthy individuals, oxyhemoglobin (O2Hb) and oxygen saturation (SO2) are approximately equal. In the presence of dyshemoglobins, oxyhemoglobin can be considerably lower than oxygen saturation.   Glucose by meter   Result Value Ref Range    GLUCOSE BY METER POCT 117 (H) 70 - 99 mg/dL   CBC with platelets   Result Value Ref Range    WBC Count 15.1 (H) 4.0 - 11.0 10e3/uL    RBC Count 3.34 (L) 4.40 - 5.90 10e6/uL    Hemoglobin 9.0 (L) 13.3 - 17.7  g/dL    Hematocrit 28.3 (L) 40.0 - 53.0 %    MCV 85 78 - 100 fL    MCH 26.9 26.5 - 33.0 pg    MCHC 31.8 31.5 - 36.5 g/dL    RDW 13.6 10.0 - 15.0 %    Platelet Count 261 150 - 450 10e3/uL   Comprehensive metabolic panel   Result Value Ref Range    Sodium 141 135 - 145 mmol/L    Potassium 4.1 3.4 - 5.3 mmol/L    Carbon Dioxide (CO2) 21 (L) 22 - 29 mmol/L    Anion Gap 12 7 - 15 mmol/L    Urea Nitrogen 52.4 (H) 8.0 - 23.0 mg/dL    Creatinine 1.74 (H) 0.67 - 1.17 mg/dL    GFR Estimate 42 (L) >60 mL/min/1.73m2    Calcium 8.5 (L) 8.8 - 10.4 mg/dL    Chloride 108 (H) 98 - 107 mmol/L    Glucose 109 (H) 70 - 99 mg/dL    Alkaline Phosphatase 141 40 - 150 U/L    AST 66 (H) 0 - 45 U/L    ALT 54 0 - 70 U/L    Protein Total 6.2 (L) 6.4 - 8.3 g/dL    Albumin 2.8 (L) 3.5 - 5.2 g/dL    Bilirubin Total 0.2 <=1.2 mg/dL   Calcium ionized whole blood   Result Value Ref Range    Calcium Ionized Whole Blood 4.5 4.4 - 5.2 mg/dL   Glucose whole blood   Result Value Ref Range    Glucose 104 (H) 70 - 99 mg/dL   Heparin Unfractionated Anti Xa Level   Result Value Ref Range    Anti Xa Unfractionated Heparin 0.67 For Reference Range, See Comment IU/mL    Narrative    Therapeutic Range: UFH: 0.25-0.50 IU/mL for low intensity dosing,  0.30-0.70 IU/mL for high intensity dosing DVT and PE.  This test is not validated for other direct factor X inhibitors (e.g. rivaroxaban, apixaban, edoxaban, betrixaban, fondaparinux) and should not be used for monitoring of other medications.   INR   Result Value Ref Range    INR 1.21 (H) 0.85 - 1.15    PT 15.6 (H) 11.8 - 14.8 Seconds   Partial thromboplastin time   Result Value Ref Range    aPTT 79 (H) 22 - 38 Seconds   Lactic acid whole blood   Result Value Ref Range    Lactic Acid 0.8 0.7 - 2.0 mmol/L   Magnesium   Result Value Ref Range    Magnesium 2.3 1.7 - 2.3 mg/dL   Troponin T, High Sensitivity   Result Value Ref Range    Troponin T, High Sensitivity 1,098 (HH) <=22 ng/L   Blood gas arterial   Result Value  Ref Range    pH Arterial 7.39 7.35 - 7.45    pCO2 Arterial 38 35 - 45 mm Hg    pO2 Arterial 75 (L) 80 - 105 mm Hg    FIO2 90     Bicarbonate Arterial 23 21 - 28 mmol/L    Base Excess/Deficit Arterial -1.3 -3.0 - 3.0 mmol/L    Rei's Test Artline     Oxyhemoglobin Arterial 95 92 - 100 %    O2 Sat, Arterial 96.2 (H) 95.0 - 96.0 %    Narrative    In healthy individuals, oxyhemoglobin (O2Hb) and oxygen saturation (SO2) are approximately equal. In the presence of dyshemoglobins, oxyhemoglobin can be considerably lower than oxygen saturation.   Phosphorus   Result Value Ref Range    Phosphorus 3.5 2.5 - 4.5 mg/dL   Glucose by meter   Result Value Ref Range    GLUCOSE BY METER POCT 101 (H) 70 - 99 mg/dL   Activated clotting time celite, POCT   Result Value Ref Range    Activated Clotting Time (Celite) POCT 182 (H) 74 - 150 seconds   Glucose by meter   Result Value Ref Range    GLUCOSE BY METER POCT 169 (H) 70 - 99 mg/dL   Blood gas arterial   Result Value Ref Range    pH Arterial 7.37 7.35 - 7.45    pCO2 Arterial 38 35 - 45 mm Hg    pO2 Arterial 83 80 - 105 mm Hg    FIO2 90     Bicarbonate Arterial 22 21 - 28 mmol/L    Base Excess/Deficit Arterial -3.3 (L) -3.0 - 3.0 mmol/L    Rei's Test Artline     Oxyhemoglobin Arterial 95 92 - 100 %    O2 Sat, Arterial 96.8 (H) 95.0 - 96.0 %    Narrative    In healthy individuals, oxyhemoglobin (O2Hb) and oxygen saturation (SO2) are approximately equal. In the presence of dyshemoglobins, oxyhemoglobin can be considerably lower than oxygen saturation.   Activated clotting time celite, POCT   Result Value Ref Range    Activated Clotting Time (Celite) POCT 182 (H) 74 - 150 seconds   CONDITIONAL Prepare red blood cells (unit)   Result Value Ref Range    Blood Component Type Red Blood Cells     Product Code [id number]     Unit Status Transfused     Unit Number [id number]     CROSSMATCH Compatible     CODING SYSTEM [id number]     ISSUE DATE AND TIME 7/22/2025  2:16:00 AM CDT     UNIT ABO/RH  A+     UNIT TYPE ISBT 6200    XR Chest Port 1 View    Impression    RESIDENT PRELIMINARY INTERPRETATION  Impression:   1. Low lung volumes with mildly increased appearing basilar  atelectasis.  2. Stable support devices.  3. Unchanged small left effusion. No pneumothorax.   Glucose by meter   Result Value Ref Range    GLUCOSE BY METER POCT 211 (H) 70 - 99 mg/dL   Blood gas arterial   Result Value Ref Range    pH Arterial 7.37 7.35 - 7.45    pCO2 Arterial 38 35 - 45 mm Hg    pO2 Arterial 89 80 - 105 mm Hg    FIO2 80     Bicarbonate Arterial 22 21 - 28 mmol/L    Base Excess/Deficit Arterial -2.8 -3.0 - 3.0 mmol/L    Rei's Test Artline     Oxyhemoglobin Arterial 96 92 - 100 %    O2 Sat, Arterial 97.9 (H) 95.0 - 96.0 %    Narrative    In healthy individuals, oxyhemoglobin (O2Hb) and oxygen saturation (SO2) are approximately equal. In the presence of dyshemoglobins, oxyhemoglobin can be considerably lower than oxygen saturation.   Glucose by meter   Result Value Ref Range    GLUCOSE BY METER POCT 189 (H) 70 - 99 mg/dL   EKG 12-lead, tracing only   Result Value Ref Range    Systolic Blood Pressure  mmHg    Diastolic Blood Pressure  mmHg    Ventricular Rate 81 BPM    Atrial Rate 81 BPM    MO Interval 198 ms    QRS Duration 88 ms     ms    QTc 476 ms    P Axis 43 degrees    R AXIS -3 degrees    T Axis 71 degrees    Interpretation ECG       Sinus rhythm  Inferior infarct (cited on or before 18-Jul-2025)  T wave abnormality, consider anterolateral ischemia  ** ** ACUTE MI / STEMI ** **  Abnormal ECG  When compared with ECG of 21-Jul-2025 04:25, (unconfirmed)  Serial changes of evolving Inferior infarct Present     CBC with platelets   Result Value Ref Range    WBC Count 13.4 (H) 4.0 - 11.0 10e3/uL    RBC Count 3.11 (L) 4.40 - 5.90 10e6/uL    Hemoglobin 8.8 (L) 13.3 - 17.7 g/dL    Hematocrit 27.2 (L) 40.0 - 53.0 %    MCV 88 78 - 100 fL    MCH 28.3 26.5 - 33.0 pg    MCHC 32.4 31.5 - 36.5 g/dL    RDW 14.2 10.0 - 15.0 %     Platelet Count 211 150 - 450 10e3/uL   Comprehensive metabolic panel   Result Value Ref Range    Sodium 141 135 - 145 mmol/L    Potassium 3.3 (L) 3.4 - 5.3 mmol/L    Carbon Dioxide (CO2) 20 (L) 22 - 29 mmol/L    Anion Gap 13 7 - 15 mmol/L    Urea Nitrogen 57.9 (H) 8.0 - 23.0 mg/dL    Creatinine 1.74 (H) 0.67 - 1.17 mg/dL    GFR Estimate 42 (L) >60 mL/min/1.73m2    Calcium 8.2 (L) 8.8 - 10.4 mg/dL    Chloride 108 (H) 98 - 107 mmol/L    Glucose 222 (H) 70 - 99 mg/dL    Alkaline Phosphatase 122 40 - 150 U/L    AST 58 (H) 0 - 45 U/L    ALT 45 0 - 70 U/L    Protein Total 5.6 (L) 6.4 - 8.3 g/dL    Albumin 2.6 (L) 3.5 - 5.2 g/dL    Bilirubin Total 0.2 <=1.2 mg/dL   Calcium ionized whole blood   Result Value Ref Range    Calcium Ionized Whole Blood 4.4 4.4 - 5.2 mg/dL   Glucose whole blood   Result Value Ref Range    Glucose 217 (H) 70 - 99 mg/dL   Heparin Unfractionated Anti Xa Level   Result Value Ref Range    Anti Xa Unfractionated Heparin 0.59 For Reference Range, See Comment IU/mL    Narrative    Therapeutic Range: UFH: 0.25-0.50 IU/mL for low intensity dosing,  0.30-0.70 IU/mL for high intensity dosing DVT and PE.  This test is not validated for other direct factor X inhibitors (e.g. rivaroxaban, apixaban, edoxaban, betrixaban, fondaparinux) and should not be used for monitoring of other medications.   INR   Result Value Ref Range    INR 1.27 (H) 0.85 - 1.15    PT 16.2 (H) 11.8 - 14.8 Seconds   Partial thromboplastin time   Result Value Ref Range    aPTT 98 (H) 22 - 38 Seconds   Lactic acid whole blood   Result Value Ref Range    Lactic Acid 1.2 0.7 - 2.0 mmol/L   Magnesium   Result Value Ref Range    Magnesium 2.3 1.7 - 2.3 mg/dL   Troponin T, High Sensitivity   Result Value Ref Range    Troponin T, High Sensitivity 1,075 (HH) <=22 ng/L   D dimer quantitative   Result Value Ref Range    D-Dimer Quantitative 2.05 (H) 0.00 - 0.50 ug/mL FEU    Narrative    This D-dimer assay is intended for use in conjunction with a  clinical pretest probability assessment model to exclude pulmonary embolism (PE) and deep venous thrombosis (DVT) in outpatients suspected of PE or DVT. The cut-off value is 0.50 ug/mL FEU.    For patients 50 years of age or older, the application of age-adjusted cut-off values for D-Dimer may increase the specificity without significant effect on sensitivity. The literature suggested calculation age adjusted cut-off in ug/L = age in years x 10 ug/L. The results in this laboratory are reported as ug/mL rather than ug/L. The calculation for age adjusted cut off in ug/mL= age in years x 0.01 ug/mL. For example, the cut off for a 76 year old male is 76 x 0.01 ug/mL = 0.76 ug/mL (760 ug/L).    M Bassam et al. Age adjusted D-dimer cut-off levels to rule out pulmonary embolism: The ADJUST-PE Study. EDGAR 2014;311:1223-9962.; HJ Shi et al. Diagnostic accuracy of conventional or age adjusted D-dimer cutoff values in older patients with suspected venous thromboembolism. Systemic review and meta-analysis. BMJ 2013:346:f2492.   Blood gas ELS venous   Result Value Ref Range    pH ELS Venous 7.34 7.32 - 7.43    pCO2 ELS Venous 42 40 - 50 mm Hg    pO2 ELS Venous 65 (H) 25 - 47 mm Hg    Bicarbonate ELS Venous 23 21 - 28 mmol/L    Base Excess/Deficit Venous -3.0 -3.0 - 3.0 mmol/L    FIO2 70     Oxyhemoglobin ELS Venous 91 %    O2 Saturation ELS Venous 92.9 (H) 70.0 - 75.0 %    Narrative    In healthy individuals, oxyhemoglobin (O2Hb) and oxygen saturation (SO2) are approximately equal. In the presence of dyshemoglobins, oxyhemoglobin can be considerably lower than oxygen saturation.   Blood gas ELS arterial   Result Value Ref Range    pH ELS Arterial 7.35 7.35 - 7.45    pCO2 ELS Arterial 40 35 - 45 mm Hg    pO2 ELS Arterial 312 (H) 80 - 105 mm Hg    Bicarbonate ELS Arterial 22 21 - 28 mmol/L    Base Excess/Deficit Arterial -3.3 (L) -3.0 - 3.0 mmol/L    FIO2 80     Oxyhemoglobin ELS Arterial 98 75 - 100 %    O2 Saturation ELS  Arterial >100.0 (H) 95.0 - 96.0 %    Narrative    In healthy individuals, oxyhemoglobin (O2Hb) and oxygen saturation (SO2) are approximately equal. In the presence of dyshemoglobins, oxyhemoglobin can be considerably lower than oxygen saturation.   Fibrinogen activity   Result Value Ref Range    Fibrinogen Activity 958 (H) 170 - 510 mg/dL   CRP inflammation   Result Value Ref Range    CRP Inflammation 236.00 (H) <5.00 mg/L   Erythrocyte sedimentation rate auto   Result Value Ref Range    Erythrocyte Sedimentation Rate 90 (H) 0 - 20 mm/hr   Hemoglobin plasma   Result Value Ref Range    Hemoglobin Plasma 30 (H) <30 mg/dL   Lactate Dehydrogenase   Result Value Ref Range    Lactate Dehydrogenase 377 (H) 0 - 250 U/L   Phosphorus   Result Value Ref Range    Phosphorus 2.6 2.5 - 4.5 mg/dL   TSH   Result Value Ref Range    TSH 0.06 (L) 0.30 - 4.20 uIU/mL   T3 Free   Result Value Ref Range    T3 Free 1.4 (L) 2.0 - 4.4 pg/mL   T3 total   Result Value Ref Range    T3 Total 64 (L) 85 - 202 ng/dL   T4 free   Result Value Ref Range    Free T4 0.93 0.90 - 1.70 ng/dL   Blood gas arterial   Result Value Ref Range    pH Arterial 7.38 7.35 - 7.45    pCO2 Arterial 37 35 - 45 mm Hg    pO2 Arterial 77 (L) 80 - 105 mm Hg    FIO2 70     Bicarbonate Arterial 22 21 - 28 mmol/L    Base Excess/Deficit Arterial -2.9 -3.0 - 3.0 mmol/L    Rei's Test Artline     Oxyhemoglobin Arterial 95 92 - 100 %    O2 Sat, Arterial 96.9 (H) 95.0 - 96.0 %    Narrative    In healthy individuals, oxyhemoglobin (O2Hb) and oxygen saturation (SO2) are approximately equal. In the presence of dyshemoglobins, oxyhemoglobin can be considerably lower than oxygen saturation.   Glucose by meter   Result Value Ref Range    GLUCOSE BY METER POCT 209 (H) 70 - 99 mg/dL   Activated clotting time celite, POCT   Result Value Ref Range    Activated Clotting Time (Celite) POCT 186 (H) 74 - 150 seconds   Respiratory Aerobic Bacterial Culture    Specimen: Endotracheal; Sputum    Result Value Ref Range    Gram Stain Result >25 PMNs/low power field (A)     Gram Stain Result 2+ Gram positive cocci (A)     Gram Stain Result 1+ Gram positive bacilli (A)    Glucose by meter   Result Value Ref Range    GLUCOSE BY METER POCT 172 (H) 70 - 99 mg/dL   Glucose by meter   Result Value Ref Range    GLUCOSE BY METER POCT 142 (H) 70 - 99 mg/dL   Blood gas arterial   Result Value Ref Range    pH Arterial 7.39 7.35 - 7.45    pCO2 Arterial 38 35 - 45 mm Hg    pO2 Arterial 99 80 - 105 mm Hg    FIO2 80     Bicarbonate Arterial 23 21 - 28 mmol/L    Base Excess/Deficit Arterial -2.0 -3.0 - 3.0 mmol/L    Rei's Test Artline     Oxyhemoglobin Arterial 96 92 - 100 %    O2 Sat, Arterial 98.3 (H) 95.0 - 96.0 %    Narrative    In healthy individuals, oxyhemoglobin (O2Hb) and oxygen saturation (SO2) are approximately equal. In the presence of dyshemoglobins, oxyhemoglobin can be considerably lower than oxygen saturation.   Glucose by meter   Result Value Ref Range    GLUCOSE BY METER POCT 135 (H) 70 - 99 mg/dL       Clinically Significant Risk Factors        # Hypokalemia: Lowest K = 3.3 mmol/L in last 2 days, will replace as needed   # Hyperchloremia: Highest Cl = 108 mmol/L in last 2 days, will monitor as appropriate          # Hypoalbuminemia: Lowest albumin = 2.6 g/dL at 7/22/2025  3:41 AM, will monitor as appropriate    # Coagulation Defect: INR = 1.27 (Ref range: 0.85 - 1.15) and/or PTT = 98 Seconds (Ref range: 22 - 38 Seconds), will monitor for bleeding        # Acute Hypoxic Respiratory Failure: Based on blood gas results. Continue supplemental oxygen as needed  # Acute Hypercapnic Respiratory Failure: based on arterial blood gas results.  Continue supplemental oxygen and ventilatory support as indicated.  # Acute Hypercapnic Respiratory Failure: based on venous blood gas results.  Continue supplemental oxygen and ventilatory support as indicated.         # Obesity: Estimated body mass index is 36.63 kg/m  as  "calculated from the following:    Height as of this encounter: 1.778 m (5' 10\").    Weight as of this encounter: 115.8 kg (255 lb 4.7 oz)., PRESENT ON ADMISSION       # Financial/Environmental Concerns: none              "

## 2025-07-22 NOTE — PROGRESS NOTES
ECMO Attending Progress Note  2025  Bob Echols is a 70 year old male who was cannulated for ECMO 2025 due to refractory VF arrest in setting of acute stent thrombosis.     Cannulation Site:  17 Fr in the R femoral artery  25 Fr in the R femoral vein    Interval events: remains encephalopathic    Pulsatilty (IABP paused if applicable):50 mmHG     Physical Exam:  Temp:  [97.7  F (36.5  C)-99.7  F (37.6  C)] 98.6  F (37  C)  Pulse:  [] 124  Resp:  [9-38] 20  MAP:  [75 mmHg-253 mmHg] 85 mmHg  Arterial Line BP: (117-166)/(58-82) 128/64  FiO2 (%):  [70 %-90 %] 80 %  SpO2:  [92 %-100 %] 98 %    Intake/Output Summary (Last 24 hours) at 2025 1401  Last data filed at 2025 1400  Gross per 24 hour   Intake 3883.13 ml   Output 4525 ml   Net -641.87 ml    FiO2 (%): 80 %, Resp: 20, Vent Mode: VC/AC, Resp Rate (Set): 12 breaths/min, Tidal Volume (Set, mL): 400 mL, PEEP (cm H2O): 8 cmH2O, Resp Rate (Set): 12 breaths/min, Tidal Volume (Set, mL): 400 mL, PEEP (cm H2O): 8 cmH2O     Labs:  Recent Labs   Lab 25  1336 25  1154 25  0954 25  0755   PH 7.45 7.42 7.41 7.42   PCO2 33* 36 35 35   PO2 74* 81 63* 78*   HCO3 23 23 22 23   O2PER 80 80 80 80      Recent Labs   Lab 25  0954 25  0341 25  2156 25  1603   WBC 16.4* 13.4* 15.1* 13.4*   HGB 9.4* 8.8* 9.0* 8.9*     Creatinine   Date Value Ref Range Status   2025 1.70 (H) 0.67 - 1.17 mg/dL Final   2025 1.74 (H) 0.67 - 1.17 mg/dL Final   2025 1.74 (H) 0.67 - 1.17 mg/dL Final   2025 1.75 (H) 0.67 - 1.17 mg/dL Final   2013 0.90 0.66 - 1.25 mg/dL Final   2013 1.00 0.66 - 1.25 mg/dL Final   2013 1.06 0.66 - 1.25 mg/dL Final   2013 1.13 0.66 - 1.25 mg/dL Final       Blood Flow (Circuit) LPM: 3.51 LPM  Sweep LPM: 1 LPM  Sweep FiO2   %: 80 %  ACT  (seconds): 182 seconds  Pulse Oximetry  (SpO2%): 98 %  Arterial Pressure  mmH mmHg      ECMO Issues including  assessments and plan on DOS 7/22/2025:  Neuro: Sedated for mechanical ventilation and ECMO.  No acute distress.  NIRS stable b/l  RASS goal: -3  CV: Cardiogenic shock.  Hemodynamically stable off pressors. Turndown today  Pulm: Keep vent settings at rest settings as above.  FEN/Renal: Electrolytes stable w/ replacement protocols in place, Cr stable, UOP stable  Heme: ACT goal: 180-200, Hemoglobin stable.  Minimal oozing around the ECMO cannulas.  ID: Receiving empiric antibiotics  Cannulae: Position is acceptable on exam and the available imaging.  Distal perfusion cannula is in place and patent.  Extremities are well-perfused.   I have personally reviewed the ECMO flows, oxygenation and CO2 clearance, anticoagulation, and cannula position.  I have also personally assessed the patient's systemic response with hemodynamics, oxygenation, ventilation, and bleeding.       The patient requires continued ECMO support and management in the ICU.  I have discussed patient care and treatment plan with the primary team.      Ilhwan Yeo, MD  Critical Care Cardiology    July 22, 2025

## 2025-07-22 NOTE — PLAN OF CARE
Shift Summary: No acute events overnight. Plan for ECMO turndown today.     Neuro: No purposeful movement. No commands. NPI >3. Overbreathes vent at times, Prop and Fent for sedation. No cough, no gag. EEG on.     CV: SR 70s-100s, stable ST elevation. MAP 65-85 on/off Nicardipine.  ECMO: 80%/3.8L/sweep 1    Pulmonary: VC/AC: 80%, RR 12, , PEEP 8. Small, thick secretions. Bloody oral secretions    GI: OG w/ TF at goal of 40ml/hr & 60ml Q4H FWF, rectal tube with minimal output    : Fox, UOP adequate. Bloody urine    Skin: MIGUEL burn to chest, bruise to lower right lip and RUE    Lines: L Fem CVC & Arterial sheath, R Fem ECMO, R Radial A-line, R & L PIV     Drips: Fent 25, Prop 25, Insulin 5, Lido 0.5, Heparin 2000, Nicardipine 5    Labs: 1 unit RBCs. Ical, K, and Phos replaced

## 2025-07-22 NOTE — PROGRESS NOTES
ECMO [place] STUDY    Inotropes/pressors:  Nicardipine 15, held during the study     Vent settings:  17404/5/80     ECMO settings prior to turndown:   3.5L/1L/80%    Flow  BP HR O2 Sat  MVO2      3.5L  134/69 119 98  94    2.5L 129/66 113 98  92    1L 125/62 110 98  86          ABG at lowest flow: 7.45/33/74  P/F ratio: 3         Summary:  --Improved EF with decreasing flows  --Hemodynamically stable with turndown   --Oxygenation and ventilation stable with turndown  --Patient was in A fib with RVR throughout study    Plan:  --The patient is ready for decannulation.      Dilan Lozada MD  Cardiovascular Diseases Fellow

## 2025-07-22 NOTE — PROGRESS NOTES
[facility]    ECLS Shift Summary:     ECMO Equipment:  Console Serial Number: [id number]  Circuit Lot Number: [id number]  Oxygenator Lot Number: [id number]    Circuit Assessment: Fibrin  Fibrin Location: connectors    Arterial ECMO Cannula: 17 Fr in the Right Femoral Artery  Venous ECMO Cannula: 25 Fr in the Right Femoral Vein  Distal Perfusion Catheter: 8 Fr in the Right Superficial Femoral Artery    ECMO Cannula Arterial Right femoral artery-Site Assessment: Sutured, Secure  ECMO Cannula Venous Right femoral vein-Site Assessment: Secure, Sutured  Distal Perfusion Catheter Right superficial femoral artery-Site Assessment: WDL  ECMO Cannula Arterial Right femoral artery-Site Intervention: No intervention needed  ECMO Cannula Venous Right femoral vein-Site Intervention: No intervention needed  Distal Perfusion Catheter Right superficial femoral artery-Site Intervention: No intervention needed    Patient remains on V-A ECMO, all equipment is functioning and alarms are appropriately set. RPM's: 3400 with Blood Flow (Circuit) LPM  Avg: 3.8 LPM  Min: 3.76 LPM  Max: 3.92 LPM L/min. Sweep is at 1 LPM and 80 %. Extremities are warm and perfused.     Significant Shift Events: Flows increased to 3.8-3.9 to help with low Pao2. Circuit now at 80% FIO2. Plan for turn down today    Vent settings:  FiO2 (%): 80 %, Resp: 15, Vent Mode: VC/AC, Resp Rate (Set): 12 breaths/min, Tidal Volume (Set, mL): 400 mL, PEEP (cm H2O): 8 cmH2O, Resp Rate (Set): 12 breaths/min, Tidal Volume (Set, mL): 400 mL, PEEP (cm H2O): 8 cmH2O    Anticoagulation:  Dose (units/hr) HEParin: 2000 Units/hr  Rate (mL/hr) HEParin: 20 mL/hr  Concentration HEParin: 100 Units/mL        Most recent ACT  (seconds): 186 seconds    Urine output is adequate, Yellow / Straw Colored, Sediment.  Blood loss was minimal. Product given included 1 unit of PRBC for replacement.     Intake/Output Summary (Last 24 hours) at 7/22/2025  0522  Last data filed at 7/22/2025 0500  Gross per 24 hour   Intake 4276.99 ml   Output 3575 ml   Net 701.99 ml       Labs:  Recent Labs   Lab 07/22/25  0341 07/22/25  0159 07/21/25  2352 07/21/25  2156   PH 7.38 7.37 7.37 7.39   PCO2 37 38 38 38   PO2 77* 89 83 75*   HCO3 22 22 22 23   O2PER 70  80  70 80 90 90       Lab Results   Component Value Date    HGB 8.8 (L) 07/22/2025    PHGB 50 (H) 07/21/2025     07/22/2025    FIBR 958 (H) 07/22/2025    INR 1.27 (H) 07/22/2025    PTT 98 (H) 07/22/2025    DD 2.05 (H) 07/22/2025    AXA 0.21 05/24/2013    ANTCH 92 07/21/2025       Plan:  Turndown     Matthew Chiang RT  ECMO Specialist  7/22/2025 5:22 AM

## 2025-07-22 NOTE — PLAN OF CARE
Goal Outcome Evaluation:      Plan of Care Reviewed With: patient, spouse    Overall Patient Progress: no changeOverall Patient Progress: no change    Outcome Evaluation: Successful turndown. Off sedation, neuro unchanged      Major Shift Events:  Off sedation, repeat head CT results pending, NPIs 2s, continues with muscle twitching. No cough, no gag. Overbreathes vent. Afib 90s-130s, off lido switched to amio. Continues on cardene for MAP 65-85. Successful turndown, now flowing 2.5L. CMV settings unchanged, copious bloody oral secretions. CT Chest, abd, pelvis, results pending. Large emesis x2, TF held, OG to LIS. Started on bumex gtt, responding well. Wife updated via telephone.   Plan: Care conference tomorrow afternoon.   For vital signs and complete assessments, please see documentation flowsheets.

## 2025-07-22 NOTE — CONSULTS
[facility]  WO Nurse Inpatient Assessment     Consulted for: ECMO    WOC nurse follow-up plan: weekly    Patient History (according to provider note(s):      Per ICU progress note [birth year]70 year old male who was cannulated for ECMO 7/18/2025 due to refractory VF arrest in setting of acute stent thrombosis.  Cannulation Site:  17 Fr in the R femoral artery  25 Fr in the R femoral vein    Assessment:      Areas visualized during today's visit: Face and neck, Lower extremities , Upper extremities , Chest , Abdomen, and Under/around medical device(s): ETT/ETAD, Fox    ECMO cannula location: Right groin ECMO cannula  Areas assessed: Face, Nose, Mouth, Ears, Hands, Heels, and Chest. Not a good time for full turn per RN (reports blanchable redness in gluteal fold)  Positioning tolerance: Okay  Date of ECMO cannulation: 7/18/25  Presence of ischemia: No  Location of ischemia: N/A  Pressure Injury Present::Yes (R lower lip)  Pressure Injury Prevention Interventions In Place:  Optifoam Dressing under ECMO Cannula, Z flow Positioner under head, Pillows for repositioning, Heel off-loading boots, and Mepilex Sacral Dressing        Pressure Injury Prevention Interventions Added:  None - discussed positioning of ETAD on upper lip with RN, repositioned       Pressure Injury Location: R lower lip      7/22    Last photo: 7/22/25  Wound type: Pressure Injury     Pressure Injury Stage: Mucosal, hospital acquired      This is a Medical Device Related Pressure Injury (MDRPI) due to ETT  Wound history/plan of care:   Intubated 7/18, dark area noted 7/20    Wound base: 100 % Purple,     Palpation of the wound bed: normal      Drainage: none     Description of drainage: none     Measurements (length x width x depth, in cm) 1  x 1   cm     Periwound skin: Intact      Color: normal and consistent with surrounding tissue      Temperature: normal   Odor: none  Pain: unable to assess due to   sedation , none  Pain intervention prior to dressing change: N/A  Treatment goal: Heal  and Protection  STATUS: initial assessment  Supplies ordered: at bedside    My PI Risk Assessment     Sensory Perception: 1 - Completely Limited     Moisture: 3 - Occasionally moist      Activity: 1 - Bedfast      Mobility: 1 - Completely immobile      Nutrition: 2 - Probably inadequate      Friction/Shear: 1 - Problem     TOTAL: 9            Treatment Plan:     R lower lip wound: Rotate/reposition and secure ET tube to keep pressure off of injured area. Apply Vaseline to lips each shift.     ECMO pressure injury prevention plan:      Reposition patient every 1-2 hours using positioning wedges  Reposition head with each turn or every 2 hours using fluidized positioner, remold fluidized positioner with each reposition so that pressure is redistributed along the entire head/neck. Ensure head and neck are aligned in a neutral position without any cords or tubes resting under head or ears.   Pad ECMO IJ cannula with non adhesive foam dressing (#[id number]) along face and scalp between skin and cannula and under Coban head wraps    Pad ECMO groin and chest cannula under rigid connectors with non adhesive foam dressing or Soft cloth  Heel off-loading Boots at all times  Sacral silicon adhesive dressing for Prevention, change every 5 days and prn  Low Air loss mattress    Under Devices: Inspect skin under all medical devices during skin inspection , Ensure tubes are stabilized without tension, and Ensure patient is not lying on medical devices or equipment when repositioned        Orders: Written    RECOMMEND PRIMARY TEAM ORDER: None, at this time  Education provided: plan of care  Discussed plan of care with: Nurse  Notify WOC if wound(s) deteriorate.  Nursing to notify the Provider(s) and re-consult the WOC Nurse if new skin concern.    DATA:     Current support surface: Standard  Low air loss (MICHELL pump, Isolibrium, Pulsate)  Containment  of urine/stool: Indwelling catheter and Internal fecal management  BMI: Body mass index is 36.63 kg/m .   Active diet order: Orders Placed This Encounter      Regular Diet Adult     Output: I/O last 3 completed shifts:  In: 4308.82 [I.V.:2183.82; NG/GT:935; IV Piggyback:50]  Out: 3505 [Urine:3405; Stool:100]     Labs:   Recent Labs   Lab 07/22/25  0954   ALBUMIN 2.8*   HGB 9.4*   INR 1.33*   WBC 16.4*     Pressure injury risk assessment:   Sensory Perception: 1-->completely limited  Moisture: 3-->occasionally moist  Activity: 1-->bedfast  Mobility: 1-->completely immobile  Nutrition: 2-->probably inadequate  Friction and Shear: 1-->problem  Nick Score: 9      Pager no longer is use, please contact through [employer] group: [facility] Nurse [place]  Dept. Office Number: [phone]    Samira Phelan RN CWOCN

## 2025-07-22 NOTE — PROGRESS NOTES
[facility]    ECLS Shift Summary: 0700 to 1900     ECMO Equipment:  Console Serial Number: [id number]  Circuit Lot Number: [id number]  Oxygenator Lot Number: [id number]    Circuit Assessment: Fibrin  Fibrin Location: connectors    Arterial ECMO Cannula: 17 Fr in the Right Femoral Artery  Venous ECMO Cannula: 25 Fr in the Right Femoral Vein  Distal Perfusion Catheter: 8 Fr in the Right Superficial Femoral Artery            Patient remains on V-A ECMO, all equipment is functioning and alarms are appropriately set. RPM's: 2875 with Blood Flow (Circuit) LPM  Avg: 3.2 LPM  Min: 2.56 LPM  Max: 3.54 LPM L/min. Sweep is at 1 LPM and 80 %. Extremities are warm and well perfused.     Significant Shift Events: Turndown study done today.     Vent settings:  FiO2 (%): 80 %, Resp: 24, Vent Mode: VC/AC, Resp Rate (Set): 12 breaths/min, Tidal Volume (Set, mL): 400 mL, PEEP (cm H2O): 8 cmH2O, Resp Rate (Set): 12 breaths/min, Tidal Volume (Set, mL): 400 mL, PEEP (cm H2O): 8 cmH2O    Anticoagulation:  Dose (units/hr) HEParin: 2000 Units/hr  Rate (mL/hr) HEParin: 20 mL/hr  Concentration HEParin: 100 Units/mL        Most recent ACT  (seconds): 182 seconds    Urine output is around 300 ml per hour, Pink, Yellow / Straw Colored.  Blood loss was minimal. No product was given.     Intake/Output Summary (Last 24 hours) at 7/22/2025 1825  Last data filed at 7/22/2025 1700  Gross per 24 hour   Intake 3833.76 ml   Output 5300 ml   Net -1466.24 ml       Labs:  Recent Labs   Lab 07/22/25  1802 07/22/25  1614 07/22/25  1336 07/22/25  1154   PH 7.46* 7.46* 7.45 7.42   PCO2 33* 33* 33* 36   PO2 71* 79* 74* 81   HCO3 24 24 23 23   O2PER 80 80  80  80 80 80       Lab Results   Component Value Date    HGB 10.1 (L) 07/22/2025    PHGB 30 (H) 07/22/2025     07/22/2025    FIBR >1,200 (H) 07/22/2025    INR 1.36 (H) 07/22/2025    PTT 98 (H) 07/22/2025    DD 2.32 (H) 07/22/2025    AXA 0.21 05/24/2013     JIM 89 07/22/2025       Plan:  To remain on VA ECMO support with hopes of decannulating from [facility] ECMO tomorrow.    TOM Gill, RRT  ECMO Specialist  7/22/2025 6:25 PM

## 2025-07-22 NOTE — PROGRESS NOTES
ATTENDING ATTESTATION:   Bob Echols is a 70 year old male with a history of HTN, DM, pAF, recent inferior STEMI on 7/10 s/p PCI of Lcx with RENA x 1 who sustained OHCA s/p VA-ECMO and PCI with POBA of LCx for acute stent thrombosis and then admitted to [facility] CICU 7/18/2025.     I personally examined and evaluated the patient today as part of a shared APRN/PA visit. I personally performed the substantive portion of the medical decision making for this visit - please see the ISMAEL's documentation for full details.   I have reviewed and evaluated the vital signs, medications, laboratory values, imaging studies, and consults from the past 24 hours.     Interval:   Net +2.4L/24h, remains encephalopathic, Cr improving, absent cough/gag, diminished NPI, new onset AF     -Neuro: unclear downtime, no seizure recorded on EEG, NSE peaked at 105, repeat CT head today  -CV: stop Lidocaine, start Amio gtt for AF, monitor Tele, serial EKG (lead II with ST elevation more pronounced). Continue ASA/ Ticagrelor, holding home Eliquis (was on for pAF/pAFL), turndown today, start Hydralazine po, wean off Nicardipine gtt, start Bumex gtt 1mg/hr for net negative 1-2L/24h.  -Pulm: continue MV, obtain CT C/A/P  -ID: CTX     Care conference sometime this week to be scheduled.     I spent a total of 35 minutes providing critical care services excluding procedure time.     Ilhwan Yeo, MD  Interventional/Critical Care Cardiology     July 22, 2025

## 2025-07-23 LAB
ACT BLD: 182 SECONDS (ref 74–150)
ACT BLD: 182 SECONDS (ref 74–150)
ACT BLD: 186 SECONDS (ref 74–150)
ACT BLD: 186 SECONDS (ref 74–150)
ACT BLD: 190 SECONDS (ref 74–150)
ACT BLD: 190 SECONDS (ref 74–150)
ALBUMIN SERPL BCG-MCNC: 2.7 G/DL (ref 3.5–5.2)
ALBUMIN SERPL BCG-MCNC: 2.8 G/DL (ref 3.5–5.2)
ALLEN'S TEST: ABNORMAL
ALP SERPL-CCNC: 116 U/L (ref 40–150)
ALP SERPL-CCNC: 116 U/L (ref 40–150)
ALP SERPL-CCNC: 119 U/L (ref 40–150)
ALP SERPL-CCNC: 125 U/L (ref 40–150)
ALT SERPL W P-5'-P-CCNC: 34 U/L (ref 0–70)
ALT SERPL W P-5'-P-CCNC: 36 U/L (ref 0–70)
ALT SERPL W P-5'-P-CCNC: 38 U/L (ref 0–70)
ALT SERPL W P-5'-P-CCNC: 39 U/L (ref 0–70)
ANION GAP SERPL CALCULATED.3IONS-SCNC: 17 MMOL/L (ref 7–15)
ANION GAP SERPL CALCULATED.3IONS-SCNC: 18 MMOL/L (ref 7–15)
ANION GAP SERPL CALCULATED.3IONS-SCNC: 18 MMOL/L (ref 7–15)
ANION GAP SERPL CALCULATED.3IONS-SCNC: 24 MMOL/L (ref 7–15)
APTT PPP: 78 SECONDS (ref 22–38)
APTT PPP: 80 SECONDS (ref 22–38)
APTT PPP: 86 SECONDS (ref 22–38)
APTT PPP: 99 SECONDS (ref 22–38)
AST SERPL W P-5'-P-CCNC: 53 U/L (ref 0–45)
AST SERPL W P-5'-P-CCNC: 56 U/L (ref 0–45)
AST SERPL W P-5'-P-CCNC: 58 U/L (ref 0–45)
AST SERPL W P-5'-P-CCNC: 58 U/L (ref 0–45)
AT III ACT/NOR PPP CHRO: 101 % (ref 85–135)
ATRIAL RATE - MUSE: NORMAL BPM
BACTERIA SPEC CULT: NO GROWTH
BACTERIA SPT CULT: ABNORMAL
BACTERIA SPT CULT: ABNORMAL
BASE EXCESS BLDA CALC-SCNC: -0.3 MMOL/L (ref -3–3)
BASE EXCESS BLDA CALC-SCNC: -1.5 MMOL/L (ref -3–3)
BASE EXCESS BLDA CALC-SCNC: -1.9 MMOL/L (ref -3–3)
BASE EXCESS BLDA CALC-SCNC: -2 MMOL/L (ref -3–3)
BASE EXCESS BLDA CALC-SCNC: -5.4 MMOL/L (ref -3–3)
BASE EXCESS BLDA CALC-SCNC: -5.7 MMOL/L (ref -3–3)
BASE EXCESS BLDA CALC-SCNC: -7.3 MMOL/L (ref -3–3)
BASE EXCESS BLDA CALC-SCNC: -7.6 MMOL/L (ref -3–3)
BASE EXCESS BLDA CALC-SCNC: 0 MMOL/L (ref -3–3)
BASE EXCESS BLDA CALC-SCNC: 0 MMOL/L (ref -3–3)
BASE EXCESS BLDA CALC-SCNC: 0.1 MMOL/L (ref -3–3)
BASE EXCESS BLDA CALC-SCNC: 0.5 MMOL/L (ref -3–3)
BASE EXCESS BLDV CALC-SCNC: -1.6 MMOL/L (ref -3–3)
BASE EXCESS BLDV CALC-SCNC: -7.1 MMOL/L (ref -3–3)
BILIRUB SERPL-MCNC: 0.2 MG/DL
BILIRUB SERPL-MCNC: 0.3 MG/DL
BLD PROD TYP BPU: NORMAL
BLOOD COMPONENT TYPE: NORMAL
BUN SERPL-MCNC: 67.2 MG/DL (ref 8–23)
BUN SERPL-MCNC: 71.3 MG/DL (ref 8–23)
BUN SERPL-MCNC: 78.7 MG/DL (ref 8–23)
BUN SERPL-MCNC: 85.6 MG/DL (ref 8–23)
CA-I BLD-MCNC: 4.5 MG/DL (ref 4.4–5.2)
CALCIUM SERPL-MCNC: 8.8 MG/DL (ref 8.8–10.4)
CALCIUM SERPL-MCNC: 8.8 MG/DL (ref 8.8–10.4)
CALCIUM SERPL-MCNC: 8.9 MG/DL (ref 8.8–10.4)
CALCIUM SERPL-MCNC: 9 MG/DL (ref 8.8–10.4)
CHLORIDE SERPL-SCNC: 105 MMOL/L (ref 98–107)
CHLORIDE SERPL-SCNC: 107 MMOL/L (ref 98–107)
CHLORIDE SERPL-SCNC: 107 MMOL/L (ref 98–107)
CHLORIDE SERPL-SCNC: 108 MMOL/L (ref 98–107)
CODING SYSTEM: NORMAL
CREAT SERPL-MCNC: 1.9 MG/DL (ref 0.67–1.17)
CREAT SERPL-MCNC: 2.05 MG/DL (ref 0.67–1.17)
CREAT SERPL-MCNC: 2.14 MG/DL (ref 0.67–1.17)
CREAT SERPL-MCNC: 2.35 MG/DL (ref 0.67–1.17)
CROSSMATCH: NORMAL
CRP SERPL-MCNC: 293 MG/L
D DIMER PPP FEU-MCNC: 2.82 UG/ML FEU (ref 0–0.5)
D DIMER PPP FEU-MCNC: 3.84 UG/ML FEU (ref 0–0.5)
DIASTOLIC BLOOD PRESSURE - MUSE: NORMAL MMHG
EGFRCR SERPLBLD CKD-EPI 2021: 29 ML/MIN/1.73M2
EGFRCR SERPLBLD CKD-EPI 2021: 33 ML/MIN/1.73M2
EGFRCR SERPLBLD CKD-EPI 2021: 34 ML/MIN/1.73M2
EGFRCR SERPLBLD CKD-EPI 2021: 38 ML/MIN/1.73M2
ERYTHROCYTE [DISTWIDTH] IN BLOOD BY AUTOMATED COUNT: 14.5 % (ref 10–15)
ERYTHROCYTE [DISTWIDTH] IN BLOOD BY AUTOMATED COUNT: 14.6 % (ref 10–15)
ERYTHROCYTE [DISTWIDTH] IN BLOOD BY AUTOMATED COUNT: 14.6 % (ref 10–15)
ERYTHROCYTE [DISTWIDTH] IN BLOOD BY AUTOMATED COUNT: 14.8 % (ref 10–15)
ERYTHROCYTE [SEDIMENTATION RATE] IN BLOOD BY WESTERGREN METHOD: 103 MM/HR (ref 0–20)
FIBRINOGEN PPP-MCNC: 1190 MG/DL (ref 170–510)
FIBRINOGEN PPP-MCNC: >1200 MG/DL (ref 170–510)
GLUCOSE BLD-MCNC: 116 MG/DL (ref 70–99)
GLUCOSE BLD-MCNC: 155 MG/DL (ref 70–99)
GLUCOSE BLD-MCNC: 185 MG/DL (ref 70–99)
GLUCOSE BLD-MCNC: 207 MG/DL (ref 70–99)
GLUCOSE BLDC GLUCOMTR-MCNC: 110 MG/DL (ref 70–99)
GLUCOSE BLDC GLUCOMTR-MCNC: 113 MG/DL (ref 70–99)
GLUCOSE BLDC GLUCOMTR-MCNC: 128 MG/DL (ref 70–99)
GLUCOSE BLDC GLUCOMTR-MCNC: 129 MG/DL (ref 70–99)
GLUCOSE BLDC GLUCOMTR-MCNC: 131 MG/DL (ref 70–99)
GLUCOSE BLDC GLUCOMTR-MCNC: 148 MG/DL (ref 70–99)
GLUCOSE BLDC GLUCOMTR-MCNC: 153 MG/DL (ref 70–99)
GLUCOSE BLDC GLUCOMTR-MCNC: 160 MG/DL (ref 70–99)
GLUCOSE BLDC GLUCOMTR-MCNC: 163 MG/DL (ref 70–99)
GLUCOSE BLDC GLUCOMTR-MCNC: 165 MG/DL (ref 70–99)
GLUCOSE BLDC GLUCOMTR-MCNC: 166 MG/DL (ref 70–99)
GLUCOSE BLDC GLUCOMTR-MCNC: 168 MG/DL (ref 70–99)
GLUCOSE BLDC GLUCOMTR-MCNC: 168 MG/DL (ref 70–99)
GLUCOSE BLDC GLUCOMTR-MCNC: 170 MG/DL (ref 70–99)
GLUCOSE BLDC GLUCOMTR-MCNC: 177 MG/DL (ref 70–99)
GLUCOSE BLDC GLUCOMTR-MCNC: 192 MG/DL (ref 70–99)
GLUCOSE BLDC GLUCOMTR-MCNC: 99 MG/DL (ref 70–99)
GLUCOSE SERPL-MCNC: 118 MG/DL (ref 70–99)
GLUCOSE SERPL-MCNC: 162 MG/DL (ref 70–99)
GLUCOSE SERPL-MCNC: 189 MG/DL (ref 70–99)
GLUCOSE SERPL-MCNC: 214 MG/DL (ref 70–99)
GRAM STAIN RESULT: ABNORMAL
GRAM STAIN RESULT: ABNORMAL
HCO3 BLD-SCNC: 18 MMOL/L (ref 21–28)
HCO3 BLD-SCNC: 19 MMOL/L (ref 21–28)
HCO3 BLD-SCNC: 20 MMOL/L (ref 21–28)
HCO3 BLD-SCNC: 22 MMOL/L (ref 21–28)
HCO3 BLD-SCNC: 23 MMOL/L (ref 21–28)
HCO3 BLD-SCNC: 24 MMOL/L (ref 21–28)
HCO3 BLDA-SCNC: 18 MMOL/L (ref 21–28)
HCO3 BLDA-SCNC: 24 MMOL/L (ref 21–28)
HCO3 BLDV-SCNC: 19 MMOL/L (ref 21–28)
HCO3 BLDV-SCNC: 24 MMOL/L (ref 21–28)
HCO3 SERPL-SCNC: 15 MMOL/L (ref 22–29)
HCO3 SERPL-SCNC: 21 MMOL/L (ref 22–29)
HCO3 SERPL-SCNC: 21 MMOL/L (ref 22–29)
HCO3 SERPL-SCNC: 22 MMOL/L (ref 22–29)
HCT VFR BLD AUTO: 27.4 % (ref 40–53)
HCT VFR BLD AUTO: 27.6 % (ref 40–53)
HCT VFR BLD AUTO: 27.8 % (ref 40–53)
HCT VFR BLD AUTO: 30.1 % (ref 40–53)
HGB BLD-MCNC: 8.7 G/DL (ref 13.3–17.7)
HGB BLD-MCNC: 8.9 G/DL (ref 13.3–17.7)
HGB BLD-MCNC: 9 G/DL (ref 13.3–17.7)
HGB BLD-MCNC: 9.7 G/DL (ref 13.3–17.7)
HGB FREE PLAS-MCNC: 30 MG/DL
INR PPP: 1.28 (ref 0.85–1.15)
INR PPP: 1.3 (ref 0.85–1.15)
INR PPP: 1.3 (ref 0.85–1.15)
INR PPP: 1.36 (ref 0.85–1.15)
INTERPRETATION ECG - MUSE: NORMAL
ISSUE DATE AND TIME: NORMAL
LACTATE SERPL-SCNC: 0.9 MMOL/L (ref 0.7–2)
LACTATE SERPL-SCNC: 1 MMOL/L (ref 0.7–2)
LACTATE SERPL-SCNC: 1.1 MMOL/L (ref 0.7–2)
LACTATE SERPL-SCNC: 1.2 MMOL/L (ref 0.7–2)
LDH SERPL L TO P-CCNC: 424 U/L (ref 0–250)
MAGNESIUM SERPL-MCNC: 2.1 MG/DL (ref 1.7–2.3)
MAGNESIUM SERPL-MCNC: 2.3 MG/DL (ref 1.7–2.3)
MCH RBC QN AUTO: 27.1 PG (ref 26.5–33)
MCH RBC QN AUTO: 27.6 PG (ref 26.5–33)
MCH RBC QN AUTO: 27.9 PG (ref 26.5–33)
MCH RBC QN AUTO: 27.9 PG (ref 26.5–33)
MCHC RBC AUTO-ENTMCNC: 31.8 G/DL (ref 31.5–36.5)
MCHC RBC AUTO-ENTMCNC: 32 G/DL (ref 31.5–36.5)
MCHC RBC AUTO-ENTMCNC: 32.2 G/DL (ref 31.5–36.5)
MCHC RBC AUTO-ENTMCNC: 32.6 G/DL (ref 31.5–36.5)
MCV RBC AUTO: 85 FL (ref 78–100)
MCV RBC AUTO: 85 FL (ref 78–100)
MCV RBC AUTO: 87 FL (ref 78–100)
MCV RBC AUTO: 87 FL (ref 78–100)
O2/TOTAL GAS SETTING VFR VENT: 60 %
O2/TOTAL GAS SETTING VFR VENT: 80 %
OXYHGB MFR BLDA: 97 % (ref 92–100)
OXYHGB MFR BLDA: 98 % (ref 75–100)
OXYHGB MFR BLDA: 98 % (ref 92–100)
OXYHGB MFR BLDA: 99 % (ref 75–100)
OXYHGB MFR BLDA: 99 % (ref 92–100)
OXYHGB MFR BLDV: 86 %
OXYHGB MFR BLDV: 86 %
P AXIS - MUSE: NORMAL DEGREES
PCO2 BLD: 32 MM HG (ref 35–45)
PCO2 BLD: 33 MM HG (ref 35–45)
PCO2 BLD: 34 MM HG (ref 35–45)
PCO2 BLD: 34 MM HG (ref 35–45)
PCO2 BLD: 35 MM HG (ref 35–45)
PCO2 BLD: 36 MM HG (ref 35–45)
PCO2 BLD: 37 MM HG (ref 35–45)
PCO2 BLD: 37 MM HG (ref 35–45)
PCO2 BLD: 39 MM HG (ref 35–45)
PCO2 BLDA: 37 MM HG (ref 35–45)
PCO2 BLDA: 44 MM HG (ref 35–45)
PCO2 BLDV: 39 MM HG (ref 40–50)
PCO2 BLDV: 46 MM HG (ref 40–50)
PH BLD: 7.33 [PH] (ref 7.35–7.45)
PH BLD: 7.34 [PH] (ref 7.35–7.45)
PH BLD: 7.37 [PH] (ref 7.35–7.45)
PH BLD: 7.4 [PH] (ref 7.35–7.45)
PH BLD: 7.42 [PH] (ref 7.35–7.45)
PH BLD: 7.43 [PH] (ref 7.35–7.45)
PH BLD: 7.44 [PH] (ref 7.35–7.45)
PH BLD: 7.45 [PH] (ref 7.35–7.45)
PH BLD: 7.46 [PH] (ref 7.35–7.45)
PH BLD: 7.46 [PH] (ref 7.35–7.45)
PH BLDA: 7.3 [PH] (ref 7.35–7.45)
PH BLDA: 7.34 [PH] (ref 7.35–7.45)
PH BLDV: 7.3 [PH] (ref 7.32–7.43)
PH BLDV: 7.34 [PH] (ref 7.32–7.43)
PHOSPHATE SERPL-MCNC: 6.1 MG/DL (ref 2.5–4.5)
PLATELET # BLD AUTO: 220 10E3/UL (ref 150–450)
PLATELET # BLD AUTO: 228 10E3/UL (ref 150–450)
PLATELET # BLD AUTO: 231 10E3/UL (ref 150–450)
PLATELET # BLD AUTO: 239 10E3/UL (ref 150–450)
PO2 BLD: 105 MM HG (ref 80–105)
PO2 BLD: 107 MM HG (ref 80–105)
PO2 BLD: 110 MM HG (ref 80–105)
PO2 BLD: 113 MM HG (ref 80–105)
PO2 BLD: 115 MM HG (ref 80–105)
PO2 BLD: 130 MM HG (ref 80–105)
PO2 BLD: 153 MM HG (ref 80–105)
PO2 BLD: 156 MM HG (ref 80–105)
PO2 BLD: 185 MM HG (ref 80–105)
PO2 BLD: 202 MM HG (ref 80–105)
PO2 BLD: 93 MM HG (ref 80–105)
PO2 BLD: 97 MM HG (ref 80–105)
PO2 BLDA: 259 MM HG (ref 80–105)
PO2 BLDA: 266 MM HG (ref 80–105)
PO2 BLDV: 56 MM HG (ref 25–47)
PO2 BLDV: 57 MM HG (ref 25–47)
POTASSIUM SERPL-SCNC: 3.6 MMOL/L (ref 3.4–5.3)
POTASSIUM SERPL-SCNC: 3.7 MMOL/L (ref 3.4–5.3)
POTASSIUM SERPL-SCNC: 3.9 MMOL/L (ref 3.4–5.3)
POTASSIUM SERPL-SCNC: 3.9 MMOL/L (ref 3.4–5.3)
PR INTERVAL - MUSE: NORMAL MS
PROT SERPL-MCNC: 6.3 G/DL (ref 6.4–8.3)
PROT SERPL-MCNC: 6.4 G/DL (ref 6.4–8.3)
PROT SERPL-MCNC: 6.5 G/DL (ref 6.4–8.3)
PROT SERPL-MCNC: 6.6 G/DL (ref 6.4–8.3)
PROTHROMBIN TIME: 16.2 SECONDS (ref 11.8–14.8)
PROTHROMBIN TIME: 16.4 SECONDS (ref 11.8–14.8)
PROTHROMBIN TIME: 16.5 SECONDS (ref 11.8–14.8)
PROTHROMBIN TIME: 17 SECONDS (ref 11.8–14.8)
QRS DURATION - MUSE: 86 MS
QT - MUSE: 360 MS
QTC - MUSE: 504 MS
R AXIS - MUSE: 50 DEGREES
RBC # BLD AUTO: 3.19 10E6/UL (ref 4.4–5.9)
RBC # BLD AUTO: 3.21 10E6/UL (ref 4.4–5.9)
RBC # BLD AUTO: 3.26 10E6/UL (ref 4.4–5.9)
RBC # BLD AUTO: 3.48 10E6/UL (ref 4.4–5.9)
SAO2 % BLDA: 100 % (ref 95–96)
SAO2 % BLDA: 98.4 % (ref 95–96)
SAO2 % BLDA: 98.4 % (ref 95–96)
SAO2 % BLDA: 98.5 % (ref 95–96)
SAO2 % BLDA: 98.5 % (ref 95–96)
SAO2 % BLDA: 98.9 % (ref 95–96)
SAO2 % BLDA: 99 % (ref 95–96)
SAO2 % BLDA: 99.2 % (ref 95–96)
SAO2 % BLDA: 99.6 % (ref 95–96)
SAO2 % BLDA: 99.6 % (ref 95–96)
SAO2 % BLDA: 99.8 % (ref 95–96)
SAO2 % BLDA: 99.9 % (ref 95–96)
SAO2 % BLDA: >100 % (ref 95–96)
SAO2 % BLDA: >100 % (ref 95–96)
SAO2 % BLDV: 87.3 % (ref 70–75)
SAO2 % BLDV: 87.6 % (ref 70–75)
SODIUM SERPL-SCNC: 144 MMOL/L (ref 135–145)
SODIUM SERPL-SCNC: 146 MMOL/L (ref 135–145)
SODIUM SERPL-SCNC: 146 MMOL/L (ref 135–145)
SODIUM SERPL-SCNC: 147 MMOL/L (ref 135–145)
SYSTOLIC BLOOD PRESSURE - MUSE: NORMAL MMHG
T AXIS - MUSE: 73 DEGREES
T3 SERPL-MCNC: 71 NG/DL (ref 85–202)
T3FREE SERPL-MCNC: 1.5 PG/ML (ref 2–4.4)
T4 FREE SERPL-MCNC: 1.06 NG/DL (ref 0.9–1.7)
TROPONIN T SERPL HS-MCNC: 1078 NG/L
TROPONIN T SERPL HS-MCNC: 1082 NG/L
TROPONIN T SERPL HS-MCNC: 1099 NG/L
TROPONIN T SERPL HS-MCNC: 1322 NG/L
TSH SERPL DL<=0.005 MIU/L-ACNC: 0.38 UIU/ML (ref 0.3–4.2)
UFH PPP CHRO-ACNC: 0.53 IU/ML (ref ?–1.1)
UFH PPP CHRO-ACNC: 0.59 IU/ML (ref ?–1.1)
UFH PPP CHRO-ACNC: 0.61 IU/ML (ref ?–1.1)
UFH PPP CHRO-ACNC: 0.62 IU/ML (ref ?–1.1)
UNIT ABO/RH: NORMAL
UNIT NUMBER: NORMAL
UNIT STATUS: NORMAL
UNIT TYPE ISBT: 6200
VENTRICULAR RATE- MUSE: 118 BPM
WBC # BLD AUTO: 12 10E3/UL (ref 4–11)
WBC # BLD AUTO: 13.1 10E3/UL (ref 4–11)
WBC # BLD AUTO: 14.7 10E3/UL (ref 4–11)
WBC # BLD AUTO: 15.3 10E3/UL (ref 4–11)

## 2025-07-23 PROCEDURE — 85384 FIBRINOGEN ACTIVITY: CPT | Performed by: STUDENT IN AN ORGANIZED HEALTH CARE EDUCATION/TRAINING PROGRAM

## 2025-07-23 PROCEDURE — 99292 CRITICAL CARE ADDL 30 MIN: CPT | Performed by: PSYCHIATRY & NEUROLOGY

## 2025-07-23 PROCEDURE — 250N000009 HC RX 250: Performed by: STUDENT IN AN ORGANIZED HEALTH CARE EDUCATION/TRAINING PROGRAM

## 2025-07-23 PROCEDURE — 85014 HEMATOCRIT: CPT | Performed by: STUDENT IN AN ORGANIZED HEALTH CARE EDUCATION/TRAINING PROGRAM

## 2025-07-23 PROCEDURE — 86140 C-REACTIVE PROTEIN: CPT | Performed by: STUDENT IN AN ORGANIZED HEALTH CARE EDUCATION/TRAINING PROGRAM

## 2025-07-23 PROCEDURE — 82805 BLOOD GASES W/O2 SATURATION: CPT | Performed by: STUDENT IN AN ORGANIZED HEALTH CARE EDUCATION/TRAINING PROGRAM

## 2025-07-23 PROCEDURE — 84155 ASSAY OF PROTEIN SERUM: CPT | Performed by: STUDENT IN AN ORGANIZED HEALTH CARE EDUCATION/TRAINING PROGRAM

## 2025-07-23 PROCEDURE — 33949 ECMO/ECLS DAILY MGMT ARTERY: CPT | Performed by: HOSPITALIST

## 2025-07-23 PROCEDURE — 85027 COMPLETE CBC AUTOMATED: CPT | Performed by: STUDENT IN AN ORGANIZED HEALTH CARE EDUCATION/TRAINING PROGRAM

## 2025-07-23 PROCEDURE — 85300 ANTITHROMBIN III ACTIVITY: CPT | Performed by: STUDENT IN AN ORGANIZED HEALTH CARE EDUCATION/TRAINING PROGRAM

## 2025-07-23 PROCEDURE — 84481 FREE ASSAY (FT-3): CPT | Performed by: STUDENT IN AN ORGANIZED HEALTH CARE EDUCATION/TRAINING PROGRAM

## 2025-07-23 PROCEDURE — 85520 HEPARIN ASSAY: CPT | Performed by: STUDENT IN AN ORGANIZED HEALTH CARE EDUCATION/TRAINING PROGRAM

## 2025-07-23 PROCEDURE — 99291 CRITICAL CARE FIRST HOUR: CPT | Mod: FS | Performed by: NURSE PRACTITIONER

## 2025-07-23 PROCEDURE — 84439 ASSAY OF FREE THYROXINE: CPT | Performed by: STUDENT IN AN ORGANIZED HEALTH CARE EDUCATION/TRAINING PROGRAM

## 2025-07-23 PROCEDURE — 33949 ECMO/ECLS DAILY MGMT ARTERY: CPT

## 2025-07-23 PROCEDURE — 84100 ASSAY OF PHOSPHORUS: CPT | Performed by: STUDENT IN AN ORGANIZED HEALTH CARE EDUCATION/TRAINING PROGRAM

## 2025-07-23 PROCEDURE — 85730 THROMBOPLASTIN TIME PARTIAL: CPT | Performed by: STUDENT IN AN ORGANIZED HEALTH CARE EDUCATION/TRAINING PROGRAM

## 2025-07-23 PROCEDURE — 250N000013 HC RX MED GY IP 250 OP 250 PS 637: Performed by: STUDENT IN AN ORGANIZED HEALTH CARE EDUCATION/TRAINING PROGRAM

## 2025-07-23 PROCEDURE — 84443 ASSAY THYROID STIM HORMONE: CPT | Performed by: STUDENT IN AN ORGANIZED HEALTH CARE EDUCATION/TRAINING PROGRAM

## 2025-07-23 PROCEDURE — 94003 VENT MGMT INPAT SUBQ DAY: CPT

## 2025-07-23 PROCEDURE — 87070 CULTURE OTHR SPECIMN AEROBIC: CPT | Performed by: STUDENT IN AN ORGANIZED HEALTH CARE EDUCATION/TRAINING PROGRAM

## 2025-07-23 PROCEDURE — 85379 FIBRIN DEGRADATION QUANT: CPT | Performed by: STUDENT IN AN ORGANIZED HEALTH CARE EDUCATION/TRAINING PROGRAM

## 2025-07-23 PROCEDURE — 82330 ASSAY OF CALCIUM: CPT | Performed by: STUDENT IN AN ORGANIZED HEALTH CARE EDUCATION/TRAINING PROGRAM

## 2025-07-23 PROCEDURE — 999N000157 HC STATISTIC RCP TIME EA 10 MIN

## 2025-07-23 PROCEDURE — 82947 ASSAY GLUCOSE BLOOD QUANT: CPT | Performed by: STUDENT IN AN ORGANIZED HEALTH CARE EDUCATION/TRAINING PROGRAM

## 2025-07-23 PROCEDURE — 250N000013 HC RX MED GY IP 250 OP 250 PS 637: Performed by: INTERNAL MEDICINE

## 2025-07-23 PROCEDURE — 83735 ASSAY OF MAGNESIUM: CPT | Performed by: STUDENT IN AN ORGANIZED HEALTH CARE EDUCATION/TRAINING PROGRAM

## 2025-07-23 PROCEDURE — 250N000011 HC RX IP 250 OP 636: Performed by: STUDENT IN AN ORGANIZED HEALTH CARE EDUCATION/TRAINING PROGRAM

## 2025-07-23 PROCEDURE — 99291 CRITICAL CARE FIRST HOUR: CPT | Mod: 25 | Performed by: HOSPITALIST

## 2025-07-23 PROCEDURE — 84484 ASSAY OF TROPONIN QUANT: CPT | Performed by: STUDENT IN AN ORGANIZED HEALTH CARE EDUCATION/TRAINING PROGRAM

## 2025-07-23 PROCEDURE — 85610 PROTHROMBIN TIME: CPT | Performed by: STUDENT IN AN ORGANIZED HEALTH CARE EDUCATION/TRAINING PROGRAM

## 2025-07-23 PROCEDURE — 85652 RBC SED RATE AUTOMATED: CPT | Performed by: STUDENT IN AN ORGANIZED HEALTH CARE EDUCATION/TRAINING PROGRAM

## 2025-07-23 PROCEDURE — 83615 LACTATE (LD) (LDH) ENZYME: CPT | Performed by: STUDENT IN AN ORGANIZED HEALTH CARE EDUCATION/TRAINING PROGRAM

## 2025-07-23 PROCEDURE — 258N000003 HC RX IP 258 OP 636: Performed by: INTERNAL MEDICINE

## 2025-07-23 PROCEDURE — 250N000011 HC RX IP 250 OP 636: Performed by: INTERNAL MEDICINE

## 2025-07-23 PROCEDURE — 83605 ASSAY OF LACTIC ACID: CPT | Performed by: STUDENT IN AN ORGANIZED HEALTH CARE EDUCATION/TRAINING PROGRAM

## 2025-07-23 PROCEDURE — 82805 BLOOD GASES W/O2 SATURATION: CPT | Performed by: INTERNAL MEDICINE

## 2025-07-23 PROCEDURE — 83051 HEMOGLOBIN PLASMA: CPT | Performed by: STUDENT IN AN ORGANIZED HEALTH CARE EDUCATION/TRAINING PROGRAM

## 2025-07-23 PROCEDURE — 84480 ASSAY TRIIODOTHYRONINE (T3): CPT | Performed by: STUDENT IN AN ORGANIZED HEALTH CARE EDUCATION/TRAINING PROGRAM

## 2025-07-23 PROCEDURE — 200N000002 HC R&B ICU UMMC

## 2025-07-23 PROCEDURE — P9016 RBC LEUKOCYTES REDUCED: HCPCS | Performed by: STUDENT IN AN ORGANIZED HEALTH CARE EDUCATION/TRAINING PROGRAM

## 2025-07-23 PROCEDURE — 85347 COAGULATION TIME ACTIVATED: CPT

## 2025-07-23 RX ORDER — MIDAZOLAM HCL IN 0.9 % NACL/PF 1 MG/ML
1-8 PLASTIC BAG, INJECTION (ML) INTRAVENOUS CONTINUOUS
Status: DISCONTINUED | OUTPATIENT
Start: 2025-07-23 | End: 2025-07-23

## 2025-07-23 RX ORDER — AMINO ACIDS/PROTEIN HYDROLYS 11G-40/45
1 LIQUID IN PACKET (ML) ORAL 3 TIMES DAILY
Status: DISCONTINUED | OUTPATIENT
Start: 2025-07-23 | End: 2025-07-27

## 2025-07-23 RX ORDER — HYDROMORPHONE HYDROCHLORIDE 1 MG/ML
.5-1 INJECTION, SOLUTION INTRAMUSCULAR; INTRAVENOUS; SUBCUTANEOUS
Status: DISCONTINUED | OUTPATIENT
Start: 2025-07-23 | End: 2025-07-27 | Stop reason: HOSPADM

## 2025-07-23 RX ORDER — POTASSIUM CHLORIDE 29.8 MG/ML
20 INJECTION INTRAVENOUS ONCE
Status: COMPLETED | OUTPATIENT
Start: 2025-07-23 | End: 2025-07-23

## 2025-07-23 RX ORDER — MIDAZOLAM HCL IN 0.9 % NACL/PF 1 MG/ML
1-8 PLASTIC BAG, INJECTION (ML) INTRAVENOUS CONTINUOUS
Status: DISCONTINUED | OUTPATIENT
Start: 2025-07-23 | End: 2025-07-24

## 2025-07-23 RX ORDER — LEVETIRACETAM 10 MG/ML
1000 INJECTION INTRAVASCULAR EVERY 12 HOURS
Status: DISCONTINUED | OUTPATIENT
Start: 2025-07-24 | End: 2025-07-27

## 2025-07-23 RX ADMIN — Medication 40 MG: at 08:12

## 2025-07-23 RX ADMIN — HYDRALAZINE HYDROCHLORIDE 50 MG: 50 TABLET ORAL at 16:51

## 2025-07-23 RX ADMIN — Medication 60 ML: at 08:12

## 2025-07-23 RX ADMIN — Medication 15 ML: at 08:12

## 2025-07-23 RX ADMIN — Medication 60 ML: at 20:03

## 2025-07-23 RX ADMIN — HEPARIN SODIUM 2000 UNITS/HR: 10000 INJECTION, SOLUTION INTRAVENOUS at 02:54

## 2025-07-23 RX ADMIN — POTASSIUM CHLORIDE 20 MEQ: 29.8 INJECTION, SOLUTION INTRAVENOUS at 11:33

## 2025-07-23 RX ADMIN — Medication 60 ML: at 13:54

## 2025-07-23 RX ADMIN — LEVETIRACETAM 750 MG: 100 INJECTION, SOLUTION INTRAVENOUS at 20:03

## 2025-07-23 RX ADMIN — MIDAZOLAM IN SODIUM CHLORIDE 3 MG/HR: 1 INJECTION INTRAVENOUS at 16:03

## 2025-07-23 RX ADMIN — NICARDIPINE HYDROCHLORIDE 15 MG/HR: 0.2 INJECTION INTRAVENOUS at 00:03

## 2025-07-23 RX ADMIN — MIDAZOLAM 4 MG: 1 INJECTION INTRAMUSCULAR; INTRAVENOUS at 14:34

## 2025-07-23 RX ADMIN — CHLORHEXIDINE GLUCONATE 15 ML: 1.2 SOLUTION ORAL at 08:12

## 2025-07-23 RX ADMIN — TICAGRELOR 90 MG: 90 TABLET ORAL at 08:12

## 2025-07-23 RX ADMIN — LEVETIRACETAM 4500 MG: 100 INJECTION, SOLUTION INTRAVENOUS at 00:07

## 2025-07-23 RX ADMIN — ASPIRIN 81 MG CHEWABLE TABLET 81 MG: 81 TABLET CHEWABLE at 08:12

## 2025-07-23 RX ADMIN — HYDRALAZINE HYDROCHLORIDE 50 MG: 50 TABLET ORAL at 11:26

## 2025-07-23 RX ADMIN — AMIODARONE HYDROCHLORIDE 0.5 MG/MIN: 1.8 INJECTION, SOLUTION INTRAVENOUS at 07:04

## 2025-07-23 RX ADMIN — AMIODARONE HYDROCHLORIDE 0.5 MG/MIN: 1.8 INJECTION, SOLUTION INTRAVENOUS at 18:29

## 2025-07-23 RX ADMIN — TICAGRELOR 90 MG: 90 TABLET ORAL at 20:03

## 2025-07-23 RX ADMIN — HYDRALAZINE HYDROCHLORIDE 50 MG: 50 TABLET ORAL at 23:35

## 2025-07-23 RX ADMIN — NICARDIPINE HYDROCHLORIDE 5 MG/HR: 0.2 INJECTION INTRAVENOUS at 04:17

## 2025-07-23 RX ADMIN — HEPARIN SODIUM 2000 UNITS/HR: 10000 INJECTION, SOLUTION INTRAVENOUS at 15:47

## 2025-07-23 RX ADMIN — CHLORHEXIDINE GLUCONATE 15 ML: 1.2 SOLUTION ORAL at 20:03

## 2025-07-23 RX ADMIN — HYDRALAZINE HYDROCHLORIDE 50 MG: 50 TABLET ORAL at 05:13

## 2025-07-23 RX ADMIN — INSULIN HUMAN 5 UNITS/HR: 1 INJECTION, SOLUTION INTRAVENOUS at 00:09

## 2025-07-23 RX ADMIN — BUMETANIDE 1 MG/HR: 0.25 INJECTION INTRAMUSCULAR; INTRAVENOUS at 05:34

## 2025-07-23 RX ADMIN — POLYETHYLENE GLYCOL 3350 17 G: 17 POWDER, FOR SOLUTION ORAL at 08:12

## 2025-07-23 RX ADMIN — HYDROMORPHONE HYDROCHLORIDE 1 MG: 1 INJECTION, SOLUTION INTRAMUSCULAR; INTRAVENOUS; SUBCUTANEOUS at 14:34

## 2025-07-23 RX ADMIN — LEVETIRACETAM 750 MG: 100 INJECTION, SOLUTION INTRAVENOUS at 08:12

## 2025-07-23 ASSESSMENT — ACTIVITIES OF DAILY LIVING (ADL)
ADLS_ACUITY_SCORE: 54
ADLS_ACUITY_SCORE: 77
ADLS_ACUITY_SCORE: 54
ADLS_ACUITY_SCORE: 77
ADLS_ACUITY_SCORE: 54
ADLS_ACUITY_SCORE: 77
ADLS_ACUITY_SCORE: 77
ADLS_ACUITY_SCORE: 54
ADLS_ACUITY_SCORE: 77
ADLS_ACUITY_SCORE: 54

## 2025-07-23 NOTE — PROGRESS NOTES
Physician Attestation   Bob was seen and evaluated by me on 07/21/25.  He was in critical condition as the result of hypoxic ischemic encephalopathy, cerebral edema post arrest.  His condition is now Critical.     Significant changes in status since my last evaluation include: currently sedated  on fentanyl and propofol, so limited exam     I have reviewed changes in critical data from the last 24 hours, including medications, laboratory results, vital signs, and radiograph results.      The acute issues managed by me today and Supportive interventions provided and/or ordered by me include   #Hypoxic ischemic encephalopathy  #Cerebral edema  Clinical exam limited with ongoing sedation but intact brainstem  CTH day 1 and 3 with cerebral edema and loss of gray white differentiation  vEEG: Generalized periodic discharges are variably present and are consistent with anoxic encephalopathy. No seizure occur.   -cont supportive care  -limit CNS acting meds as able  -will continue to follow exam  -too early to prognosticate but has some concerning findings on vEEG and CTH     I formulated and discussed my care plan with the patient s Advanced Practice Provider     I discussed the course and plan with the Chief Resident/Fellow, Bedside Nurse, Patient, and Primary team and answered all questions to the best of my ability.     Total Critical Care time spent by me, excluding procedures, was  51 minutes     Amber Luna,

## 2025-07-23 NOTE — PROGRESS NOTES
[facility]    ECLS Shift Summary:     ECMO Equipment:  Console Serial Number: [id number]  Circuit Lot Number: [id number]  Oxygenator Lot Number: [id number]    Circuit Assessment: Fibrin  Fibrin Location: Connectors    Arterial ECMO Cannula: 17 Fr in the Right Femoral Artery  Venous ECMO Cannula: 25 Fr in the Right Femoral Vein  Distal Perfusion Catheter: 8 Fr in the Right Superficial Femoral Artery    ECMO Cannula Arterial Right femoral artery-Site Assessment: Sutured, Secure  ECMO Cannula Venous Right femoral vein-Site Assessment: Secure, Sutured  Distal Perfusion Catheter Right superficial femoral artery-Site Assessment: WDL  ECMO Cannula Arterial Right femoral artery-Site Intervention: No intervention needed  ECMO Cannula Venous Right femoral vein-Site Intervention: No intervention needed  Distal Perfusion Catheter Right superficial femoral artery-Site Intervention: No intervention needed    Patient remains on V-A ECMO, all equipment is functioning and alarms are appropriately set. RPM's: 2874 with Blood Flow (Circuit) LPM  Av.6 LPM  Min: 2.52 LPM  Max: 2.6 LPM L/min. Sweep is at 0.5 LPM and 6 %. Extremities are warm and perfused .     Significant Shift Events: None.Plan for care conference today.     Vent settings:  FiO2 (%): 80 %, Resp: 20, Vent Mode: VC/AC, Resp Rate (Set): 12 breaths/min, Tidal Volume (Set, mL): 400 mL, PEEP (cm H2O): 8 cmH2O, Resp Rate (Set): 12 breaths/min, Tidal Volume (Set, mL): 400 mL, PEEP (cm H2O): 8 cmH2O    Anticoagulation:  Dose (units/hr) HEParin: 2000 Units/hr  Rate (mL/hr) HEParin: 20 mL/hr  Concentration HEParin: 100 Units/mL        Most recent ACT  (seconds): 186 seconds    Urine output is adequate, Pink, Yellow / Straw Colored.  Blood loss was none. Product given included none.     Intake/Output Summary (Last 24 hours) at 2025 0517  Last data filed at 2025 0500  Gross per 24 hour   Intake 3669.77 ml   Output 6900 ml    Net -3230.23 ml       Labs:  Recent Labs   Lab 07/23/25  0356 07/23/25  0217 07/23/25  0012 07/22/25  2205   PH 7.34* 7.33* 7.46* 7.45   PCO2 32* 39 33* 35   PO2 110* 130* 93 85   HCO3 18* 20* 23 24   O2PER 60  80  80 80 80 80       Lab Results   Component Value Date    HGB 9.7 (L) 07/23/2025    PHGB 30 (H) 07/23/2025     07/23/2025    FIBR >1,200 (H) 07/23/2025    INR 1.28 (H) 07/23/2025    PTT 78 (H) 07/23/2025    DD 3.84 (H) 07/23/2025    AXA 0.21 05/24/2013    ANTCH 89 07/22/2025       Plan:  Continue VA ECMO support    RT Jill  ECMO Specialist  7/23/2025 5:34 AM

## 2025-07-23 NOTE — PROGRESS NOTES
[facility]    ECLS Shift Summary:     ECMO Equipment:  Console Serial Number: [id number]  Circuit Lot Number: [id number]  Oxygenator Lot Number: [id number]    Circuit Assessment: Fibrin  Fibrin Location: connectors    Arterial ECMO Cannula: 17 Fr in the Right Femoral Artery  Venous ECMO Cannula: 25 Fr in the Right Femoral Vein  Distal Perfusion Catheter: 8 Fr in the Right Superficial Femoral Artery    ECMO Cannula Arterial Right femoral artery-Site Assessment: Sutured, Secure  ECMO Cannula Venous Right femoral vein-Site Assessment: Secure, Sutured  Distal Perfusion Catheter Right superficial femoral artery-Site Assessment: WDL  ECMO Cannula Arterial Right femoral artery-Site Intervention: No intervention needed  ECMO Cannula Venous Right femoral vein-Site Intervention: No intervention needed  Distal Perfusion Catheter Right superficial femoral artery-Site Intervention: No intervention needed    Patient remains on V-A ECMO, all equipment is functioning and alarms are appropriately set. RPM's: 2875 with Blood Flow (Circuit) LPM  Av.69 LPM  Min: 2.69 LPM  Max: 2.69 LPM L/min. Sweep is at 0.5 LPM and 60 %. Extremities are warm.     Significant Shift Events: N/A    Vent settings:  FiO2 (%): (S) 60 %, Resp: 19, Vent Mode: VC/AC, Resp Rate (Set): 12 breaths/min, Tidal Volume (Set, mL): 400 mL, PEEP (cm H2O): 8 cmH2O, Resp Rate (Set): 12 breaths/min, Tidal Volume (Set, mL): 400 mL, PEEP (cm H2O): 8 cmH2O    Anticoagulation:  Dose (units/hr) HEParin: 2000 Units/hr  Rate (mL/hr) HEParin: 20 mL/hr  Concentration HEParin: 100 Units/mL        Most recent ACT  (seconds): 186 seconds    Urine output is as per charted, Yellow / Straw Colored.  Blood loss was minimal. Product was not given this shift.     Intake/Output Summary (Last 24 hours) at 2025 1817  Last data filed at 2025 1800  Gross per 24 hour   Intake 3149.33 ml   Output 4235 ml   Net -1085.67 ml        Labs:  Recent Labs   Lab 07/23/25  1755 07/23/25  1546 07/23/25  1353 07/23/25  1152   PH 7.46* 7.40 7.45 7.45   PCO2 33* 37 34* 35   PO2 202* 153* 185* 156*   HCO3 24 23 23 24   O2PER 80 80  80  60 80 80       Lab Results   Component Value Date    HGB 8.9 (L) 07/23/2025    PHGB 30 (H) 07/23/2025     07/23/2025    FIBR 1,190 (H) 07/23/2025    INR 1.30 (H) 07/23/2025    PTT 80 (H) 07/23/2025    DD 2.82 (H) 07/23/2025    AXA 0.21 05/24/2013    ANTCH 101 07/23/2025       Plan:  To continue VA ECMO and wean as able.    Ada Alexander, RT  ECMO Specialist  7/23/2025 6:17 PM

## 2025-07-23 NOTE — PROGRESS NOTES
Neurocritical Care Progress Note    Reason for critical care admission: Cardiac Arrest  Consulting Team: CICU  Date of Service:  07/23/2025  Date of Admission:  7/18/2025  Hospital Day: 6    Assessment/Plan  Bob Echols is a 70 year old male with a past medical history of HTN, DM2, and DEMOND.  Admission for inferior STEMI on 7/10 RENA to the distal LCx discharged on 7/14.  Had been doing well at home then 7/18 when he was found to be apneic and pulseless. EMS was called at 1537, arrived and started compressions at 1542, cannulated for [facility] ECMO at 1614.      24 hour events:  -Myoclonus and myoclonic status epilepticus, loaded with keppra 4.5 grams and started on 750mg BID  -Patients clinical exam is notable for remaining comatose 5 days post arrest and off sedation for >24 hours, ongoing myoclonus, concerning EEG findings with suppressed background, GPDs, myoclonic status epilepticus. Head CT with diminished gray-white matter differentiation, signs of hypoxic brain injury.  Although uncertainty exists, these findings point towards a poor neurological outcome post cardiac arrest.    Neuro  #Post-arrest cerebral edema  #Encephalopathy, multifactorial  #Abnormal EEG with suppressed background, myoclonic status epilepticus  #Multifocal myoclonus  #Concern for poor neurological outcome  Post arrest day: 5  Length of downtime: unclear, appears about 10-15 min unwitnessed and then perhaps another 5 minutes without intervention while waiting for ambulance, ECMO started 32 minutes into resuscitation (although family reported to nurse that he may have been down for an hour before being found)  -Witnessed arrest: No  -ROSC: No, [facility] ECMO  -Keppra loaded and started 7/23  -Recommend versed bolus followed by drip for suppressing and treating status myoclonus epilepticus  -Restart EEG    Analgesics & sedation  Current sedation: off since 7/22 0715    Neuroimaging  -Day 1 CTH 7/18: Mildly diminished gray-white differentiation  throughout the cerebral hemispheres concerning for hypoxic ischemic injury  -Day 3 CT head 7/21-await formal read but appears to have worse blurring of gray-white differentiation as well as worse cerebral edema, do not see herniation    Neurophysiology  -continue vEEG  -vEEG 7/20 (read on 7/21) shows: Waxing and waning GPDs throughout the recording ranging from 0.5 to greater than 3 Hz at times.  Three patient events significant for status myoclonus    Biomarkers  -Neuron-specific Enolase (NSE) results: 27, 105, 91.7  -S 100B protein: 314, 189, 115    Recommendations  -Avoid hypotonic solutions as they may worsen cerebral edema   -Avoid nitroprusside or nitroglycerin as they may also worsen cerbral edema  -Advise slow correction of hypernatremia if needed  -When safe to do so, please limit use of CNS acting medications such as benzodiazepines, opiates, anticholinergics, which will permit a more accurate neurological assessment    Clinically Significant Risk Factors        # Hypokalemia: Lowest K = 3.3 mmol/L in last 2 days, will replace as needed  # Hypernatremia: Highest Na = 147 mmol/L in last 2 days, will monitor as appropriate  # Hyperchloremia: Highest Cl = 109 mmol/L in last 2 days, will monitor as appropriate       # Hypercalcemia: corrected calcium is >10.1, will monitor as appropriate   # Anion Gap Metabolic Acidosis: Highest Anion Gap = 24 mmol/L in last 2 days, will monitor and treat as appropriate  # Hypoalbuminemia: Lowest albumin = 2.6 g/dL at 7/22/2025  3:41 AM, will monitor as appropriate    # Coagulation Defect: INR = 1.30 (Ref range: 0.85 - 1.15) and/or PTT = 86 Seconds (Ref range: 22 - 38 Seconds), will monitor for bleeding        # Acute Hypoxic Respiratory Failure: Based on blood gas results. Continue supplemental oxygen as needed  # Acute Hypercapnic Respiratory Failure: based on arterial blood gas results.  Continue supplemental oxygen and ventilatory support as indicated.  # Acute Hypercapnic  "Respiratory Failure: based on venous blood gas results.  Continue supplemental oxygen and ventilatory support as indicated.         # Obesity: Estimated body mass index is 35.68 kg/m  as calculated from the following:    Height as of this encounter: 1.778 m (5' 10\").    Weight as of this encounter: 112.8 kg (248 lb 10.9 oz).      # Financial/Environmental Concerns: none          I spent 35 minutes of critical care time on the unit/floor managing the care of Bob Echols. Upon evaluation, this patient had a high probability of imminent or life-threatening deterioration due to post arrest anoxia, which required my direct attention, intervention, and personal management . Greater than 50% of my time was spent at the bedside counseling the patient and/or coordinating care including chart review, history, exam, documentation, and further activities per this note. I have personally reviewed the following data/imaging over the past 24 hours.     The patient was discussed with the NCC attending, Dr. Luna.    Darleen Concepcion CNP  Neurocritical care  -------------------------------------------------------------------------------------------------------------  Vital signs:  Temp: 98.2  F (36.8  C) Temp src: Bladder   Pulse: 102   Resp: 26 SpO2: 100 % O2 Device: Mechanical Ventilator   Height: 177.8 cm (5' 10\") Weight: 112.8 kg (248 lb 10.9 oz)  Estimated body mass index is 35.68 kg/m  as calculated from the following:    Height as of this encounter: 1.778 m (5' 10\").    Weight as of this encounter: 112.8 kg (248 lb 10.9 oz).    Physical Examination:  General: Adult male patient, lying in bed, critically-ill  HEENT: Normocephalic, atraumatic, no icterus, oral cavity/oropharynx pink and moist, clenched and biting jaw  Cardiac: SR with bedside ST elevation lead 2, [facility] ECMO  Pulm: unlabored, mechanical ventilator, ETT, overbreathing  Abdomen: rounded, rectal tube present  Extremities: pale  Psych: Comatose  Neuro:  Mental " status: Comatose, not responding, facial twitching and jaw clenching/chomping  Cranial nerves: PERRL right size 4.95 NPI 2.3 and left size 4.66 NPI 1.9, conjugate gaze, no cough, overbreaths  Motor: Normal bulk and tone. Fine tremor generalized throughout body including jaw clenching, also having exaggerated left leg clonus, unable to elicit movement in BUE or BLE  Sensory: no response in any extremity to painful stim  Gait: RASHAUN, deferred.    No Known Allergies    Current Medications:  Current Facility-Administered Medications   Medication Dose Route Frequency Provider Last Rate Last Admin    aspirin (ASA) chewable tablet 81 mg  81 mg Oral or Feeding Tube Daily Matt Nugent MD   81 mg at 07/23/25 0812    chlorhexidine (PERIDEX) 0.12 % solution 15 mL  15 mL Mouth/Throat Q12H Matt Nugent MD   15 mL at 07/23/25 0812    hydrALAZINE (APRESOLINE) tablet 50 mg  50 mg Oral Q6H Sidney Lozada MD   50 mg at 07/23/25 1126    levETIRAcetam (KEPPRA) 750 mg in sodium chloride 0.9 % 117.5 mL intermittent infusion  750 mg Intravenous Q12H Fausto Taylor  mL/hr at 07/23/25 0812 750 mg at 07/23/25 0812    multivitamins w/minerals liquid 15 mL  15 mL Per Feeding Tube Daily Sidney Lozada MD   15 mL at 07/23/25 0812    pantoprazole (PROTONIX) 2 mg/mL suspension 40 mg  40 mg Oral or Feeding Tube Daily Epifanio Beckwith MD   40 mg at 07/23/25 0812    polyethylene glycol (MIRALAX) Packet 17 g  17 g Oral or Feeding Tube Daily Matt Nugent MD   17 g at 07/23/25 0812    Prosource TF20 ENfit Compatibl EN LIQD (PROSOURCE TF20) packet 60 mL  1 packet Per Feeding Tube TID Sidney Lozada MD        ticagrelor (BRILINTA) tablet 90 mg  90 mg Oral or Feeding Tube BID Matt Nugent MD   90 mg at 07/23/25 0812       PRN Medications:  Current Facility-Administered Medications   Medication Dose Route Frequency Provider Last Rate Last Admin    artificial tears ophthalmic ointment   Both Eyes Q8H PRN  Matt Nugent MD        calcium chloride 1 g in sodium chloride 0.9 % 100 mL intermittent infusion  1 g Intravenous Q6H PRN Matt Nugent MD   1 g at 07/22/25 0455    dextrose 10% infusion   Intravenous Continuous PRN Sidney Lozada MD        dextrose 10% infusion   Intravenous Continuous PRN Matt Nugent MD        glucose gel 15-30 g  15-30 g Oral Q15 Min PRN Matt Nugent MD        Or    dextrose 50 % injection 25-50 mL  25-50 mL Intravenous Q15 Min PRN Matt Nugent MD        Or    glucagon injection 1 mg  1 mg Subcutaneous Q15 Min PRN Matt Nugent MD        heparin ANTICOAGULANT bolus dose from infusion pump 388-776 Units  5-10 Units/kg (Ideal) Other Q30 Min PRN Matt Nugent MD        lidocaine (LMX4) cream   Topical Q1H PRN Matt Nugent MD        lidocaine 1 % 0.1-1 mL  0.1-1 mL Other Q1H PRN Matt Nugent MD        melatonin tablet 10 mg  10 mg Oral or Feeding Tube At Bedtime PRN Sidney Lozada MD        naloxone (NARCAN) injection 0.2 mg  0.2 mg Intravenous Q2 Min PRN Epifanio Beckwith MD        Or    naloxone (NARCAN) injection 0.4 mg  0.4 mg Intravenous Q2 Min PRN Epifanio Beckwith MD        Or    naloxone (NARCAN) injection 0.2 mg  0.2 mg Intramuscular Q2 Min PRN Epifanio Beckwith MD        Or    naloxone (NARCAN) injection 0.4 mg  0.4 mg Intramuscular Q2 Min PRN Epifanio Beckwith MD        senna-docusate (SENOKOT-S/PERICOLACE) 8.6-50 MG per tablet 1-2 tablet  1-2 tablet Oral or Feeding Tube BID PRN Matt Nugent MD        sodium chloride (PF) 0.9% PF flush 3 mL  3 mL Intracatheter Q8H PRN Matt Nugent MD        sodium chloride (PF) 0.9% PF flush 3 mL  3 mL Intracatheter q1 min prn Matt Nugent MD           Infusions:  Current Facility-Administered Medications   Medication Dose Route Frequency Provider Last Rate Last Admin    amiodarone (NEXTERONE) 1.8 mg/mL in dextrose 5% 200 mL ADULT STANDARD infusion  0.5 mg/min Intravenous  Continuous Sidney Lozada MD 16.7 mL/hr at 07/23/25 1300 0.5 mg/min at 07/23/25 1300    bumetanide (BUMEX) 0.25 mg/mL infusion  0.5 mg/hr Intravenous Continuous Sidney Lozada MD 2 mL/hr at 07/23/25 1300 0.5 mg/hr at 07/23/25 1300    dextrose 10% infusion   Intravenous Continuous PRN Sidney Lozaad MD        dextrose 10% infusion   Intravenous Continuous PRN Matt Nugent MD        fentaNYL (SUBLIMAZE) infusion  25-50 mcg/hr Intravenous Continuous Sidney Lozada MD   Stopped at 07/22/25 0830    heparin 2 unit/mL in 0.9% NaCl (for REPERFUSION CATHETER)  3 mL/hr Intravenous Continuous Matt Nugent MD 3 mL/hr at 07/23/25 1300 3 mL/hr at 07/23/25 1300    heparin 25,000 units in 0.45% NaCl 250 mL ANTICOAGULANT infusion  10-50 Units/kg/hr (Ideal) Other Continuous Matt Nugent MD 20 mL/hr at 07/23/25 1300 2,000 Units/hr at 07/23/25 1300    insulin regular (MYXREDLIN) 1 unit/mL infusion  0-24 Units/hr Intravenous Continuous Matt Nugent MD 1 mL/hr at 07/23/25 1300 1 Units/hr at 07/23/25 1300    niCARdipine 40 mg in 200 mL NS (CARDENE) infusion  0.5-15 mg/hr Intravenous Continuous Matt Nugent MD   Stopped at 07/23/25 0701     Labs and Imaging:    Recent Results (from the past 24 hours)   Blood gas arterial   Result Value Ref Range    pH Arterial 7.45 7.35 - 7.45    pCO2 Arterial 33 (L) 35 - 45 mm Hg    pO2 Arterial 74 (L) 80 - 105 mm Hg    FIO2 80     Bicarbonate Arterial 23 21 - 28 mmol/L    Base Excess/Deficit Arterial -0.8 -3.0 - 3.0 mmol/L    Rei's Test Artline     Oxyhemoglobin Arterial 95 92 - 100 %    O2 Sat, Arterial 96.6 (H) 95.0 - 96.0 %    Peep 8 cm H2O    Narrative    In healthy individuals, oxyhemoglobin (O2Hb) and oxygen saturation (SO2) are approximately equal. In the presence of dyshemoglobins, oxyhemoglobin can be considerably lower than oxygen saturation.   Glucose by meter   Result Value Ref Range    GLUCOSE BY METER POCT 163 (H) 70 - 99 mg/dL   Echo  Complete   Result Value Ref Range    LVEF  50-55%     [facility]    [id number]  [id number]  [id number]  [id number]^OLEG^     [facility],[place]  Echocardiography Laboratory  [address] [place] [address]     Name: HOOD MARTÍNEZ  MRN: [id number]  : 1955  Study Date: 2025 01:16 PM  Age: 70 yrs  Gender: Male  Patient Location: [facility]  Reason For Study: Cardiac Arrest  Ordering Physician: Olge Lozada  Performed By: Linn Houston     BSA: 2.3 m2  Height: 70 in  Weight: 255 lb  HR: 102  BP: 133/69 mmHg  ______________________________________________________________________________  Procedure  Echocardiogram with two-dimensional, color and spectral Doppler.  ______________________________________________________________________________  Interpretation Summary  VA ECMO turndown 3.5 lpm     Baseline  LVEF is 55%  Base of inferuor wall is akinetic  The right ventricle is normal size.  Global right ventricular function is mildly reduced.  Trivial pericardial effusion is present.     VA ECMO turndown to 1 lpm  LVEF is 60%  RV size and function are normal     ______________________________________________________________________________  Left Ventricle  Left ventricular size is normal. The visual ejection fraction is 50-55%.     Right Ventricle  The right ventricle is normal size. Global right ventricular function is  mildly reduced.     Mitral Valve  The mitral valve is normal.     Aortic Valve  Aortic valve is normal in structure and function.     Tricuspid Valve  The valve leaflets are not well visualized. On Doppler interrogation, there is  no significant stenosis or regurgitation.     Vessels  The aorta root is normal. ECMO cannula is present in the IVC.     Pericardium  Trivial pericardial effusion is present.     Miscellaneous  No significant valvular abnormalities present.      ______________________________________________________________________________  MMode/2D Measurements & Calculations  LVIDd: 3.9 cm  LVOT diam: 2.2 cm  LVOT area: 3.7 cm2     Doppler Measurements & Calculations  LV V1 max P.9 mmHg  LV V1 max: 110.3 cm/sec  LV V1 VTI: 15.3 cm  SV(LVOT): 55.9 ml  SI(LVOT): 24.2 ml/m2     RV S Enio: 18.0 cm/sec     ______________________________________________________________________________  Report approved by: BRITTANY GORDON MD on 2025 03:21 PM         Glucose by meter   Result Value Ref Range    GLUCOSE BY METER POCT 142 (H) 70 - 99 mg/dL   CBC with platelets   Result Value Ref Range    WBC Count 15.9 (H) 4.0 - 11.0 10e3/uL    RBC Count 3.60 (L) 4.40 - 5.90 10e6/uL    Hemoglobin 10.1 (L) 13.3 - 17.7 g/dL    Hematocrit 30.2 (L) 40.0 - 53.0 %    MCV 84 78 - 100 fL    MCH 28.1 26.5 - 33.0 pg    MCHC 33.4 31.5 - 36.5 g/dL    RDW 14.1 10.0 - 15.0 %    Platelet Count 248 150 - 450 10e3/uL   Comprehensive metabolic panel   Result Value Ref Range    Sodium 143 135 - 145 mmol/L    Potassium 3.7 3.4 - 5.3 mmol/L    Carbon Dioxide (CO2) 21 (L) 22 - 29 mmol/L    Anion Gap 16 (H) 7 - 15 mmol/L    Urea Nitrogen 57.5 (H) 8.0 - 23.0 mg/dL    Creatinine 1.67 (H) 0.67 - 1.17 mg/dL    GFR Estimate 44 (L) >60 mL/min/1.73m2    Calcium 9.4 8.8 - 10.4 mg/dL    Chloride 106 98 - 107 mmol/L    Glucose 156 (H) 70 - 99 mg/dL    Alkaline Phosphatase 140 40 - 150 U/L    AST 62 (H) 0 - 45 U/L    ALT 44 0 - 70 U/L    Protein Total 6.8 6.4 - 8.3 g/dL    Albumin 2.9 (L) 3.5 - 5.2 g/dL    Bilirubin Total 0.3 <=1.2 mg/dL   Calcium ionized whole blood   Result Value Ref Range    Calcium Ionized Whole Blood 4.6 4.4 - 5.2 mg/dL   Glucose whole blood   Result Value Ref Range    Glucose 153 (H) 70 - 99 mg/dL   Heparin Unfractionated Anti Xa Level   Result Value Ref Range    Anti Xa Unfractionated Heparin 0.72 For Reference Range, See Comment IU/mL    Narrative    Therapeutic Range: UFH: 0.25-0.50 IU/mL for low  intensity dosing,  0.30-0.70 IU/mL for high intensity dosing DVT and PE.  This test is not validated for other direct factor X inhibitors (e.g. rivaroxaban, apixaban, edoxaban, betrixaban, fondaparinux) and should not be used for monitoring of other medications.   INR   Result Value Ref Range    INR 1.36 (H) 0.85 - 1.15    PT 17.1 (H) 11.8 - 14.8 Seconds   Partial thromboplastin time   Result Value Ref Range    aPTT 98 (H) 22 - 38 Seconds   Lactic acid whole blood   Result Value Ref Range    Lactic Acid 1.1 0.7 - 2.0 mmol/L   Magnesium   Result Value Ref Range    Magnesium 3.6 (H) 1.7 - 2.3 mg/dL   Troponin T, High Sensitivity   Result Value Ref Range    Troponin T, High Sensitivity 964 (HH) <=22 ng/L   D dimer quantitative   Result Value Ref Range    D-Dimer Quantitative 2.32 (H) 0.00 - 0.50 ug/mL FEU    Narrative    This D-dimer assay is intended for use in conjunction with a clinical pretest probability assessment model to exclude pulmonary embolism (PE) and deep venous thrombosis (DVT) in outpatients suspected of PE or DVT. The cut-off value is 0.50 ug/mL FEU.    For patients 50 years of age or older, the application of age-adjusted cut-off values for D-Dimer may increase the specificity without significant effect on sensitivity. The literature suggested calculation age adjusted cut-off in ug/L = age in years x 10 ug/L. The results in this laboratory are reported as ug/mL rather than ug/L. The calculation for age adjusted cut off in ug/mL= age in years x 0.01 ug/mL. For example, the cut off for a 76 year old male is 77 x 0.01 ug/mL = 0.76 ug/mL (760 ug/L).    M Bassam et al. Age adjusted D-dimer cut-off levels to rule out pulmonary embolism: The ADJUST-PE Study. EDGAR 2014;311:9616-9530.; VEGA Osullivan et al. Diagnostic accuracy of conventional or age adjusted D-dimer cutoff values in older patients with suspected venous thromboembolism. Systemic review and meta-analysis. BMJ 2013:346:f2492.   Blood gas ELS venous    Result Value Ref Range    pH ELS Venous 7.43 7.32 - 7.43    pCO2 ELS Venous 36 (L) 40 - 50 mm Hg    pO2 ELS Venous 56 (H) 25 - 47 mm Hg    Bicarbonate ELS Venous 24 21 - 28 mmol/L    Base Excess/Deficit Venous 0.0 -3.0 - 3.0 mmol/L    FIO2 80     Oxyhemoglobin ELS Venous 88 %    O2 Saturation ELS Venous 90.3 (H) 70.0 - 75.0 %    Narrative    In healthy individuals, oxyhemoglobin (O2Hb) and oxygen saturation (SO2) are approximately equal. In the presence of dyshemoglobins, oxyhemoglobin can be considerably lower than oxygen saturation.   Blood gas ELS arterial   Result Value Ref Range    pH ELS Arterial 7.45 7.35 - 7.45    pCO2 ELS Arterial 34 (L) 35 - 45 mm Hg    pO2 ELS Arterial 427 (H) 80 - 105 mm Hg    Bicarbonate ELS Arterial 23 21 - 28 mmol/L    Base Excess/Deficit Arterial -0.3 -3.0 - 3.0 mmol/L    FIO2 80     Oxyhemoglobin ELS Arterial 99 75 - 100 %    O2 Saturation ELS Arterial 99.6 (H) 95.0 - 96.0 %    Narrative    In healthy individuals, oxyhemoglobin (O2Hb) and oxygen saturation (SO2) are approximately equal. In the presence of dyshemoglobins, oxyhemoglobin can be considerably lower than oxygen saturation.   Fibrinogen activity   Result Value Ref Range    Fibrinogen Activity >1,200 (H) 170 - 510 mg/dL   Blood gas arterial   Result Value Ref Range    pH Arterial 7.46 (H) 7.35 - 7.45    pCO2 Arterial 33 (L) 35 - 45 mm Hg    pO2 Arterial 79 (L) 80 - 105 mm Hg    FIO2 80     Bicarbonate Arterial 24 21 - 28 mmol/L    Base Excess/Deficit Arterial 0.0 -3.0 - 3.0 mmol/L    Rei's Test Artline     Oxyhemoglobin Arterial 95 92 - 100 %    O2 Sat, Arterial 96.5 (H) 95.0 - 96.0 %    Peep 8 cm H2O    Narrative    In healthy individuals, oxyhemoglobin (O2Hb) and oxygen saturation (SO2) are approximately equal. In the presence of dyshemoglobins, oxyhemoglobin can be considerably lower than oxygen saturation.   Activated clotting time celite, POCT   Result Value Ref Range    Activated Clotting Time (Celite) POCT 182  (H) 74 - 150 seconds   CT Head w/o Contrast    Narrative    CT HEAD W/O CONTRAST 7/22/2025 5:04 PM    History: worsening neurologic exam     Comparison: CT 7/21/1935, 7/18/2025    Technique: Using multidetector thin collimation helical acquisition  technique, axial, coronal and sagittal CT images from the skull base  to the vertex were obtained without intravenous contrast.   (topogram) image(s) also obtained and reviewed.    Findings: There is no intracranial hemorrhage, mass effect, or midline  shift. Similar loss of gray-white matter differentiation compared to  7/21/1935.. Ventricles are proportionate to the cerebral sulci. The  basal cisterns are clear.    The bony calvaria and the bones of the skull base are normal.  Scattered effusions of the frontal, ethmoid and maxillary sinuses.  Partially visualized enteric and endotracheal tubes.      Impression    Impression:  Similar diminished gray-white matter differentiation when compared to  CT 7/21/2025. No acute or worsening intracranial pathology.    I have personally reviewed the examination and initial interpretation  and I agree with the findings.    LYNETTE HIDALGO MD         SYSTEM ID:  [id number]   CT Chest Abdomen Pelvis w/o Contrast    Narrative    EXAM: Chest/abdomen/pelvis CT without contrast 7/22/2025 5:05 PM    HISTORY: 70 years Male eval pulm for pna, infectious workup of  abd/pelv.    COMPARISON: CT 7/18/2025.    TECHNIQUE: Helical CT images from the thoracic inlet through the  symphysis pubis were obtained without IV contrast.    FINDINGS:  Limited evaluation secondary to lack of intravenous contrast.    LINES/TUBES: Endotracheal tube terminates in the mid thoracic trachea  about 4.4 cm above the davis.. Gastric tube terminates in the  stomach. Inferior approach venous ECMO cannula terminates near the  superior cavoatrial junction. Right ECMO arterial cannula terminates  in the right common iliac artery. Fox catheter terminates within  the  urinary bladder. Rectal tube within the distal rectum. Left femoral  venous catheter terminates within the left common iliac vein. Left  femoral arterial catheter terminates in the left external iliac  artery.    CHEST:    Unremarkable thyroid gland. Enlarged right retrotracheal measuring 16  mm compared to 13 mm previously (4/52). Additional prominent  paratracheal node. Limited evaluation of the hilum with noncontrast  technique. A small pericardial effusion. Decreased retrosternal  hyperdensity/hematoma. Trace pleural effusions, left more than right;  small fluid in superior pericardial recess.      PULMONARY: Respiratory and cardiac motion limits detailed evaluation  of the pulmonary parenchyma.  The central tracheobronchial tree is  clear. No pneumothorax. Patchy nodular groundglass opacities, greatest  in the right middle and lower lobe, new compared to 7/18/2025 CT.   Dense confluent consolidation and bilateral posterior lower lobes        ABDOMEN/PELVIS:    Limited evaluation of abdominal parenchymal organs due to noncontrast  technique. Motion related artifact.    Unremarkable similar noncontrast appearance of the liver, spleen,  right adrenal gland, and pancreas. Mildly atrophic appearing kidneys.  No hydronephrosis. Fat attenuating lesion in the left adrenal gland  compatible with angiomyolipoma. Scattered atherosclerotic  calcification of the abdominal aorta without aneurysmal dilatation. No  new bulky lymphadenopathy. Rectal tube. Redundant sigmoid colon. Mild  distention of the appendix tip measuring up to 9 mm, similar to prior  with no significant periappendiceal fatty streakiness or collection.  No small bowel obstruction. No significant ascites or  pneumoperitoneum. Gallbladder is surgically absent. No significant  biliary ductal dilatation. Nondependent intraluminal air in the  urinary bladder, possibly secondary to instrumentation with indwelling  Fox catheter. Borderline prostatic size.  Mild apparent thickening of  the urinary bladder wall. Small fat-containing inguinal hernia. Few  prominent inguinal nodes, possibly reactive.      Punctate hyperdensities in the left proximal humerus, possibly bone  islands. Similar minimally displaced left posterior 12th rib fracture  deformity. Similar minimally displaced bilateral anterior rib fracture  deformities similar sclerotic changes with lucencies along the iliac  bone adjacent to the sacroiliac joints, left more than right. No new  aggressive osseous lesion. Multilevel degenerative changes of the  spine. Similar sclerotic focus in T10 posterior vertebral body (8/70).  Similar milder streakiness along the left inguinal region possibly  small cannulation related hematoma/changes...        Impression    IMPRESSION: Compared to prior CT from 7/18/2025, the current scan  shows:      1. Trace pleural effusions, left more than right. Bilateral dense  consolidation involving dependent posterior lower lobes, increased  from prior, may represent atelectasis and/or aspiration, underlying  infection not excluded  2. Multifocal small patchy consolidative, nodular and patchy  groundglass densities involving the right middle lobe and right lower  lobe, may represent infectious infiltrate and/or pulmonary edema.  3. Support devices as above.  4. Mild apparent urinary bladder wall, consider correlation with  urinalysis for infection.  5. Increased size of now enlarged right retrotracheal lymph node,  possibly reactive. Similar sized additional prominent mediastinal  nodes. Consider attention on follow-up.  6. Decrease in retrosternal density/hematoma  7. Stable mild distention of the distal appendix without  periappendiceal inflammatory changes, nonspecific, consider close  clinical follow-up and short-term repeat imaging if concern for  developing appendicitis.  7. Additional findings are similar to prior, as detailed above  including small pericardial effusion.        I have personally reviewed the examination and initial interpretation  and I agree with the findings.    JAROD ZARAGOZA MD         SYSTEM ID:  [id number]   Blood gas arterial   Result Value Ref Range    pH Arterial 7.46 (H) 7.35 - 7.45    pCO2 Arterial 33 (L) 35 - 45 mm Hg    pO2 Arterial 71 (L) 80 - 105 mm Hg    FIO2 80     Bicarbonate Arterial 24 21 - 28 mmol/L    Base Excess/Deficit Arterial 0.1 -3.0 - 3.0 mmol/L    Rei's Test Artline     Oxyhemoglobin Arterial 94 92 - 100 %    O2 Sat, Arterial 95.8 95.0 - 96.0 %    Peep 8 cm H2O    Narrative    In healthy individuals, oxyhemoglobin (O2Hb) and oxygen saturation (SO2) are approximately equal. In the presence of dyshemoglobins, oxyhemoglobin can be considerably lower than oxygen saturation.   Glucose by meter   Result Value Ref Range    GLUCOSE BY METER POCT 132 (H) 70 - 99 mg/dL   Activated clotting time celite, POCT   Result Value Ref Range    Activated Clotting Time (Celite) POCT 182 (H) 74 - 150 seconds   Blood gas arterial   Result Value Ref Range    pH Arterial 7.46 (H) 7.35 - 7.45    pCO2 Arterial 33 (L) 35 - 45 mm Hg    pO2 Arterial 102 80 - 105 mm Hg    FIO2 80     Bicarbonate Arterial 23 21 - 28 mmol/L    Base Excess/Deficit Arterial -0.1 -3.0 - 3.0 mmol/L    Rei's Test Artline     Oxyhemoglobin Arterial 98 92 - 100 %    O2 Sat, Arterial 99.1 (H) 95.0 - 96.0 %    Narrative    In healthy individuals, oxyhemoglobin (O2Hb) and oxygen saturation (SO2) are approximately equal. In the presence of dyshemoglobins, oxyhemoglobin can be considerably lower than oxygen saturation.   Glucose by meter   Result Value Ref Range    GLUCOSE BY METER POCT 123 (H) 70 - 99 mg/dL   Glucose by meter   Result Value Ref Range    GLUCOSE BY METER POCT 115 (H) 70 - 99 mg/dL   CBC with platelets   Result Value Ref Range    WBC Count 14.4 (H) 4.0 - 11.0 10e3/uL    RBC Count 3.49 (L) 4.40 - 5.90 10e6/uL    Hemoglobin 9.8 (L) 13.3 - 17.7 g/dL    Hematocrit 29.1 (L) 40.0 -  53.0 %    MCV 83 78 - 100 fL    MCH 28.1 26.5 - 33.0 pg    MCHC 33.7 31.5 - 36.5 g/dL    RDW 14.2 10.0 - 15.0 %    Platelet Count 233 150 - 450 10e3/uL   Comprehensive metabolic panel   Result Value Ref Range    Sodium 144 135 - 145 mmol/L    Potassium 3.6 3.4 - 5.3 mmol/L    Carbon Dioxide (CO2) 21 (L) 22 - 29 mmol/L    Anion Gap 17 (H) 7 - 15 mmol/L    Urea Nitrogen 60.5 (H) 8.0 - 23.0 mg/dL    Creatinine 1.78 (H) 0.67 - 1.17 mg/dL    GFR Estimate 41 (L) >60 mL/min/1.73m2    Calcium 9.3 8.8 - 10.4 mg/dL    Chloride 106 98 - 107 mmol/L    Glucose 126 (H) 70 - 99 mg/dL    Alkaline Phosphatase 143 40 - 150 U/L    AST 62 (H) 0 - 45 U/L    ALT 42 0 - 70 U/L    Protein Total 6.7 6.4 - 8.3 g/dL    Albumin 2.9 (L) 3.5 - 5.2 g/dL    Bilirubin Total 0.3 <=1.2 mg/dL   Calcium ionized whole blood   Result Value Ref Range    Calcium Ionized Whole Blood 4.6 4.4 - 5.2 mg/dL   Glucose whole blood   Result Value Ref Range    Glucose 122 (H) 70 - 99 mg/dL   Heparin Unfractionated Anti Xa Level   Result Value Ref Range    Anti Xa Unfractionated Heparin 0.62 For Reference Range, See Comment IU/mL    Narrative    Therapeutic Range: UFH: 0.25-0.50 IU/mL for low intensity dosing,  0.30-0.70 IU/mL for high intensity dosing DVT and PE.  This test is not validated for other direct factor X inhibitors (e.g. rivaroxaban, apixaban, edoxaban, betrixaban, fondaparinux) and should not be used for monitoring of other medications.   INR   Result Value Ref Range    INR 1.32 (H) 0.85 - 1.15    PT 16.6 (H) 11.8 - 14.8 Seconds   Partial thromboplastin time   Result Value Ref Range    aPTT 99 (H) 22 - 38 Seconds   Lactic acid whole blood   Result Value Ref Range    Lactic Acid 0.9 0.7 - 2.0 mmol/L   Magnesium   Result Value Ref Range    Magnesium 2.3 1.7 - 2.3 mg/dL   Troponin T, High Sensitivity   Result Value Ref Range    Troponin T, High Sensitivity 965 (HH) <=22 ng/L   Blood gas arterial   Result Value Ref Range    pH Arterial 7.45 7.35 - 7.45     pCO2 Arterial 35 35 - 45 mm Hg    pO2 Arterial 85 80 - 105 mm Hg    FIO2 80     Bicarbonate Arterial 24 21 - 28 mmol/L    Base Excess/Deficit Arterial 0.7 -3.0 - 3.0 mmol/L    Rei's Test Artline     Oxyhemoglobin Arterial 96 92 - 100 %    O2 Sat, Arterial 97.5 (H) 95.0 - 96.0 %    Narrative    In healthy individuals, oxyhemoglobin (O2Hb) and oxygen saturation (SO2) are approximately equal. In the presence of dyshemoglobins, oxyhemoglobin can be considerably lower than oxygen saturation.   Activated clotting time celite, POCT   Result Value Ref Range    Activated Clotting Time (Celite) POCT 182 (H) 74 - 150 seconds   EEG Video 12-26 hr Unmonitored    Addendum: 2025    I personally reviewed this EEG recording and agree with the findings as reported.     Eulalio Castillo MD           Narrative    EEG Video 12-26 hr Unmonitored Result    VIDEO EEG DATE: 25  VIDEO EEG [id number]  VIDEO EEG DAY#: 4  VIDEO EEG SOURCE FILE DURATION: 22 hours and 34 minutes    PATIENT INFORMATION: Bob Echols is a 70 year old year old male who   presents with cardiac arrest and ECMO. EEG is being done to evaluate for   monitoring for seizures.     MEDICATIONS: -  No sedation  LEV 4500mg IV x1 at 0007 hrs.    TECHNICAL SUMMARY: EEG was recorded from 23 scalp electrodes placed   according to the 10-20 international system. Additional electrodes were   used for referencing, EKG, and to record from other cerebral regions as   appropriate. Video was continuously recorded and was reviewed for clinical   correlation. Electrodes were attached and both video and EEG were   monitored and annotated by qualified EEG technologists. Video-EEG was   reviewed and report generated by qualified physician.    BACKGROUND ACTIVITY:  The background is suppressed with intermittent   bursts of generalized periodic discharges.  Sedation had been weaned at   0700 on the day of this recording.     ACTIVATION PROCEDURE:   STIMS were performed  at 09:35.  During which the background remained   unchanged from above.    INTERICTAL EPILEPTIFORM DISCHARGES: There are waxing and waning GPDs   throughout the study ranging from frequency of 0.5 to greater than 3 Hz at   times. Sometimes these GPDs are associated clinically with myoclonus.    ICTAL:   Patient Event 19:40:34 for diffuse full body myoclonus.  The background is   at times obscured by myogenic artifact.  However, at times, GPDs are   followed by myogenic artifact (and clinical jerking on camera) are seen at   sometimes greater than 3 Hz.  The clinical event outside of the   electrographic abnormalities lasts from 19:39:36 - 20:53:27, supporting   status myoclonus.    Patient Event at 22:55:04 for diffuse full body myoclonus. The background   is at times obscured by myogenic artifact.  However, at times, GPDs are   followed by myogenic artifact (and clinical jerking on camera) are seen at   sometimes greater than 3 Hz.  The clinical event outside of the   electrographic abnormalities lasts from 20:54:20 - 21:01:24, supporting   status epilepticus.     Patient Event 22:59:36 for diffuse full body myoclonus.  Neurology   resident physician at bedside evaluating patient and EEG, asking staff to   maya the event appropriately.  The background is at times obscured by   myogenic artifact.  However, at times, GPDs are followed by myogenic   artifact (and clinical jerking on camera) are seen at sometimes greater   than 3 Hz.  The clinical event outside of the electrographic abnormalities   lasts from 22:50:42 - 00:35:25, supporting status myoclonus.    IMPRESSION OF VIDEO EEG DAY # 4  This LTM vEEG recording is abnormal due to the presence of:  Waxing and waning GPDs throughout the recording ranging from 0.5 to   greater than 3 Hz at times.  Three patient events significant for status myoclonus.    These findings indicate severe cortical irritability and suggest global   anoxic injury. The clinical events are  suggestive of status myoclonus   (persistent generalized myoclonic jerks lasting 30 min or longer), which   is also indicative of anoxic injury.     Discussed with Dr. Castillo.    Nick Henriquez DO  Fellow Physician - Epileptology, PGY-5  Department of Neurology     Blood gas arterial   Result Value Ref Range    pH Arterial 7.46 (H) 7.35 - 7.45    pCO2 Arterial 33 (L) 35 - 45 mm Hg    pO2 Arterial 93 80 - 105 mm Hg    FIO2 80     Bicarbonate Arterial 23 21 - 28 mmol/L    Base Excess/Deficit Arterial 0.1 -3.0 - 3.0 mmol/L    Rei's Test Artline     Oxyhemoglobin Arterial 97 92 - 100 %    O2 Sat, Arterial 98.5 (H) 95.0 - 96.0 %    Narrative    In healthy individuals, oxyhemoglobin (O2Hb) and oxygen saturation (SO2) are approximately equal. In the presence of dyshemoglobins, oxyhemoglobin can be considerably lower than oxygen saturation.   Glucose by meter   Result Value Ref Range    GLUCOSE BY METER POCT 99 70 - 99 mg/dL   Glucose by meter   Result Value Ref Range    GLUCOSE BY METER POCT 128 (H) 70 - 99 mg/dL   Glucose by meter   Result Value Ref Range    GLUCOSE BY METER POCT 166 (H) 70 - 99 mg/dL   Blood gas arterial   Result Value Ref Range    pH Arterial 7.33 (L) 7.35 - 7.45    pCO2 Arterial 39 35 - 45 mm Hg    pO2 Arterial 130 (H) 80 - 105 mm Hg    FIO2 80     Bicarbonate Arterial 20 (L) 21 - 28 mmol/L    Base Excess/Deficit Arterial -5.4 (L) -3.0 - 3.0 mmol/L    Rei's Test Artline     Oxyhemoglobin Arterial 97 92 - 100 %    O2 Sat, Arterial 98.9 (H) 95.0 - 96.0 %    Narrative    In healthy individuals, oxyhemoglobin (O2Hb) and oxygen saturation (SO2) are approximately equal. In the presence of dyshemoglobins, oxyhemoglobin can be considerably lower than oxygen saturation.   XR Chest Port 1 View    Narrative    Exam: XR CHEST PORT 1 VIEW, 7/23/2025 5:05 AM    Indication: Check endotracheal tube placement and ECLS cannula  placement. DO NOT log-roll patient.  Place film under patient using  patient safety  handling process.    Comparison: Multiple priors.    Findings:   Frontal supine radiograph of the chest. Endotracheal tube tip is at  the level of T2 approximately 10 cm above the davis. Gastric tube  courses below the diaphragm towards the stomach. There are project on  cannula in stable position.    Retrocardiac and medial basilar atelectasis. Slightly increased  nodular appearance of the right lower lobe opacity. Low normal lung  volumes. No significant left effusion, small right pleural effusion.  No pneumothorax.      Impression    Impression:   1. Endotracheal tube tip is near the cervical thoracic junction at the  level of T2 approximately 10 cm above the davis. Consider  advancement.  2. Dense retrocardiac and basilar atelectasis. The right lower lobe  demonstrates a slightly increased nodular appearance which may  represent an early forming consolidation.    I have personally reviewed the examination and initial interpretation  and I agree with the findings.    TETE AHMADI MD         SYSTEM ID:  [id number]   Glucose by meter   Result Value Ref Range    GLUCOSE BY METER POCT 177 (H) 70 - 99 mg/dL   EKG 12-lead, tracing only   Result Value Ref Range    Systolic Blood Pressure  mmHg    Diastolic Blood Pressure  mmHg    Ventricular Rate 118 BPM    Atrial Rate  BPM    WI Interval  ms    QRS Duration 86 ms     ms    QTc 504 ms    P Axis  degrees    R AXIS 50 degrees    T Axis 73 degrees    Interpretation ECG       Atrial fibrillation with rapid ventricular response  ST elevation consider inferior injury or acute infarct  Prolonged QT  ** ** ACUTE MI / STEMI ** **  Abnormal ECG  When compared with ECG of 22-Jul-2025 11:07,  Criteria for Inferior infarct are no longer Present     CBC with platelets   Result Value Ref Range    WBC Count 15.3 (H) 4.0 - 11.0 10e3/uL    RBC Count 3.48 (L) 4.40 - 5.90 10e6/uL    Hemoglobin 9.7 (L) 13.3 - 17.7 g/dL    Hematocrit 30.1 (L) 40.0 - 53.0 %    MCV 87 78 - 100 fL     MCH 27.9 26.5 - 33.0 pg    MCHC 32.2 31.5 - 36.5 g/dL    RDW 14.5 10.0 - 15.0 %    Platelet Count 239 150 - 450 10e3/uL   Comprehensive metabolic panel   Result Value Ref Range    Sodium 144 135 - 145 mmol/L    Potassium 3.9 3.4 - 5.3 mmol/L    Carbon Dioxide (CO2) 15 (L) 22 - 29 mmol/L    Anion Gap 24 (H) 7 - 15 mmol/L    Urea Nitrogen 67.2 (H) 8.0 - 23.0 mg/dL    Creatinine 1.90 (H) 0.67 - 1.17 mg/dL    GFR Estimate 38 (L) >60 mL/min/1.73m2    Calcium 8.9 8.8 - 10.4 mg/dL    Chloride 105 98 - 107 mmol/L    Glucose 189 (H) 70 - 99 mg/dL    Alkaline Phosphatase 125 40 - 150 U/L    AST 58 (H) 0 - 45 U/L    ALT 39 0 - 70 U/L    Protein Total 6.6 6.4 - 8.3 g/dL    Albumin 2.8 (L) 3.5 - 5.2 g/dL    Bilirubin Total 0.3 <=1.2 mg/dL   Calcium ionized whole blood   Result Value Ref Range    Calcium Ionized Whole Blood 4.5 4.4 - 5.2 mg/dL   Glucose whole blood   Result Value Ref Range    Glucose 185 (H) 70 - 99 mg/dL   Heparin Unfractionated Anti Xa Level   Result Value Ref Range    Anti Xa Unfractionated Heparin 0.53 For Reference Range, See Comment IU/mL    Narrative    Therapeutic Range: UFH: 0.25-0.50 IU/mL for low intensity dosing,  0.30-0.70 IU/mL for high intensity dosing DVT and PE.  This test is not validated for other direct factor X inhibitors (e.g. rivaroxaban, apixaban, edoxaban, betrixaban, fondaparinux) and should not be used for monitoring of other medications.   INR   Result Value Ref Range    INR 1.28 (H) 0.85 - 1.15    PT 16.2 (H) 11.8 - 14.8 Seconds   Partial thromboplastin time   Result Value Ref Range    aPTT 78 (H) 22 - 38 Seconds   Lactic acid whole blood   Result Value Ref Range    Lactic Acid 1.2 0.7 - 2.0 mmol/L   Magnesium   Result Value Ref Range    Magnesium 2.1 1.7 - 2.3 mg/dL   Troponin T, High Sensitivity   Result Value Ref Range    Troponin T, High Sensitivity 1,078 (HH) <=22 ng/L   D dimer quantitative   Result Value Ref Range    D-Dimer Quantitative 3.84 (H) 0.00 - 0.50 ug/mL FEU     Narrative    This D-dimer assay is intended for use in conjunction with a clinical pretest probability assessment model to exclude pulmonary embolism (PE) and deep venous thrombosis (DVT) in outpatients suspected of PE or DVT. The cut-off value is 0.50 ug/mL FEU.    For patients 50 years of age or older, the application of age-adjusted cut-off values for D-Dimer may increase the specificity without significant effect on sensitivity. The literature suggested calculation age adjusted cut-off in ug/L = age in years x 10 ug/L. The results in this laboratory are reported as ug/mL rather than ug/L. The calculation for age adjusted cut off in ug/mL= age in years x 0.01 ug/mL. For example, the cut off for a 76 year old male is 77 x 0.01 ug/mL = 0.76 ug/mL (760 ug/L).    M Bassam et al. Age adjusted D-dimer cut-off levels to rule out pulmonary embolism: The ADJUST-PE Study. EDGAR 2014;311:3464-1069.; HJ Shi et al. Diagnostic accuracy of conventional or age adjusted D-dimer cutoff values in older patients with suspected venous thromboembolism. Systemic review and meta-analysis. BMJ 2013:346:f2492.   Blood gas ELS venous   Result Value Ref Range    pH ELS Venous 7.30 (L) 7.32 - 7.43    pCO2 ELS Venous 39 (L) 40 - 50 mm Hg    pO2 ELS Venous 57 (H) 25 - 47 mm Hg    Bicarbonate ELS Venous 19 (L) 21 - 28 mmol/L    Base Excess/Deficit Venous -7.1 (L) -3.0 - 3.0 mmol/L    FIO2 80     Oxyhemoglobin ELS Venous 86 %    O2 Saturation ELS Venous 87.6 (H) 70.0 - 75.0 %    Narrative    In healthy individuals, oxyhemoglobin (O2Hb) and oxygen saturation (SO2) are approximately equal. In the presence of dyshemoglobins, oxyhemoglobin can be considerably lower than oxygen saturation.   Blood gas ELS arterial   Result Value Ref Range    pH ELS Arterial 7.30 (L) 7.35 - 7.45    pCO2 ELS Arterial 37 35 - 45 mm Hg    pO2 ELS Arterial 259 (H) 80 - 105 mm Hg    Bicarbonate ELS Arterial 18 (L) 21 - 28 mmol/L    Base Excess/Deficit Arterial -7.6  (L) -3.0 - 3.0 mmol/L    FIO2 60     Oxyhemoglobin ELS Arterial 98 75 - 100 %    O2 Saturation ELS Arterial 99.8 (H) 95.0 - 96.0 %    Narrative    In healthy individuals, oxyhemoglobin (O2Hb) and oxygen saturation (SO2) are approximately equal. In the presence of dyshemoglobins, oxyhemoglobin can be considerably lower than oxygen saturation.   Fibrinogen activity   Result Value Ref Range    Fibrinogen Activity >1,200 (H) 170 - 510 mg/dL   Antithrombin III   Result Value Ref Range    Antithrombin  85 - 135 %   CRP inflammation   Result Value Ref Range    CRP Inflammation 293.00 (H) <5.00 mg/L   Erythrocyte sedimentation rate auto   Result Value Ref Range    Erythrocyte Sedimentation Rate 103 (H) 0 - 20 mm/hr   Hemoglobin plasma   Result Value Ref Range    Hemoglobin Plasma 30 (H) <30 mg/dL   Lactate Dehydrogenase   Result Value Ref Range    Lactate Dehydrogenase 424 (H) 0 - 250 U/L   Phosphorus   Result Value Ref Range    Phosphorus 6.1 (H) 2.5 - 4.5 mg/dL   TSH   Result Value Ref Range    TSH 0.38 0.30 - 4.20 uIU/mL   T3 Free   Result Value Ref Range    T3 Free 1.5 (L) 2.0 - 4.4 pg/mL   T3 total   Result Value Ref Range    T3 Total 71 (L) 85 - 202 ng/dL   T4 free   Result Value Ref Range    Free T4 1.06 0.90 - 1.70 ng/dL   Blood gas arterial   Result Value Ref Range    pH Arterial 7.34 (L) 7.35 - 7.45    pCO2 Arterial 32 (L) 35 - 45 mm Hg    pO2 Arterial 110 (H) 80 - 105 mm Hg    FIO2 80     Bicarbonate Arterial 18 (L) 21 - 28 mmol/L    Base Excess/Deficit Arterial -7.3 (L) -3.0 - 3.0 mmol/L    Rei's Test Artline     Oxyhemoglobin Arterial 97 92 - 100 %    O2 Sat, Arterial 99.0 (H) 95.0 - 96.0 %    Narrative    In healthy individuals, oxyhemoglobin (O2Hb) and oxygen saturation (SO2) are approximately equal. In the presence of dyshemoglobins, oxyhemoglobin can be considerably lower than oxygen saturation.   Glucose by meter   Result Value Ref Range    GLUCOSE BY METER POCT 170 (H) 70 - 99 mg/dL   Activated  clotting time celite, POCT   Result Value Ref Range    Activated Clotting Time (Celite) POCT 186 (H) 74 - 150 seconds   CONDITIONAL Prepare red blood cells (unit)   Result Value Ref Range    Blood Component Type Red Blood Cells     Product Code [id number]     Unit Status Ready for issue     Unit Number [id number]     CROSSMATCH Compatible     CODING SYSTEM [id number]     ISSUE DATE AND TIME 7/23/2025  4:47:00 AM CDT     UNIT ABO/RH A+     UNIT TYPE ISBT 6200    Glucose by meter   Result Value Ref Range    GLUCOSE BY METER POCT 168 (H) 70 - 99 mg/dL   Blood gas arterial   Result Value Ref Range    pH Arterial 7.37 7.35 - 7.45    pCO2 Arterial 33 (L) 35 - 45 mm Hg    pO2 Arterial 105 80 - 105 mm Hg    FIO2 80     Bicarbonate Arterial 19 (L) 21 - 28 mmol/L    Base Excess/Deficit Arterial -5.7 (L) -3.0 - 3.0 mmol/L    Rei's Test Artline     Oxyhemoglobin Arterial 97 92 - 100 %    O2 Sat, Arterial 98.4 (H) 95.0 - 96.0 %    Narrative    In healthy individuals, oxyhemoglobin (O2Hb) and oxygen saturation (SO2) are approximately equal. In the presence of dyshemoglobins, oxyhemoglobin can be considerably lower than oxygen saturation.   Glucose by meter   Result Value Ref Range    GLUCOSE BY METER POCT 165 (H) 70 - 99 mg/dL   Glucose by meter   Result Value Ref Range    GLUCOSE BY METER POCT 160 (H) 70 - 99 mg/dL   Respiratory Aerobic Bacterial Culture    Specimen: Endotracheal; Sputum   Result Value Ref Range    Gram Stain Result >25 PMNs/low power field     Gram Stain Result 2+ Mixed christine    Blood gas arterial   Result Value Ref Range    pH Arterial 7.43 7.35 - 7.45    pCO2 Arterial 33 (L) 35 - 45 mm Hg    pO2 Arterial 115 (H) 80 - 105 mm Hg    FIO2 80     Bicarbonate Arterial 22 21 - 28 mmol/L    Base Excess/Deficit Arterial -1.9 -3.0 - 3.0 mmol/L    Rei's Test Artline     Oxyhemoglobin Arterial 98 92 - 100 %    O2 Sat, Arterial 99.6 (H) 95.0 - 96.0 %    Narrative    In healthy individuals, oxyhemoglobin (O2Hb) and  oxygen saturation (SO2) are approximately equal. In the presence of dyshemoglobins, oxyhemoglobin can be considerably lower than oxygen saturation.   Glucose by meter   Result Value Ref Range    GLUCOSE BY METER POCT 153 (H) 70 - 99 mg/dL   Activated clotting time celite, POCT   Result Value Ref Range    Activated Clotting Time (Celite) POCT 182 (H) 74 - 150 seconds   CBC with platelets   Result Value Ref Range    WBC Count 14.7 (H) 4.0 - 11.0 10e3/uL    RBC Count 3.26 (L) 4.40 - 5.90 10e6/uL    Hemoglobin 9.0 (L) 13.3 - 17.7 g/dL    Hematocrit 27.6 (L) 40.0 - 53.0 %    MCV 85 78 - 100 fL    MCH 27.6 26.5 - 33.0 pg    MCHC 32.6 31.5 - 36.5 g/dL    RDW 14.6 10.0 - 15.0 %    Platelet Count 231 150 - 450 10e3/uL   Comprehensive metabolic panel   Result Value Ref Range    Sodium 147 (H) 135 - 145 mmol/L    Potassium 3.6 3.4 - 5.3 mmol/L    Carbon Dioxide (CO2) 21 (L) 22 - 29 mmol/L    Anion Gap 18 (H) 7 - 15 mmol/L    Urea Nitrogen 71.3 (H) 8.0 - 23.0 mg/dL    Creatinine 2.05 (H) 0.67 - 1.17 mg/dL    GFR Estimate 34 (L) >60 mL/min/1.73m2    Calcium 9.0 8.8 - 10.4 mg/dL    Chloride 108 (H) 98 - 107 mmol/L    Glucose 162 (H) 70 - 99 mg/dL    Alkaline Phosphatase 116 40 - 150 U/L    AST 58 (H) 0 - 45 U/L    ALT 38 0 - 70 U/L    Protein Total 6.5 6.4 - 8.3 g/dL    Albumin 2.7 (L) 3.5 - 5.2 g/dL    Bilirubin Total 0.2 <=1.2 mg/dL   Calcium ionized whole blood   Result Value Ref Range    Calcium Ionized Whole Blood 4.5 4.4 - 5.2 mg/dL   Glucose whole blood   Result Value Ref Range    Glucose 155 (H) 70 - 99 mg/dL   Heparin Unfractionated Anti Xa Level   Result Value Ref Range    Anti Xa Unfractionated Heparin 0.61 For Reference Range, See Comment IU/mL    Narrative    Therapeutic Range: UFH: 0.25-0.50 IU/mL for low intensity dosing,  0.30-0.70 IU/mL for high intensity dosing DVT and PE.  This test is not validated for other direct factor X inhibitors (e.g. rivaroxaban, apixaban, edoxaban, betrixaban, fondaparinux) and should  not be used for monitoring of other medications.   INR   Result Value Ref Range    INR 1.30 (H) 0.85 - 1.15    PT 16.5 (H) 11.8 - 14.8 Seconds   Partial thromboplastin time   Result Value Ref Range    aPTT 86 (H) 22 - 38 Seconds   Lactic acid whole blood   Result Value Ref Range    Lactic Acid 1.0 0.7 - 2.0 mmol/L   Magnesium   Result Value Ref Range    Magnesium 2.3 1.7 - 2.3 mg/dL   Troponin T, High Sensitivity   Result Value Ref Range    Troponin T, High Sensitivity 1,082 (HH) <=22 ng/L   Blood gas arterial   Result Value Ref Range    pH Arterial 7.45 7.35 - 7.45    pCO2 Arterial 34 (L) 35 - 45 mm Hg    pO2 Arterial 97 80 - 105 mm Hg    FIO2 80     Bicarbonate Arterial 24 21 - 28 mmol/L    Base Excess/Deficit Arterial 0.1 -3.0 - 3.0 mmol/L    Rei's Test Artline     Oxyhemoglobin Arterial 97 92 - 100 %    O2 Sat, Arterial 98.5 (H) 95.0 - 96.0 %    Narrative    In healthy individuals, oxyhemoglobin (O2Hb) and oxygen saturation (SO2) are approximately equal. In the presence of dyshemoglobins, oxyhemoglobin can be considerably lower than oxygen saturation.   Glucose by meter   Result Value Ref Range    GLUCOSE BY METER POCT 148 (H) 70 - 99 mg/dL   Blood gas arterial   Result Value Ref Range    pH Arterial 7.45 7.35 - 7.45    pCO2 Arterial 35 35 - 45 mm Hg    pO2 Arterial 156 (H) 80 - 105 mm Hg    FIO2 80     Bicarbonate Arterial 24 21 - 28 mmol/L    Base Excess/Deficit Arterial 0.5 -3.0 - 3.0 mmol/L    Rei's Test Artline     Oxyhemoglobin Arterial 98 92 - 100 %    O2 Sat, Arterial 99.9 (H) 95.0 - 96.0 %    Narrative    In healthy individuals, oxyhemoglobin (O2Hb) and oxygen saturation (SO2) are approximately equal. In the presence of dyshemoglobins, oxyhemoglobin can be considerably lower than oxygen saturation.   Glucose by meter   Result Value Ref Range    GLUCOSE BY METER POCT 129 (H) 70 - 99 mg/dL   XR Chest Port 1 View    Narrative    Exam: XR CHEST PORT 1 VIEW, 7/23/2025 11:57 AM    Indication: ett adv  4cm    Comparison: Chest x-ray 7/23/2025 at 01:10    Findings:   Portable AP supine radiograph of the chest. Endotracheal tube  terminates within the mid thoracic trachea proximally 5 cm above the  davis. Inferior protocol cannula terminates within the right atrium.  Gastric tube crosses the diaphragm and projects outside of the field  of view.    Trachea is midline. The cardiomediastinal silhouette and pulmonary  vasculature are stable. Persistent, mildly increased, streaky/patchy  perihilar and bibasilar opacities. Dense retrocardiac opacification,  unchanged. Small right pleural effusion. No pneumothorax. No acute  osseous or soft tissue abnormality.      Impression    Impression:   1. Endotracheal tube is advanced to the mid thoracic trachea, measures  5 cm above the davis. Other support devices detailed above.  2. Dense intracardiac bibasilar atelectasis with increased hazy  opacification of the right lower lobe. Probable small right pleural  effusion.    I have personally reviewed the examination and initial interpretation  and I agree with the findings.    GARCIA COURTNEY MD         SYSTEM ID:  [id number]   Activated clotting time celite, POCT   Result Value Ref Range    Activated Clotting Time (Celite) POCT 182 (H) 74 - 150 seconds       All relevant imaging and laboratory values personally reviewed.

## 2025-07-23 NOTE — PROGRESS NOTES
Cardiology ICU Progress Note    Brief HPI:  Bob Echols is a 70 year old male with a past medical history of HTN, DM2, and DEMOND as well as recent admission for inferior STEMI on 7/10 (treated with percutaneous revascularization with 2.75 x 20mm Promus RENA to the distal LCx). The patient was discharged on 7/14 and had been doing well at home prior to 7/18 when he was found to be apneic without a pulse with estimated down time ~10-15 min prior to discovery. EMS was called at 1537, arrived and started compressions at 1542, cannulated for VA ECMO at 1614. Subsequently found to have subacute in-stent thrombosis of his recently placed stent without obvious mechanical causes, treated with 2.75 x 20mm Synergy XD RENA. Now admitted to [facility] for further care. His initial pH was 7.08, PcO2 74, PO2 58, Lactate 8.6.     Subjective and Interval:  Remains in A fib with RVR  Keppra loaded for new myoclonic jerking   CT chest showing new RML/RLL consolidation   ETT 10cm from davis on daily CXR   Diuresed 3.2L net neg in last 24 hours   Continues to have poor neurologic exam- basic brainstem reflexes c/w CT findings of severe anoxic brain injury     Assessment and plan by system:  ==Today's changes ==  - Bumex gtt 1-> 0.5 mg/hr   - Advance ETT   - Family care conference today    == Neurology ==   #?anoxic brain injury    #hypoxic encephalopathy 2/2 cardiac arrest  -Intubated, sedated. Avoiding hyperthermia  -NCC consulted and appreciate recommendations  -vEEG   -Palliative care consulted    CT head 7/21  Continued diminished gray-white differentiation throughout  the cerebral hemispheres, concerning for hypoxic ischemic injury.    Current sedation: None    Plan:  -Continue to hold sedation pending neurologic recovery   -Spontaneous awakening trial as tolerated  -Permissive hypernatremia for neuroprotection  -Goals of care conversation to be had with family 7/23    == Cardiovascular ==  #Refractory VF cardiac arrest    #cardiogenic shock requiring VA ECMO (SCAI E)  #CAD, status post percutaneous revascularization of the left circumflex artery with sinacute in-stent thrombosis   #PAH  #HTN  Presented initially to [facility] on 7/10 with inferior STEMI, found to have acute thrombotic lesion of the distal LCx treated with a 2.75 x 20mm Promus RENA, discharged on apixaban and clopidogrel. He was found down at home in VF-mediated cardiac arrest on  requiring emergent VA ECMO cannulation. Coronary angiography demonstrated in-stent thrombosis of his recently placed stent without obvious mechanical cause and therefore presumed to be clopidogrel failure. Treated with a 2.75 x 20mm Synergy XD RENA.     Peripheral V-A ECMO inserted via RCFA and RCFV   Angiogram:     Prox LAD to Mid LAD lesion is 55% stenosed.    Prox RCA lesion is 30% stenosed.    1st Diag lesion is 40% stenosed.    2nd Mrg lesion is 70% stenosed.    RPAV lesion is 70% stenosed.    Mid Cx lesion is 100% stenosed.    TTE:   Left ventricular size is normal. Left ventricular function is decreased. The  ejection fraction is 5-10% (severely reduced). Mild to moderate right  ventricular dilation is present.  Global right ventricular function is severely reduced.  Trace to mild mitral insufficiency is present.  AV opens with each systole No aortic regurgitation is present.  The inferior vena cava is normal. Trivial to small circumferential pericardial  effusion with no hemodynamic consequences    ECG Rhythm: Atrial fibrillation    ECMO :  Mode: V-A   Pump Type: Cardiohelp  RPM's: 2874  Blood Flow (Circuit) LPM: 2.6 LPM  Sweep LPM: 0.5 LPM  Sweep FiO2   %: 6 %  Venous Pressure  mmHg: -13 mmHg  Arterial Pressure  mmH mmHg  Internal Pressure mmH mmHg  Pressure Change mmH mmHg    Current Pressors/inotropes/antiarrythmic: N/A    Hemodynamics:  Art Line  Arterial Line BP: 112/61  Arterial Line MAP (mmHg): 75 mmHg  Arterial Line Location: Right radial  Art Line Wave  Form: Appropriate wave forms      Plan:  -Continue ASA 81mg and ticagrelor 90mg BID   -Hold statin given shock liver, resume when leaving ICU  -Hold ACE/ARB for now given likely reduced renal fxn after arrest  -Hold beta blocker given shock  -Telemetry monitoring  -Trend troponin and lactic acid  -Continue ECMO support  -FBG negative 1L, Diuresis with bumex gtt @ 0.5 mg/hr  -IV amiodarone bolus and gtt for A fib with RVR/VT  -Hydralazine 50mg q6h     == Pulmonary ==  #Acute hypoxemic respiratory failure requiring intubation  #DEMOND    Vent Settings:   FiO2 (%): 80 %, Resp: 25, Vent Mode: VC/AC, Resp Rate (Set): 12 breaths/min, Tidal Volume (Set, mL): 400 mL, PEEP (cm H2O): 8 cmH2O, Resp Rate (Set): 12 breaths/min, Tidal Volume (Set, mL): 400 mL, PEEP (cm H2O): 8 cmH2O    Plan:  -Wean vent as able  -Daily CXR  -Serial ABGs  -Peridex BID  -FBG and diuresis as above  -Will defer tx of RLL consolidation unless develops clinical evidence of infection        == Gastrointestinal, Nutrition ==  #Severe obesity  #?shock liver 2/2 cardiac arrest  #Hypoalbuminemia     Diet Advance tube feeds to goal    Plan:  -Monitor LFTs  -Bowel regimen in place  -GI Prophylaxis: PPI; Protonix 40 mg daily     == Renal, Electrolytes ==  #?acute renal injury 2/2 cardiac arrest  #Lactic acidosis    Plan:  -Avoid nephrotoxins, renally dose meds  -Monitor urine output  -FBG and diuresis as above    == Infectious Disease ==  #Aspiration pneumonia  #Leukocytosis    Plan:  -Continue CTX x 5 days for aspiration coverage  -Monitor for signs of infection  -Avoid fevers     == Hematology ==  # Anemia of critical illness    Receiving heparin for ECMO with ACT goal 180-200    Plan:  -Continue Heparin with ACT goal as above  -Transfusion parameters:   -1u PRBC for hbg < 8.0   -1u platelet for plt < 100   -1u FFP for INR > 3   -5u cryoprecipitate for fibrinogen <150    ASA/ticagrelor for RENA  US LE w/ arterial duplex with no evidence of acute thrombus  DVT  "PPX: Heparin as above    == Endocrinology ==  #Hyperglycemia   #DM2     Plan:  -Insulin infusion as needed    == Integumentary ==  -No skin issues    Plan:  -CTM cannula sites  -WOCN consult    == Lines/Tubes/Drains ==  Cannula: right fem  Airway: ETT  Enteric: OGT  Central access: left fem  Arterial access: radial  Urinary: yes  Rectal Tube: yes    ICU Checklist  Feeding: yes  Analgesia: fentanyl  Sedation: propofol   Thrombopx: heparin infusion  Head of bed: RT  Ulcers: PPI  Glucose: CTM  SBT: not today  Bowel regimen: yes  Indwelling cath: yes  De-escalate meds: no  Code Status: Full Code , eCPR candidate? Yes, on support  Family updated by team. Patient seen and discussed with staff physician, Dr. Ilhwan Yeo.     Dilan Lozada MD  Cardiovascular Diseases Fellow    Objective:  Most recent vital signs:  Pulse 111   Temp 98.4  F (36.9  C)   Resp 25   Ht 1.778 m (5' 10\")   Wt 112.8 kg (248 lb 10.9 oz)   SpO2 100%   BMI 35.68 kg/m    Temp:  [98.4  F (36.9  C)-98.8  F (37.1  C)] 98.4  F (36.9  C)  Pulse:  [] 111  Resp:  [11-39] 25  MAP:  [66 mmHg-94 mmHg] 75 mmHg  Arterial Line BP: ()/(51-73) 112/61  FiO2 (%):  [80 %] 80 %  SpO2:  [94 %-100 %] 100 %    Physical exam:  General: critically ill, supine in bed, in NAD  HEENT: PERRL, no scleral icterus or injection  CARDIAC: RRR, no m/r/g appreciated. Peripheral pulses dopplered  RESP: synchronous with mechanical ventilation, CTAB, no wheezes, rhonchi or crackles appreciated.  GI: soft, non-distended, BS hypoactive  : Urinary catheter present  EXTREMITIES: No BLE edema, pulses 2+. Femoral access site w/o bleeding, dressing C/D/I.   SKIN: No acute lesions appreciated on exposed skin.  NEURO: Intubated and sedated. Unable to assess language or mentation. Unresponsive to noxious stim x 4 extremities. No focal deficit.     Imaging/procedure results:  Recent Results (from the past 24 hours)   Glucose by meter   Result Value Ref Range    GLUCOSE BY METER " POCT 128 (H) 70 - 99 mg/dL   Blood gas arterial   Result Value Ref Range    pH Arterial 7.42 7.35 - 7.45    pCO2 Arterial 35 35 - 45 mm Hg    pO2 Arterial 78 (L) 80 - 105 mm Hg    FIO2 80     Bicarbonate Arterial 23 21 - 28 mmol/L    Base Excess/Deficit Arterial -1.5 -3.0 - 3.0 mmol/L    Rei's Test Artline     Oxyhemoglobin Arterial 96 92 - 100 %    O2 Sat, Arterial 97.2 (H) 95.0 - 96.0 %    Peep 8 cm H2O    Narrative    In healthy individuals, oxyhemoglobin (O2Hb) and oxygen saturation (SO2) are approximately equal. In the presence of dyshemoglobins, oxyhemoglobin can be considerably lower than oxygen saturation.   Activated clotting time celite, POCT   Result Value Ref Range    Activated Clotting Time (Celite) POCT 182 (H) 74 - 150 seconds   CBC with platelets   Result Value Ref Range    WBC Count 16.4 (H) 4.0 - 11.0 10e3/uL    RBC Count 3.45 (L) 4.40 - 5.90 10e6/uL    Hemoglobin 9.4 (L) 13.3 - 17.7 g/dL    Hematocrit 29.3 (L) 40.0 - 53.0 %    MCV 85 78 - 100 fL    MCH 27.2 26.5 - 33.0 pg    MCHC 32.1 31.5 - 36.5 g/dL    RDW 14.1 10.0 - 15.0 %    Platelet Count 231 150 - 450 10e3/uL   Comprehensive metabolic panel   Result Value Ref Range    Sodium 142 135 - 145 mmol/L    Potassium 4.1 3.4 - 5.3 mmol/L    Carbon Dioxide (CO2) 20 (L) 22 - 29 mmol/L    Anion Gap 13 7 - 15 mmol/L    Urea Nitrogen 57.6 (H) 8.0 - 23.0 mg/dL    Creatinine 1.70 (H) 0.67 - 1.17 mg/dL    GFR Estimate 43 (L) >60 mL/min/1.73m2    Calcium 9.0 8.8 - 10.4 mg/dL    Chloride 109 (H) 98 - 107 mmol/L    Glucose 159 (H) 70 - 99 mg/dL    Alkaline Phosphatase 137 40 - 150 U/L    AST 62 (H) 0 - 45 U/L    ALT 44 0 - 70 U/L    Protein Total 6.3 (L) 6.4 - 8.3 g/dL    Albumin 2.8 (L) 3.5 - 5.2 g/dL    Bilirubin Total 0.3 <=1.2 mg/dL   Calcium ionized whole blood   Result Value Ref Range    Calcium Ionized Whole Blood 4.8 4.4 - 5.2 mg/dL   Glucose whole blood   Result Value Ref Range    Glucose 153 (H) 70 - 99 mg/dL   Heparin Unfractionated Anti Xa Level    Result Value Ref Range    Anti Xa Unfractionated Heparin 0.69 For Reference Range, See Comment IU/mL    Narrative    Therapeutic Range: UFH: 0.25-0.50 IU/mL for low intensity dosing,  0.30-0.70 IU/mL for high intensity dosing DVT and PE.  This test is not validated for other direct factor X inhibitors (e.g. rivaroxaban, apixaban, edoxaban, betrixaban, fondaparinux) and should not be used for monitoring of other medications.   INR   Result Value Ref Range    INR 1.33 (H) 0.85 - 1.15    PT 16.8 (H) 11.8 - 14.8 Seconds   Partial thromboplastin time   Result Value Ref Range    aPTT 96 (H) 22 - 38 Seconds   Lactic acid whole blood   Result Value Ref Range    Lactic Acid 0.9 0.7 - 2.0 mmol/L   Magnesium   Result Value Ref Range    Magnesium 2.4 (H) 1.7 - 2.3 mg/dL   Troponin T, High Sensitivity   Result Value Ref Range    Troponin T, High Sensitivity 1,069 (HH) <=22 ng/L   Blood gas arterial   Result Value Ref Range    pH Arterial 7.41 7.35 - 7.45    pCO2 Arterial 35 35 - 45 mm Hg    pO2 Arterial 63 (L) 80 - 105 mm Hg    FIO2 80     Bicarbonate Arterial 22 21 - 28 mmol/L    Base Excess/Deficit Arterial -2.1 -3.0 - 3.0 mmol/L    Rei's Test Artline     Oxyhemoglobin Arterial 92 92 - 100 %    O2 Sat, Arterial 93.6 (L) 95.0 - 96.0 %    Peep 8 cm H2O    Narrative    In healthy individuals, oxyhemoglobin (O2Hb) and oxygen saturation (SO2) are approximately equal. In the presence of dyshemoglobins, oxyhemoglobin can be considerably lower than oxygen saturation.   Potassium   Result Value Ref Range    Potassium 4.1 3.4 - 5.3 mmol/L   Glucose by meter   Result Value Ref Range    GLUCOSE BY METER POCT 148 (H) 70 - 99 mg/dL   EKG 12-lead, complete   Result Value Ref Range    Systolic Blood Pressure  mmHg    Diastolic Blood Pressure  mmHg    Ventricular Rate 115 BPM    Atrial Rate  BPM    VA Interval  ms    QRS Duration 92 ms     ms    QTc 509 ms    P Axis  degrees    R AXIS 1 degrees    T Axis 76 degrees    Interpretation  ECG       Atrial fibrillation with rapid ventricular response  Inferior infarct (cited on or before 18-Jul-2025)  Lateral injury pattern  Prolonged QT  ** ** ACUTE MI / STEMI ** **  Abnormal ECG  When compared with ECG of 22-Jul-2025 03:17, (unconfirmed)  Atrial fibrillation has replaced Sinus rhythm  Serial changes of evolving Inferior infarct Present  Confirmed by MD VIRGINIA, MASOUD ([id number]) on 7/22/2025 5:10:43 PM     Blood gas arterial   Result Value Ref Range    pH Arterial 7.42 7.35 - 7.45    pCO2 Arterial 36 35 - 45 mm Hg    pO2 Arterial 81 80 - 105 mm Hg    FIO2 80     Bicarbonate Arterial 23 21 - 28 mmol/L    Base Excess/Deficit Arterial -1.2 -3.0 - 3.0 mmol/L    Rei's Test Artline     Oxyhemoglobin Arterial 95 92 - 100 %    O2 Sat, Arterial 97.0 (H) 95.0 - 96.0 %    Peep 8 cm H2O    Narrative    In healthy individuals, oxyhemoglobin (O2Hb) and oxygen saturation (SO2) are approximately equal. In the presence of dyshemoglobins, oxyhemoglobin can be considerably lower than oxygen saturation.   Glucose by meter   Result Value Ref Range    GLUCOSE BY METER POCT 151 (H) 70 - 99 mg/dL   Activated clotting time celite, POCT   Result Value Ref Range    Activated Clotting Time (Celite) POCT 182 (H) 74 - 150 seconds   Blood gas arterial   Result Value Ref Range    pH Arterial 7.45 7.35 - 7.45    pCO2 Arterial 33 (L) 35 - 45 mm Hg    pO2 Arterial 74 (L) 80 - 105 mm Hg    FIO2 80     Bicarbonate Arterial 23 21 - 28 mmol/L    Base Excess/Deficit Arterial -0.8 -3.0 - 3.0 mmol/L    Rei's Test Artline     Oxyhemoglobin Arterial 95 92 - 100 %    O2 Sat, Arterial 96.6 (H) 95.0 - 96.0 %    Peep 8 cm H2O    Narrative    In healthy individuals, oxyhemoglobin (O2Hb) and oxygen saturation (SO2) are approximately equal. In the presence of dyshemoglobins, oxyhemoglobin can be considerably lower than oxygen saturation.   Glucose by meter   Result Value Ref Range    GLUCOSE BY METER POCT 163 (H) 70 - 99 mg/dL   Echo Complete    Result Value Ref Range    LVEF  50-55%     [facility]    [id number]  [id number]  [id number]  [id number]^[email or web address]^     [facility]  Echocardiography Laboratory  [address] [place] [address]     Name: HOOD MARTÍNEZ  MRN: [id number]  : 1955  Study Date: 2025 01:16 PM  Age: 70 yrs  Gender: Male  Patient Location: [facility]  Reason For Study: Cardiac Arrest  Ordering Physician: Oleg Lozada  Performed By: Linn Houston     BSA: 2.3 m2  Height: 70 in  Weight: 255 lb  HR: 102  BP: 133/69 mmHg  ______________________________________________________________________________  Procedure  Echocardiogram with two-dimensional, color and spectral Doppler.  ______________________________________________________________________________  Interpretation Summary  VA ECMO turndown 3.5 lpm     Baseline  LVEF is 55%  Base of inferuor wall is akinetic  The right ventricle is normal size.  Global right ventricular function is mildly reduced.  Trivial pericardial effusion is present.     VA ECMO turndown to 1 lpm  LVEF is 60%  RV size and function are normal     ______________________________________________________________________________  Left Ventricle  Left ventricular size is normal. The visual ejection fraction is 50-55%.     Right Ventricle  The right ventricle is normal size. Global right ventricular function is  mildly reduced.     Mitral Valve  The mitral valve is normal.     Aortic Valve  Aortic valve is normal in structure and function.     Tricuspid Valve  The valve leaflets are not well visualized. On Doppler interrogation, there is  no significant stenosis or regurgitation.     Vessels  The aorta root is normal. ECMO cannula is present in the IVC.     Pericardium  Trivial pericardial effusion is present.     Miscellaneous  No significant valvular abnormalities present.     ______________________________________________________________________________  MMode/2D  Measurements & Calculations  LVIDd: 3.9 cm  LVOT diam: 2.2 cm  LVOT area: 3.7 cm2     Doppler Measurements & Calculations  LV V1 max P.9 mmHg  LV V1 max: 110.3 cm/sec  LV V1 VTI: 15.3 cm  SV(LVOT): 55.9 ml  SI(LVOT): 24.2 ml/m2     RV S Enio: 18.0 cm/sec     ______________________________________________________________________________  Report approved by: BRITTANY GORDON MD on 2025 03:21 PM         Glucose by meter   Result Value Ref Range    GLUCOSE BY METER POCT 142 (H) 70 - 99 mg/dL   CBC with platelets   Result Value Ref Range    WBC Count 15.9 (H) 4.0 - 11.0 10e3/uL    RBC Count 3.60 (L) 4.40 - 5.90 10e6/uL    Hemoglobin 10.1 (L) 13.3 - 17.7 g/dL    Hematocrit 30.2 (L) 40.0 - 53.0 %    MCV 84 78 - 100 fL    MCH 28.1 26.5 - 33.0 pg    MCHC 33.4 31.5 - 36.5 g/dL    RDW 14.1 10.0 - 15.0 %    Platelet Count 248 150 - 450 10e3/uL   Comprehensive metabolic panel   Result Value Ref Range    Sodium 143 135 - 145 mmol/L    Potassium 3.7 3.4 - 5.3 mmol/L    Carbon Dioxide (CO2) 21 (L) 22 - 29 mmol/L    Anion Gap 16 (H) 7 - 15 mmol/L    Urea Nitrogen 57.5 (H) 8.0 - 23.0 mg/dL    Creatinine 1.67 (H) 0.67 - 1.17 mg/dL    GFR Estimate 44 (L) >60 mL/min/1.73m2    Calcium 9.4 8.8 - 10.4 mg/dL    Chloride 106 98 - 107 mmol/L    Glucose 156 (H) 70 - 99 mg/dL    Alkaline Phosphatase 140 40 - 150 U/L    AST 62 (H) 0 - 45 U/L    ALT 44 0 - 70 U/L    Protein Total 6.8 6.4 - 8.3 g/dL    Albumin 2.9 (L) 3.5 - 5.2 g/dL    Bilirubin Total 0.3 <=1.2 mg/dL   Calcium ionized whole blood   Result Value Ref Range    Calcium Ionized Whole Blood 4.6 4.4 - 5.2 mg/dL   Glucose whole blood   Result Value Ref Range    Glucose 153 (H) 70 - 99 mg/dL   Heparin Unfractionated Anti Xa Level   Result Value Ref Range    Anti Xa Unfractionated Heparin 0.72 For Reference Range, See Comment IU/mL    Narrative    Therapeutic Range: UFH: 0.25-0.50 IU/mL for low intensity dosing,  0.30-0.70 IU/mL for high intensity dosing DVT and PE.  This test is not  validated for other direct factor X inhibitors (e.g. rivaroxaban, apixaban, edoxaban, betrixaban, fondaparinux) and should not be used for monitoring of other medications.   INR   Result Value Ref Range    INR 1.36 (H) 0.85 - 1.15    PT 17.1 (H) 11.8 - 14.8 Seconds   Partial thromboplastin time   Result Value Ref Range    aPTT 98 (H) 22 - 38 Seconds   Lactic acid whole blood   Result Value Ref Range    Lactic Acid 1.1 0.7 - 2.0 mmol/L   Magnesium   Result Value Ref Range    Magnesium 3.6 (H) 1.7 - 2.3 mg/dL   Troponin T, High Sensitivity   Result Value Ref Range    Troponin T, High Sensitivity 964 (HH) <=22 ng/L   D dimer quantitative   Result Value Ref Range    D-Dimer Quantitative 2.32 (H) 0.00 - 0.50 ug/mL FEU    Narrative    This D-dimer assay is intended for use in conjunction with a clinical pretest probability assessment model to exclude pulmonary embolism (PE) and deep venous thrombosis (DVT) in outpatients suspected of PE or DVT. The cut-off value is 0.50 ug/mL FEU.    For patients 50 years of age or older, the application of age-adjusted cut-off values for D-Dimer may increase the specificity without significant effect on sensitivity. The literature suggested calculation age adjusted cut-off in ug/L = age in years x 10 ug/L. The results in this laboratory are reported as ug/mL rather than ug/L. The calculation for age adjusted cut off in ug/mL= age in years x 0.01 ug/mL. For example, the cut off for a 76 year old male is 77 x 0.01 ug/mL = 0.76 ug/mL (760 ug/L).    M Bassam et al. Age adjusted D-dimer cut-off levels to rule out pulmonary embolism: The ADJUST-PE Study. EDGAR 2014;311:8107-3868.; HJ Shi et al. Diagnostic accuracy of conventional or age adjusted D-dimer cutoff values in older patients with suspected venous thromboembolism. Systemic review and meta-analysis. BMJ 2013:346:f2492.   Blood gas ELS venous   Result Value Ref Range    pH ELS Venous 7.43 7.32 - 7.43    pCO2 ELS Venous 36 (L) 40 -  50 mm Hg    pO2 ELS Venous 56 (H) 25 - 47 mm Hg    Bicarbonate ELS Venous 24 21 - 28 mmol/L    Base Excess/Deficit Venous 0.0 -3.0 - 3.0 mmol/L    FIO2 80     Oxyhemoglobin ELS Venous 88 %    O2 Saturation ELS Venous 90.3 (H) 70.0 - 75.0 %    Narrative    In healthy individuals, oxyhemoglobin (O2Hb) and oxygen saturation (SO2) are approximately equal. In the presence of dyshemoglobins, oxyhemoglobin can be considerably lower than oxygen saturation.   Blood gas ELS arterial   Result Value Ref Range    pH ELS Arterial 7.45 7.35 - 7.45    pCO2 ELS Arterial 34 (L) 35 - 45 mm Hg    pO2 ELS Arterial 427 (H) 80 - 105 mm Hg    Bicarbonate ELS Arterial 23 21 - 28 mmol/L    Base Excess/Deficit Arterial -0.3 -3.0 - 3.0 mmol/L    FIO2 80     Oxyhemoglobin ELS Arterial 99 75 - 100 %    O2 Saturation ELS Arterial 99.6 (H) 95.0 - 96.0 %    Narrative    In healthy individuals, oxyhemoglobin (O2Hb) and oxygen saturation (SO2) are approximately equal. In the presence of dyshemoglobins, oxyhemoglobin can be considerably lower than oxygen saturation.   Fibrinogen activity   Result Value Ref Range    Fibrinogen Activity >1,200 (H) 170 - 510 mg/dL   Blood gas arterial   Result Value Ref Range    pH Arterial 7.46 (H) 7.35 - 7.45    pCO2 Arterial 33 (L) 35 - 45 mm Hg    pO2 Arterial 79 (L) 80 - 105 mm Hg    FIO2 80     Bicarbonate Arterial 24 21 - 28 mmol/L    Base Excess/Deficit Arterial 0.0 -3.0 - 3.0 mmol/L    Rei's Test Artline     Oxyhemoglobin Arterial 95 92 - 100 %    O2 Sat, Arterial 96.5 (H) 95.0 - 96.0 %    Peep 8 cm H2O    Narrative    In healthy individuals, oxyhemoglobin (O2Hb) and oxygen saturation (SO2) are approximately equal. In the presence of dyshemoglobins, oxyhemoglobin can be considerably lower than oxygen saturation.   Activated clotting time celite, POCT   Result Value Ref Range    Activated Clotting Time (Celite) POCT 182 (H) 74 - 150 seconds   CT Head w/o Contrast    [facility]    CT HEAD W/O CONTRAST 7/22/2025  5:04 PM    History: worsening neurologic exam     Comparison: CT 7/21/1935, 7/18/2025    Technique: Using multidetector thin collimation helical acquisition  technique, axial, coronal and sagittal CT images from the skull base  to the vertex were obtained without intravenous contrast.   (topogram) image(s) also obtained and reviewed.    Findings: There is no intracranial hemorrhage, mass effect, or midline  shift. Similar loss of gray-white matter differentiation compared to  7/21/1935.. Ventricles are proportionate to the cerebral sulci. The  basal cisterns are clear.    The bony calvaria and the bones of the skull base are normal.  Scattered effusions of the frontal, ethmoid and maxillary sinuses.  Partially visualized enteric and endotracheal tubes.      Impression    Impression:  Similar diminished gray-white matter differentiation when compared to  CT 7/21/2025. No acute or worsening intracranial pathology.    I have personally reviewed the examination and initial interpretation  and I agree with the findings.    LYNETTE HIDALGO MD         SYSTEM ID:  [id number]   CT Chest Abdomen Pelvis w/o Contrast    Impression    RESIDENT PRELIMINARY INTERPRETATION  IMPRESSION:   1.  Dependent bilateral lower lobe consolidation, patchy nodular and  groundglass opacities, and endobronchial debris, new compared to  7/18/2025 CT. Findings are consistent with acute pneumonia/aspiration  versus other inflammatory cause.  2.  Lymphadenopathy within the chest and abdomen likely reactive.  3.  Small pericardial effusion.  4.  Support devices as detailed above.  5.  Nondisplaced bilateral anterolateral rib fractures are stable.   Blood gas arterial   Result Value Ref Range    pH Arterial 7.46 (H) 7.35 - 7.45    pCO2 Arterial 33 (L) 35 - 45 mm Hg    pO2 Arterial 71 (L) 80 - 105 mm Hg    FIO2 80     Bicarbonate Arterial 24 21 - 28 mmol/L    Base Excess/Deficit Arterial 0.1 -3.0 - 3.0 mmol/L    Rei's Test Artline      Oxyhemoglobin Arterial 94 92 - 100 %    O2 Sat, Arterial 95.8 95.0 - 96.0 %    Peep 8 cm H2O    Narrative    In healthy individuals, oxyhemoglobin (O2Hb) and oxygen saturation (SO2) are approximately equal. In the presence of dyshemoglobins, oxyhemoglobin can be considerably lower than oxygen saturation.   Glucose by meter   Result Value Ref Range    GLUCOSE BY METER POCT 132 (H) 70 - 99 mg/dL   Activated clotting time celite, POCT   Result Value Ref Range    Activated Clotting Time (Celite) POCT 182 (H) 74 - 150 seconds   Blood gas arterial   Result Value Ref Range    pH Arterial 7.46 (H) 7.35 - 7.45    pCO2 Arterial 33 (L) 35 - 45 mm Hg    pO2 Arterial 102 80 - 105 mm Hg    FIO2 80     Bicarbonate Arterial 23 21 - 28 mmol/L    Base Excess/Deficit Arterial -0.1 -3.0 - 3.0 mmol/L    Rei's Test Artline     Oxyhemoglobin Arterial 98 92 - 100 %    O2 Sat, Arterial 99.1 (H) 95.0 - 96.0 %    Narrative    In healthy individuals, oxyhemoglobin (O2Hb) and oxygen saturation (SO2) are approximately equal. In the presence of dyshemoglobins, oxyhemoglobin can be considerably lower than oxygen saturation.   Glucose by meter   Result Value Ref Range    GLUCOSE BY METER POCT 123 (H) 70 - 99 mg/dL   Glucose by meter   Result Value Ref Range    GLUCOSE BY METER POCT 115 (H) 70 - 99 mg/dL   CBC with platelets   Result Value Ref Range    WBC Count 14.4 (H) 4.0 - 11.0 10e3/uL    RBC Count 3.49 (L) 4.40 - 5.90 10e6/uL    Hemoglobin 9.8 (L) 13.3 - 17.7 g/dL    Hematocrit 29.1 (L) 40.0 - 53.0 %    MCV 83 78 - 100 fL    MCH 28.1 26.5 - 33.0 pg    MCHC 33.7 31.5 - 36.5 g/dL    RDW 14.2 10.0 - 15.0 %    Platelet Count 233 150 - 450 10e3/uL   Comprehensive metabolic panel   Result Value Ref Range    Sodium 144 135 - 145 mmol/L    Potassium 3.6 3.4 - 5.3 mmol/L    Carbon Dioxide (CO2) 21 (L) 22 - 29 mmol/L    Anion Gap 17 (H) 7 - 15 mmol/L    Urea Nitrogen 60.5 (H) 8.0 - 23.0 mg/dL    Creatinine 1.78 (H) 0.67 - 1.17 mg/dL    GFR Estimate 41  (L) >60 mL/min/1.73m2    Calcium 9.3 8.8 - 10.4 mg/dL    Chloride 106 98 - 107 mmol/L    Glucose 126 (H) 70 - 99 mg/dL    Alkaline Phosphatase 143 40 - 150 U/L    AST 62 (H) 0 - 45 U/L    ALT 42 0 - 70 U/L    Protein Total 6.7 6.4 - 8.3 g/dL    Albumin 2.9 (L) 3.5 - 5.2 g/dL    Bilirubin Total 0.3 <=1.2 mg/dL   Calcium ionized whole blood   Result Value Ref Range    Calcium Ionized Whole Blood 4.6 4.4 - 5.2 mg/dL   Glucose whole blood   Result Value Ref Range    Glucose 122 (H) 70 - 99 mg/dL   Heparin Unfractionated Anti Xa Level   Result Value Ref Range    Anti Xa Unfractionated Heparin 0.62 For Reference Range, See Comment IU/mL    Narrative    Therapeutic Range: UFH: 0.25-0.50 IU/mL for low intensity dosing,  0.30-0.70 IU/mL for high intensity dosing DVT and PE.  This test is not validated for other direct factor X inhibitors (e.g. rivaroxaban, apixaban, edoxaban, betrixaban, fondaparinux) and should not be used for monitoring of other medications.   INR   Result Value Ref Range    INR 1.32 (H) 0.85 - 1.15    PT 16.6 (H) 11.8 - 14.8 Seconds   Partial thromboplastin time   Result Value Ref Range    aPTT 99 (H) 22 - 38 Seconds   Lactic acid whole blood   Result Value Ref Range    Lactic Acid 0.9 0.7 - 2.0 mmol/L   Magnesium   Result Value Ref Range    Magnesium 2.3 1.7 - 2.3 mg/dL   Troponin T, High Sensitivity   Result Value Ref Range    Troponin T, High Sensitivity 965 (HH) <=22 ng/L   Blood gas arterial   Result Value Ref Range    pH Arterial 7.45 7.35 - 7.45    pCO2 Arterial 35 35 - 45 mm Hg    pO2 Arterial 85 80 - 105 mm Hg    FIO2 80     Bicarbonate Arterial 24 21 - 28 mmol/L    Base Excess/Deficit Arterial 0.7 -3.0 - 3.0 mmol/L    Rei's Test Artline     Oxyhemoglobin Arterial 96 92 - 100 %    O2 Sat, Arterial 97.5 (H) 95.0 - 96.0 %    Narrative    In healthy individuals, oxyhemoglobin (O2Hb) and oxygen saturation (SO2) are approximately equal. In the presence of dyshemoglobins, oxyhemoglobin can be  considerably lower than oxygen saturation.   Activated clotting time celite, POCT   Result Value Ref Range    Activated Clotting Time (Celite) POCT 182 (H) 74 - 150 seconds   Blood gas arterial   Result Value Ref Range    pH Arterial 7.46 (H) 7.35 - 7.45    pCO2 Arterial 33 (L) 35 - 45 mm Hg    pO2 Arterial 93 80 - 105 mm Hg    FIO2 80     Bicarbonate Arterial 23 21 - 28 mmol/L    Base Excess/Deficit Arterial 0.1 -3.0 - 3.0 mmol/L    Rei's Test Artline     Oxyhemoglobin Arterial 97 92 - 100 %    O2 Sat, Arterial 98.5 (H) 95.0 - 96.0 %    Narrative    In healthy individuals, oxyhemoglobin (O2Hb) and oxygen saturation (SO2) are approximately equal. In the presence of dyshemoglobins, oxyhemoglobin can be considerably lower than oxygen saturation.   Glucose by meter   Result Value Ref Range    GLUCOSE BY METER POCT 99 70 - 99 mg/dL   Glucose by meter   Result Value Ref Range    GLUCOSE BY METER POCT 128 (H) 70 - 99 mg/dL   Glucose by meter   Result Value Ref Range    GLUCOSE BY METER POCT 166 (H) 70 - 99 mg/dL   Blood gas arterial   Result Value Ref Range    pH Arterial 7.33 (L) 7.35 - 7.45    pCO2 Arterial 39 35 - 45 mm Hg    pO2 Arterial 130 (H) 80 - 105 mm Hg    FIO2 80     Bicarbonate Arterial 20 (L) 21 - 28 mmol/L    Base Excess/Deficit Arterial -5.4 (L) -3.0 - 3.0 mmol/L    Rei's Test Artline     Oxyhemoglobin Arterial 97 92 - 100 %    O2 Sat, Arterial 98.9 (H) 95.0 - 96.0 %    Narrative    In healthy individuals, oxyhemoglobin (O2Hb) and oxygen saturation (SO2) are approximately equal. In the presence of dyshemoglobins, oxyhemoglobin can be considerably lower than oxygen saturation.   XR Chest Port 1 View    Impression    RESIDENT PRELIMINARY INTERPRETATION  Impression:   1. Endotracheal tube tip is near the cervical thoracic junction at the  level of T2 approximately 10 cm above the davis. Consider  advancement.  2. Dense retrocardiac and basilar atelectasis. The right lower lobe  demonstrates a slightly  increased nodular appearance which may  represent an early forming consolidation.   Glucose by meter   Result Value Ref Range    GLUCOSE BY METER POCT 177 (H) 70 - 99 mg/dL   EKG 12-lead, tracing only   Result Value Ref Range    Systolic Blood Pressure  mmHg    Diastolic Blood Pressure  mmHg    Ventricular Rate 118 BPM    Atrial Rate  BPM    MD Interval  ms    QRS Duration 86 ms     ms    QTc 504 ms    P Axis  degrees    R AXIS 50 degrees    T Axis 73 degrees    Interpretation ECG       Atrial fibrillation with rapid ventricular response  ST elevation consider inferior injury or acute infarct  Prolonged QT  ** ** ACUTE MI / STEMI ** **  Abnormal ECG  When compared with ECG of 22-Jul-2025 11:07,  Criteria for Inferior infarct are no longer Present     CBC with platelets   Result Value Ref Range    WBC Count 15.3 (H) 4.0 - 11.0 10e3/uL    RBC Count 3.48 (L) 4.40 - 5.90 10e6/uL    Hemoglobin 9.7 (L) 13.3 - 17.7 g/dL    Hematocrit 30.1 (L) 40.0 - 53.0 %    MCV 87 78 - 100 fL    MCH 27.9 26.5 - 33.0 pg    MCHC 32.2 31.5 - 36.5 g/dL    RDW 14.5 10.0 - 15.0 %    Platelet Count 239 150 - 450 10e3/uL   Comprehensive metabolic panel   Result Value Ref Range    Sodium 144 135 - 145 mmol/L    Potassium 3.9 3.4 - 5.3 mmol/L    Carbon Dioxide (CO2) 15 (L) 22 - 29 mmol/L    Anion Gap 24 (H) 7 - 15 mmol/L    Urea Nitrogen 67.2 (H) 8.0 - 23.0 mg/dL    Creatinine 1.90 (H) 0.67 - 1.17 mg/dL    GFR Estimate 38 (L) >60 mL/min/1.73m2    Calcium 8.9 8.8 - 10.4 mg/dL    Chloride 105 98 - 107 mmol/L    Glucose 189 (H) 70 - 99 mg/dL    Alkaline Phosphatase 125 40 - 150 U/L    AST 58 (H) 0 - 45 U/L    ALT 39 0 - 70 U/L    Protein Total 6.6 6.4 - 8.3 g/dL    Albumin 2.8 (L) 3.5 - 5.2 g/dL    Bilirubin Total 0.3 <=1.2 mg/dL   Calcium ionized whole blood   Result Value Ref Range    Calcium Ionized Whole Blood 4.5 4.4 - 5.2 mg/dL   Glucose whole blood   Result Value Ref Range    Glucose 185 (H) 70 - 99 mg/dL   Heparin Unfractionated Anti Xa  Level   Result Value Ref Range    Anti Xa Unfractionated Heparin 0.53 For Reference Range, See Comment IU/mL    Narrative    Therapeutic Range: UFH: 0.25-0.50 IU/mL for low intensity dosing,  0.30-0.70 IU/mL for high intensity dosing DVT and PE.  This test is not validated for other direct factor X inhibitors (e.g. rivaroxaban, apixaban, edoxaban, betrixaban, fondaparinux) and should not be used for monitoring of other medications.   INR   Result Value Ref Range    INR 1.28 (H) 0.85 - 1.15    PT 16.2 (H) 11.8 - 14.8 Seconds   Partial thromboplastin time   Result Value Ref Range    aPTT 78 (H) 22 - 38 Seconds   Lactic acid whole blood   Result Value Ref Range    Lactic Acid 1.2 0.7 - 2.0 mmol/L   Magnesium   Result Value Ref Range    Magnesium 2.1 1.7 - 2.3 mg/dL   Troponin T, High Sensitivity   Result Value Ref Range    Troponin T, High Sensitivity 1,078 (HH) <=22 ng/L   D dimer quantitative   Result Value Ref Range    D-Dimer Quantitative 3.84 (H) 0.00 - 0.50 ug/mL FEU    Narrative    This D-dimer assay is intended for use in conjunction with a clinical pretest probability assessment model to exclude pulmonary embolism (PE) and deep venous thrombosis (DVT) in outpatients suspected of PE or DVT. The cut-off value is 0.50 ug/mL FEU.    For patients 50 years of age or older, the application of age-adjusted cut-off values for D-Dimer may increase the specificity without significant effect on sensitivity. The literature suggested calculation age adjusted cut-off in ug/L = age in years x 10 ug/L. The results in this laboratory are reported as ug/mL rather than ug/L. The calculation for age adjusted cut off in ug/mL= age in years x 0.01 ug/mL. For example, the cut off for a 76 year old male is 77 x 0.01 ug/mL = 0.76 ug/mL (760 ug/L).    M Bassam et al. Age adjusted D-dimer cut-off levels to rule out pulmonary embolism: The ADJUST-PE Study. EDGAR 2014;311:7373-8942.; HJ Shi et al. Diagnostic accuracy of conventional  or age adjusted D-dimer cutoff values in older patients with suspected venous thromboembolism. Systemic review and meta-analysis. BMJ 2013:346:f2492.   Blood gas ELS venous   Result Value Ref Range    pH ELS Venous 7.30 (L) 7.32 - 7.43    pCO2 ELS Venous 39 (L) 40 - 50 mm Hg    pO2 ELS Venous 57 (H) 25 - 47 mm Hg    Bicarbonate ELS Venous 19 (L) 21 - 28 mmol/L    Base Excess/Deficit Venous -7.1 (L) -3.0 - 3.0 mmol/L    FIO2 80     Oxyhemoglobin ELS Venous 86 %    O2 Saturation ELS Venous 87.6 (H) 70.0 - 75.0 %    Narrative    In healthy individuals, oxyhemoglobin (O2Hb) and oxygen saturation (SO2) are approximately equal. In the presence of dyshemoglobins, oxyhemoglobin can be considerably lower than oxygen saturation.   Blood gas ELS arterial   Result Value Ref Range    pH ELS Arterial 7.30 (L) 7.35 - 7.45    pCO2 ELS Arterial 37 35 - 45 mm Hg    pO2 ELS Arterial 259 (H) 80 - 105 mm Hg    Bicarbonate ELS Arterial 18 (L) 21 - 28 mmol/L    Base Excess/Deficit Arterial -7.6 (L) -3.0 - 3.0 mmol/L    FIO2 60     Oxyhemoglobin ELS Arterial 98 75 - 100 %    O2 Saturation ELS Arterial 99.8 (H) 95.0 - 96.0 %    Narrative    In healthy individuals, oxyhemoglobin (O2Hb) and oxygen saturation (SO2) are approximately equal. In the presence of dyshemoglobins, oxyhemoglobin can be considerably lower than oxygen saturation.   Fibrinogen activity   Result Value Ref Range    Fibrinogen Activity >1,200 (H) 170 - 510 mg/dL   Antithrombin III   Result Value Ref Range    Antithrombin  85 - 135 %   CRP inflammation   Result Value Ref Range    CRP Inflammation 293.00 (H) <5.00 mg/L   Erythrocyte sedimentation rate auto   Result Value Ref Range    Erythrocyte Sedimentation Rate 103 (H) 0 - 20 mm/hr   Hemoglobin plasma   Result Value Ref Range    Hemoglobin Plasma 30 (H) <30 mg/dL   Lactate Dehydrogenase   Result Value Ref Range    Lactate Dehydrogenase 424 (H) 0 - 250 U/L   Phosphorus   Result Value Ref Range    Phosphorus 6.1 (H)  2.5 - 4.5 mg/dL   TSH   Result Value Ref Range    TSH 0.38 0.30 - 4.20 uIU/mL   T3 Free   Result Value Ref Range    T3 Free 1.5 (L) 2.0 - 4.4 pg/mL   T3 total   Result Value Ref Range    T3 Total 71 (L) 85 - 202 ng/dL   T4 free   Result Value Ref Range    Free T4 1.06 0.90 - 1.70 ng/dL   Blood gas arterial   Result Value Ref Range    pH Arterial 7.34 (L) 7.35 - 7.45    pCO2 Arterial 32 (L) 35 - 45 mm Hg    pO2 Arterial 110 (H) 80 - 105 mm Hg    FIO2 80     Bicarbonate Arterial 18 (L) 21 - 28 mmol/L    Base Excess/Deficit Arterial -7.3 (L) -3.0 - 3.0 mmol/L    Rei's Test Artline     Oxyhemoglobin Arterial 97 92 - 100 %    O2 Sat, Arterial 99.0 (H) 95.0 - 96.0 %    Narrative    In healthy individuals, oxyhemoglobin (O2Hb) and oxygen saturation (SO2) are approximately equal. In the presence of dyshemoglobins, oxyhemoglobin can be considerably lower than oxygen saturation.   Glucose by meter   Result Value Ref Range    GLUCOSE BY METER POCT 170 (H) 70 - 99 mg/dL   Activated clotting time celite, POCT   Result Value Ref Range    Activated Clotting Time (Celite) POCT 186 (H) 74 - 150 seconds   CONDITIONAL Prepare red blood cells (unit)   Result Value Ref Range    Blood Component Type Red Blood Cells     Product Code [id number]     Unit Status Ready for issue     Unit Number [id number]     CROSSMATCH Compatible     CODING SYSTEM [id number]     ISSUE DATE AND TIME 7/23/2025  4:47:00 AM CDT     UNIT ABO/RH A+     UNIT TYPE ISBT 6200    Glucose by meter   Result Value Ref Range    GLUCOSE BY METER POCT 168 (H) 70 - 99 mg/dL   Blood gas arterial   Result Value Ref Range    pH Arterial 7.37 7.35 - 7.45    pCO2 Arterial 33 (L) 35 - 45 mm Hg    pO2 Arterial 105 80 - 105 mm Hg    FIO2 80     Bicarbonate Arterial 19 (L) 21 - 28 mmol/L    Base Excess/Deficit Arterial -5.7 (L) -3.0 - 3.0 mmol/L    Rei's Test Artline     Oxyhemoglobin Arterial 97 92 - 100 %    O2 Sat, Arterial 98.4 (H) 95.0 - 96.0 %    Narrative    In healthy  "individuals, oxyhemoglobin (O2Hb) and oxygen saturation (SO2) are approximately equal. In the presence of dyshemoglobins, oxyhemoglobin can be considerably lower than oxygen saturation.   Glucose by meter   Result Value Ref Range    GLUCOSE BY METER POCT 165 (H) 70 - 99 mg/dL   Glucose by meter   Result Value Ref Range    GLUCOSE BY METER POCT 160 (H) 70 - 99 mg/dL       Clinically Significant Risk Factors        # Hypokalemia: Lowest K = 3.3 mmol/L in last 2 days, will replace as needed   # Hyperchloremia: Highest Cl = 109 mmol/L in last 2 days, will monitor as appropriate       # Hypercalcemia: corrected calcium is >10.1, will monitor as appropriate   # Anion Gap Metabolic Acidosis: Highest Anion Gap = 24 mmol/L in last 2 days, will monitor and treat as appropriate  # Hypoalbuminemia: Lowest albumin = 2.6 g/dL at 7/22/2025  3:41 AM, will monitor as appropriate    # Coagulation Defect: INR = 1.28 (Ref range: 0.85 - 1.15) and/or PTT = 78 Seconds (Ref range: 22 - 38 Seconds), will monitor for bleeding        # Acute Hypoxic Respiratory Failure: Based on blood gas results. Continue supplemental oxygen as needed  # Acute Hypercapnic Respiratory Failure: based on arterial blood gas results.  Continue supplemental oxygen and ventilatory support as indicated.  # Acute Hypercapnic Respiratory Failure: based on venous blood gas results.  Continue supplemental oxygen and ventilatory support as indicated.         # Obesity: Estimated body mass index is 35.68 kg/m  as calculated from the following:    Height as of this encounter: 1.778 m (5' 10\").    Weight as of this encounter: 112.8 kg (248 lb 10.9 oz).        # Financial/Environmental Concerns: none              "

## 2025-07-23 NOTE — INTERIM SUMMARY
BRIEF NEUROLOGY NOTE:     I was notified that the patient was having abnormal movements, on my assessment, the patient was having myoclonic movements, and on discussion with the team these movements were following stimulus and his abnormal movements would improve after about 5 to 10 minutes following stimulus.  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  I requested that the team continue to monitor these movements and to notify me if he started to have these movements without any stimulus.    I was then notified that the patient started having these movements without any stimulus and given concern for myoclonus, recommended proceeding with treatment with Keppra.    On assessment, the patient was having myoclonic movements. On pupillometry, R pupil 4.9 NPI 2.8; L pupil 3.9, NPI 3.3.  No cough, gag, corneal.  No withdrawal to noxious stimuli in extremities.    70-year-old male who presented following cardiac arrest, on ECMO. The patient is having abnormal movements consistent with myoclonus, initially stimulus induced, but now occurring without stimulus.    Recommendations:  - IV levetiracetam load 4500mg once  - continue levetiracetam 750mg BID  - continue EEG    Neurocrititcal care team will continue to follow.     The patient was discussed with neurology attending, Dr. Luna.    Shana Garza MD  Neurology Resident, PGY-3    Note: Chart documentation done in part with Dragon Voice Recognition software. Although reviewed after completion, some word and grammatical errors may remain.       Anesthesia Volume In Cc: 4.5

## 2025-07-23 NOTE — PLAN OF CARE
Goal Outcome Evaluation:      Plan of Care Reviewed With: patient    Overall Patient Progress: decliningOverall Patient Progress: declining    Outcome Evaluation: in status per EEG and Dr. Monte note; versed started    Major Shift Events:  Having increase in whole body jerking. NCC note stated pt is in status, therefore Versed gtt started and currently running at 5mg/hr. No response to commands or painful stimuli. No cough/gag. Pupils all over the place. EEG remains on. Bumex gtt cut in half. Amio at 0.5, Insulin at 5. Wife and pts sister was updated today by Dr. Yeo, but will have formal care conference with NCC team and CICU team together. ECMO @ 2.5LPM, 0.5Sweep, 60%. Vent remains at 80%. Pt remains in Afib 90s-100s. Rectal tube remains in place, Fox with 125-150/hr out. Heparin at 2000 ACT goal 180-200. Lip abrasion with scab and swelling. Gauze remains in place.   Plan: Monitor and assess as tolerated.   For vital signs and complete assessments, please see documentation flowsheets.

## 2025-07-23 NOTE — PROGRESS NOTES
Major Shift Events:  Neurology to bedside overnight to witness increased myoclonic movements. Initially thought to be due to stimulus, however continued unprovoked with cares. Keppra added. Nicardipine currently off after hydralazine dose. Insulin titrated for glucose control. UoP adequate on bumex gtt. Supplement Potassium given x 1 (40meq). Remains in afib on amio gtt.     Plan: care conference today.     For vital signs and complete assessments, please see documentation flowsheets.

## 2025-07-23 NOTE — PROGRESS NOTES
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     Cannulation Site:  17 Fr in the R femoral artery  25 Fr in the R femoral vein    Interval events: successful turndown, remains in AF    Pulsatilty (IABP paused if applicable):50 mmHG     Physical Exam:  Temp:  [98.2  F (36.8  C)-98.6  F (37  C)] 98.4  F (36.9  C)  Pulse:  [] 105  Resp:  [11-42] 27  MAP:  [66 mmHg-103 mmHg] 89 mmHg  Arterial Line BP: ()/(51-80) 136/68  FiO2 (%):  [80 %] 80 %  SpO2:  [97 %-100 %] 100 %    Intake/Output Summary (Last 24 hours) at 2025 1426  Last data filed at 2025 1400  Gross per 24 hour   Intake 3548.63 ml   Output 5735 ml   Net -2186.37 ml    FiO2 (%): 80 %, Resp: 27, Vent Mode: VC/AC, Resp Rate (Set): 12 breaths/min, Tidal Volume (Set, mL): 400 mL, PEEP (cm H2O): 8 cmH2O, Resp Rate (Set): 12 breaths/min, Tidal Volume (Set, mL): 400 mL, PEEP (cm H2O): 8 cmH2O     Labs:  Recent Labs   Lab 25  1353 25  1152 25  0950 25  0754   PH 7.45 7.45 7.45 7.43   PCO2 34* 35 34* 33*   PO2 185* 156* 97 115*   HCO3 23 24 24 22   O2PER 80 80 80 80      Recent Labs   Lab 25  0950 25  0356 25  2205 25  1614   WBC 14.7* 15.3* 14.4* 15.9*   HGB 9.0* 9.7* 9.8* 10.1*     Creatinine   Date Value Ref Range Status   2025 2.05 (H) 0.67 - 1.17 mg/dL Final   2025 1.90 (H) 0.67 - 1.17 mg/dL Final   2025 1.78 (H) 0.67 - 1.17 mg/dL Final   2025 1.67 (H) 0.67 - 1.17 mg/dL Final   2013 0.90 0.66 - 1.25 mg/dL Final   2013 1.00 0.66 - 1.25 mg/dL Final   2013 1.06 0.66 - 1.25 mg/dL Final   2013 1.13 0.66 - 1.25 mg/dL Final       Blood Flow (Circuit) LPM: 2.6 LPM  Sweep LPM: 0.5 LPM  Sweep FiO2   %: 60 %  ACT  (seconds): 182 seconds  Pulse Oximetry  (SpO2%): 100 %  Arterial Pressure  mmH mmHg      ECMO Issues  including assessments and plan on DOS 7/23/2025:  Neuro: Sedated for mechanical ventilation and ECMO.  No acute distress.  NIRS stable b/l  RASS goal: -3  CV: Cardiogenic shock.  Hemodynamically stable off pressors.   Pulm: Keep vent settings at rest settings as above.  FEN/Renal: Electrolytes stable w/ replacement protocols in place, Cr stable, UOP stable  Heme: ACT goal: 180-200, Hemoglobin stable.  Minimal oozing around the ECMO cannulas.  ID: Receiving empiric antibiotics  Cannulae: Position is acceptable on exam and the available imaging.  Distal perfusion cannula is in place and patent.  Extremities are well-perfused.   I have personally reviewed the ECMO flows, oxygenation and CO2 clearance, anticoagulation, and cannula position.  I have also personally assessed the patient's systemic response with hemodynamics, oxygenation, ventilation, and bleeding.      I have personally reviewed the ECMO flows, oxygenation and CO2 clearance, anticoagulation, and cannula position.  I have also personally assessed the patient's systemic response with hemodynamics, oxygenation, ventilation, and bleeding.       The patient requires continued ECMO support and management in the ICU.  I have discussed patient care and treatment plan with the primary team.      Ilhwan Yeo, MD  Critical Care Cardiology    July 23, 2025

## 2025-07-24 LAB
ABO + RH BLD: NORMAL
ACT BLD: 186 SECONDS (ref 74–150)
ACT BLD: 190 SECONDS (ref 74–150)
ACT BLD: 194 SECONDS (ref 74–150)
ACT BLD: 194 SECONDS (ref 74–150)
ALBUMIN SERPL BCG-MCNC: 2.6 G/DL (ref 3.5–5.2)
ALBUMIN SERPL BCG-MCNC: 2.7 G/DL (ref 3.5–5.2)
ALLEN'S TEST: ABNORMAL
ALP SERPL-CCNC: 101 U/L (ref 40–150)
ALP SERPL-CCNC: 102 U/L (ref 40–150)
ALP SERPL-CCNC: 96 U/L (ref 40–150)
ALP SERPL-CCNC: 97 U/L (ref 40–150)
ALT SERPL W P-5'-P-CCNC: 33 U/L (ref 0–70)
ALT SERPL W P-5'-P-CCNC: 34 U/L (ref 0–70)
ANION GAP SERPL CALCULATED.3IONS-SCNC: 16 MMOL/L (ref 7–15)
ANION GAP SERPL CALCULATED.3IONS-SCNC: 17 MMOL/L (ref 7–15)
ANION GAP SERPL CALCULATED.3IONS-SCNC: 18 MMOL/L (ref 7–15)
ANION GAP SERPL CALCULATED.3IONS-SCNC: 18 MMOL/L (ref 7–15)
APTT PPP: 89 SECONDS (ref 22–38)
APTT PPP: 93 SECONDS (ref 22–38)
APTT PPP: 94 SECONDS (ref 22–38)
APTT PPP: 99 SECONDS (ref 22–38)
AST SERPL W P-5'-P-CCNC: 51 U/L (ref 0–45)
AST SERPL W P-5'-P-CCNC: 53 U/L (ref 0–45)
AT III ACT/NOR PPP CHRO: 89 % (ref 85–135)
ATRIAL RATE - MUSE: NORMAL BPM
BACTERIA SPEC CULT: NO GROWTH
BACTERIA SPT CULT: ABNORMAL
BACTERIA SPT CULT: NORMAL
BASE EXCESS BLDA CALC-SCNC: -0.1 MMOL/L (ref -3–3)
BASE EXCESS BLDA CALC-SCNC: -0.7 MMOL/L (ref -3–3)
BASE EXCESS BLDA CALC-SCNC: 0 MMOL/L (ref -3–3)
BASE EXCESS BLDA CALC-SCNC: 0.1 MMOL/L (ref -3–3)
BASE EXCESS BLDA CALC-SCNC: 0.2 MMOL/L (ref -3–3)
BASE EXCESS BLDA CALC-SCNC: 0.3 MMOL/L (ref -3–3)
BASE EXCESS BLDA CALC-SCNC: 0.4 MMOL/L (ref -3–3)
BASE EXCESS BLDA CALC-SCNC: 0.4 MMOL/L (ref -3–3)
BASE EXCESS BLDA CALC-SCNC: 0.6 MMOL/L (ref -3–3)
BASE EXCESS BLDA CALC-SCNC: 0.6 MMOL/L (ref -3–3)
BASE EXCESS BLDA CALC-SCNC: 0.9 MMOL/L (ref -3–3)
BASE EXCESS BLDA CALC-SCNC: 1 MMOL/L (ref -3–3)
BASE EXCESS BLDA CALC-SCNC: 1.2 MMOL/L (ref -3–3)
BASE EXCESS BLDA CALC-SCNC: 1.4 MMOL/L (ref -3–3)
BASE EXCESS BLDV CALC-SCNC: 0.5 MMOL/L (ref -3–3)
BASE EXCESS BLDV CALC-SCNC: 0.7 MMOL/L (ref -3–3)
BILIRUB SERPL-MCNC: 0.2 MG/DL
BILIRUB SERPL-MCNC: 0.3 MG/DL
BLD GP AB SCN SERPL QL: NEGATIVE
BUN SERPL-MCNC: 104 MG/DL (ref 8–23)
BUN SERPL-MCNC: 110 MG/DL (ref 8–23)
BUN SERPL-MCNC: 93.3 MG/DL (ref 8–23)
BUN SERPL-MCNC: 97 MG/DL (ref 8–23)
CA-I BLD-MCNC: 4.5 MG/DL (ref 4.4–5.2)
CA-I BLD-MCNC: 4.5 MG/DL (ref 4.4–5.2)
CA-I BLD-MCNC: 4.6 MG/DL (ref 4.4–5.2)
CA-I BLD-MCNC: 4.7 MG/DL (ref 4.4–5.2)
CALCIUM SERPL-MCNC: 8.8 MG/DL (ref 8.8–10.4)
CALCIUM SERPL-MCNC: 8.8 MG/DL (ref 8.8–10.4)
CALCIUM SERPL-MCNC: 8.9 MG/DL (ref 8.8–10.4)
CALCIUM SERPL-MCNC: 9.2 MG/DL (ref 8.8–10.4)
CHLORIDE SERPL-SCNC: 107 MMOL/L (ref 98–107)
CHLORIDE SERPL-SCNC: 109 MMOL/L (ref 98–107)
CHLORIDE SERPL-SCNC: 110 MMOL/L (ref 98–107)
CHLORIDE SERPL-SCNC: 110 MMOL/L (ref 98–107)
CREAT SERPL-MCNC: 2.39 MG/DL (ref 0.67–1.17)
CREAT SERPL-MCNC: 2.41 MG/DL (ref 0.67–1.17)
CREAT SERPL-MCNC: 2.46 MG/DL (ref 0.67–1.17)
CREAT SERPL-MCNC: 2.48 MG/DL (ref 0.67–1.17)
CRP SERPL-MCNC: 223 MG/L
D DIMER PPP FEU-MCNC: 2.61 UG/ML FEU (ref 0–0.5)
D DIMER PPP FEU-MCNC: 2.68 UG/ML FEU (ref 0–0.5)
DIASTOLIC BLOOD PRESSURE - MUSE: NORMAL MMHG
EGFRCR SERPLBLD CKD-EPI 2021: 27 ML/MIN/1.73M2
EGFRCR SERPLBLD CKD-EPI 2021: 28 ML/MIN/1.73M2
EGFRCR SERPLBLD CKD-EPI 2021: 28 ML/MIN/1.73M2
EGFRCR SERPLBLD CKD-EPI 2021: 29 ML/MIN/1.73M2
ERYTHROCYTE [DISTWIDTH] IN BLOOD BY AUTOMATED COUNT: 14.7 % (ref 10–15)
ERYTHROCYTE [DISTWIDTH] IN BLOOD BY AUTOMATED COUNT: 14.8 % (ref 10–15)
ERYTHROCYTE [DISTWIDTH] IN BLOOD BY AUTOMATED COUNT: 14.9 % (ref 10–15)
ERYTHROCYTE [DISTWIDTH] IN BLOOD BY AUTOMATED COUNT: 14.9 % (ref 10–15)
ERYTHROCYTE [SEDIMENTATION RATE] IN BLOOD BY WESTERGREN METHOD: 136 MM/HR (ref 0–20)
FIBRINOGEN PPP-MCNC: 1140 MG/DL (ref 170–510)
FIBRINOGEN PPP-MCNC: >1200 MG/DL (ref 170–510)
GLUCOSE BLD-MCNC: 115 MG/DL (ref 70–99)
GLUCOSE BLD-MCNC: 139 MG/DL (ref 70–99)
GLUCOSE BLD-MCNC: 145 MG/DL (ref 70–99)
GLUCOSE BLD-MCNC: 174 MG/DL (ref 70–99)
GLUCOSE BLDC GLUCOMTR-MCNC: 109 MG/DL (ref 70–99)
GLUCOSE BLDC GLUCOMTR-MCNC: 118 MG/DL (ref 70–99)
GLUCOSE BLDC GLUCOMTR-MCNC: 118 MG/DL (ref 70–99)
GLUCOSE BLDC GLUCOMTR-MCNC: 128 MG/DL (ref 70–99)
GLUCOSE BLDC GLUCOMTR-MCNC: 129 MG/DL (ref 70–99)
GLUCOSE BLDC GLUCOMTR-MCNC: 132 MG/DL (ref 70–99)
GLUCOSE BLDC GLUCOMTR-MCNC: 134 MG/DL (ref 70–99)
GLUCOSE BLDC GLUCOMTR-MCNC: 136 MG/DL (ref 70–99)
GLUCOSE BLDC GLUCOMTR-MCNC: 136 MG/DL (ref 70–99)
GLUCOSE BLDC GLUCOMTR-MCNC: 137 MG/DL (ref 70–99)
GLUCOSE BLDC GLUCOMTR-MCNC: 139 MG/DL (ref 70–99)
GLUCOSE BLDC GLUCOMTR-MCNC: 159 MG/DL (ref 70–99)
GLUCOSE BLDC GLUCOMTR-MCNC: 164 MG/DL (ref 70–99)
GLUCOSE SERPL-MCNC: 123 MG/DL (ref 70–99)
GLUCOSE SERPL-MCNC: 144 MG/DL (ref 70–99)
GLUCOSE SERPL-MCNC: 149 MG/DL (ref 70–99)
GLUCOSE SERPL-MCNC: 176 MG/DL (ref 70–99)
GRAM STAIN RESULT: ABNORMAL
GRAM STAIN RESULT: NORMAL
GRAM STAIN RESULT: NORMAL
HCO3 BLD-SCNC: 24 MMOL/L (ref 21–28)
HCO3 BLD-SCNC: 25 MMOL/L (ref 21–28)
HCO3 BLD-SCNC: 26 MMOL/L (ref 21–28)
HCO3 BLDA-SCNC: 25 MMOL/L (ref 21–28)
HCO3 BLDA-SCNC: 25 MMOL/L (ref 21–28)
HCO3 BLDV-SCNC: 26 MMOL/L (ref 21–28)
HCO3 BLDV-SCNC: 26 MMOL/L (ref 21–28)
HCO3 SERPL-SCNC: 22 MMOL/L (ref 22–29)
HCO3 SERPL-SCNC: 23 MMOL/L (ref 22–29)
HCT VFR BLD AUTO: 27.1 % (ref 40–53)
HCT VFR BLD AUTO: 27.8 % (ref 40–53)
HCT VFR BLD AUTO: 27.9 % (ref 40–53)
HCT VFR BLD AUTO: 28.1 % (ref 40–53)
HGB BLD-MCNC: 8.7 G/DL (ref 13.3–17.7)
HGB BLD-MCNC: 8.7 G/DL (ref 13.3–17.7)
HGB BLD-MCNC: 8.8 G/DL (ref 13.3–17.7)
HGB BLD-MCNC: 8.8 G/DL (ref 13.3–17.7)
HGB FREE PLAS-MCNC: <30 MG/DL
INR PPP: 1.23 (ref 0.85–1.15)
INR PPP: 1.28 (ref 0.85–1.15)
INR PPP: 1.29 (ref 0.85–1.15)
INR PPP: 1.34 (ref 0.85–1.15)
INTERPRETATION ECG - MUSE: NORMAL
LACTATE SERPL-SCNC: 0.9 MMOL/L (ref 0.7–2)
LACTATE SERPL-SCNC: 1.1 MMOL/L (ref 0.7–2)
LACTATE SERPL-SCNC: 1.2 MMOL/L (ref 0.7–2)
LACTATE SERPL-SCNC: 1.3 MMOL/L (ref 0.7–2)
LDH SERPL L TO P-CCNC: 399 U/L (ref 0–250)
MAGNESIUM SERPL-MCNC: 2.3 MG/DL (ref 1.7–2.3)
MAGNESIUM SERPL-MCNC: 2.4 MG/DL (ref 1.7–2.3)
MCH RBC QN AUTO: 27.6 PG (ref 26.5–33)
MCH RBC QN AUTO: 27.6 PG (ref 26.5–33)
MCH RBC QN AUTO: 27.7 PG (ref 26.5–33)
MCH RBC QN AUTO: 27.8 PG (ref 26.5–33)
MCHC RBC AUTO-ENTMCNC: 31.3 G/DL (ref 31.5–36.5)
MCHC RBC AUTO-ENTMCNC: 31.3 G/DL (ref 31.5–36.5)
MCHC RBC AUTO-ENTMCNC: 31.5 G/DL (ref 31.5–36.5)
MCHC RBC AUTO-ENTMCNC: 32.1 G/DL (ref 31.5–36.5)
MCV RBC AUTO: 86 FL (ref 78–100)
MCV RBC AUTO: 88 FL (ref 78–100)
MCV RBC AUTO: 88 FL (ref 78–100)
MCV RBC AUTO: 89 FL (ref 78–100)
O2/TOTAL GAS SETTING VFR VENT: 50 %
O2/TOTAL GAS SETTING VFR VENT: 60 %
OXYHGB MFR BLDA: 95 % (ref 92–100)
OXYHGB MFR BLDA: 97 % (ref 92–100)
OXYHGB MFR BLDA: 98 % (ref 92–100)
OXYHGB MFR BLDA: 99 % (ref 75–100)
OXYHGB MFR BLDA: 99 % (ref 75–100)
OXYHGB MFR BLDA: 99 % (ref 92–100)
OXYHGB MFR BLDA: 99 % (ref 92–100)
OXYHGB MFR BLDV: 84 %
OXYHGB MFR BLDV: 84 %
P AXIS - MUSE: NORMAL DEGREES
PCO2 BLD: 34 MM HG (ref 35–45)
PCO2 BLD: 35 MM HG (ref 35–45)
PCO2 BLD: 36 MM HG (ref 35–45)
PCO2 BLD: 36 MM HG (ref 35–45)
PCO2 BLD: 37 MM HG (ref 35–45)
PCO2 BLD: 39 MM HG (ref 35–45)
PCO2 BLDA: 42 MM HG (ref 35–45)
PCO2 BLDA: 43 MM HG (ref 35–45)
PCO2 BLDV: 45 MM HG (ref 40–50)
PCO2 BLDV: 46 MM HG (ref 40–50)
PH BLD: 7.42 [PH] (ref 7.35–7.45)
PH BLD: 7.42 [PH] (ref 7.35–7.45)
PH BLD: 7.43 [PH] (ref 7.35–7.45)
PH BLD: 7.44 [PH] (ref 7.35–7.45)
PH BLD: 7.44 [PH] (ref 7.35–7.45)
PH BLD: 7.45 [PH] (ref 7.35–7.45)
PH BLD: 7.47 [PH] (ref 7.35–7.45)
PH BLDA: 7.37 [PH] (ref 7.35–7.45)
PH BLDA: 7.39 [PH] (ref 7.35–7.45)
PH BLDV: 7.36 [PH] (ref 7.32–7.43)
PH BLDV: 7.37 [PH] (ref 7.32–7.43)
PHOSPHATE SERPL-MCNC: 5 MG/DL (ref 2.5–4.5)
PLATELET # BLD AUTO: 210 10E3/UL (ref 150–450)
PLATELET # BLD AUTO: 220 10E3/UL (ref 150–450)
PLATELET # BLD AUTO: 222 10E3/UL (ref 150–450)
PLATELET # BLD AUTO: 222 10E3/UL (ref 150–450)
PO2 BLD: 101 MM HG (ref 80–105)
PO2 BLD: 105 MM HG (ref 80–105)
PO2 BLD: 107 MM HG (ref 80–105)
PO2 BLD: 108 MM HG (ref 80–105)
PO2 BLD: 109 MM HG (ref 80–105)
PO2 BLD: 111 MM HG (ref 80–105)
PO2 BLD: 112 MM HG (ref 80–105)
PO2 BLD: 121 MM HG (ref 80–105)
PO2 BLD: 123 MM HG (ref 80–105)
PO2 BLD: 130 MM HG (ref 80–105)
PO2 BLD: 133 MM HG (ref 80–105)
PO2 BLD: 78 MM HG (ref 80–105)
PO2 BLDA: 227 MM HG (ref 80–105)
PO2 BLDA: 276 MM HG (ref 80–105)
PO2 BLDV: 52 MM HG (ref 25–47)
PO2 BLDV: 53 MM HG (ref 25–47)
POTASSIUM SERPL-SCNC: 3.7 MMOL/L (ref 3.4–5.3)
POTASSIUM SERPL-SCNC: 3.8 MMOL/L (ref 3.4–5.3)
PR INTERVAL - MUSE: NORMAL MS
PROT SERPL-MCNC: 6.2 G/DL (ref 6.4–8.3)
PROT SERPL-MCNC: 6.3 G/DL (ref 6.4–8.3)
PROTHROMBIN TIME: 15.8 SECONDS (ref 11.8–14.8)
PROTHROMBIN TIME: 16.2 SECONDS (ref 11.8–14.8)
PROTHROMBIN TIME: 16.4 SECONDS (ref 11.8–14.8)
PROTHROMBIN TIME: 16.8 SECONDS (ref 11.8–14.8)
QRS DURATION - MUSE: 74 MS
QT - MUSE: 396 MS
QTC - MUSE: 516 MS
R AXIS - MUSE: 13 DEGREES
RBC # BLD AUTO: 3.15 10E6/UL (ref 4.4–5.9)
RBC # BLD AUTO: 3.15 10E6/UL (ref 4.4–5.9)
RBC # BLD AUTO: 3.17 10E6/UL (ref 4.4–5.9)
RBC # BLD AUTO: 3.18 10E6/UL (ref 4.4–5.9)
SAO2 % BLDA: 97.1 % (ref 95–96)
SAO2 % BLDA: 98.7 % (ref 95–96)
SAO2 % BLDA: 99 % (ref 95–96)
SAO2 % BLDA: 99.1 % (ref 95–96)
SAO2 % BLDA: 99.3 % (ref 95–96)
SAO2 % BLDA: 99.3 % (ref 95–96)
SAO2 % BLDA: 99.4 % (ref 95–96)
SAO2 % BLDA: 99.4 % (ref 95–96)
SAO2 % BLDA: 99.6 % (ref 95–96)
SAO2 % BLDA: 99.7 % (ref 95–96)
SAO2 % BLDA: >100 % (ref 95–96)
SAO2 % BLDV: 85.7 % (ref 70–75)
SAO2 % BLDV: 85.8 % (ref 70–75)
SODIUM SERPL-SCNC: 146 MMOL/L (ref 135–145)
SODIUM SERPL-SCNC: 149 MMOL/L (ref 135–145)
SODIUM SERPL-SCNC: 150 MMOL/L (ref 135–145)
SODIUM SERPL-SCNC: 151 MMOL/L (ref 135–145)
SPECIMEN EXP DATE BLD: NORMAL
SYSTOLIC BLOOD PRESSURE - MUSE: NORMAL MMHG
T AXIS - MUSE: 77 DEGREES
T3 SERPL-MCNC: 65 NG/DL (ref 85–202)
T3FREE SERPL-MCNC: 1.3 PG/ML (ref 2–4.4)
T4 FREE SERPL-MCNC: 0.89 NG/DL (ref 0.9–1.7)
TROPONIN T SERPL HS-MCNC: 1123 NG/L
TROPONIN T SERPL HS-MCNC: 1215 NG/L
TROPONIN T SERPL HS-MCNC: 1217 NG/L
TROPONIN T SERPL HS-MCNC: 1347 NG/L
TSH SERPL DL<=0.005 MIU/L-ACNC: 0.27 UIU/ML (ref 0.3–4.2)
UFH PPP CHRO-ACNC: 0.6 IU/ML (ref ?–1.1)
UFH PPP CHRO-ACNC: 0.64 IU/ML (ref ?–1.1)
UFH PPP CHRO-ACNC: 0.65 IU/ML (ref ?–1.1)
UFH PPP CHRO-ACNC: 0.68 IU/ML (ref ?–1.1)
VENTRICULAR RATE- MUSE: 102 BPM
WBC # BLD AUTO: 11.7 10E3/UL (ref 4–11)
WBC # BLD AUTO: 11.9 10E3/UL (ref 4–11)
WBC # BLD AUTO: 12 10E3/UL (ref 4–11)
WBC # BLD AUTO: 12.4 10E3/UL (ref 4–11)

## 2025-07-24 PROCEDURE — 33949 ECMO/ECLS DAILY MGMT ARTERY: CPT

## 2025-07-24 PROCEDURE — 83735 ASSAY OF MAGNESIUM: CPT | Performed by: STUDENT IN AN ORGANIZED HEALTH CARE EDUCATION/TRAINING PROGRAM

## 2025-07-24 PROCEDURE — 82805 BLOOD GASES W/O2 SATURATION: CPT | Performed by: INTERNAL MEDICINE

## 2025-07-24 PROCEDURE — 84484 ASSAY OF TROPONIN QUANT: CPT | Performed by: STUDENT IN AN ORGANIZED HEALTH CARE EDUCATION/TRAINING PROGRAM

## 2025-07-24 PROCEDURE — 85520 HEPARIN ASSAY: CPT | Performed by: STUDENT IN AN ORGANIZED HEALTH CARE EDUCATION/TRAINING PROGRAM

## 2025-07-24 PROCEDURE — 84100 ASSAY OF PHOSPHORUS: CPT | Performed by: STUDENT IN AN ORGANIZED HEALTH CARE EDUCATION/TRAINING PROGRAM

## 2025-07-24 PROCEDURE — 85384 FIBRINOGEN ACTIVITY: CPT | Performed by: STUDENT IN AN ORGANIZED HEALTH CARE EDUCATION/TRAINING PROGRAM

## 2025-07-24 PROCEDURE — 83615 LACTATE (LD) (LDH) ENZYME: CPT | Performed by: STUDENT IN AN ORGANIZED HEALTH CARE EDUCATION/TRAINING PROGRAM

## 2025-07-24 PROCEDURE — 84443 ASSAY THYROID STIM HORMONE: CPT | Performed by: STUDENT IN AN ORGANIZED HEALTH CARE EDUCATION/TRAINING PROGRAM

## 2025-07-24 PROCEDURE — 82947 ASSAY GLUCOSE BLOOD QUANT: CPT | Performed by: STUDENT IN AN ORGANIZED HEALTH CARE EDUCATION/TRAINING PROGRAM

## 2025-07-24 PROCEDURE — 250N000013 HC RX MED GY IP 250 OP 250 PS 637: Performed by: STUDENT IN AN ORGANIZED HEALTH CARE EDUCATION/TRAINING PROGRAM

## 2025-07-24 PROCEDURE — 85610 PROTHROMBIN TIME: CPT | Performed by: STUDENT IN AN ORGANIZED HEALTH CARE EDUCATION/TRAINING PROGRAM

## 2025-07-24 PROCEDURE — 83605 ASSAY OF LACTIC ACID: CPT | Performed by: STUDENT IN AN ORGANIZED HEALTH CARE EDUCATION/TRAINING PROGRAM

## 2025-07-24 PROCEDURE — 82805 BLOOD GASES W/O2 SATURATION: CPT | Performed by: STUDENT IN AN ORGANIZED HEALTH CARE EDUCATION/TRAINING PROGRAM

## 2025-07-24 PROCEDURE — 999N000157 HC STATISTIC RCP TIME EA 10 MIN

## 2025-07-24 PROCEDURE — 250N000011 HC RX IP 250 OP 636

## 2025-07-24 PROCEDURE — 85379 FIBRIN DEGRADATION QUANT: CPT | Performed by: STUDENT IN AN ORGANIZED HEALTH CARE EDUCATION/TRAINING PROGRAM

## 2025-07-24 PROCEDURE — 84155 ASSAY OF PROTEIN SERUM: CPT | Performed by: STUDENT IN AN ORGANIZED HEALTH CARE EDUCATION/TRAINING PROGRAM

## 2025-07-24 PROCEDURE — 250N000011 HC RX IP 250 OP 636: Performed by: INTERNAL MEDICINE

## 2025-07-24 PROCEDURE — 33949 ECMO/ECLS DAILY MGMT ARTERY: CPT | Performed by: HOSPITALIST

## 2025-07-24 PROCEDURE — 84295 ASSAY OF SERUM SODIUM: CPT | Performed by: STUDENT IN AN ORGANIZED HEALTH CARE EDUCATION/TRAINING PROGRAM

## 2025-07-24 PROCEDURE — 85027 COMPLETE CBC AUTOMATED: CPT | Performed by: STUDENT IN AN ORGANIZED HEALTH CARE EDUCATION/TRAINING PROGRAM

## 2025-07-24 PROCEDURE — 82330 ASSAY OF CALCIUM: CPT | Performed by: STUDENT IN AN ORGANIZED HEALTH CARE EDUCATION/TRAINING PROGRAM

## 2025-07-24 PROCEDURE — 85018 HEMOGLOBIN: CPT | Performed by: STUDENT IN AN ORGANIZED HEALTH CARE EDUCATION/TRAINING PROGRAM

## 2025-07-24 PROCEDURE — 200N000002 HC R&B ICU UMMC

## 2025-07-24 PROCEDURE — 250N000013 HC RX MED GY IP 250 OP 250 PS 637: Performed by: INTERNAL MEDICINE

## 2025-07-24 PROCEDURE — 83051 HEMOGLOBIN PLASMA: CPT | Performed by: STUDENT IN AN ORGANIZED HEALTH CARE EDUCATION/TRAINING PROGRAM

## 2025-07-24 PROCEDURE — 86901 BLOOD TYPING SEROLOGIC RH(D): CPT | Performed by: STUDENT IN AN ORGANIZED HEALTH CARE EDUCATION/TRAINING PROGRAM

## 2025-07-24 PROCEDURE — 86140 C-REACTIVE PROTEIN: CPT | Performed by: STUDENT IN AN ORGANIZED HEALTH CARE EDUCATION/TRAINING PROGRAM

## 2025-07-24 PROCEDURE — 85730 THROMBOPLASTIN TIME PARTIAL: CPT | Performed by: STUDENT IN AN ORGANIZED HEALTH CARE EDUCATION/TRAINING PROGRAM

## 2025-07-24 PROCEDURE — 85347 COAGULATION TIME ACTIVATED: CPT

## 2025-07-24 PROCEDURE — 250N000011 HC RX IP 250 OP 636: Performed by: STUDENT IN AN ORGANIZED HEALTH CARE EDUCATION/TRAINING PROGRAM

## 2025-07-24 PROCEDURE — 85652 RBC SED RATE AUTOMATED: CPT | Performed by: STUDENT IN AN ORGANIZED HEALTH CARE EDUCATION/TRAINING PROGRAM

## 2025-07-24 PROCEDURE — 99291 CRITICAL CARE FIRST HOUR: CPT | Mod: 25 | Performed by: HOSPITALIST

## 2025-07-24 PROCEDURE — 84480 ASSAY TRIIODOTHYRONINE (T3): CPT | Performed by: STUDENT IN AN ORGANIZED HEALTH CARE EDUCATION/TRAINING PROGRAM

## 2025-07-24 PROCEDURE — 99222 1ST HOSP IP/OBS MODERATE 55: CPT | Performed by: FAMILY MEDICINE

## 2025-07-24 PROCEDURE — 258N000003 HC RX IP 258 OP 636

## 2025-07-24 PROCEDURE — 93005 ELECTROCARDIOGRAM TRACING: CPT

## 2025-07-24 PROCEDURE — 85300 ANTITHROMBIN III ACTIVITY: CPT | Performed by: STUDENT IN AN ORGANIZED HEALTH CARE EDUCATION/TRAINING PROGRAM

## 2025-07-24 PROCEDURE — 84481 FREE ASSAY (FT-3): CPT | Performed by: STUDENT IN AN ORGANIZED HEALTH CARE EDUCATION/TRAINING PROGRAM

## 2025-07-24 PROCEDURE — 99291 CRITICAL CARE FIRST HOUR: CPT | Mod: FS | Performed by: NURSE PRACTITIONER

## 2025-07-24 PROCEDURE — 94003 VENT MGMT INPAT SUBQ DAY: CPT

## 2025-07-24 PROCEDURE — 84439 ASSAY OF FREE THYROXINE: CPT | Performed by: STUDENT IN AN ORGANIZED HEALTH CARE EDUCATION/TRAINING PROGRAM

## 2025-07-24 PROCEDURE — 250N000013 HC RX MED GY IP 250 OP 250 PS 637: Performed by: HOSPITALIST

## 2025-07-24 RX ORDER — MIDAZOLAM HCL IN 0.9 % NACL/PF 1 MG/ML
10 PLASTIC BAG, INJECTION (ML) INTRAVENOUS CONTINUOUS
Status: DISCONTINUED | OUTPATIENT
Start: 2025-07-24 | End: 2025-07-27 | Stop reason: HOSPADM

## 2025-07-24 RX ORDER — POTASSIUM CHLORIDE 1.5 G/1.58G
20 POWDER, FOR SOLUTION ORAL ONCE
Status: COMPLETED | OUTPATIENT
Start: 2025-07-24 | End: 2025-07-24

## 2025-07-24 RX ORDER — POTASSIUM CHLORIDE 29.8 MG/ML
20 INJECTION INTRAVENOUS ONCE
Status: COMPLETED | OUTPATIENT
Start: 2025-07-24 | End: 2025-07-24

## 2025-07-24 RX ADMIN — POLYETHYLENE GLYCOL 3350 17 G: 17 POWDER, FOR SOLUTION ORAL at 07:52

## 2025-07-24 RX ADMIN — TICAGRELOR 90 MG: 90 TABLET ORAL at 07:52

## 2025-07-24 RX ADMIN — HYDRALAZINE HYDROCHLORIDE 50 MG: 50 TABLET ORAL at 05:56

## 2025-07-24 RX ADMIN — LEVETIRACETAM 1000 MG: 10 INJECTION INTRAVENOUS at 07:52

## 2025-07-24 RX ADMIN — POTASSIUM CHLORIDE 20 MEQ: 1.5 POWDER, FOR SOLUTION ORAL at 06:14

## 2025-07-24 RX ADMIN — Medication 60 ML: at 20:35

## 2025-07-24 RX ADMIN — Medication 15 ML: at 07:53

## 2025-07-24 RX ADMIN — TICAGRELOR 90 MG: 90 TABLET ORAL at 20:34

## 2025-07-24 RX ADMIN — CHLORHEXIDINE GLUCONATE 15 ML: 1.2 SOLUTION ORAL at 20:34

## 2025-07-24 RX ADMIN — LEVETIRACETAM 250 MG: 100 INJECTION, SOLUTION INTRAVENOUS at 00:12

## 2025-07-24 RX ADMIN — ASPIRIN 81 MG CHEWABLE TABLET 81 MG: 81 TABLET CHEWABLE at 07:52

## 2025-07-24 RX ADMIN — Medication 60 ML: at 14:13

## 2025-07-24 RX ADMIN — HEPARIN SODIUM 2000 UNITS/HR: 10000 INJECTION, SOLUTION INTRAVENOUS at 03:35

## 2025-07-24 RX ADMIN — AMIODARONE HYDROCHLORIDE 0.5 MG/MIN: 1.8 INJECTION, SOLUTION INTRAVENOUS at 17:42

## 2025-07-24 RX ADMIN — POTASSIUM CHLORIDE 20 MEQ: 1.5 POWDER, FOR SOLUTION ORAL at 23:16

## 2025-07-24 RX ADMIN — HYDRALAZINE HYDROCHLORIDE 50 MG: 50 TABLET ORAL at 11:13

## 2025-07-24 RX ADMIN — AMIODARONE HYDROCHLORIDE 0.5 MG/MIN: 1.8 INJECTION, SOLUTION INTRAVENOUS at 06:14

## 2025-07-24 RX ADMIN — HYDRALAZINE HYDROCHLORIDE 50 MG: 50 TABLET ORAL at 16:57

## 2025-07-24 RX ADMIN — HEPARIN SODIUM 2000 UNITS/HR: 10000 INJECTION, SOLUTION INTRAVENOUS at 16:07

## 2025-07-24 RX ADMIN — MIDAZOLAM IN SODIUM CHLORIDE 5 MG/HR: 1 INJECTION INTRAVENOUS at 08:47

## 2025-07-24 RX ADMIN — HYDRALAZINE HYDROCHLORIDE 50 MG: 50 TABLET ORAL at 23:16

## 2025-07-24 RX ADMIN — POTASSIUM CHLORIDE 20 MEQ: 29.8 INJECTION, SOLUTION INTRAVENOUS at 17:18

## 2025-07-24 RX ADMIN — POTASSIUM CHLORIDE 20 MEQ: 29.8 INJECTION, SOLUTION INTRAVENOUS at 11:27

## 2025-07-24 RX ADMIN — LEVETIRACETAM 1000 MG: 10 INJECTION INTRAVENOUS at 20:34

## 2025-07-24 RX ADMIN — Medication 40 MG: at 07:53

## 2025-07-24 RX ADMIN — Medication 60 ML: at 07:53

## 2025-07-24 RX ADMIN — CHLORHEXIDINE GLUCONATE 15 ML: 1.2 SOLUTION ORAL at 07:52

## 2025-07-24 RX ADMIN — POTASSIUM CHLORIDE 20 MEQ: 1.5 POWDER, FOR SOLUTION ORAL at 00:06

## 2025-07-24 ASSESSMENT — ACTIVITIES OF DAILY LIVING (ADL)
ADLS_ACUITY_SCORE: 54

## 2025-07-24 NOTE — PLAN OF CARE
Goal Outcome Evaluation:      Plan of Care Reviewed With: patient    Overall Patient Progress: no changeOverall Patient Progress: no change    Outcome Evaluation: Remains on 5mg straight rate per NCC. No response to stimuli    Major Shift Events:  No acute changes. All gtts remain the same. Neuro remains unchanged. ECMO remains at 2.5 LPM/50%/0.5 Sweep. Vent at 50%. Replaced K x2 total 40mEq given.   Plan: CC for tomorrow with all services involved. Pt's wife aware.   For vital signs and complete assessments, please see documentation flowsheets.

## 2025-07-24 NOTE — PROGRESS NOTES
Care Management Follow Up    Length of Stay (days): 6    Expected Discharge Date: 08/01/2025     Concerns to be Addressed: Care conference    Patient plan of care discussed at interdisciplinary rounds: Yes    Additional Information:  Team requested care conf to be arranged with family for tomorrow, 7/25.  RNCC called pt spouseMilly # [phone] and discussed about the care conf.  Milly stated she is aware of the care conf. request and need to talk to her son about his availability and will call back.  RNCC provided RNCC call back number.    Next Steps:   Awaiting a call back from pt spouse regarding their availability for tomorrow, care conf.  Need to update Palliative, CICU team and bedside RN about the care conf time once conformed with pt spouse.    Addendum- 4:00  Pt spouse, Milly called back and stated the earliest they can meet tomorrow is 4:00 pm.  Team agreed to meet tomorrow at 4:00.      Care conf arranged for tomorrow, 7/25 at 4:00, providers meeting at 3:50 in 4A conf. Room.  Team members that have been informed about the care conf. are- CICU, Palliative and bedside RN.      Wade Vale, RN, PHN, BSN  4A and 4E/ ICU  Nurse Care Coordinator  [facility] Acute Care Management  Phone: [phone]  Available on Vocera:

## 2025-07-24 NOTE — PROGRESS NOTES
"Neurocritical Care Consultation    Reason for critical care admission: Cardiac Arrest  Consulting Team: CICU  Date of Service:  07/24/2025  Date of Admission:  7/18/2025  Hospital Day: 7    Assessment/Plan  Bob Echols is a 70 year old male with a past medical history of HTN, DM2, DEMOND, and recent admission for inferior STEMI on 7/10 RENA to the distal LCx (discharged on 7/14) who was admitted on 7/18/2025 after being found down following a VF cardiac arrest now s/p VA ECMO found to have subacute in-stent thrombosis of his recently placed stent without obvious mechanical causes, treated with 2.75 x 20mm Synergy XD RENA.     Neurocritical Care consulted for neuro prognostication.     24 hour events:  -S/p Keppra load + maint   -Midazolam infusion 2/2 myoclonus status epilepticus, now resolved    -NPI's < 3     Neuro  #Hypoxic ischemic injury   #Diminished gray-white matter differentiation   #Hypoxic encephalopathy 2.2 cardiac arrest  Post arrest day: 6  Length of downtime: Unclear, appears about 10-15 min unwitnessed and then perhaps another 5 minutes without intervention while waiting for ambulance, ECMO started 32 minutes into resuscitation (although family reported to nurse that he may have been down for an hour before being found)   -Witnessed arrest: No  -ROSC: No, rhythm: VF  -Goal normothermia   -Current temp: 98.2     Analgesics & sedation  Current sedation:   -Midazolam @ 5 mg/hr - indication for seizures/myoclonic jerking     Exam findings   -Cough: No  -Gag: No  -Pupils: R pupil 4.43 with NPI 2.4, L pupil 4.77 with NPI 1.8     Neuroimaging  -7/18 Day 1 CTH shows: \"Mildly diminished gray-white differentiation throughout the cerebral hemispheres concerning for hypoxic ischemic injury. Follow-up as  clinically indicated recommended.\"  -7/21 Day 3 CTH shows: \"Continued diminished gray-white differentiation throughout the cerebral hemispheres, concerning for hypoxic ischemic injury.\"  -7/22 CTH shows: " "\"Similar diminished gray-white matter differentiation when compared to CT 7/21/2025. No acute or worsening intracranial pathology.\"    Neurophysiology  #Rare epileptiform GPD's  #Status myoclonus, jerking improved with sedation   #Myoclonic status epilepticus, now resolved    -Continue vEEG with VA ECMO  -7/23-7/24 vEEG shows: \"The background shows near-complete suppression with rare generalized epileptiform discharges. These findings indicate a severe encephalopathy/coma, consistent with anoxic injury.\"  -Continue Keppra 1 g BID  -Continue Midazolam infusion @ 5 mg/hr for another 24HR - will start to wean tomorrow 7/25    Biomarkers  -Neuron-specific Enolase (NSE) results: 27.0, 105.0, 91.7  -S 100B protein: 314, 189, 115    Recommendations  -Will remain available for care conference tomorrow   -Avoid hypotonic solutions as they may worsen cerebral edema   -Avoid nitroprusside or nitroglycerin as they may also worsen cerbral edema  -Advise slow correction of hypernatremia if needed  -NCC will continue to follow, please call [phone] with any questions      I spent 36 minutes of critical care time on the unit/floor managing the care of Bob Echols. Upon evaluation, this patient had a high probability of imminent or life-threatening deterioration due to hypoxic encephalopathy, which required my direct attention, intervention, and personal management. Greater than 50% of my time was spent at the bedside counseling the patient and/or coordinating care including chart review, history, exam, documentation, and further activities per this note. I have personally reviewed the following data/imaging over the past 24 hours.     The patient was seen and discussed with the NCC attending, Dr. Ambrosio.    María NAVARRETE, RAJESH  Neurocritical care nurse practitioner  Available by [email or web address]      Current Medications:  Current Facility-Administered Medications   Medication Dose Route Frequency Provider Last Rate Last Admin    " aspirin (ASA) chewable tablet 81 mg  81 mg Oral or Feeding Tube Daily Matt Nugent MD   81 mg at 07/24/25 0752    chlorhexidine (PERIDEX) 0.12 % solution 15 mL  15 mL Mouth/Throat Q12H Matt Nugent MD   15 mL at 07/24/25 0752    hydrALAZINE (APRESOLINE) tablet 50 mg  50 mg Oral Q6H Sidney Lozada MD   50 mg at 07/24/25 1113    levETIRAcetam (KEPPRA) intermittent infusion 1,000 mg  1,000 mg Intravenous Q12H Fausto Taylor MD   1,000 mg at 07/24/25 0752    multivitamins w/minerals liquid 15 mL  15 mL Per Feeding Tube Daily Sidney Lozada MD   15 mL at 07/24/25 0753    pantoprazole (PROTONIX) 2 mg/mL suspension 40 mg  40 mg Oral or Feeding Tube Daily Epifanio Beckwith MD   40 mg at 07/24/25 0753    polyethylene glycol (MIRALAX) Packet 17 g  17 g Oral or Feeding Tube Daily Matt Nugent MD   17 g at 07/24/25 0752    Prosource TF20 ENfit Compatibl EN LIQD (PROSOURCE TF20) packet 60 mL  1 packet Per Feeding Tube TID Sidney Lozada MD   60 mL at 07/24/25 0753    ticagrelor (BRILINTA) tablet 90 mg  90 mg Oral or Feeding Tube BID Matt Nugent MD   90 mg at 07/24/25 0752       PRN Medications:  Current Facility-Administered Medications   Medication Dose Route Frequency Provider Last Rate Last Admin    artificial tears ophthalmic ointment   Both Eyes Q8H PRN Matt Nugent MD        calcium chloride 1 g in sodium chloride 0.9 % 100 mL intermittent infusion  1 g Intravenous Q6H PRN Matt Nugent MD   1 g at 07/22/25 0455    dextrose 10% infusion   Intravenous Continuous PRN Sidney Lozada MD        dextrose 10% infusion   Intravenous Continuous PRN Matt Nugent MD        glucose gel 15-30 g  15-30 g Oral Q15 Min PRN Matt Nugent MD        Or    dextrose 50 % injection 25-50 mL  25-50 mL Intravenous Q15 Min PRN Matt Nugent MD        Or    glucagon injection 1 mg  1 mg Subcutaneous Q15 Min PRN Matt Nugent MD        heparin ANTICOAGULANT  bolus dose from infusion pump 388-776 Units  5-10 Units/kg (Ideal) Other Q30 Min PRN Matt Nugent MD        HYDROmorphone (PF) (DILAUDID) injection 0.5-1 mg  0.5-1 mg Intravenous Q1H PRN Sidney Lozada MD   1 mg at 07/23/25 1434    lidocaine (LMX4) cream   Topical Q1H PRN Matt Nugent MD        lidocaine 1 % 0.1-1 mL  0.1-1 mL Other Q1H PRN Matt Nugent MD        melatonin tablet 10 mg  10 mg Oral or Feeding Tube At Bedtime PRN Sidney Lozada MD        midazolam (VERSED) injection 2-4 mg  2-4 mg Intravenous Q1H PRN Sidney Lozada MD   4 mg at 07/23/25 1434    naloxone (NARCAN) injection 0.2 mg  0.2 mg Intravenous Q2 Min PRN Epifanio Beckwith MD        Or    naloxone (NARCAN) injection 0.4 mg  0.4 mg Intravenous Q2 Min PRN Epifanio Beckwith MD        Or    naloxone (NARCAN) injection 0.2 mg  0.2 mg Intramuscular Q2 Min PRN Epifanio Beckwith MD        Or    naloxone (NARCAN) injection 0.4 mg  0.4 mg Intramuscular Q2 Min PRN Epifanio Beckwith MD        senna-docusate (SENOKOT-S/PERICOLACE) 8.6-50 MG per tablet 1-2 tablet  1-2 tablet Oral or Feeding Tube BID PRN Matt Nugent MD        sodium chloride (PF) 0.9% PF flush 3 mL  3 mL Intracatheter Q8H PRN Matt Nugent MD        sodium chloride (PF) 0.9% PF flush 3 mL  3 mL Intracatheter q1 min prn Matt Nugent MD           Infusions:  Current Facility-Administered Medications   Medication Dose Route Frequency Provider Last Rate Last Admin    amiodarone (NEXTERONE) 1.8 mg/mL in dextrose 5% 200 mL ADULT STANDARD infusion  0.5 mg/min Intravenous Continuous Sidney Lozada MD 16.7 mL/hr at 07/24/25 1200 0.5 mg/min at 07/24/25 1200    bumetanide (BUMEX) 0.25 mg/mL infusion  0.5 mg/hr Intravenous Continuous Sidney Lozada MD 2 mL/hr at 07/24/25 1200 0.5 mg/hr at 07/24/25 1200    dextrose 10% infusion   Intravenous Continuous PRN Sidney Lozada MD        dextrose 10% infusion   Intravenous Continuous PRN Matt Nugent MD  "       heparin 2 unit/mL in 0.9% NaCl (for REPERFUSION CATHETER)  3 mL/hr Intravenous Continuous Matt Nugent MD 3 mL/hr at 07/24/25 1200 3 mL/hr at 07/24/25 1200    heparin 25,000 units in 0.45% NaCl 250 mL ANTICOAGULANT infusion  10-50 Units/kg/hr (Ideal) Other Continuous Matt Nugent MD 20 mL/hr at 07/24/25 1200 2,000 Units/hr at 07/24/25 1200    insulin regular (MYXREDLIN) 1 unit/mL infusion  0-24 Units/hr Intravenous Continuous Matt Nugent MD 3 mL/hr at 07/24/25 1200 3 Units/hr at 07/24/25 1200    midazolam (VERSED) 100 mg/100 mL NS infusion - ADULT  1-8 mg/hr Intravenous Continuous Sidney Lozada MD 5 mL/hr at 07/24/25 1200 5 mg/hr at 07/24/25 1200    niCARdipine 40 mg in 200 mL NS (CARDENE) infusion  0.5-15 mg/hr Intravenous Continuous Matt Nugent MD   Stopped at 07/23/25 0701       Physical Examination:  Vitals: Pulse 97   Temp 98.2  F (36.8  C)   Resp (!) 34   Ht 1.778 m (5' 10\")   Wt 111.3 kg (245 lb 6 oz)   SpO2 99%   BMI 35.21 kg/m    General: Adult male patient, lying in bed, critically-ill  HEENT: Head and facial swelling, atraumatic, no icterus, scleral and periorbital swelling   Cardiac: Atrial fibrillation on bedside monitor    Pulm: Unlabored, expansion symmetric, no retractions or use of accessory muscles, assisted mechanically   Abdomen: Soft, non-distended  Extremities: Warm, no edema, appears adequately perfused  Skin: Bruising, abrasions   Psych: Calm  Neuro:  Mental status: Unresponsive, does not open eyes, does not follow commands, intubated.   Cranial nerves: Conjugate gaze, R pupil 4.43 with NPI 2.4, L pupil 4.77 with NPI 1.8, no cough or gag present with deep suction.  Motor: Normal bulk and tone. No abnormal movements observed. 0/5 strength in 4/4 extremities with no active withdrawal to noxious stimuli. Heavily sedated.  Sensory: Does not grimace nor respond/withdraw to painful stimuli.   Coordination: RASHAUN, deferred.   Gait: RASHAUN, deferred.    Labs " and Imaging:    All relevant imaging and laboratory values personally reviewed.

## 2025-07-24 NOTE — PROGRESS NOTES
[facility]    ECLS Shift Summary:     ECMO Equipment:  Console Serial Number: [id number]  Circuit Lot Number: [id number]  Oxygenator Lot Number: [id number]    Circuit Assessment: Fibrin  Fibrin Location: connectors    Arterial ECMO Cannula: 17 Fr in the Right Femoral Artery  Venous ECMO Cannula: 25 Fr in the Right Femoral Vein  Distal Perfusion Catheter: 8 Fr in the Right Superficial Femoral Artery    ECMO Cannula Arterial Right femoral artery-Site Assessment: Sutured, Secure  ECMO Cannula Venous Right femoral vein-Site Assessment: Sutured, Secure  Distal Perfusion Catheter Right superficial femoral artery-Site Assessment: WDL  ECMO Cannula Arterial Right femoral artery-Site Intervention: No intervention needed  ECMO Cannula Venous Right femoral vein-Site Intervention: No intervention needed  Distal Perfusion Catheter Right superficial femoral artery-Site Intervention: No intervention needed    Patient remains on V-A ECMO, all equipment is functioning and alarms are appropriately set. RPM's: 2874 with Blood Flow (Circuit) LPM  Av.8 LPM  Min: 2.72 LPM  Max: 2.83 LPM L/min. Sweep is at 0.5 LPM and (S) 50 %. Extremities are warm and well perfused.     Significant Shift Events: no issues overnight, no products/fluids needed. No chugging. VSS.     Vent settings:  FiO2 (%): 60 %, Resp: 18, Vent Mode: VC/AC, Resp Rate (Set): 12 breaths/min, Tidal Volume (Set, mL): 400 mL, PEEP (cm H2O): 8 cmH2O, Resp Rate (Set): 12 breaths/min, Tidal Volume (Set, mL): 400 mL, PEEP (cm H2O): 8 cmH2O    Anticoagulation:  Dose (units/hr) HEParin: 2000 Units/hr  Rate (mL/hr) HEParin: 20 mL/hr  Concentration HEParin: 100 Units/mL        Most recent ACT  (seconds): 190 seconds    Urine output is adequate, Yellow / Straw Colored.  Blood loss was none. Product given included none.     Intake/Output Summary (Last 24 hours) at 2025 0633  Last data filed at 2025 0600  Gross per 24 hour    Intake 2377.21 ml   Output 3230 ml   Net -852.79 ml       Labs:  Recent Labs   Lab 07/24/25  0555 07/24/25  0347 07/24/25  0210 07/24/25  0000   PH 7.43 7.44 7.43 7.45   PCO2 37 36 37 34*   PO2 78* 108* 121* 107*   HCO3 24 25 24 24   O2PER 60 60  60  60 60 60       Lab Results   Component Value Date    HGB 8.7 (L) 07/24/2025    PHGB 30 (H) 07/23/2025     07/24/2025    FIBR >1,200 (H) 07/24/2025    INR 1.34 (H) 07/24/2025    PTT 94 (H) 07/24/2025    DD 2.61 (H) 07/24/2025    AXA 0.21 05/24/2013    ANTCH 101 07/23/2025       Plan:  To continue with VA ECMO    Yancy Duncan RN  ECMO Specialist  7/24/2025 6:33 AM

## 2025-07-24 NOTE — PROGRESS NOTES
ATTENDING ATTESTATION:   Bob Echols is a 70 year old male with a history of HTN, DM, pAF, recent inferior STEMI on 7/10 s/p PCI of Lcx with RENA x 1 who sustained OHCA s/p VA-ECMO and PCI with POBA of LCx for acute stent thrombosis and then admitted to [facility] CICU 7/18/2025.     I personally examined and evaluated the patient today as part of a shared APRN/PA visit. I personally performed the substantive portion of the medical decision making for this visit - please see the ISMAEL's documentation for full details.   I have reviewed and evaluated the vital signs, medications, laboratory values, imaging studies, and consults from the past 24 hours.     Interval:   Had care conference yesterday.    Versed gtt started at 4pm for myoclonus status epilepticus  7/22:   IMPRESSION OF VIDEO EEG DAY # 4  This LTM vEEG recording is abnormal due to the presence of:  Waxing and waning GPDs throughout the recording ranging from 0.5 to greater than 3 Hz at times.  Three patient events significant for status myoclonus.    7/23:   IMPRESSION OF VIDEO EEG DAY # 5  This is an abnormal LTM vEEG recording due to the presence of:  Near complete suppression of the background  Rare generalized periodic epileptiform discharges.    Oxygen requirement decreasing.     -Neuro: unclear downtime, no seizure recorded on EEG, NSE peaked at 105, continue Keppra/Versed.  -CV: continue Amio gtt for AF, monitor Tele, serial EKG (lead II with ST elevation more pronounced). Continue ASA/ Ticagrelor, holding home Eliquis (was on for pAF/pAFL), continue Bumex gtt 0.5mg/hr for net negative 1-2L/24h.  -Pulm: continue MV  -ID: [id number]    Care conference again tomorrow.     I spent a total of 35 minutes providing critical care services excluding procedure time.     Ilhwan Yeo, MD  Interventional/Critical Care Cardiology    July 24, 2025

## 2025-07-24 NOTE — PROGRESS NOTES
[facility]    ECLS Shift Summary:     ECMO Equipment:  Console Serial Number: [id number]  Circuit Lot Number: [id number]  Oxygenator Lot Number: [id number]    Circuit Assessment: Fibrin  Fibrin Location: connectors    Arterial ECMO Cannula: 17 Fr in the Right Femoral Artery  Venous ECMO Cannula: 25 Fr in the Right Femoral Vein  Distal Perfusion Catheter: 8 Fr in the Right Superficial Femoral Artery    ECMO Cannula Arterial Right femoral artery-Site Assessment: Sutured, Secure  ECMO Cannula Venous Right femoral vein-Site Assessment: Secure, Sutured  Distal Perfusion Catheter Right superficial femoral artery-Site Assessment: Sutured, Secure  ECMO Cannula Arterial Right femoral artery-Site Intervention: Dressing changed/new dressing  ECMO Cannula Venous Right femoral vein-Site Intervention: Dressing changed/new dressing  Distal Perfusion Catheter Right superficial femoral artery-Site Intervention: Dressing changed/new dressing    Patient remains on V-A ECMO, all equipment is functioning and alarms are appropriately set. RPM's: 2875 with Blood Flow (Circuit) LPM  Av.7 LPM  Min: 2.67 LPM  Max: 2.74 LPM L/min. Sweep is at 0.5 LPM and 50 %. Extremities are warm.     Significant Shift Events: Dressing changed and no sutures were noted at the site so Dr. Yeo added sutures and dressing change was completed.     Vent settings:  FiO2 (%): 50 %, Resp: (!) 0, Vent Mode: VC/AC, Resp Rate (Set): 12 breaths/min, Tidal Volume (Set, mL): 400 mL, PEEP (cm H2O): 8 cmH2O, Resp Rate (Set): 12 breaths/min, Tidal Volume (Set, mL): 400 mL, PEEP (cm H2O): 8 cmH2O    Anticoagulation:  Dose (units/hr) HEParin: 2000 Units/hr  Rate (mL/hr) HEParin: 20 mL/hr  Concentration HEParin: 100 Units/mL        Most recent ACT  (seconds): 190 seconds    Urine output is Yellow / Straw Colored.  Blood loss was zero. Product given included none.     Intake/Output Summary (Last 24 hours) at 2025 1727  Last  data filed at 7/24/2025 1700  Gross per 24 hour   Intake 2337.1 ml   Output 2945 ml   Net -607.9 ml       Labs:  Recent Labs   Lab 07/24/25  1538 07/24/25  1401 07/24/25  1150 07/24/25  1011   PH 7.45 7.47* 7.45 7.42   PCO2 35 35 36 39   PO2 133* 101 130* 111*   HCO3 24 25 25 25   O2PER 50  50  50 60 60 60       Lab Results   Component Value Date    HGB 8.8 (L) 07/24/2025    PHGB <30 07/24/2025     07/24/2025    FIBR 1,140 (H) 07/24/2025    INR 1.29 (H) 07/24/2025    PTT 99 (H) 07/24/2025    DD 2.68 (H) 07/24/2025    AXA 0.21 05/24/2013    ANTCH 89 07/24/2025       Plan:  Care conference tomorrow, 7/25.    Sunni Valladares RT  ECMO Specialist  7/24/2025 5:27 PM

## 2025-07-24 NOTE — PROGRESS NOTES
"CLINICAL NUTRITION SERVICES - REASSESSMENT/FOLLOW-UP     Recommendations for MDs/Providers:  {RD MD Recommendations:433172}    Registered Dietitian Interventions:  {RD Interventions:628512}  {RD Interventions:740547}    Future/Additional Recommendations:  {RD Additional Recs:262875}     Reason for Admission: Cardiac arrest   PMH: HTN, T2DM, HLD, DEMOND    SUBJECTIVE INFORMATION  Assessed patient in room.    CURRENT NUTRITION ORDERS  Diet: {RD Diet, Snacks, Supplements:409308}  Supplements: {Amsupplements:542087} at {AMSuppTime:237004}    CURRENT INTAKE/TOLERANCE  {RD Intakes and Tolerance:352335}     ESTIMATED NUTRITION NEEDS  Based on: *** kg ({RD Body Weight Used:330398})    Energy: 4294-8444 kcals/day (11 - 14 kcals/kg of admission wt)  Justification: {RD Energy Justification:083278}  Protein: *** grams protein/day ({RDNEstimatedproteinneeds:029876} of {RDNDosingweightjustification:305110})  Justification: {RD Pro Justification:224307}  Fluid:    1 mL/kcal ({RD Fluid Estimate:756162})  Justification: {KK Est Fluid:855028}    CURRENT FINDINGS  Height: 1.778 m (5' 10\")  Admission Weight: 113 kg (249 lb 1.9 oz) (07/18/25 2100)   IBW: ***  Body mass index is 35.21 kg/m . {RDNBMI:291120}  Weight History: {RD Weight Trends:254141}  Wt Readings from Last 5 Encounters:   07/24/25 111.3 kg (245 lb 6 oz)   07/14/25 115.5 kg (254 lb 9.6 oz)   01/15/22 104.8 kg (231 lb)   07/07/13 100.7 kg (222 lb)   07/02/13 101.2 kg (223 lb)     Skin/wounds: Reviewed   Wound Anterior;Left Toe (Comment  which one) Other (comment) (Active)       Wound Right;Lower Lip Pressure injury Mucosal (Active)   Wound Bed Scabbing;Bleeding 07/24/25 1200   Sabra-wound Assessment Ecchymosis;Edema;Erythema 07/24/25 1200   Wound Length (cm) 1 cm 07/20/25 2000   Wound Width (cm) 1 cm 07/20/25 2000   Wound Surface Area (cm^2) 0.79 cm^2 07/20/25 2000   Drainage Amount Scant 07/24/25 1200   Drainage Color/Characteristics Serosanguineous 07/24/25 1200   Wound " "Care/Cleansing Soap and water 07/24/25 1200   Dressing Open to air;Per Plan of Care 07/24/25 1200   Dressing Status Changed;Clean, dry, intact 07/23/25 0800   Wound Image   07/22/25 1400     GI symptoms: {RD GI Symptoms:215844}; Last BM: 07/24/25  Nutrition-relevant labs: Reviewed  Lab Results   Component Value Date     (H) 07/24/2025    POTASSIUM 3.8 07/24/2025     (H) 07/24/2025    BUN 97.0 (H) 07/24/2025    CR 2.41 (H) 07/24/2025    GFRESTIMATED 28 (L) 07/24/2025    PALMIRA 8.8 07/24/2025    MAG 2.4 (H) 07/24/2025    PHOS 5.0 (H) 07/24/2025     Nutrition-relevant medications: {RD Nutrition Med List:035819::\"Reviewed\"}  Current Facility-Administered Medications   Medication Dose Route Frequency Provider Last Rate Last Admin    amiodarone (NEXTERONE) 1.8 mg/mL in dextrose 5% 200 mL ADULT STANDARD infusion  0.5 mg/min Intravenous Continuous Sidney Lozada MD 16.7 mL/hr at 07/24/25 1400 0.5 mg/min at 07/24/25 1400    artificial tears ophthalmic ointment   Both Eyes Q8H PRN Matt Nugent MD        aspirin (ASA) chewable tablet 81 mg  81 mg Oral or Feeding Tube Daily Matt Nugetn MD   81 mg at 07/24/25 0752    bumetanide (BUMEX) 0.25 mg/mL infusion  0.5 mg/hr Intravenous Continuous Sidney Lozada MD 2 mL/hr at 07/24/25 1400 0.5 mg/hr at 07/24/25 1400    calcium chloride 1 g in sodium chloride 0.9 % 100 mL intermittent infusion  1 g Intravenous Q6H PRN Matt Nugent MD   1 g at 07/22/25 0455    chlorhexidine (PERIDEX) 0.12 % solution 15 mL  15 mL Mouth/Throat Q12H Matt Nugent MD   15 mL at 07/24/25 0752    dextrose 10% infusion   Intravenous Continuous PRN Sidney Lozada MD        dextrose 10% infusion   Intravenous Continuous PRN Matt Nugent MD        glucose gel 15-30 g  15-30 g Oral Q15 Min PRN Matt Nugent MD        Or    dextrose 50 % injection 25-50 mL  25-50 mL Intravenous Q15 Min PRN Matt Nugent MD        Or    glucagon injection 1 mg  1 mg " Subcutaneous Q15 Min PRN Matt Nugent MD        heparin 2 unit/mL in 0.9% NaCl (for REPERFUSION CATHETER)  3 mL/hr Intravenous Continuous Matt Nugent MD 3 mL/hr at 07/24/25 1400 3 mL/hr at 07/24/25 1400    heparin 25,000 units in 0.45% NaCl 250 mL ANTICOAGULANT infusion  10-50 Units/kg/hr (Ideal) Other Continuous Matt Nugent MD 20 mL/hr at 07/24/25 1400 2,000 Units/hr at 07/24/25 1400    heparin ANTICOAGULANT bolus dose from infusion pump 388-776 Units  5-10 Units/kg (Ideal) Other Q30 Min PRN Matt Nugent MD        hydrALAZINE (APRESOLINE) tablet 50 mg  50 mg Oral Q6H Sidney Lozada MD   50 mg at 07/24/25 1113    HYDROmorphone (PF) (DILAUDID) injection 0.5-1 mg  0.5-1 mg Intravenous Q1H PRN Sidney Lozada MD   1 mg at 07/23/25 1434    insulin regular (MYXREDLIN) 1 unit/mL infusion  0-24 Units/hr Intravenous Continuous Matt Nugent MD 3 mL/hr at 07/24/25 1400 3 Units/hr at 07/24/25 1400    levETIRAcetam (KEPPRA) intermittent infusion 1,000 mg  1,000 mg Intravenous Q12H Fausto Taylor MD   1,000 mg at 07/24/25 0752    lidocaine (LMX4) cream   Topical Q1H PRN Matt Nugent MD        lidocaine 1 % 0.1-1 mL  0.1-1 mL Other Q1H PRN Matt Nugent MD        melatonin tablet 10 mg  10 mg Oral or Feeding Tube At Bedtime PRN Sidney Lozada MD        midazolam (VERSED) 100 mg/100 mL NS infusion - ADULT  5 mg/hr Intravenous Continuous María Jonas CNP        midazolam (VERSED) injection 2-4 mg  2-4 mg Intravenous Q1H PRN Sidney Lozada MD   4 mg at 07/23/25 1434    multivitamins w/minerals liquid 15 mL  15 mL Per Feeding Tube Daily Sidney Lozada MD   15 mL at 07/24/25 0753    naloxone (NARCAN) injection 0.2 mg  0.2 mg Intravenous Q2 Min PRN Epifanio Beckwith MD        Or    naloxone (NARCAN) injection 0.4 mg  0.4 mg Intravenous Q2 Min PRN Epifanio Beckwith MD        Or    naloxone (NARCAN) injection 0.2 mg  0.2 mg Intramuscular Q2 Min PRN Tang  "Epifanio SOMMERS MD        Or    naloxone (NARCAN) injection 0.4 mg  0.4 mg Intramuscular Q2 Min PRN Epifanio Beckwith MD        niCARdipine 40 mg in 200 mL NS (CARDENE) infusion  0.5-15 mg/hr Intravenous Continuous Matt Nugent MD   Stopped at 07/23/25 0701    pantoprazole (PROTONIX) 2 mg/mL suspension 40 mg  40 mg Oral or Feeding Tube Daily Epifnaio Beckwith MD   40 mg at 07/24/25 0753    polyethylene glycol (MIRALAX) Packet 17 g  17 g Oral or Feeding Tube Daily Matt Nugent MD   17 g at 07/24/25 0752    Prosource TF20 ENfit Compatibl EN LIQD (PROSOURCE TF20) packet 60 mL  1 packet Per Feeding Tube TID Sidney Lozada MD   60 mL at 07/24/25 1413    senna-docusate (SENOKOT-S/PERICOLACE) 8.6-50 MG per tablet 1-2 tablet  1-2 tablet Oral or Feeding Tube BID PRN Matt Nugent MD        sodium chloride (PF) 0.9% PF flush 3 mL  3 mL Intracatheter Q8H PRN Matt Nugent MD        sodium chloride (PF) 0.9% PF flush 3 mL  3 mL Intracatheter q1 min prn Matt Nugent MD        ticagrelor (BRILINTA) tablet 90 mg  90 mg Oral or Feeding Tube BID Matt Nugent MD   90 mg at 07/24/25 0752     MALNUTRITION  % Intake: {RDNMalnutrition%intake:282439}  % Weight Loss: {KK Maln Wt:106444}  Subcutaneous Fat Loss: {RDNMalnutritionsubcutaneousfatloss:273152}  Muscle Loss: {RDNMalnutritionmuscleloss:224347}  Fluid Accumulation/Edema: {RDNMalnutritionfluidaccumulationedema:203436}  Malnutrition Diagnosis: {RDNMalnutritiondiagnosis:103644}  Malnutrition Present on Admission: {RDNMalnutritionpresentonadmit:785335}    EVALUATION OF PROGRESS TOWARD GOALS   Previous Goals  ***  Evaluation: {RDNpreviousgoalevaluation:176386}    Previous Nutrition Diagnosis  ***  Evaluation: {RDNpreviousnutritiondiagnosisevaluation:355779}    NUTRITION DIAGNOSIS  {RDNNutritiondiagnosis:269178} related to {RD PES Related to:429060} as evidenced by ***    INTERVENTIONS  {RD Interventions:096495::\"See nutrition interventions " "above\"}    Goals  {RD Goals:735934}     Monitoring/Evaluation      Progress toward goals will be monitored and evaluated per policy.    Delfina Nguyen RD   "

## 2025-07-24 NOTE — CONSULTS
Palliative Care Consultation Note  [facility]      Patient: Bob Echols  Date of Admission:  7/18/2025    Requesting Clinician / Team: CICU  Reason for consult: GOC/family support       Recommendations & Counseling     GOALS OF CARE:   Full restorative cares at this time.  Care conference planned for Friday 7/25; timing TBD    ADVANCE CARE PLANNING:  No ACD or POLST on file.  Code status: Full Code    MEDICAL MANAGEMENT:   We are not involved in medical management of this patient.    PSYCHOSOCIAL/SPIRITUAL SUPPORT:  I put in a request for chaplaincy support.    Palliative Care will continue to follow this patient. Thank you for the consult and allowing us to aid in the care of Bob Echols.    These recommendations have been discussed with primary team via this note    Chavo Teran MD  [facility], Palliative Care  Securely message with the Vocera Web Console (learn more here) or  Text page via [employer] Paging/Directory   TT: 66 minutes, with > 50% spent in C/C/E patient/family/care teams re: GOC, POC, Sx management. [id number]        Assessment    Bob Echols is a 70 year old male with a past medical history of HTN, DM2, and DEMOND.     Recent admission for inferior STEMI on 7/10 (treated with percutaneous revascularization.    The patient was discharged on 7/14 and had been doing well at home prior to 7/18 when he was found to be apneic without a pulse, with estimated down time ~10-15 min prior to discovery.     Subsequently found to have subacute in-stent thrombosis of his recently placed stent without obvious mechanical causes. Now admitted to [facility] for further care.     History of Present Illness   I spoke with wife Milly on the phone today.    I introduced palliative care services and our multidisciplinary team. Explained that our service offers an extra layer of support for individuals who are experiencing serious, life threatening illnesses, which can  "include assistance with symptom management, emotional and spiritual support as well as complex medical decision making.     Now with concerns of anoxic brain injury 2/2 cardiac arrest.  CBC planned for Friday 7/25    Prognosis, Goals, & Planning:   Functional Status just prior to this current hospitalization:  Was working full-time as a  until his MI earlier this month.  Was independent at home after being discharged 7/14    Prognosis, Goals, and/or Advance Care Planning:  See comments above; care conference tomorrow    Code Status was not addressed today, pending family meeting tomorrow.:         Patient does not have decision-making capacity today for complex decisions:           Coping, Meaning, & Spirituality:   Rely on family and friends.  Uatsdin radha is core component of life varies life, radha is important to Ron but perhaps to a lesser degree.    Social:   , 2 adult children.  Ron has worked as a  for many years, up until the time of his recent MI.    Medications:  Reviewed this patient's medication profile and medications from this hospitalization.    [place] Board of Pharmacy Data Base Reviewed:   No opioid or BZD prescriptions noted in this database.  Other documented prescriptions fit with known care/prescribers    ROS:  Comprehensive ROS is reviewed and is negative except as here & per HPI: Ron is unable to participate in comprehensive review of systems 2/2 likely anoxic brain injury, intubation.  Appears comfortable    Physical Exam   Pulse 94, temperature 98.2  F (36.8  C), resp. rate 20, height 1.778 m (5' 10\"), weight 111.3 kg (245 lb 6 oz), SpO2 100%.   General: Appears comfortable lying in bed, ECMO, EEG    Data reviewed:  ROUTINE ICU LABS (Last four results)  CMP  Recent Labs   Lab 07/24/25  1011 07/24/25  1005 07/24/25  0733 07/24/25  0349 07/24/25  0347 07/23/25  2201 07/23/25  2200 07/23/25  1552 07/23/25  1546 07/23/25  0357 07/23/25  0356 " 07/22/25 0347 07/22/25 0341 07/21/25 2201 07/21/25 2156   *  --   --   --  146*  --  146*  --  146*   < > 144   < > 141  --  141   POTASSIUM 3.8  --   --   --  3.7  --  3.7  --  3.9   < > 3.9   < > 3.3*  --  4.1   CHLORIDE 109*  --   --   --  107  --  107  --  107   < > 105   < > 108*  --  108*   CO2 23  --   --   --  23  --  22  --  21*   < > 15*   < > 20*  --  21*   ANIONGAP 17*  --   --   --  16*  --  17*  --  18*   < > 24*   < > 13  --  12   *  176* 164* 136*   < > 139*  144*   < > 116*  118*   < > 207*  214*   < > 185*  189*   < > 217*  222*   < > 104*  109*   BUN 97.0*  --   --   --  93.3*  --  85.6*  --  78.7*   < > 67.2*   < > 57.9*  --  52.4*   CR 2.41*  --   --   --  2.39*  --  2.35*  --  2.14*   < > 1.90*   < > 1.74*  --  1.74*   GFRESTIMATED 28*  --   --   --  29*  --  29*  --  33*   < > 38*   < > 42*  --  42*   PALMIRA 8.8  --   --   --  8.8  --  8.8  --  8.8   < > 8.9   < > 8.2*  --  8.5*   MAG 2.4*  --   --   --  2.4*  --  2.3  --  2.3   < > 2.1   < > 2.3  --  2.3   PHOS  --   --   --   --  5.0*  --   --   --   --   --  6.1*  --  2.6  --  3.5   PROTTOTAL 6.2*  --   --   --  6.3*  --  6.3*  --  6.4   < > 6.6   < > 5.6*  --  6.2*   ALBUMIN 2.6*  --   --   --  2.6*  --  2.7*  --  2.7*   < > 2.8*   < > 2.6*  --  2.8*   BILITOTAL 0.2  --   --   --  0.3  --  0.3  --  0.3   < > 0.3   < > 0.2  --  0.2   ALKPHOS 101  --   --   --  102  --  119  --  116   < > 125   < > 122  --  141   AST 51*  --   --   --  53*  --  53*  --  56*   < > 58*   < > 58*  --  66*   ALT 33  --   --   --  34  --  34  --  36   < > 39   < > 45  --  54    < > = values in this interval not displayed.     CBC  Recent Labs   Lab 07/24/25  1011 07/24/25  0347 07/23/25  2200 07/23/25  1546   WBC 12.0* 12.4* 12.0* 13.1*   RBC 3.15* 3.15* 3.21* 3.19*   HGB 8.7* 8.7* 8.7* 8.9*   HCT 27.8* 27.1* 27.4* 27.8*   MCV 88 86 85 87   MCH 27.6 27.6 27.1 27.9   MCHC 31.3* 32.1 31.8 32.0   RDW 14.7 14.8 14.8 14.6    222 228 220      INR  Recent Labs   Lab 07/24/25  1011 07/24/25  0347 07/23/25  2200 07/23/25  1546   INR 1.28* 1.34* 1.36* 1.30*     Arterial Blood Gas  Recent Labs   Lab 07/24/25  1011 07/24/25  0734 07/24/25  0555 07/24/25  0347   PH 7.42 7.45 7.43 7.44   PCO2 39 35 37 36   PO2 111* 112* 78* 108*   HCO3 25 24 24 25   O2PER 60 60 60 60  60  60

## 2025-07-24 NOTE — PROGRESS NOTES
Major Shift Events: now sedated for myoclonus status. Afib overnight . No pauses or ectopy overnight. Able to titrate ecmo and vent fi02 down. Adequate UOP on bumex. Potassium replaced x 2. Insulin titrated to mainain glucose control.       Plan: care conference on Friday.     For vital signs and complete assessments, please see documentation flowsheets.

## 2025-07-24 NOTE — PROGRESS NOTES
Cardiology ICU Progress Note    Brief HPI:  Bob Echols is a 70 year old male with a past medical history of HTN, DM2, and DEMOND as well as recent admission for inferior STEMI on 7/10 (treated with percutaneous revascularization with 2.75 x 20mm Promus RENA to the distal LCx). The patient was discharged on 7/14 and had been doing well at home prior to 7/18 when he was found to be apneic without a pulse with estimated down time ~10-15 min prior to discovery. EMS was called at 1537, arrived and started compressions at 1542, cannulated for VA ECMO at 1614. Subsequently found to have subacute in-stent thrombosis of his recently placed stent without obvious mechanical causes, treated with 2.75 x 20mm Synergy XD RENA. Now admitted to [facility] for further care. His initial pH was 7.08, PcO2 74, PO2 58, Lactate 8.6.     Subjective and Interval:  Rates controlled  In status myoclonus epilepticus, IV versed restarted   Continues to have poor neurologic exam- basic brainstem reflexes c/w CT findings of severe anoxic brain injury     Assessment and plan by system:  ==Today's changes ==  - Bumex gtt 0.5 mg/hr   - Family care conference on Friday    == Neurology ==   #?anoxic brain injury    #hypoxic encephalopathy 2/2 cardiac arrest  #Status myoclonus eplipeticus   -Intubated, sedated. Avoiding hyperthermia  -NCC consulted and appreciate recommendations  -vEEG   -Palliative care consulted    CT head 7/21  Continued diminished gray-white differentiation throughout  the cerebral hemispheres, concerning for hypoxic ischemic injury.    Current sedation: Versed gtt    Plan:  -Continue to hold sedation pending neurologic recovery   -Spontaneous awakening trial as tolerated  -Permissive hypernatremia for neuroprotection  -Goals of care conversation to be had with family 7/23    == Cardiovascular ==  #Refractory VF cardiac arrest   #cardiogenic shock requiring VA ECMO ([facility] E)  #CAD, status post percutaneous revascularization of the  left circumflex artery with sinacute in-stent thrombosis   #PAH  #HTN  Presented initially to [facility] on 7/10 with inferior STEMI, found to have acute thrombotic lesion of the distal LCx treated with a 2.75 x 20mm Promus RENA, discharged on apixaban and clopidogrel. He was found down at home in VF-mediated cardiac arrest on  requiring emergent VA ECMO cannulation. Coronary angiography demonstrated in-stent thrombosis of his recently placed stent without obvious mechanical cause and therefore presumed to be clopidogrel failure. Treated with a 2.75 x 20mm Synergy XD RENA.     Peripheral V-A ECMO inserted via RCFA and RCFV   Angiogram:     Prox LAD to Mid LAD lesion is 55% stenosed.    Prox RCA lesion is 30% stenosed.    1st Diag lesion is 40% stenosed.    2nd Mrg lesion is 70% stenosed.    RPAV lesion is 70% stenosed.    Mid Cx lesion is 100% stenosed.    TTE:   Left ventricular size is normal. Left ventricular function is decreased. The  ejection fraction is 5-10% (severely reduced). Mild to moderate right  ventricular dilation is present.  Global right ventricular function is severely reduced.  Trace to mild mitral insufficiency is present.  AV opens with each systole No aortic regurgitation is present.  The inferior vena cava is normal. Trivial to small circumferential pericardial  effusion with no hemodynamic consequences    ECG Rhythm: Atrial fibrillation    ECMO :  Mode: V-A   Pump Type: Cardiohelp  RPM's: [id number]  Blood Flow (Circuit) LPM: 2.74 LPM  Sweep LPM: 0.5 LPM  Sweep FiO2   %: 50 %  Venous Pressure  mmHg: -15 mmHg  Arterial Pressure  mmH mmHg  Internal Pressure mmH mmHg  Pressure Change mmHg: 15 mmHg    Current Pressors/inotropes/antiarrythmic: N/A    Hemodynamics:  Art Line  Arterial Line BP: 120/68  Arterial Line MAP (mmHg): 82 mmHg  Arterial Line Location: Right radial  Art Line Wave Form: Appropriate wave forms      Plan:  -Continue ASA 81mg and ticagrelor 90mg BID   -Hold statin given  shock liver, resume when leaving ICU  -Hold ACE/ARB for now given likely reduced renal fxn after arrest  -Hold beta blocker given shock  -Telemetry monitoring  -Trend troponin and lactic acid  -Continue ECMO support  -FBG negative 1L, Diuresis with bumex gtt @ 0.5 mg/hr  -IV amiodarone bolus and gtt for A fib with RVR/VT  -Hydralazine 50mg q6h     == Pulmonary ==  #Acute hypoxemic respiratory failure requiring intubation  #DEMOND    Vent Settings:   FiO2 (%): 60 %, Resp: 26, Vent Mode: VC/AC, Resp Rate (Set): 12 breaths/min, Tidal Volume (Set, mL): 400 mL, PEEP (cm H2O): 8 cmH2O, Resp Rate (Set): 12 breaths/min, Tidal Volume (Set, mL): 400 mL, PEEP (cm H2O): 8 cmH2O    Plan:  -Wean vent as able  -Daily CXR  -Serial ABGs  -Peridex BID  -FBG and diuresis as above  -Will defer tx of RLL consolidation unless develops clinical evidence of infection        == Gastrointestinal, Nutrition ==  #Severe obesity  #?shock liver 2/2 cardiac arrest  #Hypoalbuminemia     Diet Advance tube feeds to goal    Plan:  -Monitor LFTs  -Bowel regimen in place  -GI Prophylaxis: PPI; Protonix 40 mg daily     == Renal, Electrolytes ==  #?acute renal injury 2/2 cardiac arrest  #Lactic acidosis    Plan:  -Avoid nephrotoxins, renally dose meds  -Monitor urine output  -FBG and diuresis as above    == Infectious Disease ==  #Aspiration pneumonia  #Leukocytosis    Plan:  -Continue CTX x 5 days for aspiration coverage  -Monitor for signs of infection  -Avoid fevers     == Hematology ==  # Anemia of critical illness    Receiving heparin for ECMO with ACT goal 180-200    Plan:  -Continue Heparin with ACT goal as above  -Transfusion parameters:   -1u PRBC for hbg < 8.0   -1u platelet for plt < 100   -1u FFP for INR > 3   -5u cryoprecipitate for fibrinogen <150    ASA/ticagrelor for RENA  US LE w/ arterial duplex with no evidence of acute thrombus  DVT PPX: Heparin as above    == Endocrinology ==  #Hyperglycemia   #DM2     Plan:  -Insulin infusion as  "needed    == Integumentary ==  -No skin issues    Plan:  -CTM cannula sites  -WOCN consult    == Lines/Tubes/Drains ==  Cannula: right fem  Airway: ETT  Enteric: OGT  Central access: left fem  Arterial access: radial  Urinary: yes  Rectal Tube: yes    ICU Checklist  Feeding: yes  Analgesia: fentanyl  Sedation: propofol   Thrombopx: heparin infusion  Head of bed: RT  Ulcers: PPI  Glucose: CTM  SBT: not today  Bowel regimen: yes  Indwelling cath: yes  De-escalate meds: no  Code Status: Full Code , eCPR candidate? Yes, on support  Family updated by team. Patient seen and discussed with staff physician, Dr. Ilhwan Yeo.     Dilan Lozada MD  Cardiovascular Diseases Fellow    Objective:  Most recent vital signs:  Pulse 98   Temp 98.4  F (36.9  C)   Resp 26   Ht 1.778 m (5' 10\")   Wt 111.3 kg (245 lb 6 oz)   SpO2 100%   BMI 35.21 kg/m    Temp:  [98.2  F (36.8  C)-98.4  F (36.9  C)] 98.4  F (36.9  C)  Pulse:  [] 98  Resp:  [0-42] 26  MAP:  [64 mmHg-152 mmHg] 82 mmHg  Arterial Line BP: ()/(50-80) 120/68  FiO2 (%):  [60 %-80 %] 60 %  SpO2:  [96 %-100 %] 100 %    Physical exam:  General: critically ill, supine in bed, in NAD  HEENT: PERRL, no scleral icterus or injection  CARDIAC: RRR, no m/r/g appreciated. Peripheral pulses dopplered  RESP: synchronous with mechanical ventilation, CTAB, no wheezes, rhonchi or crackles appreciated.  GI: soft, non-distended, BS hypoactive  : Urinary catheter present  EXTREMITIES: No BLE edema, pulses 2+. Femoral access site w/o bleeding, dressing C/D/I.   SKIN: No acute lesions appreciated on exposed skin.  NEURO: Intubated and sedated. Unable to assess language or mentation. Unresponsive to noxious stim x 4 extremities. No focal deficit.     Imaging/procedure results:  Recent Results (from the past 24 hours)   CBC with platelets   Result Value Ref Range    WBC Count 14.7 (H) 4.0 - 11.0 10e3/uL    RBC Count 3.26 (L) 4.40 - 5.90 10e6/uL    Hemoglobin 9.0 (L) 13.3 - 17.7 g/dL "    Hematocrit 27.6 (L) 40.0 - 53.0 %    MCV 85 78 - 100 fL    MCH 27.6 26.5 - 33.0 pg    MCHC 32.6 31.5 - 36.5 g/dL    RDW 14.6 10.0 - 15.0 %    Platelet Count 231 150 - 450 10e3/uL   Comprehensive metabolic panel   Result Value Ref Range    Sodium 147 (H) 135 - 145 mmol/L    Potassium 3.6 3.4 - 5.3 mmol/L    Carbon Dioxide (CO2) 21 (L) 22 - 29 mmol/L    Anion Gap 18 (H) 7 - 15 mmol/L    Urea Nitrogen 71.3 (H) 8.0 - 23.0 mg/dL    Creatinine 2.05 (H) 0.67 - 1.17 mg/dL    GFR Estimate 34 (L) >60 mL/min/1.73m2    Calcium 9.0 8.8 - 10.4 mg/dL    Chloride 108 (H) 98 - 107 mmol/L    Glucose 162 (H) 70 - 99 mg/dL    Alkaline Phosphatase 116 40 - 150 U/L    AST 58 (H) 0 - 45 U/L    ALT 38 0 - 70 U/L    Protein Total 6.5 6.4 - 8.3 g/dL    Albumin 2.7 (L) 3.5 - 5.2 g/dL    Bilirubin Total 0.2 <=1.2 mg/dL   Calcium ionized whole blood   Result Value Ref Range    Calcium Ionized Whole Blood 4.5 4.4 - 5.2 mg/dL   Glucose whole blood   Result Value Ref Range    Glucose 155 (H) 70 - 99 mg/dL   Heparin Unfractionated Anti Xa Level   Result Value Ref Range    Anti Xa Unfractionated Heparin 0.61 For Reference Range, See Comment IU/mL    Narrative    Therapeutic Range: UFH: 0.25-0.50 IU/mL for low intensity dosing,  0.30-0.70 IU/mL for high intensity dosing DVT and PE.  This test is not validated for other direct factor X inhibitors (e.g. rivaroxaban, apixaban, edoxaban, betrixaban, fondaparinux) and should not be used for monitoring of other medications.   INR   Result Value Ref Range    INR 1.30 (H) 0.85 - 1.15    PT 16.5 (H) 11.8 - 14.8 Seconds   Partial thromboplastin time   Result Value Ref Range    aPTT 86 (H) 22 - 38 Seconds   Lactic acid whole blood   Result Value Ref Range    Lactic Acid 1.0 0.7 - 2.0 mmol/L   Magnesium   Result Value Ref Range    Magnesium 2.3 1.7 - 2.3 mg/dL   Troponin T, High Sensitivity   Result Value Ref Range    Troponin T, High Sensitivity 1,082 (HH) <=22 ng/L   Blood gas arterial   Result Value Ref  Range    pH Arterial 7.45 7.35 - 7.45    pCO2 Arterial 34 (L) 35 - 45 mm Hg    pO2 Arterial 97 80 - 105 mm Hg    FIO2 80     Bicarbonate Arterial 24 21 - 28 mmol/L    Base Excess/Deficit Arterial 0.1 -3.0 - 3.0 mmol/L    Rei's Test Artline     Oxyhemoglobin Arterial 97 92 - 100 %    O2 Sat, Arterial 98.5 (H) 95.0 - 96.0 %    Narrative    In healthy individuals, oxyhemoglobin (O2Hb) and oxygen saturation (SO2) are approximately equal. In the presence of dyshemoglobins, oxyhemoglobin can be considerably lower than oxygen saturation.   Glucose by meter   Result Value Ref Range    GLUCOSE BY METER POCT 148 (H) 70 - 99 mg/dL   Blood gas arterial   Result Value Ref Range    pH Arterial 7.45 7.35 - 7.45    pCO2 Arterial 35 35 - 45 mm Hg    pO2 Arterial 156 (H) 80 - 105 mm Hg    FIO2 80     Bicarbonate Arterial 24 21 - 28 mmol/L    Base Excess/Deficit Arterial 0.5 -3.0 - 3.0 mmol/L    Rei's Test Artline     Oxyhemoglobin Arterial 98 92 - 100 %    O2 Sat, Arterial 99.9 (H) 95.0 - 96.0 %    Narrative    In healthy individuals, oxyhemoglobin (O2Hb) and oxygen saturation (SO2) are approximately equal. In the presence of dyshemoglobins, oxyhemoglobin can be considerably lower than oxygen saturation.   Glucose by meter   Result Value Ref Range    GLUCOSE BY METER POCT 129 (H) 70 - 99 mg/dL   XR Chest Port 1 View    Narrative    Exam: XR CHEST PORT 1 VIEW, 7/23/2025 11:57 AM    Indication: ett adv 4cm    Comparison: Chest x-ray 7/23/2025 at 01:10    Findings:   Portable AP supine radiograph of the chest. Endotracheal tube  terminates within the mid thoracic trachea proximally 5 cm above the  davis. Inferior protocol cannula terminates within the right atrium.  Gastric tube crosses the diaphragm and projects outside of the field  of view.    Trachea is midline. The cardiomediastinal silhouette and pulmonary  vasculature are stable. Persistent, mildly increased, streaky/patchy  perihilar and bibasilar opacities. Dense  retrocardiac opacification,  unchanged. Small right pleural effusion. No pneumothorax. No acute  osseous or soft tissue abnormality.      Impression    Impression:   1. Endotracheal tube is advanced to the mid thoracic trachea, measures  5 cm above the davis. Other support devices detailed above.  2. Dense intracardiac bibasilar atelectasis with increased hazy  opacification of the right lower lobe. Probable small right pleural  effusion.    I have personally reviewed the examination and initial interpretation  and I agree with the findings.    GARCIA COURTNEY MD         SYSTEM ID:  [id number]   Activated clotting time celite, POCT   Result Value Ref Range    Activated Clotting Time (Celite) POCT 182 (H) 74 - 150 seconds   Blood gas arterial   Result Value Ref Range    pH Arterial 7.45 7.35 - 7.45    pCO2 Arterial 34 (L) 35 - 45 mm Hg    pO2 Arterial 185 (H) 80 - 105 mm Hg    FIO2 80     Bicarbonate Arterial 23 21 - 28 mmol/L    Base Excess/Deficit Arterial -0.3 -3.0 - 3.0 mmol/L    Rei's Test Artline     Oxyhemoglobin Arterial 99 92 - 100 %    O2 Sat, Arterial >100.0 (H) 95.0 - 96.0 %    Narrative    In healthy individuals, oxyhemoglobin (O2Hb) and oxygen saturation (SO2) are approximately equal. In the presence of dyshemoglobins, oxyhemoglobin can be considerably lower than oxygen saturation.   Glucose by meter   Result Value Ref Range    GLUCOSE BY METER POCT 168 (H) 70 - 99 mg/dL   CBC with platelets   Result Value Ref Range    WBC Count 13.1 (H) 4.0 - 11.0 10e3/uL    RBC Count 3.19 (L) 4.40 - 5.90 10e6/uL    Hemoglobin 8.9 (L) 13.3 - 17.7 g/dL    Hematocrit 27.8 (L) 40.0 - 53.0 %    MCV 87 78 - 100 fL    MCH 27.9 26.5 - 33.0 pg    MCHC 32.0 31.5 - 36.5 g/dL    RDW 14.6 10.0 - 15.0 %    Platelet Count 220 150 - 450 10e3/uL   Comprehensive metabolic panel   Result Value Ref Range    Sodium 146 (H) 135 - 145 mmol/L    Potassium 3.9 3.4 - 5.3 mmol/L    Carbon Dioxide (CO2) 21 (L) 22 - 29 mmol/L    Anion Gap 18 (H)  7 - 15 mmol/L    Urea Nitrogen 78.7 (H) 8.0 - 23.0 mg/dL    Creatinine 2.14 (H) 0.67 - 1.17 mg/dL    GFR Estimate 33 (L) >60 mL/min/1.73m2    Calcium 8.8 8.8 - 10.4 mg/dL    Chloride 107 98 - 107 mmol/L    Glucose 214 (H) 70 - 99 mg/dL    Alkaline Phosphatase 116 40 - 150 U/L    AST 56 (H) 0 - 45 U/L    ALT 36 0 - 70 U/L    Protein Total 6.4 6.4 - 8.3 g/dL    Albumin 2.7 (L) 3.5 - 5.2 g/dL    Bilirubin Total 0.3 <=1.2 mg/dL   Calcium ionized whole blood   Result Value Ref Range    Calcium Ionized Whole Blood 4.5 4.4 - 5.2 mg/dL   Glucose whole blood   Result Value Ref Range    Glucose 207 (H) 70 - 99 mg/dL   Heparin Unfractionated Anti Xa Level   Result Value Ref Range    Anti Xa Unfractionated Heparin 0.59 For Reference Range, See Comment IU/mL    Narrative    Therapeutic Range: UFH: 0.25-0.50 IU/mL for low intensity dosing,  0.30-0.70 IU/mL for high intensity dosing DVT and PE.  This test is not validated for other direct factor X inhibitors (e.g. rivaroxaban, apixaban, edoxaban, betrixaban, fondaparinux) and should not be used for monitoring of other medications.   INR   Result Value Ref Range    INR 1.30 (H) 0.85 - 1.15    PT 16.4 (H) 11.8 - 14.8 Seconds   Partial thromboplastin time   Result Value Ref Range    aPTT 80 (H) 22 - 38 Seconds   Lactic acid whole blood   Result Value Ref Range    Lactic Acid 0.9 0.7 - 2.0 mmol/L   Magnesium   Result Value Ref Range    Magnesium 2.3 1.7 - 2.3 mg/dL   Troponin T, High Sensitivity   Result Value Ref Range    Troponin T, High Sensitivity 1,099 (HH) <=22 ng/L   D dimer quantitative   Result Value Ref Range    D-Dimer Quantitative 2.82 (H) 0.00 - 0.50 ug/mL FEU    Narrative    This D-dimer assay is intended for use in conjunction with a clinical pretest probability assessment model to exclude pulmonary embolism (PE) and deep venous thrombosis (DVT) in outpatients suspected of PE or DVT. The cut-off value is 0.50 ug/mL FEU.    For patients 50 years of age or older, the  application of age-adjusted cut-off values for D-Dimer may increase the specificity without significant effect on sensitivity. The literature suggested calculation age adjusted cut-off in ug/L = age in years x 10 ug/L. The results in this laboratory are reported as ug/mL rather than ug/L. The calculation for age adjusted cut off in ug/mL= age in years x 0.01 ug/mL. For example, the cut off for a 76 year old male is 77 x 0.01 ug/mL = 0.76 ug/mL (760 ug/L).    M Bassam et al. Age adjusted D-dimer cut-off levels to rule out pulmonary embolism: The ADJUST-PE Study. EDGAR 2014;311:3001-5237.; HJ Shi et al. Diagnostic accuracy of conventional or age adjusted D-dimer cutoff values in older patients with suspected venous thromboembolism. Systemic review and meta-analysis. BMJ 2013:346:f2492.   Blood gas ELS venous   Result Value Ref Range    pH ELS Venous 7.34 7.32 - 7.43    pCO2 ELS Venous 46 40 - 50 mm Hg    pO2 ELS Venous 56 (H) 25 - 47 mm Hg    Bicarbonate ELS Venous 24 21 - 28 mmol/L    Base Excess/Deficit Venous -1.6 -3.0 - 3.0 mmol/L    FIO2 80     Oxyhemoglobin ELS Venous 86 %    O2 Saturation ELS Venous 87.3 (H) 70.0 - 75.0 %    Narrative    In healthy individuals, oxyhemoglobin (O2Hb) and oxygen saturation (SO2) are approximately equal. In the presence of dyshemoglobins, oxyhemoglobin can be considerably lower than oxygen saturation.   Blood gas ELS arterial   Result Value Ref Range    pH ELS Arterial 7.34 (L) 7.35 - 7.45    pCO2 ELS Arterial 44 35 - 45 mm Hg    pO2 ELS Arterial 266 (H) 80 - 105 mm Hg    Bicarbonate ELS Arterial 24 21 - 28 mmol/L    Base Excess/Deficit Arterial -2.0 -3.0 - 3.0 mmol/L    FIO2 60     Oxyhemoglobin ELS Arterial 99 75 - 100 %    O2 Saturation ELS Arterial >100.0 (H) 95.0 - 96.0 %    Narrative    In healthy individuals, oxyhemoglobin (O2Hb) and oxygen saturation (SO2) are approximately equal. In the presence of dyshemoglobins, oxyhemoglobin can be considerably lower than oxygen  saturation.   Fibrinogen activity   Result Value Ref Range    Fibrinogen Activity 1,190 (H) 170 - 510 mg/dL   Blood gas arterial   Result Value Ref Range    pH Arterial 7.40 7.35 - 7.45    pCO2 Arterial 37 35 - 45 mm Hg    pO2 Arterial 153 (H) 80 - 105 mm Hg    FIO2 80     Bicarbonate Arterial 23 21 - 28 mmol/L    Base Excess/Deficit Arterial -1.5 -3.0 - 3.0 mmol/L    Rei's Test Artline     Oxyhemoglobin Arterial 98 92 - 100 %    O2 Sat, Arterial 99.6 (H) 95.0 - 96.0 %    Narrative    In healthy individuals, oxyhemoglobin (O2Hb) and oxygen saturation (SO2) are approximately equal. In the presence of dyshemoglobins, oxyhemoglobin can be considerably lower than oxygen saturation.   Glucose by meter   Result Value Ref Range    GLUCOSE BY METER POCT 192 (H) 70 - 99 mg/dL   Activated clotting time celite, POCT   Result Value Ref Range    Activated Clotting Time (Celite) POCT 186 (H) 74 - 150 seconds   Blood gas arterial   Result Value Ref Range    pH Arterial 7.46 (H) 7.35 - 7.45    pCO2 Arterial 33 (L) 35 - 45 mm Hg    pO2 Arterial 202 (H) 80 - 105 mm Hg    FIO2 80     Bicarbonate Arterial 24 21 - 28 mmol/L    Base Excess/Deficit Arterial 0.0 -3.0 - 3.0 mmol/L    Rei's Test Artline     Oxyhemoglobin Arterial 98 92 - 100 %    O2 Sat, Arterial 100.0 (H) 95.0 - 96.0 %    Narrative    In healthy individuals, oxyhemoglobin (O2Hb) and oxygen saturation (SO2) are approximately equal. In the presence of dyshemoglobins, oxyhemoglobin can be considerably lower than oxygen saturation.   Glucose by meter   Result Value Ref Range    GLUCOSE BY METER POCT 163 (H) 70 - 99 mg/dL   Activated clotting time celite, POCT   Result Value Ref Range    Activated Clotting Time (Celite) POCT 190 (H) 74 - 150 seconds   Blood gas arterial   Result Value Ref Range    pH Arterial 7.42 7.35 - 7.45    pCO2 Arterial 37 35 - 45 mm Hg    pO2 Arterial 113 (H) 80 - 105 mm Hg    FIO2 60     Bicarbonate Arterial 24 21 - 28 mmol/L    Base Excess/Deficit  Arterial 0.1 -3.0 - 3.0 mmol/L    Rei's Test Artline     Oxyhemoglobin Arterial 97 92 - 100 %    O2 Sat, Arterial 98.4 (H) 95.0 - 96.0 %    Narrative    In healthy individuals, oxyhemoglobin (O2Hb) and oxygen saturation (SO2) are approximately equal. In the presence of dyshemoglobins, oxyhemoglobin can be considerably lower than oxygen saturation.   Glucose by meter   Result Value Ref Range    GLUCOSE BY METER POCT 131 (H) 70 - 99 mg/dL   Glucose by meter   Result Value Ref Range    GLUCOSE BY METER POCT 110 (H) 70 - 99 mg/dL   CBC with platelets   Result Value Ref Range    WBC Count 12.0 (H) 4.0 - 11.0 10e3/uL    RBC Count 3.21 (L) 4.40 - 5.90 10e6/uL    Hemoglobin 8.7 (L) 13.3 - 17.7 g/dL    Hematocrit 27.4 (L) 40.0 - 53.0 %    MCV 85 78 - 100 fL    MCH 27.1 26.5 - 33.0 pg    MCHC 31.8 31.5 - 36.5 g/dL    RDW 14.8 10.0 - 15.0 %    Platelet Count 228 150 - 450 10e3/uL   Comprehensive metabolic panel   Result Value Ref Range    Sodium 146 (H) 135 - 145 mmol/L    Potassium 3.7 3.4 - 5.3 mmol/L    Carbon Dioxide (CO2) 22 22 - 29 mmol/L    Anion Gap 17 (H) 7 - 15 mmol/L    Urea Nitrogen 85.6 (H) 8.0 - 23.0 mg/dL    Creatinine 2.35 (H) 0.67 - 1.17 mg/dL    GFR Estimate 29 (L) >60 mL/min/1.73m2    Calcium 8.8 8.8 - 10.4 mg/dL    Chloride 107 98 - 107 mmol/L    Glucose 118 (H) 70 - 99 mg/dL    Alkaline Phosphatase 119 40 - 150 U/L    AST 53 (H) 0 - 45 U/L    ALT 34 0 - 70 U/L    Protein Total 6.3 (L) 6.4 - 8.3 g/dL    Albumin 2.7 (L) 3.5 - 5.2 g/dL    Bilirubin Total 0.3 <=1.2 mg/dL   Calcium ionized whole blood   Result Value Ref Range    Calcium Ionized Whole Blood 4.5 4.4 - 5.2 mg/dL   Glucose whole blood   Result Value Ref Range    Glucose 116 (H) 70 - 99 mg/dL   Heparin Unfractionated Anti Xa Level   Result Value Ref Range    Anti Xa Unfractionated Heparin 0.62 For Reference Range, See Comment IU/mL    Narrative    Therapeutic Range: UFH: 0.25-0.50 IU/mL for low intensity dosing,  0.30-0.70 IU/mL for high intensity  dosing DVT and PE.  This test is not validated for other direct factor X inhibitors (e.g. rivaroxaban, apixaban, edoxaban, betrixaban, fondaparinux) and should not be used for monitoring of other medications.   INR   Result Value Ref Range    INR 1.36 (H) 0.85 - 1.15    PT 17.0 (H) 11.8 - 14.8 Seconds   Partial thromboplastin time   Result Value Ref Range    aPTT 99 (H) 22 - 38 Seconds   Lactic acid whole blood   Result Value Ref Range    Lactic Acid 1.1 0.7 - 2.0 mmol/L   Magnesium   Result Value Ref Range    Magnesium 2.3 1.7 - 2.3 mg/dL   Troponin T, High Sensitivity   Result Value Ref Range    Troponin T, High Sensitivity 1,322 (HH) <=22 ng/L   Blood gas arterial   Result Value Ref Range    pH Arterial 7.44 7.35 - 7.45    pCO2 Arterial 36 35 - 45 mm Hg    pO2 Arterial 107 (H) 80 - 105 mm Hg    FIO2 60     Bicarbonate Arterial 24 21 - 28 mmol/L    Base Excess/Deficit Arterial 0.0 -3.0 - 3.0 mmol/L    Rei's Test Artline     Oxyhemoglobin Arterial 98 92 - 100 %    O2 Sat, Arterial 99.2 (H) 95.0 - 96.0 %    Narrative    In healthy individuals, oxyhemoglobin (O2Hb) and oxygen saturation (SO2) are approximately equal. In the presence of dyshemoglobins, oxyhemoglobin can be considerably lower than oxygen saturation.   Glucose by meter   Result Value Ref Range    GLUCOSE BY METER POCT 113 (H) 70 - 99 mg/dL   Activated clotting time celite, POCT   Result Value Ref Range    Activated Clotting Time (Celite) POCT 190 (H) 74 - 150 seconds   ABO/Rh type and screen    Narrative    The following orders were created for panel order ABO/Rh type and screen.  Procedure                               Abnormality         Status                     ---------                               -----------         ------                     Adult Type and Screen[[id number]]                           Final result                 Please view results for these tests on the individual orders.   Blood gas arterial   Result Value Ref Range    pH  Arterial 7.45 7.35 - 7.45    pCO2 Arterial 34 (L) 35 - 45 mm Hg    pO2 Arterial 107 (H) 80 - 105 mm Hg    FIO2 60     Bicarbonate Arterial 24 21 - 28 mmol/L    Base Excess/Deficit Arterial -0.1 -3.0 - 3.0 mmol/L    Rei's Test Artline     Oxyhemoglobin Arterial 98 92 - 100 %    O2 Sat, Arterial 99.4 (H) 95.0 - 96.0 %    Narrative    In healthy individuals, oxyhemoglobin (O2Hb) and oxygen saturation (SO2) are approximately equal. In the presence of dyshemoglobins, oxyhemoglobin can be considerably lower than oxygen saturation.   Adult Type and Screen   Result Value Ref Range    ABO/RH(D) A POS     Antibody Screen Negative Negative    SPECIMEN EXPIRATION DATE 7/27/2025 11:59:00 PM CDT    Glucose by meter   Result Value Ref Range    GLUCOSE BY METER POCT 128 (H) 70 - 99 mg/dL   XR Chest Port 1 View    Impression    RESIDENT PRELIMINARY INTERPRETATION  Impression:   1. Interval improvement in right lower lobe aeration. Trace basilar  atelectasis remains with underlying low lung volumes however no focal  consolidations.  2. Stable support devices.   Glucose by meter   Result Value Ref Range    GLUCOSE BY METER POCT 132 (H) 70 - 99 mg/dL   Blood gas arterial   Result Value Ref Range    pH Arterial 7.43 7.35 - 7.45    pCO2 Arterial 37 35 - 45 mm Hg    pO2 Arterial 121 (H) 80 - 105 mm Hg    FIO2 60     Bicarbonate Arterial 24 21 - 28 mmol/L    Base Excess/Deficit Arterial 0.0 -3.0 - 3.0 mmol/L    Rei's Test Artline     Oxyhemoglobin Arterial 98 92 - 100 %    O2 Sat, Arterial 99.7 (H) 95.0 - 96.0 %    Narrative    In healthy individuals, oxyhemoglobin (O2Hb) and oxygen saturation (SO2) are approximately equal. In the presence of dyshemoglobins, oxyhemoglobin can be considerably lower than oxygen saturation.   CBC with platelets   Result Value Ref Range    WBC Count 12.4 (H) 4.0 - 11.0 10e3/uL    RBC Count 3.15 (L) 4.40 - 5.90 10e6/uL    Hemoglobin 8.7 (L) 13.3 - 17.7 g/dL    Hematocrit 27.1 (L) 40.0 - 53.0 %    MCV 86 78  - 100 fL    MCH 27.6 26.5 - 33.0 pg    MCHC 32.1 31.5 - 36.5 g/dL    RDW 14.8 10.0 - 15.0 %    Platelet Count 222 150 - 450 10e3/uL   Comprehensive metabolic panel   Result Value Ref Range    Sodium 146 (H) 135 - 145 mmol/L    Potassium 3.7 3.4 - 5.3 mmol/L    Carbon Dioxide (CO2) 23 22 - 29 mmol/L    Anion Gap 16 (H) 7 - 15 mmol/L    Urea Nitrogen 93.3 (H) 8.0 - 23.0 mg/dL    Creatinine 2.39 (H) 0.67 - 1.17 mg/dL    GFR Estimate 29 (L) >60 mL/min/1.73m2    Calcium 8.8 8.8 - 10.4 mg/dL    Chloride 107 98 - 107 mmol/L    Glucose 144 (H) 70 - 99 mg/dL    Alkaline Phosphatase 102 40 - 150 U/L    AST 53 (H) 0 - 45 U/L    ALT 34 0 - 70 U/L    Protein Total 6.3 (L) 6.4 - 8.3 g/dL    Albumin 2.6 (L) 3.5 - 5.2 g/dL    Bilirubin Total 0.3 <=1.2 mg/dL   Calcium ionized whole blood   Result Value Ref Range    Calcium Ionized Whole Blood 4.5 4.4 - 5.2 mg/dL   Glucose whole blood   Result Value Ref Range    Glucose 139 (H) 70 - 99 mg/dL   Heparin Unfractionated Anti Xa Level   Result Value Ref Range    Anti Xa Unfractionated Heparin 0.64 For Reference Range, See Comment IU/mL    Narrative    Therapeutic Range: UFH: 0.25-0.50 IU/mL for low intensity dosing,  0.30-0.70 IU/mL for high intensity dosing DVT and PE.  This test is not validated for other direct factor X inhibitors (e.g. rivaroxaban, apixaban, edoxaban, betrixaban, fondaparinux) and should not be used for monitoring of other medications.   INR   Result Value Ref Range    INR 1.34 (H) 0.85 - 1.15    PT 16.8 (H) 11.8 - 14.8 Seconds   Partial thromboplastin time   Result Value Ref Range    aPTT 94 (H) 22 - 38 Seconds   Lactic acid whole blood   Result Value Ref Range    Lactic Acid 0.9 0.7 - 2.0 mmol/L   Magnesium   Result Value Ref Range    Magnesium 2.4 (H) 1.7 - 2.3 mg/dL   Troponin T, High Sensitivity   Result Value Ref Range    Troponin T, High Sensitivity 1,347 (HH) <=22 ng/L   D dimer quantitative   Result Value Ref Range    D-Dimer Quantitative 2.61 (H) 0.00 - 0.50  ug/mL FEU    Narrative    This D-dimer assay is intended for use in conjunction with a clinical pretest probability assessment model to exclude pulmonary embolism (PE) and deep venous thrombosis (DVT) in outpatients suspected of PE or DVT. The cut-off value is 0.50 ug/mL FEU.    For patients 50 years of age or older, the application of age-adjusted cut-off values for D-Dimer may increase the specificity without significant effect on sensitivity. The literature suggested calculation age adjusted cut-off in ug/L = age in years x 10 ug/L. The results in this laboratory are reported as ug/mL rather than ug/L. The calculation for age adjusted cut off in ug/mL= age in years x 0.01 ug/mL. For example, the cut off for a 76 year old male is 77 x 0.01 ug/mL = 0.76 ug/mL (760 ug/L).    M Bassam et al. Age adjusted D-dimer cut-off levels to rule out pulmonary embolism: The ADJUST-PE Study. EDGAR 2014;311:4119-3323.; HJ Shi et al. Diagnostic accuracy of conventional or age adjusted D-dimer cutoff values in older patients with suspected venous thromboembolism. Systemic review and meta-analysis. BMJ 2013:346:f2492.   Blood gas ELS venous   Result Value Ref Range    pH ELS Venous 7.36 7.32 - 7.43    pCO2 ELS Venous 46 40 - 50 mm Hg    pO2 ELS Venous 53 (H) 25 - 47 mm Hg    Bicarbonate ELS Venous 26 21 - 28 mmol/L    Base Excess/Deficit Venous 0.5 -3.0 - 3.0 mmol/L    FIO2 60     Oxyhemoglobin ELS Venous 84 %    O2 Saturation ELS Venous 85.7 (H) 70.0 - 75.0 %    Narrative    In healthy individuals, oxyhemoglobin (O2Hb) and oxygen saturation (SO2) are approximately equal. In the presence of dyshemoglobins, oxyhemoglobin can be considerably lower than oxygen saturation.   Blood gas ELS arterial   Result Value Ref Range    pH ELS Arterial 7.37 7.35 - 7.45    pCO2 ELS Arterial 43 35 - 45 mm Hg    pO2 ELS Arterial 276 (H) 80 - 105 mm Hg    Bicarbonate ELS Arterial 25 21 - 28 mmol/L    Base Excess/Deficit Arterial -0.7 -3.0 - 3.0  mmol/L    FIO2 60     Oxyhemoglobin ELS Arterial 99 75 - 100 %    O2 Saturation ELS Arterial >100.0 (H) 95.0 - 96.0 %    Narrative    In healthy individuals, oxyhemoglobin (O2Hb) and oxygen saturation (SO2) are approximately equal. In the presence of dyshemoglobins, oxyhemoglobin can be considerably lower than oxygen saturation.   Fibrinogen activity   Result Value Ref Range    Fibrinogen Activity >1,200 (H) 170 - 510 mg/dL   Antithrombin III   Result Value Ref Range    Antithrombin III 89 85 - 135 %   CRP inflammation   Result Value Ref Range    CRP Inflammation 223.00 (H) <5.00 mg/L   Erythrocyte sedimentation rate auto   Result Value Ref Range    Erythrocyte Sedimentation Rate 136 (H) 0 - 20 mm/hr   Hemoglobin plasma   Result Value Ref Range    Hemoglobin Plasma <30 <30 mg/dL   Lactate Dehydrogenase   Result Value Ref Range    Lactate Dehydrogenase 399 (H) 0 - 250 U/L   Phosphorus   Result Value Ref Range    Phosphorus 5.0 (H) 2.5 - 4.5 mg/dL   TSH   Result Value Ref Range    TSH 0.27 (L) 0.30 - 4.20 uIU/mL   T3 Free   Result Value Ref Range    T3 Free 1.3 (L) 2.0 - 4.4 pg/mL   T3 total   Result Value Ref Range    T3 Total 65 (L) 85 - 202 ng/dL   T4 free   Result Value Ref Range    Free T4 0.89 (L) 0.90 - 1.70 ng/dL   Blood gas arterial   Result Value Ref Range    pH Arterial 7.44 7.35 - 7.45    pCO2 Arterial 36 35 - 45 mm Hg    pO2 Arterial 108 (H) 80 - 105 mm Hg    FIO2 60     Bicarbonate Arterial 25 21 - 28 mmol/L    Base Excess/Deficit Arterial 0.6 -3.0 - 3.0 mmol/L    Rei's Test Artline     Oxyhemoglobin Arterial 98 92 - 100 %    O2 Sat, Arterial 99.6 (H) 95.0 - 96.0 %    Narrative    In healthy individuals, oxyhemoglobin (O2Hb) and oxygen saturation (SO2) are approximately equal. In the presence of dyshemoglobins, oxyhemoglobin can be considerably lower than oxygen saturation.   Glucose by meter   Result Value Ref Range    GLUCOSE BY METER POCT 134 (H) 70 - 99 mg/dL   Activated clotting time celite, POCT    Result Value Ref Range    Activated Clotting Time (Celite) POCT 190 (H) 74 - 150 seconds   Blood gas arterial   Result Value Ref Range    pH Arterial 7.43 7.35 - 7.45    pCO2 Arterial 37 35 - 45 mm Hg    pO2 Arterial 78 (L) 80 - 105 mm Hg    FIO2 60     Bicarbonate Arterial 24 21 - 28 mmol/L    Base Excess/Deficit Arterial 0.1 -3.0 - 3.0 mmol/L    Rei's Test Artline     Oxyhemoglobin Arterial 95 92 - 100 %    O2 Sat, Arterial 97.1 (H) 95.0 - 96.0 %    Narrative    In healthy individuals, oxyhemoglobin (O2Hb) and oxygen saturation (SO2) are approximately equal. In the presence of dyshemoglobins, oxyhemoglobin can be considerably lower than oxygen saturation.   Glucose by meter   Result Value Ref Range    GLUCOSE BY METER POCT 139 (H) 70 - 99 mg/dL   EKG 12-lead, tracing only   Result Value Ref Range    Systolic Blood Pressure  mmHg    Diastolic Blood Pressure  mmHg    Ventricular Rate 102 BPM    Atrial Rate  BPM    FL Interval  ms    QRS Duration 74 ms     ms    QTc 516 ms    P Axis  degrees    R AXIS 13 degrees    T Axis 77 degrees    Interpretation ECG       Atrial fibrillation with rapid ventricular response  Low voltage QRS  ST elevation consider inferior injury or acute infarct  ** ** ACUTE MI / STEMI ** **  Abnormal ECG  When compared with ECG of 23-Jul-2025 03:21,  T wave inversion now evident in Anterior leads     Glucose by meter   Result Value Ref Range    GLUCOSE BY METER POCT 136 (H) 70 - 99 mg/dL   Blood gas arterial   Result Value Ref Range    pH Arterial 7.45 7.35 - 7.45    pCO2 Arterial 35 35 - 45 mm Hg    pO2 Arterial 112 (H) 80 - 105 mm Hg    FIO2 60     Bicarbonate Arterial 24 21 - 28 mmol/L    Base Excess/Deficit Arterial 0.2 -3.0 - 3.0 mmol/L    Rei's Test Artline     Oxyhemoglobin Arterial 97 92 - 100 %    O2 Sat, Arterial 99.3 (H) 95.0 - 96.0 %    Narrative    In healthy individuals, oxyhemoglobin (O2Hb) and oxygen saturation (SO2) are approximately equal. In the presence of  "dyshemoglobins, oxyhemoglobin can be considerably lower than oxygen saturation.   Activated clotting time celite, POCT   Result Value Ref Range    Activated Clotting Time (Celite) POCT 194 (H) 74 - 150 seconds       Clinically Significant Risk Factors         # Hypernatremia: Highest Na = 147 mmol/L in last 2 days, will monitor as appropriate  # Hyperchloremia: Highest Cl = 109 mmol/L in last 2 days, will monitor as appropriate       # Hypercalcemia: corrected calcium is >10.1, will monitor as appropriate   # Anion Gap Metabolic Acidosis: Highest Anion Gap = 24 mmol/L in last 2 days, will monitor and treat as appropriate  # Hypoalbuminemia: Lowest albumin = 2.6 g/dL at 7/24/2025  3:47 AM, will monitor as appropriate    # Coagulation Defect: INR = 1.34 (Ref range: 0.85 - 1.15) and/or PTT = 94 Seconds (Ref range: 22 - 38 Seconds), will monitor for bleeding   # Acute Kidney Injury, unspecified: based on a >150% or 0.3 mg/dL increase in last creatinine compared to past 90 day average, will monitor renal function      # Acute Hypoxic Respiratory Failure: Based on blood gas results. Continue supplemental oxygen as needed  # Acute Hypercapnic Respiratory Failure: based on arterial blood gas results.  Continue supplemental oxygen and ventilatory support as indicated.  # Acute Hypercapnic Respiratory Failure: based on venous blood gas results.  Continue supplemental oxygen and ventilatory support as indicated.         # Obesity: Estimated body mass index is 35.21 kg/m  as calculated from the following:    Height as of this encounter: 1.778 m (5' 10\").    Weight as of this encounter: 111.3 kg (245 lb 6 oz).        # Financial/Environmental Concerns: none              "

## 2025-07-24 NOTE — PROGRESS NOTES
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     Cannulation Site:  17 Fr in the R femoral artery  25 Fr in the R femoral vein    Interval events: Remains in AF, had care conference yesterday    Pulsatilty (IABP paused if applicable): 50 mmHG     Physical Exam:  Temp:  [98.2  F (36.8  C)-98.4  F (36.9  C)] 98.2  F (36.8  C)  Pulse:  [] 97  Resp:  [0-34] 34  MAP:  [64 mmHg-152 mmHg] 81 mmHg  Arterial Line BP: ()/(50-75) 120/66  FiO2 (%):  [50 %-80 %] 50 %  SpO2:  [96 %-100 %] 99 %    Intake/Output Summary (Last 24 hours) at 2025 1258  Last data filed at 2025 1200  Gross per 24 hour   Intake 2130.65 ml   Output 2880 ml   Net -749.35 ml    FiO2 (%): (S) 50 %, Resp: (!) 34, Vent Mode: VC/AC, Resp Rate (Set): 12 breaths/min, Tidal Volume (Set, mL): 400 mL, PEEP (cm H2O): 8 cmH2O, Resp Rate (Set): 12 breaths/min, Tidal Volume (Set, mL): 400 mL, PEEP (cm H2O): 8 cmH2O     Labs:  Recent Labs   Lab 25  1150 25  1011 25  0734 25  0555   PH 7.45 7.42 7.45 7.43   PCO2 36 39 35 37   PO2 130* 111* 112* 78*   HCO3    O2PER 60 60 60 60      Recent Labs   Lab 25  1011 25  0347 25  2200 25  1546   WBC 12.0* 12.4* 12.0* 13.1*   HGB 8.7* 8.7* 8.7* 8.9*     Creatinine   Date Value Ref Range Status   2025 2.41 (H) 0.67 - 1.17 mg/dL Final   2025 2.39 (H) 0.67 - 1.17 mg/dL Final   2025 2.35 (H) 0.67 - 1.17 mg/dL Final   2025 2.14 (H) 0.67 - 1.17 mg/dL Final   2013 0.90 0.66 - 1.25 mg/dL Final   2013 1.00 0.66 - 1.25 mg/dL Final   2013 1.06 0.66 - 1.25 mg/dL Final   2013 1.13 0.66 - 1.25 mg/dL Final       Blood Flow (Circuit) LPM: 2.74 LPM  Sweep LPM: 0.5 LPM  Sweep FiO2   %: 50 %  ACT  (seconds): 194 seconds  Pulse Oximetry  (SpO2%): 100 %  Arterial Pressure  mmH  mmHg    ECMO Issues including assessments and plan on DOS 7/24/2025:  Neuro: Sedated for mechanical ventilation and ECMO.  No acute distress.  NIRS stable b/l  RASS goal: -3  CV: Cardiogenic shock.  Hemodynamically stable off pressors.   Pulm: Keep vent settings at rest settings as above.  FEN/Renal: Electrolytes stable w/ replacement protocols in place, Cr stable, UOP stable  Heme: ACT goal: 180-200, Hemoglobin stable.  Minimal oozing around the ECMO cannulas.  ID: Receiving empiric antibiotics  Cannulae: Position is acceptable on exam and the available imaging.  Distal perfusion cannula is in place and patent.  Extremities are well-perfused.   I have personally reviewed the ECMO flows, oxygenation and CO2 clearance, anticoagulation, and cannula position.  I have also personally assessed the patient's systemic response with hemodynamics, oxygenation, ventilation, and bleeding.      I have personally reviewed the ECMO flows, oxygenation and CO2 clearance, anticoagulation, and cannula position.  I have also personally assessed the patient's systemic response with hemodynamics, oxygenation, ventilation, and bleeding.       The patient requires continued ECMO support and management in the ICU.  I have discussed patient care and treatment plan with the primary team.      Ilhwan Yeo, MD  Critical Care Cardiology    July 24, 2025

## 2025-07-25 VITALS
HEIGHT: 70 IN | OXYGEN SATURATION: 99 % | BODY MASS INDEX: 35.13 KG/M2 | RESPIRATION RATE: 24 BRPM | WEIGHT: 245.37 LBS | HEART RATE: 98 BPM | TEMPERATURE: 98.2 F

## 2025-07-25 LAB
ACT BLD: 198 SECONDS (ref 74–150)
ACT BLD: 207 SECONDS (ref 74–150)
ACT BLD: 215 SECONDS (ref 74–150)
ALBUMIN SERPL BCG-MCNC: 2.6 G/DL (ref 3.5–5.2)
ALBUMIN SERPL BCG-MCNC: 2.7 G/DL (ref 3.5–5.2)
ALLEN'S TEST: ABNORMAL
ALP SERPL-CCNC: 88 U/L (ref 40–150)
ALP SERPL-CCNC: 89 U/L (ref 40–150)
ALP SERPL-CCNC: 92 U/L (ref 40–150)
ALP SERPL-CCNC: 92 U/L (ref 40–150)
ALT SERPL W P-5'-P-CCNC: 31 U/L (ref 0–70)
ALT SERPL W P-5'-P-CCNC: 32 U/L (ref 0–70)
ALT SERPL W P-5'-P-CCNC: 32 U/L (ref 0–70)
ALT SERPL W P-5'-P-CCNC: 33 U/L (ref 0–70)
ANION GAP SERPL CALCULATED.3IONS-SCNC: 17 MMOL/L (ref 7–15)
ANION GAP SERPL CALCULATED.3IONS-SCNC: 17 MMOL/L (ref 7–15)
ANION GAP SERPL CALCULATED.3IONS-SCNC: 19 MMOL/L (ref 7–15)
ANION GAP SERPL CALCULATED.3IONS-SCNC: 22 MMOL/L (ref 7–15)
APTT PPP: 101 SECONDS (ref 22–38)
APTT PPP: 102 SECONDS (ref 22–38)
APTT PPP: 115 SECONDS (ref 22–38)
APTT PPP: 87 SECONDS (ref 22–38)
AST SERPL W P-5'-P-CCNC: 46 U/L (ref 0–45)
AST SERPL W P-5'-P-CCNC: 48 U/L (ref 0–45)
AST SERPL W P-5'-P-CCNC: 49 U/L (ref 0–45)
AST SERPL W P-5'-P-CCNC: 49 U/L (ref 0–45)
AT III ACT/NOR PPP CHRO: 97 % (ref 85–135)
ATRIAL RATE - MUSE: NORMAL BPM
BACTERIA SPT CULT: ABNORMAL
BACTERIA SPT CULT: NORMAL
BASE EXCESS BLDA CALC-SCNC: -0.1 MMOL/L (ref -3–3)
BASE EXCESS BLDA CALC-SCNC: -2.1 MMOL/L (ref -3–3)
BASE EXCESS BLDA CALC-SCNC: -3.2 MMOL/L (ref -3–3)
BASE EXCESS BLDA CALC-SCNC: 0.6 MMOL/L (ref -3–3)
BASE EXCESS BLDA CALC-SCNC: 0.7 MMOL/L (ref -3–3)
BASE EXCESS BLDA CALC-SCNC: 0.7 MMOL/L (ref -3–3)
BASE EXCESS BLDA CALC-SCNC: 0.8 MMOL/L (ref -3–3)
BASE EXCESS BLDA CALC-SCNC: 0.9 MMOL/L (ref -3–3)
BASE EXCESS BLDA CALC-SCNC: 1.3 MMOL/L (ref -3–3)
BASE EXCESS BLDA CALC-SCNC: 1.4 MMOL/L (ref -3–3)
BASE EXCESS BLDA CALC-SCNC: 1.5 MMOL/L (ref -3–3)
BASE EXCESS BLDA CALC-SCNC: 2 MMOL/L (ref -3–3)
BASE EXCESS BLDV CALC-SCNC: 1.2 MMOL/L (ref -3–3)
BASE EXCESS BLDV CALC-SCNC: 1.5 MMOL/L (ref -3–3)
BILIRUB SERPL-MCNC: 0.3 MG/DL
BUN SERPL-MCNC: 112 MG/DL (ref 8–23)
BUN SERPL-MCNC: 114 MG/DL (ref 8–23)
BUN SERPL-MCNC: 117 MG/DL (ref 8–23)
BUN SERPL-MCNC: 123 MG/DL (ref 8–23)
CA-I BLD-MCNC: 4.6 MG/DL (ref 4.4–5.2)
CA-I BLD-MCNC: 4.7 MG/DL (ref 4.4–5.2)
CALCIUM SERPL-MCNC: 9.1 MG/DL (ref 8.8–10.4)
CHLORIDE SERPL-SCNC: 111 MMOL/L (ref 98–107)
CHLORIDE SERPL-SCNC: 112 MMOL/L (ref 98–107)
CREAT SERPL-MCNC: 2.49 MG/DL (ref 0.67–1.17)
CREAT SERPL-MCNC: 2.51 MG/DL (ref 0.67–1.17)
CREAT SERPL-MCNC: 2.63 MG/DL (ref 0.67–1.17)
CREAT SERPL-MCNC: 2.78 MG/DL (ref 0.67–1.17)
CRP SERPL-MCNC: 148 MG/L
D DIMER PPP FEU-MCNC: 3.03 UG/ML FEU (ref 0–0.5)
D DIMER PPP FEU-MCNC: 3.46 UG/ML FEU (ref 0–0.5)
DIASTOLIC BLOOD PRESSURE - MUSE: NORMAL MMHG
EGFRCR SERPLBLD CKD-EPI 2021: 24 ML/MIN/1.73M2
EGFRCR SERPLBLD CKD-EPI 2021: 26 ML/MIN/1.73M2
EGFRCR SERPLBLD CKD-EPI 2021: 27 ML/MIN/1.73M2
EGFRCR SERPLBLD CKD-EPI 2021: 27 ML/MIN/1.73M2
ERYTHROCYTE [DISTWIDTH] IN BLOOD BY AUTOMATED COUNT: 14.7 % (ref 10–15)
ERYTHROCYTE [DISTWIDTH] IN BLOOD BY AUTOMATED COUNT: 14.8 % (ref 10–15)
ERYTHROCYTE [DISTWIDTH] IN BLOOD BY AUTOMATED COUNT: 14.9 % (ref 10–15)
ERYTHROCYTE [DISTWIDTH] IN BLOOD BY AUTOMATED COUNT: 15 % (ref 10–15)
ERYTHROCYTE [SEDIMENTATION RATE] IN BLOOD BY WESTERGREN METHOD: 136 MM/HR (ref 0–20)
FIBRINOGEN PPP-MCNC: 1128 MG/DL (ref 170–510)
FIBRINOGEN PPP-MCNC: 1132 MG/DL (ref 170–510)
GLUCOSE BLD-MCNC: 108 MG/DL (ref 70–99)
GLUCOSE BLD-MCNC: 126 MG/DL (ref 70–99)
GLUCOSE BLD-MCNC: 137 MG/DL (ref 70–99)
GLUCOSE BLD-MCNC: 80 MG/DL (ref 70–99)
GLUCOSE BLDC GLUCOMTR-MCNC: 105 MG/DL (ref 70–99)
GLUCOSE BLDC GLUCOMTR-MCNC: 109 MG/DL (ref 70–99)
GLUCOSE BLDC GLUCOMTR-MCNC: 114 MG/DL (ref 70–99)
GLUCOSE BLDC GLUCOMTR-MCNC: 118 MG/DL (ref 70–99)
GLUCOSE BLDC GLUCOMTR-MCNC: 122 MG/DL (ref 70–99)
GLUCOSE BLDC GLUCOMTR-MCNC: 122 MG/DL (ref 70–99)
GLUCOSE BLDC GLUCOMTR-MCNC: 123 MG/DL (ref 70–99)
GLUCOSE BLDC GLUCOMTR-MCNC: 123 MG/DL (ref 70–99)
GLUCOSE BLDC GLUCOMTR-MCNC: 127 MG/DL (ref 70–99)
GLUCOSE BLDC GLUCOMTR-MCNC: 73 MG/DL (ref 70–99)
GLUCOSE BLDC GLUCOMTR-MCNC: 76 MG/DL (ref 70–99)
GLUCOSE BLDC GLUCOMTR-MCNC: 99 MG/DL (ref 70–99)
GLUCOSE SERPL-MCNC: 112 MG/DL (ref 70–99)
GLUCOSE SERPL-MCNC: 131 MG/DL (ref 70–99)
GLUCOSE SERPL-MCNC: 140 MG/DL (ref 70–99)
GLUCOSE SERPL-MCNC: 82 MG/DL (ref 70–99)
GRAM STAIN RESULT: ABNORMAL
GRAM STAIN RESULT: NORMAL
GRAM STAIN RESULT: NORMAL
HCO3 BLD-SCNC: 21 MMOL/L (ref 21–28)
HCO3 BLD-SCNC: 22 MMOL/L (ref 21–28)
HCO3 BLD-SCNC: 24 MMOL/L (ref 21–28)
HCO3 BLD-SCNC: 25 MMOL/L (ref 21–28)
HCO3 BLD-SCNC: 26 MMOL/L (ref 21–28)
HCO3 BLDA-SCNC: 25 MMOL/L (ref 21–28)
HCO3 BLDA-SCNC: 26 MMOL/L (ref 21–28)
HCO3 BLDV-SCNC: 26 MMOL/L (ref 21–28)
HCO3 BLDV-SCNC: 27 MMOL/L (ref 21–28)
HCO3 SERPL-SCNC: 19 MMOL/L (ref 22–29)
HCO3 SERPL-SCNC: 22 MMOL/L (ref 22–29)
HCO3 SERPL-SCNC: 23 MMOL/L (ref 22–29)
HCO3 SERPL-SCNC: 23 MMOL/L (ref 22–29)
HCT VFR BLD AUTO: 28.5 % (ref 40–53)
HCT VFR BLD AUTO: 28.6 % (ref 40–53)
HCT VFR BLD AUTO: 28.6 % (ref 40–53)
HCT VFR BLD AUTO: 29.2 % (ref 40–53)
HGB BLD-MCNC: 8.8 G/DL (ref 13.3–17.7)
HGB BLD-MCNC: 8.8 G/DL (ref 13.3–17.7)
HGB BLD-MCNC: 8.9 G/DL (ref 13.3–17.7)
HGB BLD-MCNC: 9.1 G/DL (ref 13.3–17.7)
HGB FREE PLAS-MCNC: <30 MG/DL
INR PPP: 1.28 (ref 0.85–1.15)
INR PPP: 1.28 (ref 0.85–1.15)
INR PPP: 1.3 (ref 0.85–1.15)
INR PPP: 1.33 (ref 0.85–1.15)
INTERPRETATION ECG - MUSE: NORMAL
LACTATE SERPL-SCNC: 0.9 MMOL/L (ref 0.7–2)
LACTATE SERPL-SCNC: 0.9 MMOL/L (ref 0.7–2)
LACTATE SERPL-SCNC: 1.1 MMOL/L (ref 0.7–2)
LACTATE SERPL-SCNC: 2.4 MMOL/L (ref 0.7–2)
LDH SERPL L TO P-CCNC: 396 U/L (ref 0–250)
MAGNESIUM SERPL-MCNC: 2.4 MG/DL (ref 1.7–2.3)
MAGNESIUM SERPL-MCNC: 2.5 MG/DL (ref 1.7–2.3)
MAGNESIUM SERPL-MCNC: 2.5 MG/DL (ref 1.7–2.3)
MAGNESIUM SERPL-MCNC: 2.6 MG/DL (ref 1.7–2.3)
MCH RBC QN AUTO: 27.5 PG (ref 26.5–33)
MCH RBC QN AUTO: 27.6 PG (ref 26.5–33)
MCH RBC QN AUTO: 27.6 PG (ref 26.5–33)
MCH RBC QN AUTO: 27.7 PG (ref 26.5–33)
MCHC RBC AUTO-ENTMCNC: 30.8 G/DL (ref 31.5–36.5)
MCHC RBC AUTO-ENTMCNC: 30.9 G/DL (ref 31.5–36.5)
MCHC RBC AUTO-ENTMCNC: 31.1 G/DL (ref 31.5–36.5)
MCHC RBC AUTO-ENTMCNC: 31.2 G/DL (ref 31.5–36.5)
MCV RBC AUTO: 89 FL (ref 78–100)
MCV RBC AUTO: 90 FL (ref 78–100)
O2/TOTAL GAS SETTING VFR VENT: 40 %
O2/TOTAL GAS SETTING VFR VENT: 50 %
OXYHGB MFR BLDA: 94 % (ref 92–100)
OXYHGB MFR BLDA: 94 % (ref 92–100)
OXYHGB MFR BLDA: 96 % (ref 92–100)
OXYHGB MFR BLDA: 97 % (ref 75–100)
OXYHGB MFR BLDA: 97 % (ref 92–100)
OXYHGB MFR BLDA: 98 % (ref 75–100)
OXYHGB MFR BLDA: 98 % (ref 92–100)
OXYHGB MFR BLDV: 74 %
OXYHGB MFR BLDV: 81 %
P AXIS - MUSE: NORMAL DEGREES
PCO2 BLD: 33 MM HG (ref 35–45)
PCO2 BLD: 36 MM HG (ref 35–45)
PCO2 BLD: 36 MM HG (ref 35–45)
PCO2 BLD: 37 MM HG (ref 35–45)
PCO2 BLD: 38 MM HG (ref 35–45)
PCO2 BLD: 39 MM HG (ref 35–45)
PCO2 BLDA: 40 MM HG (ref 35–45)
PCO2 BLDA: 43 MM HG (ref 35–45)
PCO2 BLDV: 44 MM HG (ref 40–50)
PCO2 BLDV: 45 MM HG (ref 40–50)
PEEP: 8 CM H2O
PEEP: 8 CM H2O
PH BLD: 7.41 [PH] (ref 7.35–7.45)
PH BLD: 7.43 [PH] (ref 7.35–7.45)
PH BLD: 7.44 [PH] (ref 7.35–7.45)
PH BLD: 7.45 [PH] (ref 7.35–7.45)
PH BLD: 7.45 [PH] (ref 7.35–7.45)
PH BLD: 7.46 [PH] (ref 7.35–7.45)
PH BLD: 7.46 [PH] (ref 7.35–7.45)
PH BLDA: 7.39 [PH] (ref 7.35–7.45)
PH BLDA: 7.41 [PH] (ref 7.35–7.45)
PH BLDV: 7.39 [PH] (ref 7.32–7.43)
PH BLDV: 7.39 [PH] (ref 7.32–7.43)
PHOSPHATE SERPL-MCNC: 5.3 MG/DL (ref 2.5–4.5)
PLATELET # BLD AUTO: 226 10E3/UL (ref 150–450)
PLATELET # BLD AUTO: 226 10E3/UL (ref 150–450)
PLATELET # BLD AUTO: 230 10E3/UL (ref 150–450)
PLATELET # BLD AUTO: 235 10E3/UL (ref 150–450)
PO2 BLD: 106 MM HG (ref 80–105)
PO2 BLD: 110 MM HG (ref 80–105)
PO2 BLD: 123 MM HG (ref 80–105)
PO2 BLD: 139 MM HG (ref 80–105)
PO2 BLD: 156 MM HG (ref 80–105)
PO2 BLD: 67 MM HG (ref 80–105)
PO2 BLD: 80 MM HG (ref 80–105)
PO2 BLD: 82 MM HG (ref 80–105)
PO2 BLD: 83 MM HG (ref 80–105)
PO2 BLD: 92 MM HG (ref 80–105)
PO2 BLD: 97 MM HG (ref 80–105)
PO2 BLD: 99 MM HG (ref 80–105)
PO2 BLDA: 141 MM HG (ref 80–105)
PO2 BLDA: 146 MM HG (ref 80–105)
PO2 BLDV: 42 MM HG (ref 25–47)
PO2 BLDV: 48 MM HG (ref 25–47)
POTASSIUM SERPL-SCNC: 3.6 MMOL/L (ref 3.4–5.3)
POTASSIUM SERPL-SCNC: 3.7 MMOL/L (ref 3.4–5.3)
POTASSIUM SERPL-SCNC: 3.7 MMOL/L (ref 3.4–5.3)
POTASSIUM SERPL-SCNC: 3.8 MMOL/L (ref 3.4–5.3)
PR INTERVAL - MUSE: NORMAL MS
PROT SERPL-MCNC: 6.3 G/DL (ref 6.4–8.3)
PROT SERPL-MCNC: 6.3 G/DL (ref 6.4–8.3)
PROT SERPL-MCNC: 6.4 G/DL (ref 6.4–8.3)
PROT SERPL-MCNC: 6.4 G/DL (ref 6.4–8.3)
PROTHROMBIN TIME: 16.3 SECONDS (ref 11.8–14.8)
PROTHROMBIN TIME: 16.3 SECONDS (ref 11.8–14.8)
PROTHROMBIN TIME: 16.4 SECONDS (ref 11.8–14.8)
PROTHROMBIN TIME: 16.8 SECONDS (ref 11.8–14.8)
QRS DURATION - MUSE: 80 MS
QT - MUSE: 358 MS
QTC - MUSE: 473 MS
R AXIS - MUSE: 43 DEGREES
RBC # BLD AUTO: 3.19 10E6/UL (ref 4.4–5.9)
RBC # BLD AUTO: 3.2 10E6/UL (ref 4.4–5.9)
RBC # BLD AUTO: 3.22 10E6/UL (ref 4.4–5.9)
RBC # BLD AUTO: 3.29 10E6/UL (ref 4.4–5.9)
SAO2 % BLDA: 100 % (ref 95–96)
SAO2 % BLDA: 95.3 % (ref 95–96)
SAO2 % BLDA: 96.4 % (ref 95–96)
SAO2 % BLDA: 96.7 % (ref 95–96)
SAO2 % BLDA: 97.3 % (ref 95–96)
SAO2 % BLDA: 98.2 % (ref 95–96)
SAO2 % BLDA: 98.4 % (ref 95–96)
SAO2 % BLDA: 98.8 % (ref 95–96)
SAO2 % BLDA: 98.9 % (ref 95–96)
SAO2 % BLDA: 99.4 % (ref 95–96)
SAO2 % BLDA: 99.5 % (ref 95–96)
SAO2 % BLDA: 99.9 % (ref 95–96)
SAO2 % BLDV: 75.7 % (ref 70–75)
SAO2 % BLDV: 82.5 % (ref 70–75)
SODIUM SERPL-SCNC: 151 MMOL/L (ref 135–145)
SODIUM SERPL-SCNC: 151 MMOL/L (ref 135–145)
SODIUM SERPL-SCNC: 152 MMOL/L (ref 135–145)
SODIUM SERPL-SCNC: 153 MMOL/L (ref 135–145)
SYSTOLIC BLOOD PRESSURE - MUSE: NORMAL MMHG
T AXIS - MUSE: 78 DEGREES
T3 SERPL-MCNC: 73 NG/DL (ref 85–202)
T3FREE SERPL-MCNC: 1.6 PG/ML (ref 2–4.4)
T4 FREE SERPL-MCNC: 0.87 NG/DL (ref 0.9–1.7)
TROPONIN T SERPL HS-MCNC: 1076 NG/L
TROPONIN T SERPL HS-MCNC: 1103 NG/L
TROPONIN T SERPL HS-MCNC: 1105 NG/L
TROPONIN T SERPL HS-MCNC: 1136 NG/L
TSH SERPL DL<=0.005 MIU/L-ACNC: 0.7 UIU/ML (ref 0.3–4.2)
UFH PPP CHRO-ACNC: 0.62 IU/ML (ref ?–1.1)
UFH PPP CHRO-ACNC: 0.69 IU/ML (ref ?–1.1)
UFH PPP CHRO-ACNC: 0.7 IU/ML (ref ?–1.1)
UFH PPP CHRO-ACNC: 0.75 IU/ML (ref ?–1.1)
VENTRICULAR RATE- MUSE: 105 BPM
WBC # BLD AUTO: 11.7 10E3/UL (ref 4–11)
WBC # BLD AUTO: 11.9 10E3/UL (ref 4–11)
WBC # BLD AUTO: 11.9 10E3/UL (ref 4–11)
WBC # BLD AUTO: 15.2 10E3/UL (ref 4–11)

## 2025-07-25 PROCEDURE — 999N000185 HC STATISTIC TRANSPORT TIME EA 15 MIN

## 2025-07-25 PROCEDURE — 82330 ASSAY OF CALCIUM: CPT | Performed by: STUDENT IN AN ORGANIZED HEALTH CARE EDUCATION/TRAINING PROGRAM

## 2025-07-25 PROCEDURE — 250N000013 HC RX MED GY IP 250 OP 250 PS 637: Performed by: STUDENT IN AN ORGANIZED HEALTH CARE EDUCATION/TRAINING PROGRAM

## 2025-07-25 PROCEDURE — 84100 ASSAY OF PHOSPHORUS: CPT | Performed by: STUDENT IN AN ORGANIZED HEALTH CARE EDUCATION/TRAINING PROGRAM

## 2025-07-25 PROCEDURE — 83605 ASSAY OF LACTIC ACID: CPT | Performed by: STUDENT IN AN ORGANIZED HEALTH CARE EDUCATION/TRAINING PROGRAM

## 2025-07-25 PROCEDURE — 82805 BLOOD GASES W/O2 SATURATION: CPT | Performed by: STUDENT IN AN ORGANIZED HEALTH CARE EDUCATION/TRAINING PROGRAM

## 2025-07-25 PROCEDURE — 83735 ASSAY OF MAGNESIUM: CPT | Performed by: STUDENT IN AN ORGANIZED HEALTH CARE EDUCATION/TRAINING PROGRAM

## 2025-07-25 PROCEDURE — 258N000003 HC RX IP 258 OP 636: Performed by: STUDENT IN AN ORGANIZED HEALTH CARE EDUCATION/TRAINING PROGRAM

## 2025-07-25 PROCEDURE — 250N000011 HC RX IP 250 OP 636: Performed by: NURSE PRACTITIONER

## 2025-07-25 PROCEDURE — 250N000009 HC RX 250: Performed by: STUDENT IN AN ORGANIZED HEALTH CARE EDUCATION/TRAINING PROGRAM

## 2025-07-25 PROCEDURE — 82805 BLOOD GASES W/O2 SATURATION: CPT | Performed by: INTERNAL MEDICINE

## 2025-07-25 PROCEDURE — 82947 ASSAY GLUCOSE BLOOD QUANT: CPT | Performed by: STUDENT IN AN ORGANIZED HEALTH CARE EDUCATION/TRAINING PROGRAM

## 2025-07-25 PROCEDURE — 85610 PROTHROMBIN TIME: CPT | Performed by: STUDENT IN AN ORGANIZED HEALTH CARE EDUCATION/TRAINING PROGRAM

## 2025-07-25 PROCEDURE — 85384 FIBRINOGEN ACTIVITY: CPT | Performed by: STUDENT IN AN ORGANIZED HEALTH CARE EDUCATION/TRAINING PROGRAM

## 2025-07-25 PROCEDURE — 85347 COAGULATION TIME ACTIVATED: CPT

## 2025-07-25 PROCEDURE — 84480 ASSAY TRIIODOTHYRONINE (T3): CPT | Performed by: STUDENT IN AN ORGANIZED HEALTH CARE EDUCATION/TRAINING PROGRAM

## 2025-07-25 PROCEDURE — 85014 HEMATOCRIT: CPT | Performed by: STUDENT IN AN ORGANIZED HEALTH CARE EDUCATION/TRAINING PROGRAM

## 2025-07-25 PROCEDURE — 250N000013 HC RX MED GY IP 250 OP 250 PS 637: Performed by: INTERNAL MEDICINE

## 2025-07-25 PROCEDURE — 250N000011 HC RX IP 250 OP 636: Performed by: HOSPITALIST

## 2025-07-25 PROCEDURE — 84481 FREE ASSAY (FT-3): CPT | Performed by: STUDENT IN AN ORGANIZED HEALTH CARE EDUCATION/TRAINING PROGRAM

## 2025-07-25 PROCEDURE — 250N000011 HC RX IP 250 OP 636: Performed by: STUDENT IN AN ORGANIZED HEALTH CARE EDUCATION/TRAINING PROGRAM

## 2025-07-25 PROCEDURE — 250N000011 HC RX IP 250 OP 636: Performed by: INTERNAL MEDICINE

## 2025-07-25 PROCEDURE — 250N000013 HC RX MED GY IP 250 OP 250 PS 637: Performed by: HOSPITALIST

## 2025-07-25 PROCEDURE — 85730 THROMBOPLASTIN TIME PARTIAL: CPT | Performed by: STUDENT IN AN ORGANIZED HEALTH CARE EDUCATION/TRAINING PROGRAM

## 2025-07-25 PROCEDURE — 85520 HEPARIN ASSAY: CPT | Performed by: STUDENT IN AN ORGANIZED HEALTH CARE EDUCATION/TRAINING PROGRAM

## 2025-07-25 PROCEDURE — 84484 ASSAY OF TROPONIN QUANT: CPT | Performed by: STUDENT IN AN ORGANIZED HEALTH CARE EDUCATION/TRAINING PROGRAM

## 2025-07-25 PROCEDURE — 99232 SBSQ HOSP IP/OBS MODERATE 35: CPT | Performed by: FAMILY MEDICINE

## 2025-07-25 PROCEDURE — 85379 FIBRIN DEGRADATION QUANT: CPT | Performed by: STUDENT IN AN ORGANIZED HEALTH CARE EDUCATION/TRAINING PROGRAM

## 2025-07-25 PROCEDURE — 85300 ANTITHROMBIN III ACTIVITY: CPT | Performed by: STUDENT IN AN ORGANIZED HEALTH CARE EDUCATION/TRAINING PROGRAM

## 2025-07-25 PROCEDURE — 85652 RBC SED RATE AUTOMATED: CPT | Performed by: STUDENT IN AN ORGANIZED HEALTH CARE EDUCATION/TRAINING PROGRAM

## 2025-07-25 PROCEDURE — 99291 CRITICAL CARE FIRST HOUR: CPT | Mod: FS | Performed by: NURSE PRACTITIONER

## 2025-07-25 PROCEDURE — 99292 CRITICAL CARE ADDL 30 MIN: CPT | Performed by: PSYCHIATRY & NEUROLOGY

## 2025-07-25 PROCEDURE — 84439 ASSAY OF FREE THYROXINE: CPT | Performed by: STUDENT IN AN ORGANIZED HEALTH CARE EDUCATION/TRAINING PROGRAM

## 2025-07-25 PROCEDURE — 84155 ASSAY OF PROTEIN SERUM: CPT | Performed by: STUDENT IN AN ORGANIZED HEALTH CARE EDUCATION/TRAINING PROGRAM

## 2025-07-25 PROCEDURE — 93005 ELECTROCARDIOGRAM TRACING: CPT

## 2025-07-25 PROCEDURE — 33949 ECMO/ECLS DAILY MGMT ARTERY: CPT

## 2025-07-25 PROCEDURE — 999N000157 HC STATISTIC RCP TIME EA 10 MIN

## 2025-07-25 PROCEDURE — 94003 VENT MGMT INPAT SUBQ DAY: CPT

## 2025-07-25 PROCEDURE — 86140 C-REACTIVE PROTEIN: CPT | Performed by: STUDENT IN AN ORGANIZED HEALTH CARE EDUCATION/TRAINING PROGRAM

## 2025-07-25 PROCEDURE — 84443 ASSAY THYROID STIM HORMONE: CPT | Performed by: STUDENT IN AN ORGANIZED HEALTH CARE EDUCATION/TRAINING PROGRAM

## 2025-07-25 PROCEDURE — 99291 CRITICAL CARE FIRST HOUR: CPT | Mod: 25 | Performed by: HOSPITALIST

## 2025-07-25 PROCEDURE — 33949 ECMO/ECLS DAILY MGMT ARTERY: CPT | Performed by: HOSPITALIST

## 2025-07-25 PROCEDURE — 83615 LACTATE (LD) (LDH) ENZYME: CPT | Performed by: STUDENT IN AN ORGANIZED HEALTH CARE EDUCATION/TRAINING PROGRAM

## 2025-07-25 PROCEDURE — 200N000002 HC R&B ICU UMMC

## 2025-07-25 PROCEDURE — 82310 ASSAY OF CALCIUM: CPT | Performed by: STUDENT IN AN ORGANIZED HEALTH CARE EDUCATION/TRAINING PROGRAM

## 2025-07-25 PROCEDURE — 83051 HEMOGLOBIN PLASMA: CPT | Performed by: STUDENT IN AN ORGANIZED HEALTH CARE EDUCATION/TRAINING PROGRAM

## 2025-07-25 RX ORDER — POTASSIUM CHLORIDE 1.5 G/1.58G
20 POWDER, FOR SOLUTION ORAL ONCE
Status: COMPLETED | OUTPATIENT
Start: 2025-07-25 | End: 2025-07-25

## 2025-07-25 RX ORDER — POTASSIUM CHLORIDE 29.8 MG/ML
20 INJECTION INTRAVENOUS ONCE
Status: COMPLETED | OUTPATIENT
Start: 2025-07-25 | End: 2025-07-25

## 2025-07-25 RX ORDER — POTASSIUM CHLORIDE 29.8 MG/ML
20 INJECTION INTRAVENOUS ONCE
Status: COMPLETED | OUTPATIENT
Start: 2025-07-25 | End: 2025-07-26

## 2025-07-25 RX ORDER — NOREPINEPHRINE BITARTRATE 0.06 MG/ML
.01-.6 INJECTION, SOLUTION INTRAVENOUS CONTINUOUS
Status: DISCONTINUED | OUTPATIENT
Start: 2025-07-25 | End: 2025-07-27

## 2025-07-25 RX ORDER — AMIODARONE HYDROCHLORIDE 200 MG/1
400 TABLET ORAL 2 TIMES DAILY
Status: DISCONTINUED | OUTPATIENT
Start: 2025-07-25 | End: 2025-07-27

## 2025-07-25 RX ADMIN — AMIODARONE HYDROCHLORIDE 400 MG: 200 TABLET ORAL at 11:16

## 2025-07-25 RX ADMIN — TICAGRELOR 90 MG: 90 TABLET ORAL at 08:10

## 2025-07-25 RX ADMIN — ASPIRIN 81 MG CHEWABLE TABLET 81 MG: 81 TABLET CHEWABLE at 08:10

## 2025-07-25 RX ADMIN — HYDRALAZINE HYDROCHLORIDE 50 MG: 50 TABLET ORAL at 05:44

## 2025-07-25 RX ADMIN — HEPARIN SODIUM 2000 UNITS/HR: 10000 INJECTION, SOLUTION INTRAVENOUS at 04:46

## 2025-07-25 RX ADMIN — BUMETANIDE 0.5 MG/HR: 0.25 INJECTION INTRAMUSCULAR; INTRAVENOUS at 03:52

## 2025-07-25 RX ADMIN — AMIODARONE HYDROCHLORIDE 0.5 MG/MIN: 1.8 INJECTION, SOLUTION INTRAVENOUS at 05:08

## 2025-07-25 RX ADMIN — LEVETIRACETAM 1000 MG: 10 INJECTION INTRAVENOUS at 19:25

## 2025-07-25 RX ADMIN — HYDRALAZINE HYDROCHLORIDE 50 MG: 50 TABLET ORAL at 12:37

## 2025-07-25 RX ADMIN — MIDAZOLAM IN SODIUM CHLORIDE 5 MG/HR: 1 INJECTION INTRAVENOUS at 04:41

## 2025-07-25 RX ADMIN — POTASSIUM CHLORIDE 20 MEQ: 29.8 INJECTION, SOLUTION INTRAVENOUS at 15:28

## 2025-07-25 RX ADMIN — POTASSIUM CHLORIDE 20 MEQ: 1.5 POWDER, FOR SOLUTION ORAL at 05:44

## 2025-07-25 RX ADMIN — MIDAZOLAM IN SODIUM CHLORIDE 10 MG/HR: 1 INJECTION INTRAVENOUS at 17:01

## 2025-07-25 RX ADMIN — Medication 15 ML: at 08:10

## 2025-07-25 RX ADMIN — Medication 60 ML: at 19:26

## 2025-07-25 RX ADMIN — NOREPINEPHRINE BITARTRATE 0.03 MCG/KG/MIN: 0.06 INJECTION, SOLUTION INTRAVENOUS at 18:39

## 2025-07-25 RX ADMIN — TICAGRELOR 90 MG: 90 TABLET ORAL at 19:25

## 2025-07-25 RX ADMIN — Medication 40 MG: at 08:10

## 2025-07-25 RX ADMIN — SODIUM CHLORIDE 3000 MG: 9 INJECTION, SOLUTION INTRAVENOUS at 11:15

## 2025-07-25 RX ADMIN — MIDAZOLAM 2 MG: 1 INJECTION INTRAMUSCULAR; INTRAVENOUS at 18:47

## 2025-07-25 RX ADMIN — HEPARIN SODIUM 1750 UNITS/HR: 10000 INJECTION, SOLUTION INTRAVENOUS at 18:21

## 2025-07-25 RX ADMIN — LEVETIRACETAM 1000 MG: 10 INJECTION INTRAVENOUS at 08:08

## 2025-07-25 RX ADMIN — CHLORHEXIDINE GLUCONATE 15 ML: 1.2 SOLUTION ORAL at 19:24

## 2025-07-25 RX ADMIN — MIDAZOLAM 2 MG: 1 INJECTION INTRAMUSCULAR; INTRAVENOUS at 15:36

## 2025-07-25 RX ADMIN — AMIODARONE HYDROCHLORIDE 400 MG: 200 TABLET ORAL at 19:24

## 2025-07-25 RX ADMIN — POTASSIUM CHLORIDE 20 MEQ: 29.8 INJECTION, SOLUTION INTRAVENOUS at 23:25

## 2025-07-25 RX ADMIN — CHLORHEXIDINE GLUCONATE 15 ML: 1.2 SOLUTION ORAL at 08:10

## 2025-07-25 ASSESSMENT — ACTIVITIES OF DAILY LIVING (ADL)
ADLS_ACUITY_SCORE: 54
ADLS_ACUITY_SCORE: 56
ADLS_ACUITY_SCORE: 54
ADLS_ACUITY_SCORE: 56
ADLS_ACUITY_SCORE: 54
ADLS_ACUITY_SCORE: 56
ADLS_ACUITY_SCORE: 54

## 2025-07-25 NOTE — PROGRESS NOTES
Cardiology ICU Progress Note    Brief HPI:  Bob Echols is a 70 year old male with a past medical history of HTN, DM2, and DEMOND as well as recent admission for inferior STEMI on 7/10 (treated with percutaneous revascularization with 2.75 x 20mm Promus RENA to the distal LCx). The patient was discharged on 7/14 and had been doing well at home prior to 7/18 when he was found to be apneic without a pulse with estimated down time ~10-15 min prior to discovery. EMS was called at 1537, arrived and started compressions at 1542, cannulated for [facility] ECMO at 1614. Subsequently found to have subacute in-stent thrombosis of his recently placed stent without obvious mechanical causes, treated with 2.75 x 20mm Synergy XD RENA. Now admitted to [facility] for further care. His initial pH was 7.08, PcO2 74, PO2 58, Lactate 8.6.     Subjective and Interval:  Rates controlled  In status myoclonus epilepticus, IV versed increased to 10mg/hr, Valproic acid started per neuro recs   Continues to have poor neurologic exam- basic brainstem reflexes c/w CT findings of severe anoxic brain injury     Assessment and plan by system:  ==Today's changes ==  - Bumex gtt 0.5 mg/hr   - Transition from IV amiodarone to po   - Family care conference today    == Neurology ==   #?anoxic brain injury    #hypoxic encephalopathy 2/2 cardiac arrest  #Status myoclonus eplipeticus   -Intubated, sedated. Avoiding hyperthermia  -NCC consulted and appreciate recommendations  -vEEG   -Palliative care consulted    CT head 7/21  Continued diminished gray-white differentiation throughout  the cerebral hemispheres, concerning for hypoxic ischemic injury.    Current sedation: Versed gtt    Plan:  -Continue to hold sedation pending neurologic recovery   -Spontaneous awakening trial as tolerated  -Permissive hypernatremia for neuroprotection  -Goals of care conversation to be had with family 7/23    == Cardiovascular ==  #Refractory VF cardiac arrest   #cardiogenic shock  requiring [facility] ECMO ([facility])  #CAD, status post percutaneous revascularization of the left circumflex artery with sinacute in-stent thrombosis   #PAH  #HTN  Presented initially to [facility] on 7[facility]10 with inferior STEMI, found to have acute thrombotic lesion of the distal LCx treated with a 2.75 x 20mm Promus RENA, discharged on apixaban and clopidogrel. He was found down at home in VF-mediated cardiac arrest on  requiring emergent [facility] ECMO cannulation. Coronary angiography demonstrated in-stent thrombosis of his recently placed stent without obvious mechanical cause and therefore presumed to be clopidogrel failure. Treated with a 2.75 x 20mm Synergy XD RENA.     Peripheral V-A ECMO inserted via RCFA and RCFV   Angiogram:     Prox LAD to Mid LAD lesion is 55% stenosed.    Prox RCA lesion is 30% stenosed.    1st Diag lesion is 40% stenosed.    2nd Mrg lesion is 70% stenosed.    RPAV lesion is 70% stenosed.    Mid Cx lesion is 100% stenosed.    TTE:   Left ventricular size is normal. Left ventricular function is decreased. The  ejection fraction is 5-10% (severely reduced). Mild to moderate right  ventricular dilation is present.  Global right ventricular function is severely reduced.  Trace to mild mitral insufficiency is present.  AV opens with each systole No aortic regurgitation is present.  The inferior vena cava is normal. Trivial to small circumferential pericardial  effusion with no hemodynamic consequences    ECG Rhythm: Atrial fibrillation    ECMO :  Mode: V-A   Pump Type: Cardiohelp  RPM's: [id number]  Blood Flow (Circuit) LPM: 2.79 LPM  Sweep LPM: 0.5 LPM  Sweep FiO2   %: 40 %  Venous Pressure  mmHg: -18 mmHg  Arterial Pressure  mmH mmHg  Internal Pressure mmH mmHg  Pressure Change mmH mmHg    Current Pressors/inotropes/antiarrythmic: N/A    Hemodynamics:  Art Line  Arterial Line BP: 115/63  Arterial Line MAP (mmHg): 76 mmHg  Arterial Line Location: Right radial  Art Line Wave Form: Appropriate  wave forms      Plan:  -Continue ASA 81mg and ticagrelor 90mg BID   -Hold statin given shock liver, resume when leaving ICU  -Hold ACE/ARB for now given likely reduced renal fxn after arrest  -Hold beta blocker given shock  -Telemetry monitoring  -Trend troponin and lactic acid  -Continue ECMO support  -FBG negative 1L, Diuresis with bumex gtt @ 0.5 mg/hr  -IV amiodarone bolus and gtt for A fib with RVR/VT, total 5g load from IV  -Amiodarone 400mg BID 7/25  -Hydralazine 50mg q6h     == Pulmonary ==  #Acute hypoxemic respiratory failure requiring intubation  #DEMOND    Vent Settings:   FiO2 (%): 50 %, Resp: 19, Vent Mode: VC/AC, Resp Rate (Set): 12 breaths/min, Tidal Volume (Set, mL): 400 mL, PEEP (cm H2O): 8 cmH2O, Resp Rate (Set): 12 breaths/min, Tidal Volume (Set, mL): 400 mL, PEEP (cm H2O): 8 cmH2O    Plan:  -Wean vent as able  -Daily CXR  -Serial ABGs  -Peridex BID  -FBG and diuresis as above  -Will defer tx of RLL consolidation unless develops clinical evidence of infection        == Gastrointestinal, Nutrition ==  #Severe obesity  #?shock liver 2/2 cardiac arrest  #Hypoalbuminemia     Diet Advance tube feeds to goal    Plan:  -Monitor LFTs  -Bowel regimen in place  -GI Prophylaxis: PPI; Protonix 40 mg daily     == Renal, Electrolytes ==  #?acute renal injury 2/2 cardiac arrest  #Lactic acidosis    Plan:  -Avoid nephrotoxins, renally dose meds  -Monitor urine output  -FBG and diuresis as above    == Infectious Disease ==  #Aspiration pneumonia  #Leukocytosis    Plan:  -Continue CTX x 5 days for aspiration coverage  -Monitor for signs of infection  -Avoid fevers     == Hematology ==  # Anemia of critical illness    Receiving heparin for ECMO with ACT goal 180-200    Plan:  -Continue Heparin with ACT goal as above  -Transfusion parameters:   -1u PRBC for hbg < 8.0   -1u platelet for plt < 100   -1u FFP for INR > 3   -5u cryoprecipitate for fibrinogen <150    ASA/ticagrelor for RENA  US LE w/ arterial duplex with no  "evidence of acute thrombus  DVT PPX: Heparin as above    == Endocrinology ==  #Hyperglycemia   #DM2     Plan:  -Insulin infusion as needed    == Integumentary ==  -No skin issues    Plan:  -CTM cannula sites  -WOCN consult    == Lines/Tubes/Drains ==  Cannula: right fem  Airway: ETT  Enteric: OGT  Central access: left fem  Arterial access: radial  Urinary: yes  Rectal Tube: yes    ICU Checklist  Feeding: yes  Analgesia: fentanyl  Sedation: propofol   Thrombopx: heparin infusion  Head of bed: RT  Ulcers: PPI  Glucose: CTM  SBT: not today  Bowel regimen: yes  Indwelling cath: yes  De-escalate meds: no  Code Status: Full Code , eCPR candidate? Yes, on support  Family updated by team. Patient seen and discussed with staff physician, Dr. Ilhwan Yeo.     Dilan Lozada MD  Cardiovascular Diseases Fellow    Objective:  Most recent vital signs:  Pulse 106   Temp 98.2  F (36.8  C)   Resp 19   Ht 1.778 m (5' 10\")   Wt 110.1 kg (242 lb 11.6 oz)   SpO2 98%   BMI 34.83 kg/m    Temp:  [98.1  F (36.7  C)-98.4  F (36.9  C)] 98.2  F (36.8  C)  Pulse:  [] 106  Resp:  [0-36] 19  MAP:  [72 mmHg-90 mmHg] 76 mmHg  Arterial Line BP: (110-138)/(57-73) 115/63  FiO2 (%):  [50 %-60 %] 50 %  SpO2:  [88 %-100 %] 98 %    Physical exam:  General: critically ill, supine in bed, in NAD  HEENT: PERRL, no scleral icterus or injection  CARDIAC: RRR, no m/r/g appreciated. Peripheral pulses dopplered  RESP: synchronous with mechanical ventilation, CTAB, no wheezes, rhonchi or crackles appreciated.  GI: soft, non-distended, BS hypoactive  : Urinary catheter present  EXTREMITIES: No BLE edema, pulses 2+. Femoral access site w/o bleeding, dressing C/D/I.   SKIN: No acute lesions appreciated on exposed skin.  NEURO: Intubated and sedated. Unable to assess language or mentation. Unresponsive to noxious stim x 4 extremities. No focal deficit.     Imaging/procedure results:  Recent Results (from the past 24 hours)   Glucose by meter   Result " Value Ref Range    GLUCOSE BY METER POCT 136 (H) 70 - 99 mg/dL   Blood gas arterial   Result Value Ref Range    pH Arterial 7.45 7.35 - 7.45    pCO2 Arterial 35 35 - 45 mm Hg    pO2 Arterial 112 (H) 80 - 105 mm Hg    FIO2 60     Bicarbonate Arterial 24 21 - 28 mmol/L    Base Excess/Deficit Arterial 0.2 -3.0 - 3.0 mmol/L    Rei's Test Artline     Oxyhemoglobin Arterial 97 92 - 100 %    O2 Sat, Arterial 99.3 (H) 95.0 - 96.0 %    Narrative    In healthy individuals, oxyhemoglobin (O2Hb) and oxygen saturation (SO2) are approximately equal. In the presence of dyshemoglobins, oxyhemoglobin can be considerably lower than oxygen saturation.   Activated clotting time celite, POCT   Result Value Ref Range    Activated Clotting Time (Celite) POCT 194 (H) 74 - 150 seconds   Glucose by meter   Result Value Ref Range    GLUCOSE BY METER POCT 164 (H) 70 - 99 mg/dL   CBC with platelets   Result Value Ref Range    WBC Count 12.0 (H) 4.0 - 11.0 10e3/uL    RBC Count 3.15 (L) 4.40 - 5.90 10e6/uL    Hemoglobin 8.7 (L) 13.3 - 17.7 g/dL    Hematocrit 27.8 (L) 40.0 - 53.0 %    MCV 88 78 - 100 fL    MCH 27.6 26.5 - 33.0 pg    MCHC 31.3 (L) 31.5 - 36.5 g/dL    RDW 14.7 10.0 - 15.0 %    Platelet Count 210 150 - 450 10e3/uL   Comprehensive metabolic panel   Result Value Ref Range    Sodium 149 (H) 135 - 145 mmol/L    Potassium 3.8 3.4 - 5.3 mmol/L    Carbon Dioxide (CO2) 23 22 - 29 mmol/L    Anion Gap 17 (H) 7 - 15 mmol/L    Urea Nitrogen 97.0 (H) 8.0 - 23.0 mg/dL    Creatinine 2.41 (H) 0.67 - 1.17 mg/dL    GFR Estimate 28 (L) >60 mL/min/1.73m2    Calcium 8.8 8.8 - 10.4 mg/dL    Chloride 109 (H) 98 - 107 mmol/L    Glucose 176 (H) 70 - 99 mg/dL    Alkaline Phosphatase 101 40 - 150 U/L    AST 51 (H) 0 - 45 U/L    ALT 33 0 - 70 U/L    Protein Total 6.2 (L) 6.4 - 8.3 g/dL    Albumin 2.6 (L) 3.5 - 5.2 g/dL    Bilirubin Total 0.2 <=1.2 mg/dL   Calcium ionized whole blood   Result Value Ref Range    Calcium Ionized Whole Blood 4.5 4.4 - 5.2 mg/dL    Glucose whole blood   Result Value Ref Range    Glucose 174 (H) 70 - 99 mg/dL   Heparin Unfractionated Anti Xa Level   Result Value Ref Range    Anti Xa Unfractionated Heparin 0.60 For Reference Range, See Comment IU/mL    Narrative    Therapeutic Range: UFH: 0.25-0.50 IU/mL for low intensity dosing,  0.30-0.70 IU/mL for high intensity dosing DVT and PE.  This test is not validated for other direct factor X inhibitors (e.g. rivaroxaban, apixaban, edoxaban, betrixaban, fondaparinux) and should not be used for monitoring of other medications.   INR   Result Value Ref Range    INR 1.28 (H) 0.85 - 1.15    PT 16.2 (H) 11.8 - 14.8 Seconds   Partial thromboplastin time   Result Value Ref Range    aPTT 89 (H) 22 - 38 Seconds   Lactic acid whole blood   Result Value Ref Range    Lactic Acid 1.1 0.7 - 2.0 mmol/L   Magnesium   Result Value Ref Range    Magnesium 2.4 (H) 1.7 - 2.3 mg/dL   Troponin T, High Sensitivity   Result Value Ref Range    Troponin T, High Sensitivity 1,217 (HH) <=22 ng/L   Blood gas arterial   Result Value Ref Range    pH Arterial 7.42 7.35 - 7.45    pCO2 Arterial 39 35 - 45 mm Hg    pO2 Arterial 111 (H) 80 - 105 mm Hg    FIO2 60     Bicarbonate Arterial 25 21 - 28 mmol/L    Base Excess/Deficit Arterial 0.6 -3.0 - 3.0 mmol/L    Rei's Test Artline     Oxyhemoglobin Arterial 97 92 - 100 %    O2 Sat, Arterial 99.1 (H) 95.0 - 96.0 %    Narrative    In healthy individuals, oxyhemoglobin (O2Hb) and oxygen saturation (SO2) are approximately equal. In the presence of dyshemoglobins, oxyhemoglobin can be considerably lower than oxygen saturation.   Blood gas arterial   Result Value Ref Range    pH Arterial 7.45 7.35 - 7.45    pCO2 Arterial 36 35 - 45 mm Hg    pO2 Arterial 130 (H) 80 - 105 mm Hg    FIO2 60     Bicarbonate Arterial 25 21 - 28 mmol/L    Base Excess/Deficit Arterial 0.9 -3.0 - 3.0 mmol/L    Rei's Test Artline     Oxyhemoglobin Arterial 99 92 - 100 %    O2 Sat, Arterial >100.0 (H) 95.0 - 96.0 %     Narrative    In healthy individuals, oxyhemoglobin (O2Hb) and oxygen saturation (SO2) are approximately equal. In the presence of dyshemoglobins, oxyhemoglobin can be considerably lower than oxygen saturation.   Activated clotting time celite, POCT   Result Value Ref Range    Activated Clotting Time (Celite) POCT 190 (H) 74 - 150 seconds   Glucose by meter   Result Value Ref Range    GLUCOSE BY METER POCT 159 (H) 70 - 99 mg/dL   Blood gas arterial   Result Value Ref Range    pH Arterial 7.47 (H) 7.35 - 7.45    pCO2 Arterial 35 35 - 45 mm Hg    pO2 Arterial 101 80 - 105 mm Hg    FIO2 60     Bicarbonate Arterial 25 21 - 28 mmol/L    Base Excess/Deficit Arterial 1.4 -3.0 - 3.0 mmol/L    Rei's Test Artline     Oxyhemoglobin Arterial 98 92 - 100 %    O2 Sat, Arterial 99.4 (H) 95.0 - 96.0 %    Narrative    In healthy individuals, oxyhemoglobin (O2Hb) and oxygen saturation (SO2) are approximately equal. In the presence of dyshemoglobins, oxyhemoglobin can be considerably lower than oxygen saturation.   Glucose by meter   Result Value Ref Range    GLUCOSE BY METER POCT 137 (H) 70 - 99 mg/dL   CBC with platelets   Result Value Ref Range    WBC Count 11.7 (H) 4.0 - 11.0 10e3/uL    RBC Count 3.17 (L) 4.40 - 5.90 10e6/uL    Hemoglobin 8.8 (L) 13.3 - 17.7 g/dL    Hematocrit 28.1 (L) 40.0 - 53.0 %    MCV 89 78 - 100 fL    MCH 27.8 26.5 - 33.0 pg    MCHC 31.3 (L) 31.5 - 36.5 g/dL    RDW 14.9 10.0 - 15.0 %    Platelet Count 220 150 - 450 10e3/uL   Comprehensive metabolic panel   Result Value Ref Range    Sodium 151 (H) 135 - 145 mmol/L    Potassium 3.7 3.4 - 5.3 mmol/L    Carbon Dioxide (CO2) 23 22 - 29 mmol/L    Anion Gap 18 (H) 7 - 15 mmol/L    Urea Nitrogen 104.0 (H) 8.0 - 23.0 mg/dL    Creatinine 2.46 (H) 0.67 - 1.17 mg/dL    GFR Estimate 28 (L) >60 mL/min/1.73m2    Calcium 8.9 8.8 - 10.4 mg/dL    Chloride 110 (H) 98 - 107 mmol/L    Glucose 149 (H) 70 - 99 mg/dL    Alkaline Phosphatase 97 40 - 150 U/L    AST 51 (H) 0 - 45  U/L    ALT 34 0 - 70 U/L    Protein Total 6.3 (L) 6.4 - 8.3 g/dL    Albumin 2.6 (L) 3.5 - 5.2 g/dL    Bilirubin Total 0.3 <=1.2 mg/dL   Calcium ionized whole blood   Result Value Ref Range    Calcium Ionized Whole Blood 4.6 4.4 - 5.2 mg/dL   Glucose whole blood   Result Value Ref Range    Glucose 145 (H) 70 - 99 mg/dL   Heparin Unfractionated Anti Xa Level   Result Value Ref Range    Anti Xa Unfractionated Heparin 0.65 For Reference Range, See Comment IU/mL    Narrative    Therapeutic Range: UFH: 0.25-0.50 IU/mL for low intensity dosing,  0.30-0.70 IU/mL for high intensity dosing DVT and PE.  This test is not validated for other direct factor X inhibitors (e.g. rivaroxaban, apixaban, edoxaban, betrixaban, fondaparinux) and should not be used for monitoring of other medications.   INR   Result Value Ref Range    INR 1.29 (H) 0.85 - 1.15    PT 16.4 (H) 11.8 - 14.8 Seconds   Partial thromboplastin time   Result Value Ref Range    aPTT 99 (H) 22 - 38 Seconds   Lactic acid whole blood   Result Value Ref Range    Lactic Acid 1.3 0.7 - 2.0 mmol/L   Magnesium   Result Value Ref Range    Magnesium 2.3 1.7 - 2.3 mg/dL   Troponin T, High Sensitivity   Result Value Ref Range    Troponin T, High Sensitivity 1,215 (HH) <=22 ng/L   D dimer quantitative   Result Value Ref Range    D-Dimer Quantitative 2.68 (H) 0.00 - 0.50 ug/mL FEU    Narrative    This D-dimer assay is intended for use in conjunction with a clinical pretest probability assessment model to exclude pulmonary embolism (PE) and deep venous thrombosis (DVT) in outpatients suspected of PE or DVT. The cut-off value is 0.50 ug/mL FEU.    For patients 50 years of age or older, the application of age-adjusted cut-off values for D-Dimer may increase the specificity without significant effect on sensitivity. The literature suggested calculation age adjusted cut-off in ug/L = age in years x 10 ug/L. The results in this laboratory are reported as ug/mL rather than ug/L. The  calculation for age adjusted cut off in ug/mL= age in years x 0.01 ug/mL. For example, the cut off for a 76 year old male is 77 x 0.01 ug/mL = 0.76 ug/mL (760 ug/L).    M Bassam et al. Age adjusted D-dimer cut-off levels to rule out pulmonary embolism: The ADJUST-PE Study. EDGAR 2014;311:7291-5301.; HJ Shi et al. Diagnostic accuracy of conventional or age adjusted D-dimer cutoff values in older patients with suspected venous thromboembolism. Systemic review and meta-analysis. BMJ 2013:346:f2492.   Blood gas ELS venous   Result Value Ref Range    pH ELS Venous 7.37 7.32 - 7.43    pCO2 ELS Venous 45 40 - 50 mm Hg    pO2 ELS Venous 52 (H) 25 - 47 mm Hg    Bicarbonate ELS Venous 26 21 - 28 mmol/L    Base Excess/Deficit Venous 0.7 -3.0 - 3.0 mmol/L    FIO2 50     Oxyhemoglobin ELS Venous 84 %    O2 Saturation ELS Venous 85.8 (H) 70.0 - 75.0 %    Narrative    In healthy individuals, oxyhemoglobin (O2Hb) and oxygen saturation (SO2) are approximately equal. In the presence of dyshemoglobins, oxyhemoglobin can be considerably lower than oxygen saturation.   Blood gas ELS arterial   Result Value Ref Range    pH ELS Arterial 7.39 7.35 - 7.45    pCO2 ELS Arterial 42 35 - 45 mm Hg    pO2 ELS Arterial 227 (H) 80 - 105 mm Hg    Bicarbonate ELS Arterial 25 21 - 28 mmol/L    Base Excess/Deficit Arterial 0.3 -3.0 - 3.0 mmol/L    FIO2 50     Oxyhemoglobin ELS Arterial 99 75 - 100 %    O2 Saturation ELS Arterial >100.0 (H) 95.0 - 96.0 %    Narrative    In healthy individuals, oxyhemoglobin (O2Hb) and oxygen saturation (SO2) are approximately equal. In the presence of dyshemoglobins, oxyhemoglobin can be considerably lower than oxygen saturation.   Fibrinogen activity   Result Value Ref Range    Fibrinogen Activity 1,140 (H) 170 - 510 mg/dL   Blood gas arterial   Result Value Ref Range    pH Arterial 7.45 7.35 - 7.45    pCO2 Arterial 35 35 - 45 mm Hg    pO2 Arterial 133 (H) 80 - 105 mm Hg    FIO2 50     Bicarbonate Arterial 24 21 -  28 mmol/L    Base Excess/Deficit Arterial 0.4 -3.0 - 3.0 mmol/L    Rei's Test Artline     Oxyhemoglobin Arterial 99 92 - 100 %    O2 Sat, Arterial >100.0 (H) 95.0 - 96.0 %    Narrative    In healthy individuals, oxyhemoglobin (O2Hb) and oxygen saturation (SO2) are approximately equal. In the presence of dyshemoglobins, oxyhemoglobin can be considerably lower than oxygen saturation.   Glucose by meter   Result Value Ref Range    GLUCOSE BY METER POCT 136 (H) 70 - 99 mg/dL   Activated clotting time celite, POCT   Result Value Ref Range    Activated Clotting Time (Celite) POCT 190 (H) 74 - 150 seconds   Blood gas arterial   Result Value Ref Range    pH Arterial 7.44 7.35 - 7.45    pCO2 Arterial 37 35 - 45 mm Hg    pO2 Arterial 123 (H) 80 - 105 mm Hg    FIO2 50     Bicarbonate Arterial 25 21 - 28 mmol/L    Base Excess/Deficit Arterial 1.0 -3.0 - 3.0 mmol/L    Rei's Test Artline     Oxyhemoglobin Arterial 97 92 - 100 %    O2 Sat, Arterial 99.3 (H) 95.0 - 96.0 %    Narrative    In healthy individuals, oxyhemoglobin (O2Hb) and oxygen saturation (SO2) are approximately equal. In the presence of dyshemoglobins, oxyhemoglobin can be considerably lower than oxygen saturation.   Glucose by meter   Result Value Ref Range    GLUCOSE BY METER POCT 129 (H) 70 - 99 mg/dL   Activated clotting time celite, POCT   Result Value Ref Range    Activated Clotting Time (Celite) POCT 186 (H) 74 - 150 seconds   Glucose by meter   Result Value Ref Range    GLUCOSE BY METER POCT 118 (H) 70 - 99 mg/dL   Blood gas arterial   Result Value Ref Range    pH Arterial 7.43 7.35 - 7.45    pCO2 Arterial 39 35 - 45 mm Hg    pO2 Arterial 105 80 - 105 mm Hg    FIO2 50     Bicarbonate Arterial 26 21 - 28 mmol/L    Base Excess/Deficit Arterial 1.2 -3.0 - 3.0 mmol/L    Rei's Test Artline     Oxyhemoglobin Arterial 97 92 - 100 %    O2 Sat, Arterial 99.0 (H) 95.0 - 96.0 %    Narrative    In healthy individuals, oxyhemoglobin (O2Hb) and oxygen saturation  (SO2) are approximately equal. In the presence of dyshemoglobins, oxyhemoglobin can be considerably lower than oxygen saturation.   CBC with platelets   Result Value Ref Range    WBC Count 11.9 (H) 4.0 - 11.0 10e3/uL    RBC Count 3.18 (L) 4.40 - 5.90 10e6/uL    Hemoglobin 8.8 (L) 13.3 - 17.7 g/dL    Hematocrit 27.9 (L) 40.0 - 53.0 %    MCV 88 78 - 100 fL    MCH 27.7 26.5 - 33.0 pg    MCHC 31.5 31.5 - 36.5 g/dL    RDW 14.9 10.0 - 15.0 %    Platelet Count 222 150 - 450 10e3/uL   Comprehensive metabolic panel   Result Value Ref Range    Sodium 150 (H) 135 - 145 mmol/L    Potassium 3.7 3.4 - 5.3 mmol/L    Carbon Dioxide (CO2) 22 22 - 29 mmol/L    Anion Gap 18 (H) 7 - 15 mmol/L    Urea Nitrogen 110.0 (H) 8.0 - 23.0 mg/dL    Creatinine 2.48 (H) 0.67 - 1.17 mg/dL    GFR Estimate 27 (L) >60 mL/min/1.73m2    Calcium 9.2 8.8 - 10.4 mg/dL    Chloride 110 (H) 98 - 107 mmol/L    Glucose 123 (H) 70 - 99 mg/dL    Alkaline Phosphatase 96 40 - 150 U/L    AST 51 (H) 0 - 45 U/L    ALT 34 0 - 70 U/L    Protein Total 6.3 (L) 6.4 - 8.3 g/dL    Albumin 2.7 (L) 3.5 - 5.2 g/dL    Bilirubin Total 0.3 <=1.2 mg/dL   Calcium ionized whole blood   Result Value Ref Range    Calcium Ionized Whole Blood 4.7 4.4 - 5.2 mg/dL   Glucose whole blood   Result Value Ref Range    Glucose 115 (H) 70 - 99 mg/dL   Heparin Unfractionated Anti Xa Level   Result Value Ref Range    Anti Xa Unfractionated Heparin 0.68 For Reference Range, See Comment IU/mL    Narrative    Therapeutic Range: UFH: 0.25-0.50 IU/mL for low intensity dosing,  0.30-0.70 IU/mL for high intensity dosing DVT and PE.  This test is not validated for other direct factor X inhibitors (e.g. rivaroxaban, apixaban, edoxaban, betrixaban, fondaparinux) and should not be used for monitoring of other medications.   INR   Result Value Ref Range    INR 1.23 (H) 0.85 - 1.15    PT 15.8 (H) 11.8 - 14.8 Seconds   Partial thromboplastin time   Result Value Ref Range    aPTT 93 (H) 22 - 38 Seconds   Lactic  acid whole blood   Result Value Ref Range    Lactic Acid 1.2 0.7 - 2.0 mmol/L   Magnesium   Result Value Ref Range    Magnesium 2.4 (H) 1.7 - 2.3 mg/dL   Troponin T, High Sensitivity   Result Value Ref Range    Troponin T, High Sensitivity 1,123 (HH) <=22 ng/L   Blood gas arterial   Result Value Ref Range    pH Arterial 7.42 7.35 - 7.45    pCO2 Arterial 39 35 - 45 mm Hg    pO2 Arterial 109 (H) 80 - 105 mm Hg    FIO2 50     Bicarbonate Arterial 25 21 - 28 mmol/L    Base Excess/Deficit Arterial 0.4 -3.0 - 3.0 mmol/L    Rei's Test Artline     Oxyhemoglobin Arterial 97 92 - 100 %    O2 Sat, Arterial 98.7 (H) 95.0 - 96.0 %    Narrative    In healthy individuals, oxyhemoglobin (O2Hb) and oxygen saturation (SO2) are approximately equal. In the presence of dyshemoglobins, oxyhemoglobin can be considerably lower than oxygen saturation.   Glucose by meter   Result Value Ref Range    GLUCOSE BY METER POCT 109 (H) 70 - 99 mg/dL   Extra Tube *Canceled*    Narrative    The following orders were created for panel order Extra Tube.  Procedure                               Abnormality         Status                     ---------                               -----------         ------                     Extra Green Top (Lithiu...[[id number]]                                                   Please view results for these tests on the individual orders.   Glucose by meter   Result Value Ref Range    GLUCOSE BY METER POCT 118 (H) 70 - 99 mg/dL   Activated clotting time celite, POCT   Result Value Ref Range    Activated Clotting Time (Celite) POCT 194 (H) 74 - 150 seconds   Blood gas arterial   Result Value Ref Range    pH Arterial 7.45 7.35 - 7.45    pCO2 Arterial 36 35 - 45 mm Hg    pO2 Arterial 123 (H) 80 - 105 mm Hg    FIO2 50     Bicarbonate Arterial 25 21 - 28 mmol/L    Base Excess/Deficit Arterial 0.6 -3.0 - 3.0 mmol/L    Rei's Test Artline     Oxyhemoglobin Arterial 98 92 - 100 %    O2 Sat, Arterial 100.0 (H) 95.0 - 96.0 %     Narrative    In healthy individuals, oxyhemoglobin (O2Hb) and oxygen saturation (SO2) are approximately equal. In the presence of dyshemoglobins, oxyhemoglobin can be considerably lower than oxygen saturation.   Glucose by meter   Result Value Ref Range    GLUCOSE BY METER POCT 122 (H) 70 - 99 mg/dL   Blood gas arterial   Result Value Ref Range    pH Arterial 7.43 7.35 - 7.45    pCO2 Arterial 38 35 - 45 mm Hg    pO2 Arterial 106 (H) 80 - 105 mm Hg    FIO2 50     Bicarbonate Arterial 25 21 - 28 mmol/L    Base Excess/Deficit Arterial 0.6 -3.0 - 3.0 mmol/L    Rei's Test [employer]     Oxyhemoglobin Arterial 97 92 - 100 %    O2 Sat, Arterial 99.5 (H) 95.0 - 96.0 %    Narrative    In healthy individuals, oxyhemoglobin (O2Hb) and oxygen saturation (SO2) are approximately equal. In the presence of dyshemoglobins, oxyhemoglobin can be considerably lower than oxygen saturation.   Glucose by meter   Result Value Ref Range    GLUCOSE BY METER POCT 127 (H) 70 - 99 mg/dL   XR Chest Port 1 View    Impression    RESIDENT PRELIMINARY INTERPRETATION  Impression:   1. Low lung volumes with basilar atelectasis. No new consolidations.  2. Stable support devices.   EKG 12-lead, tracing only   Result Value Ref Range    Systolic Blood Pressure  mmHg    Diastolic Blood Pressure  mmHg    Ventricular Rate 105 BPM    Atrial Rate  BPM    IL Interval  ms    QRS Duration 80 ms     ms    QTc 473 ms    P Axis  degrees    R AXIS 43 degrees    T Axis 78 degrees    Interpretation ECG       Atrial fibrillation with rapid ventricular response  Low voltage QRS  Inferior infarct , possibly acute  ** ** ACUTE MI / STEMI ** **  Abnormal ECG  When compared with ECG of 24-Jul-2025 06:14,  Inferior infarct is now Present  ST no longer depressed in Anterior leads  Nonspecific T wave abnormality has replaced inverted T waves in Anterior leads     Glucose by meter   Result Value Ref Range    GLUCOSE BY METER POCT 122 (H) 70 - 99 mg/dL   CBC with  platelets   Result Value Ref Range    WBC Count 11.7 (H) 4.0 - 11.0 10e3/uL    RBC Count 3.20 (L) 4.40 - 5.90 10e6/uL    Hemoglobin 8.8 (L) 13.3 - 17.7 g/dL    Hematocrit 28.5 (L) 40.0 - 53.0 %    MCV 89 78 - 100 fL    MCH 27.5 26.5 - 33.0 pg    MCHC 30.9 (L) 31.5 - 36.5 g/dL    RDW 14.8 10.0 - 15.0 %    Platelet Count 230 150 - 450 10e3/uL   Comprehensive metabolic panel   Result Value Ref Range    Sodium 151 (H) 135 - 145 mmol/L    Potassium 3.6 3.4 - 5.3 mmol/L    Carbon Dioxide (CO2) 23 22 - 29 mmol/L    Anion Gap 17 (H) 7 - 15 mmol/L    Urea Nitrogen 112.0 (H) 8.0 - 23.0 mg/dL    Creatinine 2.49 (H) 0.67 - 1.17 mg/dL    GFR Estimate 27 (L) >60 mL/min/1.73m2    Calcium 9.1 8.8 - 10.4 mg/dL    Chloride 111 (H) 98 - 107 mmol/L    Glucose 140 (H) 70 - 99 mg/dL    Alkaline Phosphatase 92 40 - 150 U/L    AST 48 (H) 0 - 45 U/L    ALT 32 0 - 70 U/L    Protein Total 6.3 (L) 6.4 - 8.3 g/dL    Albumin 2.6 (L) 3.5 - 5.2 g/dL    Bilirubin Total 0.3 <=1.2 mg/dL   Calcium ionized whole blood   Result Value Ref Range    Calcium Ionized Whole Blood 4.6 4.4 - 5.2 mg/dL   Glucose whole blood   Result Value Ref Range    Glucose 137 (H) 70 - 99 mg/dL   Heparin Unfractionated Anti Xa Level   Result Value Ref Range    Anti Xa Unfractionated Heparin 0.69 For Reference Range, See Comment IU/mL    Narrative    Therapeutic Range: UFH: 0.25-0.50 IU/mL for low intensity dosing,  0.30-0.70 IU/mL for high intensity dosing DVT and PE.  This test is not validated for other direct factor X inhibitors (e.g. rivaroxaban, apixaban, edoxaban, betrixaban, fondaparinux) and should not be used for monitoring of other medications.   INR   Result Value Ref Range    INR 1.28 (H) 0.85 - 1.15    PT 16.3 (H) 11.8 - 14.8 Seconds   Partial thromboplastin time   Result Value Ref Range    aPTT 102 (H) 22 - 38 Seconds   Lactic acid whole blood   Result Value Ref Range    Lactic Acid 0.9 0.7 - 2.0 mmol/L   Magnesium   Result Value Ref Range    Magnesium 2.4 (H)  1.7 - 2.3 mg/dL   Troponin T, High Sensitivity   Result Value Ref Range    Troponin T, High Sensitivity 1,103 (HH) <=22 ng/L   D dimer quantitative   Result Value Ref Range    D-Dimer Quantitative 3.03 (H) 0.00 - 0.50 ug/mL FEU    Narrative    This D-dimer assay is intended for use in conjunction with a clinical pretest probability assessment model to exclude pulmonary embolism (PE) and deep venous thrombosis (DVT) in outpatients suspected of PE or DVT. The cut-off value is 0.50 ug/mL FEU.    For patients 50 years of age or older, the application of age-adjusted cut-off values for D-Dimer may increase the specificity without significant effect on sensitivity. The literature suggested calculation age adjusted cut-off in ug/L = age in years x 10 ug/L. The results in this laboratory are reported as ug/mL rather than ug/L. The calculation for age adjusted cut off in ug/mL= age in years x 0.01 ug/mL. For example, the cut off for a 76 year old male is 77 x 0.01 ug/mL = 0.76 ug/mL (760 ug/L).    M Bassam et al. Age adjusted D-dimer cut-off levels to rule out pulmonary embolism: The ADJUST-PE Study. EDGAR 2014;311:0835-5569.; HJ Shi et al. Diagnostic accuracy of conventional or age adjusted D-dimer cutoff values in older patients with suspected venous thromboembolism. Systemic review and meta-analysis. BMJ 2013:346:f2492.   Blood gas ELS venous   Result Value Ref Range    pH ELS Venous 7.39 7.32 - 7.43    pCO2 ELS Venous 45 40 - 50 mm Hg    pO2 ELS Venous 48 (H) 25 - 47 mm Hg    Bicarbonate ELS Venous 27 21 - 28 mmol/L    Base Excess/Deficit Venous 1.5 -3.0 - 3.0 mmol/L    FIO2 50     Oxyhemoglobin ELS Venous 81 %    O2 Saturation ELS Venous 82.5 (H) 70.0 - 75.0 %    Narrative    In healthy individuals, oxyhemoglobin (O2Hb) and oxygen saturation (SO2) are approximately equal. In the presence of dyshemoglobins, oxyhemoglobin can be considerably lower than oxygen saturation.   Blood gas ELS arterial   Result Value Ref  Range    pH ELS Arterial 7.39 7.35 - 7.45    pCO2 ELS Arterial 43 35 - 45 mm Hg    pO2 ELS Arterial 141 (H) 80 - 105 mm Hg    Bicarbonate ELS Arterial 26 21 - 28 mmol/L    Base Excess/Deficit Arterial 0.7 -3.0 - 3.0 mmol/L    FIO2 40     Oxyhemoglobin ELS Arterial 98 75 - 100 %    O2 Saturation ELS Arterial 100.0 (H) 95.0 - 96.0 %    Narrative    In healthy individuals, oxyhemoglobin (O2Hb) and oxygen saturation (SO2) are approximately equal. In the presence of dyshemoglobins, oxyhemoglobin can be considerably lower than oxygen saturation.   Fibrinogen activity   Result Value Ref Range    Fibrinogen Activity 1,128 (H) 170 - 510 mg/dL   CRP inflammation   Result Value Ref Range    CRP Inflammation 148.00 (H) <5.00 mg/L   Erythrocyte sedimentation rate auto   Result Value Ref Range    Erythrocyte Sedimentation Rate 136 (H) 0 - 20 mm/hr   Hemoglobin plasma   Result Value Ref Range    Hemoglobin Plasma <30 <30 mg/dL   Lactate Dehydrogenase   Result Value Ref Range    Lactate Dehydrogenase 396 (H) 0 - 250 U/L   Phosphorus   Result Value Ref Range    Phosphorus 5.3 (H) 2.5 - 4.5 mg/dL   TSH   Result Value Ref Range    TSH 0.70 0.30 - 4.20 uIU/mL   T3 Free   Result Value Ref Range    T3 Free 1.6 (L) 2.0 - 4.4 pg/mL   T3 total   Result Value Ref Range    T3 Total 73 (L) 85 - 202 ng/dL   T4 free   Result Value Ref Range    Free T4 0.87 (L) 0.90 - 1.70 ng/dL   Blood gas arterial   Result Value Ref Range    pH Arterial 7.44 7.35 - 7.45    pCO2 Arterial 37 35 - 45 mm Hg    pO2 Arterial 99 80 - 105 mm Hg    FIO2 50     Bicarbonate Arterial 25 21 - 28 mmol/L    Base Excess/Deficit Arterial 0.9 -3.0 - 3.0 mmol/L    Rei's Test [facility]     Oxyhemoglobin Arterial 97 92 - 100 %    O2 Sat, Arterial 98.9 (H) 95.0 - 96.0 %    Narrative    In healthy individuals, oxyhemoglobin (O2Hb) and oxygen saturation (SO2) are approximately equal. In the presence of dyshemoglobins, oxyhemoglobin can be considerably lower than oxygen saturation.    Activated clotting time celite, POCT   Result Value Ref Range    Activated Clotting Time (Celite) POCT 198 (H) 74 - 150 seconds   Blood gas arterial   Result Value Ref Range    pH Arterial 7.44 7.35 - 7.45    pCO2 Arterial 38 35 - 45 mm Hg    pO2 Arterial 82 80 - 105 mm Hg    FIO2 50     Bicarbonate Arterial 26 21 - 28 mmol/L    Base Excess/Deficit Arterial 1.5 -3.0 - 3.0 mmol/L    Rei's Test [facility]     Oxyhemoglobin Arterial 96 92 - 100 %    O2 Sat, Arterial 97.3 (H) 95.0 - 96.0 %    Narrative    In healthy individuals, oxyhemoglobin (O2Hb) and oxygen saturation (SO2) are approximately equal. In the presence of dyshemoglobins, oxyhemoglobin can be considerably lower than oxygen saturation.   Glucose by meter   Result Value Ref Range    GLUCOSE BY METER POCT 123 (H) 70 - 99 mg/dL       Clinically Significant Risk Factors         # Hypernatremia: Highest Na = 151 mmol/L in last 2 days, will monitor as appropriate  # Hyperchloremia: Highest Cl = 111 mmol/L in last 2 days, will monitor as appropriate          # Hypoalbuminemia: Lowest albumin = 2.6 g/dL at 7/25/2025  3:48 AM, will monitor as appropriate    # Coagulation Defect: INR = 1.28 (Ref range: 0.85 - 1.15) and/or PTT = 102 Seconds (Ref range: 22 - 38 Seconds), will monitor for bleeding   # Acute Kidney Injury, unspecified: based on a >150% or 0.3 mg/dL increase in last creatinine compared to past 90 day average, will monitor renal function      # Acute Hypoxic Respiratory Failure: Based on blood gas results. Continue supplemental oxygen as needed  # Acute Hypercapnic Respiratory Failure: based on arterial blood gas results.  Continue supplemental oxygen and ventilatory support as indicated.  # Acute Hypercapnic Respiratory Failure: based on venous blood gas results.  Continue supplemental oxygen and ventilatory support as indicated.         # Obesity: Estimated body mass index is 34.83 kg/m  as calculated from the following:    Height as of this encounter:  "1.778 m (5' 10\").    Weight as of this encounter: 110.1 kg (242 lb 11.6 oz).        # Financial/Environmental Concerns: none              "

## 2025-07-25 NOTE — PROGRESS NOTES
PALLIATIVE CARE PROGRESS NOTE  [facility]     Patient Name: Bob Echols       Recommendations & Counseling   What is new today:  Participated in care conference today with wife and multiple friends and community , neuro critical care, CVICU.  Adult children present by phone  Shared severity of his injury, and likelihood of blindness, unlikely to regain consciousness, speech/motor deficits unlikely improvement.  Family feels confident that this would be an unacceptable quality of life to Bob, and are planning life support withdrawal in upcoming days.  They are interested in learning more about organ donation.  Discussed that most likely prognosis would be death in minutes to hours after life support withdrawal.    GOALS OF CARE:   Likely withdrawal of life support in upcoming several days.  DNR/DNI if he has a catastrophic event before then.    ADVANCE CARE PLANNING:  No ACD or POLST on file  Code status: DNR/DNI in anticipation of upcoming withdrawal of life support.    MEDICAL MANAGEMENT:   We are not involved in medical management of this patient    PSYCHOSOCIAL/SPIRITUAL:   support    Palliative Care will continue to follow. Thank you for the consult and allowing us to aid in the care of Bob Echols.    These recommendations have been discussed with primary team, family    Chavo Teran MD  [facility], Palliative Care  Securely message with the [employer] Web Console (learn more here) or  Text page via [employer] Paging/Directory    TT: 49 minutes, with > 50% spent in C/C/E patient/family/care teams re: GOC, POC, Sx management. [id number]  Seen today for cardiac arrest, anoxic brain injury, GOC planning    Assessment   Bob Echols is a 70 year old male with a past medical history of HTN, DM2, and DEMOND.      Recent admission for inferior STEMI on 7/10 (treated with percutaneous revascularization.     The patient was discharged on 7/14 and had  "been doing well at home prior to 7/18 when he was found to be apneic without a pulse, with estimated down time ~10-15 min prior to discovery.      Subsequently found to have subacute in-stent thrombosis of his recently placed stent without obvious mechanical causes. Now admitted to [facility] for further care.         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  Remains on ECMO at this time, but with severe anoxic brain injury.  Review of Systems:     Unable to participate in comprehensive review of systems secondary to anoxic brain injury, intubation, need for sedation.     Physical Exam:   Pulse 110, temperature 98.1  F (36.7  C), resp. rate 28, height 1.778 m (5' 10\"), weight 110.1 kg (242 lb 11.6 oz), SpO2 98%.  General: Intubated, ECMO, not responding to voice.  Appears comfortable      Data Reviewed:   ROUTINE ICU LABS (Last four results)  CMP  Recent Labs   Lab 07/25/25  0749 07/25/25  0550 07/25/25  0348 07/24/25  2156 07/24/25  2153 07/24/25  1544 07/24/25  1538 07/24/25  1154 07/24/25  1011 07/24/25  0349 07/24/25  0347 07/23/25  0357 07/23/25  0356 07/22/25  0347 07/22/25  0341   NA  --   --  151*  --  150*  --  151*  --  149*  --  146*   < > 144   < > 141   POTASSIUM  --   --  3.6  --  3.7  --  3.7  --  3.8  --  3.7   < > 3.9   < > 3.3*   CHLORIDE  --   --  111*  --  110*  --  110*  --  109*  --  107   < > 105   < > 108*   CO2  --   --  23  --  22  --  23  --  23  --  23   < > 15*   < > 20*   ANIONGAP  --   --  17*  --  18*  --  18*  --  17*  --  16*   < > 24*   < > 13   * 123* 137*  140*   < > 115*  123*   < > 145*  149*   < > 174*  176*   < > 139*  144*   < > 185*  189*   < > 217*  222*   BUN  --   --  112.0*  --  110.0*  --  104.0*  --  97.0*  --  93.3*   < > 67.2*   < > 57.9*   CR  --   --  2.49*  --  2.48*  --  2.46*  --  2.41*  --  2.39*   " < > 1.90*   < > 1.74*   GFRESTIMATED  --   --  27*  --  27*  --  28*  --  28*  --  29*   < > 38*   < > 42*   PALMIRA  --   --  9.1  --  9.2  --  8.9  --  8.8  --  8.8   < > 8.9   < > 8.2*   MAG  --   --  2.4*  --  2.4*  --  2.3  --  2.4*  --  2.4*   < > 2.1   < > 2.3   PHOS  --   --  5.3*  --   --   --   --   --   --   --  5.0*  --  6.1*  --  2.6   PROTTOTAL  --   --  6.3*  --  6.3*  --  6.3*  --  6.2*  --  6.3*   < > 6.6   < > 5.6*   ALBUMIN  --   --  2.6*  --  2.7*  --  2.6*  --  2.6*  --  2.6*   < > 2.8*   < > 2.6*   BILITOTAL  --   --  0.3  --  0.3  --  0.3  --  0.2  --  0.3   < > 0.3   < > 0.2   ALKPHOS  --   --  92  --  96  --  97  --  101  --  102   < > 125   < > 122   AST  --   --  48*  --  51*  --  51*  --  51*  --  53*   < > 58*   < > 58*   ALT  --   --  32  --  34  --  34  --  33  --  34   < > 39   < > 45    < > = values in this interval not displayed.     CBC  Recent Labs   Lab 07/25/25  0348 07/24/25  2153 07/24/25  1538 07/24/25  1011   WBC 11.7* 11.9* 11.7* 12.0*   RBC 3.20* 3.18* 3.17* 3.15*   HGB 8.8* 8.8* 8.8* 8.7*   HCT 28.5* 27.9* 28.1* 27.8*   MCV 89 88 89 88   MCH 27.5 27.7 27.8 27.6   MCHC 30.9* 31.5 31.3* 31.3*   RDW 14.8 14.9 14.9 14.7    222 220 210     INR  Recent Labs   Lab 07/25/25  0348 07/24/25  2153 07/24/25  1538 07/24/25  1011   INR 1.28* 1.23* 1.29* 1.28*     Arterial Blood Gas  Recent Labs   Lab 07/25/25  0750 07/25/25  0550 07/25/25  0348 07/25/25  0203   PH 7.46* 7.44 7.44 7.43   PCO2 37 38 37 38   PO2 92 82 99 106*   HCO3 26 26 25 25   O2PER 50 50 40  50  50 50

## 2025-07-25 NOTE — PROGRESS NOTES
[facility]    ECLS Shift Summary:     ECMO Equipment:  Console Serial Number: [id number]  Circuit Lot Number: [id number]  Oxygenator Lot Number: [id number]    Circuit Assessment: Fibrin  Fibrin Location: connectors    Arterial ECMO Cannula: 17 Fr in the Right Femoral Artery  Venous ECMO Cannula: 25 Fr in the Right Femoral Vein  Distal Perfusion Catheter: 8 Fr in the Right Superficial Femoral Artery    ECMO Cannula Arterial Right femoral artery-Site Assessment: Sutured, Secure  ECMO Cannula Venous Right femoral vein-Site Assessment: Secure, Sutured  Distal Perfusion Catheter Right superficial femoral artery-Site Assessment: WDL  ECMO Cannula Arterial Right femoral artery-Site Intervention: No intervention needed  ECMO Cannula Venous Right femoral vein-Site Intervention: No intervention needed  Distal Perfusion Catheter Right superficial femoral artery-Site Intervention: No intervention needed    Patient remains on V-A ECMO, all equipment is functioning and alarms are appropriately set. RPM's: 2876 with Blood Flow (Circuit) LPM  Av.7 LPM  Min: 2.63 LPM  Max: 2.79 LPM L/min. Sweep is at 0.5 LPM and 40 %. Extremities are warm.     Significant Shift Events: None. Heparin gtt stable at 2000 u/hr. No chugging and no products given.    Vent settings:  FiO2 (%): 50 %, Resp: 14, Vent Mode: VC/AC, Resp Rate (Set): 12 breaths/min, Tidal Volume (Set, mL): 400 mL, PEEP (cm H2O): 8 cmH2O, Resp Rate (Set): 12 breaths/min, Tidal Volume (Set, mL): 400 mL, PEEP (cm H2O): 8 cmH2O    Anticoagulation:  Dose (units/hr) HEParin: 2000 Units/hr  Rate (mL/hr) HEParin: 20 mL/hr  Concentration HEParin: 100 Units/mL        Most recent ACT  (seconds): 198 seconds    Urine output is adequate, Yellow / Straw Colored.  Blood loss was none. Product given included none.     Intake/Output Summary (Last 24 hours) at 2025 0613  Last data filed at 2025 0600  Gross per 24 hour   Intake 2240.8 ml    Output 3730 ml   Net -1489.2 ml       Labs:  Recent Labs   Lab 07/25/25  0550 07/25/25  0348 07/25/25  0203 07/24/25  2356   PH 7.44 7.44 7.43 7.45   PCO2 38 37 38 36   PO2 82 99 106* 123*   HCO3 26 25 25 25   O2PER 50 40  50  50 50 50       Lab Results   Component Value Date    HGB 8.8 (L) 07/25/2025    PHGB <30 07/25/2025     07/25/2025    FIBR 1,128 (H) 07/25/2025    INR 1.28 (H) 07/25/2025     (H) 07/25/2025    DD 3.03 (H) 07/25/2025    AXA 0.21 05/24/2013    ANTCH 89 07/24/2025       Plan:  Continue VA ECMO, wean as able, and care conf later today    Yancy Duncan RN  ECMO Specialist  7/25/2025 6:13 AM

## 2025-07-25 NOTE — PROGRESS NOTES
"Neurocritical Care Consultation    Reason for critical care admission: Cardiac Arrest  Consulting Team: CICU  Date of Service:  07/25/2025  Date of Admission:  7/18/2025  Hospital Day: 8    Assessment/Plan  Bob Echols is a 70 year old male with a past medical history of HTN, DM2, DEMOND, and recent admission for inferior STEMI on 7/10 RENA to the distal LCx (discharged on 7/14) who was admitted on 7/18/2025 after being found down following a VF cardiac arrest now s/p VA ECMO found to have subacute in-stent thrombosis of his recently placed stent without obvious mechanical causes, treated with 2.75 x 20mm Synergy XD RENA.     Neurocritical Care consulted for neuro prognostication.     24 hour events:  -Midazolam infusion increased to 10 mg/hr for continued status myoclonus  -Repeat CTH unchanged, consistent with hypoxic ischemic injury  -Care conference completed. Family considering next steps. Will sign off, however, will remain available to family should they have anymore questions for Neurology.    Neuro  #Hypoxic ischemic injury   #Diminished gray-white matter differentiation   #Hypoxic encephalopathy 2.2 cardiac arrest  Post arrest day: 7  Length of downtime: Unclear, appears about 10-15 min unwitnessed and then perhaps another 5 minutes without intervention while waiting for ambulance, ECMO started 32 minutes into resuscitation (although family reported to nurse that he may have been down for an hour before being found)   -Witnessed arrest: No  -ROSC: No, rhythm: VF  -Goal normothermia   -Current temp: 97.9     Analgesics & sedation  Current sedation:   -Midazolam @ 10 mg/hr - indication for seizures/myoclonic jerking     Exam findings   -Cough: No  -Gag: No  -Pupils: R pupil 4.33 with NPI 3.3, L pupil 3.17 with NPI 3.3     Neuroimaging  -7/18 Day 1 CTH shows: \"Mildly diminished gray-white differentiation throughout the cerebral hemispheres concerning for hypoxic ischemic injury. Follow-up as  clinically " "indicated recommended.\"  -7/21 Day 3 CTH shows: \"Continued diminished gray-white differentiation throughout the cerebral hemispheres, concerning for hypoxic ischemic injury.\"  -7/22 CTH shows: \"Similar diminished gray-white matter differentiation when compared to CT 7/21/2025. No acute or worsening intracranial pathology.\"    Neurophysiology  #Rare epileptiform GPD's  #Status myoclonus, jerking improved with sedation   #Myoclonic status epilepticus, now resolved    -Continue vEEG with VA ECMO  -7/23-7/24 vEEG shows: \"The background shows near-complete suppression with rare generalized epileptiform discharges. These findings indicate a severe encephalopathy/coma, consistent with anoxic injury.\"  -Continue Keppra 1 g BID  -Continue Midazolam infusion @ 10 mg/hr for now    Biomarkers  -Neuron-specific Enolase (NSE) results: 27.0, 105.0, 91.7  -S 100B protein: 314, 189, 115    Recommendations  -Avoid hypotonic solutions as they may worsen cerebral edema   -Avoid nitroprusside or nitroglycerin as they may also worsen cerbral edema  -Advise slow correction of hypernatremia if needed  -Please call [phone] should family have any further questions    -NCC will sign off.      I spent 36 minutes of critical care time on the unit/floor managing the care of Bob Echols. Upon evaluation, this patient had a high probability of imminent or life-threatening deterioration due to hypoxic encephalopathy, which required my direct attention, intervention, and personal management. Greater than 50% of my time was spent at the bedside counseling the patient and/or coordinating care including chart review, history, exam, documentation, and further activities per this note. I have personally reviewed the following data/imaging over the past 24 hours.     The patient was seen and discussed with the NCC attending, Dr. Ambrosio.    María NAVARRETE, RAJESH  Neurocritical care nurse practitioner  Available by Bibi      Current " Medications:  Current Facility-Administered Medications   Medication Dose Route Frequency Provider Last Rate Last Admin    amiodarone (PACERONE) tablet 400 mg  400 mg Oral or Feeding Tube BID Sidney Lozada MD   400 mg at 07/25/25 1116    aspirin (ASA) chewable tablet 81 mg  81 mg Oral or Feeding Tube Daily Matt Nugent MD   81 mg at 07/25/25 0810    chlorhexidine (PERIDEX) 0.12 % solution 15 mL  15 mL Mouth/Throat Q12H Matt Nugent MD   15 mL at 07/25/25 0810    hydrALAZINE (APRESOLINE) tablet 50 mg  50 mg Oral Q6H Sidney Lozada MD   50 mg at 07/25/25 1237    levETIRAcetam (KEPPRA) intermittent infusion 1,000 mg  1,000 mg Intravenous Q12H Fausto Taylor MD   1,000 mg at 07/25/25 0808    multivitamins w/minerals liquid 15 mL  15 mL Per Feeding Tube Daily Sidney Lozada MD   15 mL at 07/25/25 0810    pantoprazole (PROTONIX) 2 mg/mL suspension 40 mg  40 mg Oral or Feeding Tube Daily Epifanio Beckwith MD   40 mg at 07/25/25 0810    polyethylene glycol (MIRALAX) Packet 17 g  17 g Oral or Feeding Tube Daily Matt Nugent MD   17 g at 07/24/25 0752    Prosource TF20 ENfit Compatibl EN LIQD (PROSOURCE TF20) packet 60 mL  1 packet Per Feeding Tube TID Sidney Lozada MD   60 mL at 07/24/25 2035    ticagrelor (BRILINTA) tablet 90 mg  90 mg Oral or Feeding Tube BID Matt Nugent MD   90 mg at 07/25/25 0810       PRN Medications:  Current Facility-Administered Medications   Medication Dose Route Frequency Provider Last Rate Last Admin    artificial tears ophthalmic ointment   Both Eyes Q8H PRN Matt Nugent MD        calcium chloride 1 g in sodium chloride 0.9 % 100 mL intermittent infusion  1 g Intravenous Q6H PRN Matt Nugent MD   1 g at 07/22/25 0455    dextrose 10% infusion   Intravenous Continuous PRN Sidney Lozada MD        dextrose 10% infusion   Intravenous Continuous PRN Matt Nugent MD        glucose gel 15-30 g  15-30 g Oral Q15 Min PRTERRI Nugent,  MD Matt        Or    dextrose 50 % injection 25-50 mL  25-50 mL Intravenous Q15 Min PRN Matt Nugent MD        Or    glucagon injection 1 mg  1 mg Subcutaneous Q15 Min PRN Matt Nugent MD        heparin ANTICOAGULANT bolus dose from infusion pump 388-776 Units  5-10 Units/kg (Ideal) Other Q30 Min PRN Matt Nugent MD        HYDROmorphone (PF) (DILAUDID) injection 0.5-1 mg  0.5-1 mg Intravenous Q1H PRN Sidney Lozada MD   1 mg at 07/23/25 1434    lidocaine (LMX4) cream   Topical Q1H PRN Matt Nugent MD        lidocaine 1 % 0.1-1 mL  0.1-1 mL Other Q1H PRN Matt Nugent MD        melatonin tablet 10 mg  10 mg Oral or Feeding Tube At Bedtime PRN Sidney Lozada MD        midazolam (VERSED) injection 2-4 mg  2-4 mg Intravenous Q1H PRN Sidney Lozada MD   2 mg at 07/25/25 1536    naloxone (NARCAN) injection 0.2 mg  0.2 mg Intravenous Q2 Min PRN Epifanio Beckwith MD        Or    naloxone (NARCAN) injection 0.4 mg  0.4 mg Intravenous Q2 Min PRN Epifanio Beckwith MD        Or    naloxone (NARCAN) injection 0.2 mg  0.2 mg Intramuscular Q2 Min PRN Epifanio Beckwith MD        Or    naloxone (NARCAN) injection 0.4 mg  0.4 mg Intramuscular Q2 Min PRN Epifanio Beckwith MD        senna-docusate (SENOKOT-S/PERICOLACE) 8.6-50 MG per tablet 1-2 tablet  1-2 tablet Oral or Feeding Tube BID PRN Matt Nugent MD        sodium chloride (PF) 0.9% PF flush 3 mL  3 mL Intracatheter Q8H PRN Matt Nugent MD        sodium chloride (PF) 0.9% PF flush 3 mL  3 mL Intracatheter q1 min prn Matt Nugent MD           Infusions:  Current Facility-Administered Medications   Medication Dose Route Frequency Provider Last Rate Last Admin    bumetanide (BUMEX) 0.25 mg/mL infusion  0.5 mg/hr Intravenous Continuous Sidney Lozada MD 2 mL/hr at 07/25/25 1600 0.5 mg/hr at 07/25/25 1600    dextrose 10% infusion   Intravenous Continuous PRN Sidney Lozada MD        dextrose 10% infusion    "Intravenous Continuous PRN Matt Nugent MD        heparin 2 unit/mL in 0.9% NaCl (for REPERFUSION CATHETER)  3 mL/hr Intravenous Continuous Matt Nugent MD 3 mL/hr at 07/25/25 1600 3 mL/hr at 07/25/25 1600    heparin 25,000 units in 0.45% NaCl 250 mL ANTICOAGULANT infusion  10-50 Units/kg/hr (Ideal) Other Continuous Matt Nugent MD 17.5 mL/hr at 07/25/25 1630 1,750 Units/hr at 07/25/25 1630    insulin regular (MYXREDLIN) 1 unit/mL infusion  0-24 Units/hr Intravenous Continuous Matt Nugent MD   Stopped at 07/25/25 1558    midazolam (VERSED) 100 mg/100 mL NS infusion - ADULT  10 mg/hr Intravenous Continuous Sidney Lozada MD 10 mL/hr at 07/25/25 1600 10 mg/hr at 07/25/25 1600    niCARdipine 40 mg in 200 mL NS (CARDENE) infusion  0.5-15 mg/hr Intravenous Continuous Matt Nugent MD   Stopped at 07/25/25 1115       Physical Examination:  Vitals: /68   Pulse 101   Temp 97.9  F (36.6  C)   Resp 25   Ht 1.778 m (5' 10\")   Wt 110.1 kg (242 lb 11.6 oz)   SpO2 95%   BMI 34.83 kg/m    General: Adult male patient, lying in bed, critically-ill  HEENT: Head and facial swelling, atraumatic, no icterus, scleral and periorbital swelling   Cardiac: Atrial fibrillation on bedside monitor    Pulm: Unlabored, expansion symmetric, no retractions or use of accessory muscles, assisted mechanically   Abdomen: Soft, non-distended  Extremities: Warm, no edema, appears adequately perfused  Skin: Bruising, abrasions   Psych: Calm  Neuro:  Mental status: Unresponsive, does not open eyes, does not follow commands, intubated.   Cranial nerves: Conjugate gaze, R pupil 4.33 with NPI 3.3, L pupil 3.17 with NPI 3.3, no cough or gag present with deep suction.  Motor: Normal bulk and tone. No abnormal movements observed. 0/5 strength in 4/4 extremities with no active withdrawal to noxious stimuli. Heavily sedated.  Sensory: Does not grimace nor respond/withdraw to painful stimuli.   Coordination: RASHAUN, " deferred.   Gait: RASHAUN, deferred.    Labs and Imaging:    All relevant imaging and laboratory values personally reviewed.

## 2025-07-25 NOTE — PROGRESS NOTES
[facility]    ECLS Shift Summary:  [id number]     ECMO Equipment:  Console Serial Number: [id number]  Circuit Lot Number: [id number]  Oxygenator Lot Number: [id number]    Circuit Assessment: Fibrin  Fibrin Location: arterial + venous connectors. Delta-Ps are stable ~18 mmHg. Post-Oxy PaO2 at 0400 was 146 mmHg on 40%.    Venous (Drain) ECMO Cannula: 25 Fr in the Right Femoral Vein  Arterial (Return) ECMO Cannula: 17 Fr in the Right Femoral Artery  Distal Reperfusion Cannula: 8 Fr in the Right Superficial Femoral Artery    Patient remains on V-A ECMO, all equipment is functioning and alarms are appropriately set. RPM's: 2884 with Blood Flow (Circuit) LPM  Av.8 LPM  Min: 2.7 LPM  Max: 2.92 LPM L/min. Sweep is at 0.5 LPM and 40 %. Extremities are warm.     Significant Shift Events:   No major events this shift. Vent FiO2 titrated up x1 for PaO2 in the 60s on 1800 ABG (SvO2 in the 80s%, pt has decent pulsatility).    ACT at 1600 was >200 on x2 checks. Heparin decreased by 250 U/hr, repeat ACT in the 190s.    Vent settings:  VC/AC / rate 12 / Vt 400 mL  / PEEP +8 cmH2O / FiO2 50%    Anticoagulation:  Dose (units/hr) HEParin: 1750 Units/hr  Rate (mL/hr) HEParin: 17.5 mL/hr  Concentration HEParin: 100 Units/mL        Most recent: ACT  (seconds): 198 seconds Range this shift was 198 to 215.      Volume/Product Status:    Urine output is ~100 mL/hr.     Blood loss was minimal. No blood product or crystalloid required shift shift.      Intake/Output Summary (Last 24 hours) at 2025 1834  Last data filed at 2025 1800  Gross per 24 hour   Intake 2148.21 ml   Output 3285 ml   Net -1136.79 ml       Labs:  Recent Labs   Lab 25  1747 25  1552 25  1356 25  1208   PH 7.46* 7.44 7.43 7.43   PCO2 33* 37 38 39   PO2 67* 83 156* 110*   HCO3 24 25 25 26   O2PER 40 40  40  40 50 50       Lab Results   Component Value Date    HGB 8.8 (L) 2025    GB  <30 07/25/2025     07/25/2025    FIBR 1,132 (H) 07/25/2025    INR 1.33 (H) 07/25/2025     (H) 07/25/2025    DD 3.46 (H) 07/25/2025    AXA 0.21 05/24/2013    ANTCH 97 07/25/2025       Plan is to continue VA ECMO, titrate sweep/flows as indicated.    Kristin Dow, RRT-ACCS  ECMO Specialist  7/25/2025 6:34 PM

## 2025-07-25 NOTE — PROGRESS NOTES
ATTENDING ATTESTATION:   Bob Echols is a 70 year old male with a history of HTN, DM, pAF, recent inferior STEMI on 7/10 s/p PCI of Lcx with RENA x 1 who sustained OHCA s/p VA-ECMO and PCI with POBA of LCx for acute stent thrombosis and then admitted to [facility] CICU 7/18/2025.     I personally examined and evaluated the patient today as part of a shared APRN/PA visit. I personally performed the substantive portion of the medical decision making for this visit - please see the ISMAEL's documentation for full details.   I have reviewed and evaluated the vital signs, medications, laboratory values, imaging studies, and consults from the past 24 hours.     Interval:   Status Myoclonus.  Versed increased to 10, Valproic acid started.      Oxygen requirement decreasing.      -Neuro: unclear downtime, continue Keppra/Versed/valproic acid. Plan to have care conference this afternoon.  -CV: switch Amio IV to po, monitor Tele, Continue ASA/ Ticagrelor, holding home Eliquis (was on for pAF/pAFL), continue Bumex gtt 0.5mg/hr for net negative 1-2L/24h. Continue VA-ECMO.  -Pulm: continue MV  -ID: s/p completion of CTX     Care conference this afternoon.     I spent a total of 35 minutes providing critical care services excluding procedure time.     Ilhwan Yeo, MD  Interventional/Critical Care Cardiology     July 25, 2025

## 2025-07-25 NOTE — PROGRESS NOTES
Major Shift Events:  no acute change overnight. Insulin titrated to maintain glucose control. Ecmo fi02 to 40%/ sweep 0.5/ 2.7lpm. lytes replaced per protocol. Adequate UOP on bumex gtt.     Plan: care conference today.   For vital signs and complete assessments, please see documentation flowsheets.

## 2025-07-25 NOTE — CONSULTS
SPIRITUAL HEALTH SERVICES - Consult Note  [place] [facility]    Referral Source: Routine Consult per Family Request    Tried several time to meet with family who were not present.  I spoke with the nurse to let her know that Spiritual Health Services are available only for the imminently dying overnight and over the weekend.  I put in a consult for Monday 7/28 to follow up with family request for emotional support.    Plan:  will plan to visit patient and family on Monday 7/28. Spiritual Health Services remain available on request. Please place a standard consult order on Epic.    Abi Watson,   Chaplain Resident    [facility] routine referrals *[phone]   [facility] available 24/7 for emergent requests/referrals, either by paging the on-call  or by entering an ASAP/STAT consult in Epic (this will also page the on-call ).

## 2025-07-25 NOTE — PROGRESS NOTES
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     Cannulation Site:  17 Fr in the R femoral artery  25 Fr in the R femoral vein    Interval events: planned care conference this afternoon.    Pulsatilty (IABP paused if applicable):50 mmHG     Physical Exam:  Temp:  [97.7  F (36.5  C)-98.2  F (36.8  C)] 98.1  F (36.7  C)  Pulse:  [] 103  Resp:  [0-33] 28  BP: (108)/(68) 108/68  MAP:  [69 mmHg-89 mmHg] 70 mmHg  Arterial Line BP: (100-131)/(57-75) 102/57  FiO2 (%):  [50 %] 50 %  SpO2:  [88 %-100 %] 95 %    Intake/Output Summary (Last 24 hours) at 2025 1458  Last data filed at 2025 1400  Gross per 24 hour   Intake 2260.83 ml   Output 3475 ml   Net -1214.17 ml    FiO2 (%): 50 %, Resp: 28, Vent Mode: VC/AC, Resp Rate (Set): 12 breaths/min, Tidal Volume (Set, mL): 400 mL, PEEP (cm H2O): 8 cmH2O, Resp Rate (Set): 12 breaths/min, Tidal Volume (Set, mL): 400 mL, PEEP (cm H2O): 8 cmH2O     Labs:  Recent Labs   Lab 25  1356 25  1208 25  0941 25  0750   PH 7.43 7.43 7.45 7.46*   PCO2 38 39 36 37   PO2 156* 110* 97 92   HCO3 25 26 25 26   O2PER 50 50 50 50      Recent Labs   Lab 25  0941 25  0348 25  2153 25  1538   WBC 11.9* 11.7* 11.9* 11.7*   HGB 9.1* 8.8* 8.8* 8.8*     Creatinine   Date Value Ref Range Status   2025 2.51 (H) 0.67 - 1.17 mg/dL Final   2025 2.49 (H) 0.67 - 1.17 mg/dL Final   2025 2.48 (H) 0.67 - 1.17 mg/dL Final   2025 2.46 (H) 0.67 - 1.17 mg/dL Final   2013 0.90 0.66 - 1.25 mg/dL Final   2013 1.00 0.66 - 1.25 mg/dL Final   2013 1.06 0.66 - 1.25 mg/dL Final   2013 1.13 0.66 - 1.25 mg/dL Final       Blood Flow (Circuit) LPM: 2.87 LPM  Sweep LPM: 0.5 LPM  Sweep FiO2   %: 40 %  ACT  (seconds): 198 seconds  Pulse Oximetry  (SpO2%): 99 %  Arterial Pressure  mmH  mmHg      ECMO Issues including assessments and plan on DOS 7/25/2025:  Neuro: Sedated for status myoclonus.  No acute distress.  NIRS stable b/l  RASS goal: -3  CV: Cardiogenic shock.  Hemodynamically stable off pressors.   Pulm: Keep vent settings at rest settings as above.  FEN/Renal: Electrolytes stable w/ replacement protocols in place, Cr stable, UOP stable  Heme: ACT goal: 180-200, Hemoglobin stable.  Minimal oozing around the ECMO cannulas.  ID: Receiving empiric antibiotics  Cannulae: Position is acceptable on exam and the available imaging.  Distal perfusion cannula is in place and patent.  Extremities are well-perfused.   I have personally reviewed the ECMO flows, oxygenation and CO2 clearance, anticoagulation, and cannula position.  I have also personally assessed the patient's systemic response with hemodynamics, oxygenation, ventilation, and bleeding.      I have personally reviewed the ECMO flows, oxygenation and CO2 clearance, anticoagulation, and cannula position.  I have also personally assessed the patient's systemic response with hemodynamics, oxygenation, ventilation, and bleeding.      Ilhwan Yeo, MD  Critical Care Cardiology    July 25, 2025

## 2025-07-25 NOTE — PLAN OF CARE
Goal Outcome Evaluation:      Plan of Care Reviewed With: patient, spouse    Overall Patient Progress: no changeOverall Patient Progress: no change    Major shift events:      Noted myoclonic jerking noted from lips, U/LE's.  M.D. notified.   Also noted, at times, patients RR up to 30 and O2 sats down to 83-84%.  FiO2 increased to 100%.  Suction for small amt of creamy secretions.  Able to wean FiO2 back to 40%.  Increased versed to 10 mg/hr and gave a dose of valporic acid. Less myclonic jerking noted.  Repeat head CT done.   VA ECMO flows 2.9, 40% FiO2, sweep 0.5.   Amiodarone transitioned to PO.  Na 153.  Bumex d/c'd.  Care conference today with Dr. Lozada, Dr. Ambrosio, Care Coordinator, Palliative, writer, and family.  See notes.  Prognosis is irreversible.     Plan:  Family to discuss withdrawal of cares.  See flowsheets for further details.         Patient requests all Lab, Cardiology, and Radiology Results on their Discharge Instructions

## 2025-07-25 NOTE — PROGRESS NOTES
[facility]    ECLS Shift Summary: [id number]     ECMO Equipment:  Console Serial Number: [id number]  Circuit Lot Number: [id number]  Oxygenator Lot Number: [id number]    Circuit Assessment: Fibrin  Fibrin Location: connectors    Arterial ECMO Cannula: 17 Fr in the Right Femoral Artery  Venous ECMO Cannula: 25 Fr in the Right Femoral Vein  Distal Perfusion Catheter: 8 Fr in the Right Superficial Femoral Artery    ECMO Cannula Arterial Right femoral artery-Site Assessment: Sutured, Secure  ECMO Cannula Venous Right femoral vein-Site Assessment: Secure, Sutured  Distal Perfusion Catheter Right superficial femoral artery-Site Assessment: WDL  ECMO Cannula Arterial Right femoral artery-Site Intervention: No intervention needed  ECMO Cannula Venous Right femoral vein-Site Intervention: No intervention needed  Distal Perfusion Catheter Right superficial femoral artery-Site Intervention: No intervention needed    Patient remains on V-A ECMO, all equipment is functioning and alarms are appropriately set. RPM's: 2884 with Blood Flow (Circuit) LPM  Av.8 LPM  Min: 2.7 LPM  Max: 2.87 LPM L/min. Sweep is at 0.5 LPM and 40 %. Extremities are warm.     Significant Shift Events:   - Head CT prior to Care Conference   - Titration of Vent FiO2 per ABG.     Vent settings:  FiO2 (%): 50 %, Resp: 27, Vent Mode: VC/AC, Resp Rate (Set): 12 breaths/min, Tidal Volume (Set, mL): 400 mL, PEEP (cm H2O): 8 cmH2O, Resp Rate (Set): 12 breaths/min, Tidal Volume (Set, mL): 400 mL, PEEP (cm H2O): 8 cmH2O    Anticoagulation:  Dose (units/hr) HEParin: 2000 Units/hr  Rate (mL/hr) HEParin: 20 mL/hr  Concentration HEParin: 100 Units/mL        Most recent ACT  (seconds): 198 seconds    Urine output is adequate, Yellow / Straw Colored.  Blood loss was minimal from cannulation site but oozing from line in left groin. Product given included none.     Intake/Output Summary (Last 24 hours) at 2025 1434  Last  data filed at 7/25/2025 1400  Gross per 24 hour   Intake 2226.83 ml   Output 3475 ml   Net -1248.17 ml       Labs:  Recent Labs   Lab 07/25/25  1356 07/25/25  1208 07/25/25  0941 07/25/25  0750   PH 7.43 7.43 7.45 7.46*   PCO2 38 39 36 37   PO2 156* 110* 97 92   HCO3 25 26 25 26   O2PER 50 50 50 50       Lab Results   Component Value Date    HGB 9.1 (L) 07/25/2025    PHGB <30 07/25/2025     07/25/2025    FIBR 1,128 (H) 07/25/2025    INR 1.28 (H) 07/25/2025     (H) 07/25/2025    DD 3.03 (H) 07/25/2025    AXA 0.21 05/24/2013    ANTCH 97 07/25/2025       Plan:  To continue current ECMO support and a care conference with family today.     Shakir Wolfe RT  ECMO Specialist  7/25/2025 2:34 PM

## 2025-07-26 LAB
ACT BLD: 178 SECONDS (ref 74–150)
ACT BLD: 178 SECONDS (ref 74–150)
ACT BLD: 182 SECONDS (ref 74–150)
ACT BLD: 190 SECONDS (ref 74–150)
ACT BLD: 194 SECONDS (ref 74–150)
ACT BLD: 194 SECONDS (ref 74–150)
ACT BLD: 198 SECONDS (ref 74–150)
ALBUMIN SERPL BCG-MCNC: 2.5 G/DL (ref 3.5–5.2)
ALBUMIN SERPL BCG-MCNC: 2.6 G/DL (ref 3.5–5.2)
ALLEN'S TEST: ABNORMAL
ALP SERPL-CCNC: 76 U/L (ref 40–150)
ALP SERPL-CCNC: 78 U/L (ref 40–150)
ALP SERPL-CCNC: 80 U/L (ref 40–150)
ALP SERPL-CCNC: 85 U/L (ref 40–150)
ALT SERPL W P-5'-P-CCNC: 26 U/L (ref 0–70)
ALT SERPL W P-5'-P-CCNC: 27 U/L (ref 0–70)
ALT SERPL W P-5'-P-CCNC: 28 U/L (ref 0–70)
ALT SERPL W P-5'-P-CCNC: 29 U/L (ref 0–70)
ANION GAP SERPL CALCULATED.3IONS-SCNC: 15 MMOL/L (ref 7–15)
ANION GAP SERPL CALCULATED.3IONS-SCNC: 15 MMOL/L (ref 7–15)
ANION GAP SERPL CALCULATED.3IONS-SCNC: 18 MMOL/L (ref 7–15)
ANION GAP SERPL CALCULATED.3IONS-SCNC: 18 MMOL/L (ref 7–15)
APTT PPP: 62 SECONDS (ref 22–38)
APTT PPP: 65 SECONDS (ref 22–38)
APTT PPP: 68 SECONDS (ref 22–38)
APTT PPP: 88 SECONDS (ref 22–38)
AST SERPL W P-5'-P-CCNC: 37 U/L (ref 0–45)
AST SERPL W P-5'-P-CCNC: 38 U/L (ref 0–45)
AST SERPL W P-5'-P-CCNC: 39 U/L (ref 0–45)
AST SERPL W P-5'-P-CCNC: 40 U/L (ref 0–45)
AT III ACT/NOR PPP CHRO: 93 % (ref 85–135)
BASE EXCESS BLDA CALC-SCNC: -0.3 MMOL/L (ref -3–3)
BASE EXCESS BLDA CALC-SCNC: -1.2 MMOL/L (ref -3–3)
BASE EXCESS BLDA CALC-SCNC: -1.3 MMOL/L (ref -3–3)
BASE EXCESS BLDA CALC-SCNC: -1.8 MMOL/L (ref -3–3)
BASE EXCESS BLDA CALC-SCNC: -2.8 MMOL/L (ref -3–3)
BASE EXCESS BLDA CALC-SCNC: 0 MMOL/L (ref -3–3)
BASE EXCESS BLDA CALC-SCNC: 0.1 MMOL/L (ref -3–3)
BASE EXCESS BLDA CALC-SCNC: 0.3 MMOL/L (ref -3–3)
BASE EXCESS BLDA CALC-SCNC: 0.4 MMOL/L (ref -3–3)
BASE EXCESS BLDA CALC-SCNC: 0.5 MMOL/L (ref -3–3)
BASE EXCESS BLDA CALC-SCNC: 0.5 MMOL/L (ref -3–3)
BASE EXCESS BLDA CALC-SCNC: 0.6 MMOL/L (ref -3–3)
BASE EXCESS BLDA CALC-SCNC: 0.8 MMOL/L (ref -3–3)
BASE EXCESS BLDA CALC-SCNC: 0.8 MMOL/L (ref -3–3)
BASE EXCESS BLDV CALC-SCNC: -0.9 MMOL/L (ref -3–3)
BASE EXCESS BLDV CALC-SCNC: 0.9 MMOL/L (ref -3–3)
BILIRUB SERPL-MCNC: 0.3 MG/DL
BUN SERPL-MCNC: 130 MG/DL (ref 8–23)
BUN SERPL-MCNC: 141 MG/DL (ref 8–23)
BUN SERPL-MCNC: 143 MG/DL (ref 8–23)
BUN SERPL-MCNC: 151 MG/DL (ref 8–23)
CA-I BLD-MCNC: 4.7 MG/DL (ref 4.4–5.2)
CA-I BLD-MCNC: 4.8 MG/DL (ref 4.4–5.2)
CALCIUM SERPL-MCNC: 8.8 MG/DL (ref 8.8–10.4)
CALCIUM SERPL-MCNC: 8.9 MG/DL (ref 8.8–10.4)
CHLORIDE SERPL-SCNC: 112 MMOL/L (ref 98–107)
CHLORIDE SERPL-SCNC: 114 MMOL/L (ref 98–107)
CHLORIDE SERPL-SCNC: 116 MMOL/L (ref 98–107)
CHLORIDE SERPL-SCNC: 117 MMOL/L (ref 98–107)
CREAT SERPL-MCNC: 2.85 MG/DL (ref 0.67–1.17)
CREAT SERPL-MCNC: 2.86 MG/DL (ref 0.67–1.17)
CREAT SERPL-MCNC: 2.98 MG/DL (ref 0.67–1.17)
CREAT SERPL-MCNC: 2.98 MG/DL (ref 0.67–1.17)
CRP SERPL-MCNC: 109 MG/L
D DIMER PPP FEU-MCNC: 3.57 UG/ML FEU (ref 0–0.5)
D DIMER PPP FEU-MCNC: 3.91 UG/ML FEU (ref 0–0.5)
EGFRCR SERPLBLD CKD-EPI 2021: 22 ML/MIN/1.73M2
EGFRCR SERPLBLD CKD-EPI 2021: 22 ML/MIN/1.73M2
EGFRCR SERPLBLD CKD-EPI 2021: 23 ML/MIN/1.73M2
EGFRCR SERPLBLD CKD-EPI 2021: 23 ML/MIN/1.73M2
ERYTHROCYTE [DISTWIDTH] IN BLOOD BY AUTOMATED COUNT: 14.9 % (ref 10–15)
ERYTHROCYTE [DISTWIDTH] IN BLOOD BY AUTOMATED COUNT: 14.9 % (ref 10–15)
ERYTHROCYTE [DISTWIDTH] IN BLOOD BY AUTOMATED COUNT: 15 % (ref 10–15)
ERYTHROCYTE [DISTWIDTH] IN BLOOD BY AUTOMATED COUNT: 15 % (ref 10–15)
ERYTHROCYTE [SEDIMENTATION RATE] IN BLOOD BY WESTERGREN METHOD: >140 MM/HR (ref 0–20)
FIBRINOGEN PPP-MCNC: 947 MG/DL (ref 170–510)
FIBRINOGEN PPP-MCNC: 990 MG/DL (ref 170–510)
GLUCOSE BLD-MCNC: 144 MG/DL (ref 70–99)
GLUCOSE BLD-MCNC: 180 MG/DL (ref 70–99)
GLUCOSE BLD-MCNC: 186 MG/DL (ref 70–99)
GLUCOSE BLD-MCNC: 198 MG/DL (ref 70–99)
GLUCOSE BLDC GLUCOMTR-MCNC: 119 MG/DL (ref 70–99)
GLUCOSE BLDC GLUCOMTR-MCNC: 126 MG/DL (ref 70–99)
GLUCOSE BLDC GLUCOMTR-MCNC: 133 MG/DL (ref 70–99)
GLUCOSE BLDC GLUCOMTR-MCNC: 139 MG/DL (ref 70–99)
GLUCOSE BLDC GLUCOMTR-MCNC: 141 MG/DL (ref 70–99)
GLUCOSE BLDC GLUCOMTR-MCNC: 141 MG/DL (ref 70–99)
GLUCOSE BLDC GLUCOMTR-MCNC: 146 MG/DL (ref 70–99)
GLUCOSE BLDC GLUCOMTR-MCNC: 146 MG/DL (ref 70–99)
GLUCOSE BLDC GLUCOMTR-MCNC: 150 MG/DL (ref 70–99)
GLUCOSE BLDC GLUCOMTR-MCNC: 154 MG/DL (ref 70–99)
GLUCOSE BLDC GLUCOMTR-MCNC: 157 MG/DL (ref 70–99)
GLUCOSE BLDC GLUCOMTR-MCNC: 157 MG/DL (ref 70–99)
GLUCOSE BLDC GLUCOMTR-MCNC: 163 MG/DL (ref 70–99)
GLUCOSE BLDC GLUCOMTR-MCNC: 165 MG/DL (ref 70–99)
GLUCOSE BLDC GLUCOMTR-MCNC: 168 MG/DL (ref 70–99)
GLUCOSE BLDC GLUCOMTR-MCNC: 184 MG/DL (ref 70–99)
GLUCOSE SERPL-MCNC: 149 MG/DL (ref 70–99)
GLUCOSE SERPL-MCNC: 188 MG/DL (ref 70–99)
GLUCOSE SERPL-MCNC: 194 MG/DL (ref 70–99)
GLUCOSE SERPL-MCNC: 204 MG/DL (ref 70–99)
HCO3 BLD-SCNC: 21 MMOL/L (ref 21–28)
HCO3 BLD-SCNC: 22 MMOL/L (ref 21–28)
HCO3 BLD-SCNC: 23 MMOL/L (ref 21–28)
HCO3 BLD-SCNC: 24 MMOL/L (ref 21–28)
HCO3 BLD-SCNC: 25 MMOL/L (ref 21–28)
HCO3 BLDA-SCNC: 24 MMOL/L (ref 21–28)
HCO3 BLDA-SCNC: 26 MMOL/L (ref 21–28)
HCO3 BLDV-SCNC: 25 MMOL/L (ref 21–28)
HCO3 BLDV-SCNC: 26 MMOL/L (ref 21–28)
HCO3 SERPL-SCNC: 21 MMOL/L (ref 22–29)
HCO3 SERPL-SCNC: 22 MMOL/L (ref 22–29)
HCT VFR BLD AUTO: 26.6 % (ref 40–53)
HCT VFR BLD AUTO: 26.8 % (ref 40–53)
HCT VFR BLD AUTO: 27.1 % (ref 40–53)
HCT VFR BLD AUTO: 27.5 % (ref 40–53)
HGB BLD-MCNC: 8.1 G/DL (ref 13.3–17.7)
HGB BLD-MCNC: 8.2 G/DL (ref 13.3–17.7)
HGB BLD-MCNC: 8.3 G/DL (ref 13.3–17.7)
HGB BLD-MCNC: 8.4 G/DL (ref 13.3–17.7)
HGB FREE PLAS-MCNC: <30 MG/DL
INR PPP: 1.35 (ref 0.85–1.15)
INR PPP: 1.36 (ref 0.85–1.15)
INR PPP: 1.37 (ref 0.85–1.15)
INR PPP: 1.39 (ref 0.85–1.15)
LACTATE SERPL-SCNC: 1.1 MMOL/L (ref 0.7–2)
LACTATE SERPL-SCNC: 1.4 MMOL/L (ref 0.7–2)
LACTATE SERPL-SCNC: 1.5 MMOL/L (ref 0.7–2)
LACTATE SERPL-SCNC: 1.6 MMOL/L (ref 0.7–2)
LDH SERPL L TO P-CCNC: 406 U/L (ref 0–250)
MAGNESIUM SERPL-MCNC: 2.6 MG/DL (ref 1.7–2.3)
MAGNESIUM SERPL-MCNC: 2.8 MG/DL (ref 1.7–2.3)
MAGNESIUM SERPL-MCNC: 2.8 MG/DL (ref 1.7–2.3)
MAGNESIUM SERPL-MCNC: 2.9 MG/DL (ref 1.7–2.3)
MCH RBC QN AUTO: 27.6 PG (ref 26.5–33)
MCH RBC QN AUTO: 27.6 PG (ref 26.5–33)
MCH RBC QN AUTO: 27.7 PG (ref 26.5–33)
MCH RBC QN AUTO: 27.8 PG (ref 26.5–33)
MCHC RBC AUTO-ENTMCNC: 30.5 G/DL (ref 31.5–36.5)
MCHC RBC AUTO-ENTMCNC: 30.5 G/DL (ref 31.5–36.5)
MCHC RBC AUTO-ENTMCNC: 30.6 G/DL (ref 31.5–36.5)
MCHC RBC AUTO-ENTMCNC: 30.6 G/DL (ref 31.5–36.5)
MCV RBC AUTO: 90 FL (ref 78–100)
MCV RBC AUTO: 90 FL (ref 78–100)
MCV RBC AUTO: 91 FL (ref 78–100)
MCV RBC AUTO: 91 FL (ref 78–100)
O2/TOTAL GAS SETTING VFR VENT: 40 %
O2/TOTAL GAS SETTING VFR VENT: 50 %
OXYHGB MFR BLDA: 94 % (ref 92–100)
OXYHGB MFR BLDA: 94 % (ref 92–100)
OXYHGB MFR BLDA: 95 % (ref 92–100)
OXYHGB MFR BLDA: 96 % (ref 92–100)
OXYHGB MFR BLDA: 96 % (ref 92–100)
OXYHGB MFR BLDA: 97 % (ref 75–100)
OXYHGB MFR BLDA: 97 % (ref 92–100)
OXYHGB MFR BLDA: 98 % (ref 75–100)
OXYHGB MFR BLDA: 98 % (ref 92–100)
OXYHGB MFR BLDA: 98 % (ref 92–100)
OXYHGB MFR BLDV: 79 %
OXYHGB MFR BLDV: 80 %
PCO2 BLD: 33 MM HG (ref 35–45)
PCO2 BLD: 34 MM HG (ref 35–45)
PCO2 BLD: 35 MM HG (ref 35–45)
PCO2 BLD: 36 MM HG (ref 35–45)
PCO2 BLD: 37 MM HG (ref 35–45)
PCO2 BLD: 37 MM HG (ref 35–45)
PCO2 BLD: 38 MM HG (ref 35–45)
PCO2 BLD: 38 MM HG (ref 35–45)
PCO2 BLDA: 43 MM HG (ref 35–45)
PCO2 BLDA: 44 MM HG (ref 35–45)
PCO2 BLDV: 44 MM HG (ref 40–50)
PCO2 BLDV: 45 MM HG (ref 40–50)
PEEP: 8 CM H2O
PH BLD: 7.42 [PH] (ref 7.35–7.45)
PH BLD: 7.43 [PH] (ref 7.35–7.45)
PH BLD: 7.44 [PH] (ref 7.35–7.45)
PH BLD: 7.45 [PH] (ref 7.35–7.45)
PH BLDA: 7.36 [PH] (ref 7.35–7.45)
PH BLDA: 7.38 [PH] (ref 7.35–7.45)
PH BLDV: 7.36 [PH] (ref 7.32–7.43)
PH BLDV: 7.37 [PH] (ref 7.32–7.43)
PHOSPHATE SERPL-MCNC: 5.7 MG/DL (ref 2.5–4.5)
PLATELET # BLD AUTO: 186 10E3/UL (ref 150–450)
PLATELET # BLD AUTO: 191 10E3/UL (ref 150–450)
PLATELET # BLD AUTO: 200 10E3/UL (ref 150–450)
PLATELET # BLD AUTO: 215 10E3/UL (ref 150–450)
PO2 BLD: 100 MM HG (ref 80–105)
PO2 BLD: 110 MM HG (ref 80–105)
PO2 BLD: 134 MM HG (ref 80–105)
PO2 BLD: 144 MM HG (ref 80–105)
PO2 BLD: 74 MM HG (ref 80–105)
PO2 BLD: 75 MM HG (ref 80–105)
PO2 BLD: 76 MM HG (ref 80–105)
PO2 BLD: 77 MM HG (ref 80–105)
PO2 BLD: 78 MM HG (ref 80–105)
PO2 BLD: 80 MM HG (ref 80–105)
PO2 BLD: 84 MM HG (ref 80–105)
PO2 BLD: 85 MM HG (ref 80–105)
PO2 BLDA: 132 MM HG (ref 80–105)
PO2 BLDA: 144 MM HG (ref 80–105)
PO2 BLDV: 47 MM HG (ref 25–47)
PO2 BLDV: 48 MM HG (ref 25–47)
POTASSIUM SERPL-SCNC: 4.1 MMOL/L (ref 3.4–5.3)
POTASSIUM SERPL-SCNC: 4.3 MMOL/L (ref 3.4–5.3)
PROT SERPL-MCNC: 5.9 G/DL (ref 6.4–8.3)
PROT SERPL-MCNC: 5.9 G/DL (ref 6.4–8.3)
PROT SERPL-MCNC: 6.1 G/DL (ref 6.4–8.3)
PROT SERPL-MCNC: 6.1 G/DL (ref 6.4–8.3)
PROTHROMBIN TIME: 17 SECONDS (ref 11.8–14.8)
PROTHROMBIN TIME: 17 SECONDS (ref 11.8–14.8)
PROTHROMBIN TIME: 17.2 SECONDS (ref 11.8–14.8)
PROTHROMBIN TIME: 17.4 SECONDS (ref 11.8–14.8)
RBC # BLD AUTO: 2.91 10E6/UL (ref 4.4–5.9)
RBC # BLD AUTO: 2.97 10E6/UL (ref 4.4–5.9)
RBC # BLD AUTO: 3 10E6/UL (ref 4.4–5.9)
RBC # BLD AUTO: 3.04 10E6/UL (ref 4.4–5.9)
SAO2 % BLDA: 96 % (ref 95–96)
SAO2 % BLDA: 96.3 % (ref 95–96)
SAO2 % BLDA: 96.3 % (ref 95–96)
SAO2 % BLDA: 96.7 % (ref 95–96)
SAO2 % BLDA: 96.7 % (ref 95–96)
SAO2 % BLDA: 97 % (ref 95–96)
SAO2 % BLDA: 97.2 % (ref 95–96)
SAO2 % BLDA: 97.7 % (ref 95–96)
SAO2 % BLDA: 98.5 % (ref 95–96)
SAO2 % BLDA: 99.3 % (ref 95–96)
SAO2 % BLDA: 99.5 % (ref 95–96)
SAO2 % BLDA: 99.6 % (ref 95–96)
SAO2 % BLDA: 99.8 % (ref 95–96)
SAO2 % BLDA: >100 % (ref 95–96)
SAO2 % BLDV: 80.6 % (ref 70–75)
SAO2 % BLDV: 82.2 % (ref 70–75)
SODIUM SERPL-SCNC: 151 MMOL/L (ref 135–145)
SODIUM SERPL-SCNC: 153 MMOL/L (ref 135–145)
T3 SERPL-MCNC: 61 NG/DL (ref 85–202)
T3FREE SERPL-MCNC: 1.3 PG/ML (ref 2–4.4)
T4 FREE SERPL-MCNC: 0.9 NG/DL (ref 0.9–1.7)
TROPONIN T SERPL HS-MCNC: 1029 NG/L
TROPONIN T SERPL HS-MCNC: 1056 NG/L
TROPONIN T SERPL HS-MCNC: 1084 NG/L
TROPONIN T SERPL HS-MCNC: 967 NG/L
TSH SERPL DL<=0.005 MIU/L-ACNC: 0.33 UIU/ML (ref 0.3–4.2)
UFH PPP CHRO-ACNC: 0.42 IU/ML (ref ?–1.1)
UFH PPP CHRO-ACNC: 0.47 IU/ML (ref ?–1.1)
UFH PPP CHRO-ACNC: 0.47 IU/ML (ref ?–1.1)
UFH PPP CHRO-ACNC: 0.64 IU/ML (ref ?–1.1)
WBC # BLD AUTO: 11.6 10E3/UL (ref 4–11)
WBC # BLD AUTO: 11.7 10E3/UL (ref 4–11)
WBC # BLD AUTO: 12.1 10E3/UL (ref 4–11)
WBC # BLD AUTO: 12.8 10E3/UL (ref 4–11)

## 2025-07-26 PROCEDURE — 86900 BLOOD TYPING SEROLOGIC ABO: CPT | Performed by: STUDENT IN AN ORGANIZED HEALTH CARE EDUCATION/TRAINING PROGRAM

## 2025-07-26 PROCEDURE — 83051 HEMOGLOBIN PLASMA: CPT | Performed by: STUDENT IN AN ORGANIZED HEALTH CARE EDUCATION/TRAINING PROGRAM

## 2025-07-26 PROCEDURE — 85300 ANTITHROMBIN III ACTIVITY: CPT | Performed by: STUDENT IN AN ORGANIZED HEALTH CARE EDUCATION/TRAINING PROGRAM

## 2025-07-26 PROCEDURE — 82805 BLOOD GASES W/O2 SATURATION: CPT | Performed by: INTERNAL MEDICINE

## 2025-07-26 PROCEDURE — 85730 THROMBOPLASTIN TIME PARTIAL: CPT | Performed by: STUDENT IN AN ORGANIZED HEALTH CARE EDUCATION/TRAINING PROGRAM

## 2025-07-26 PROCEDURE — 250N000011 HC RX IP 250 OP 636: Performed by: STUDENT IN AN ORGANIZED HEALTH CARE EDUCATION/TRAINING PROGRAM

## 2025-07-26 PROCEDURE — 85652 RBC SED RATE AUTOMATED: CPT | Performed by: STUDENT IN AN ORGANIZED HEALTH CARE EDUCATION/TRAINING PROGRAM

## 2025-07-26 PROCEDURE — 85520 HEPARIN ASSAY: CPT | Performed by: STUDENT IN AN ORGANIZED HEALTH CARE EDUCATION/TRAINING PROGRAM

## 2025-07-26 PROCEDURE — 82330 ASSAY OF CALCIUM: CPT | Performed by: STUDENT IN AN ORGANIZED HEALTH CARE EDUCATION/TRAINING PROGRAM

## 2025-07-26 PROCEDURE — 82805 BLOOD GASES W/O2 SATURATION: CPT | Performed by: STUDENT IN AN ORGANIZED HEALTH CARE EDUCATION/TRAINING PROGRAM

## 2025-07-26 PROCEDURE — 250N000013 HC RX MED GY IP 250 OP 250 PS 637: Performed by: STUDENT IN AN ORGANIZED HEALTH CARE EDUCATION/TRAINING PROGRAM

## 2025-07-26 PROCEDURE — 33949 ECMO/ECLS DAILY MGMT ARTERY: CPT | Performed by: HOSPITALIST

## 2025-07-26 PROCEDURE — 84443 ASSAY THYROID STIM HORMONE: CPT | Performed by: STUDENT IN AN ORGANIZED HEALTH CARE EDUCATION/TRAINING PROGRAM

## 2025-07-26 PROCEDURE — 85610 PROTHROMBIN TIME: CPT | Performed by: STUDENT IN AN ORGANIZED HEALTH CARE EDUCATION/TRAINING PROGRAM

## 2025-07-26 PROCEDURE — 83735 ASSAY OF MAGNESIUM: CPT | Performed by: STUDENT IN AN ORGANIZED HEALTH CARE EDUCATION/TRAINING PROGRAM

## 2025-07-26 PROCEDURE — 83615 LACTATE (LD) (LDH) ENZYME: CPT | Performed by: STUDENT IN AN ORGANIZED HEALTH CARE EDUCATION/TRAINING PROGRAM

## 2025-07-26 PROCEDURE — 82040 ASSAY OF SERUM ALBUMIN: CPT | Performed by: STUDENT IN AN ORGANIZED HEALTH CARE EDUCATION/TRAINING PROGRAM

## 2025-07-26 PROCEDURE — 84100 ASSAY OF PHOSPHORUS: CPT | Performed by: STUDENT IN AN ORGANIZED HEALTH CARE EDUCATION/TRAINING PROGRAM

## 2025-07-26 PROCEDURE — 200N000002 HC R&B ICU UMMC

## 2025-07-26 PROCEDURE — 85347 COAGULATION TIME ACTIVATED: CPT

## 2025-07-26 PROCEDURE — 999N000157 HC STATISTIC RCP TIME EA 10 MIN

## 2025-07-26 PROCEDURE — 83605 ASSAY OF LACTIC ACID: CPT | Performed by: STUDENT IN AN ORGANIZED HEALTH CARE EDUCATION/TRAINING PROGRAM

## 2025-07-26 PROCEDURE — 84132 ASSAY OF SERUM POTASSIUM: CPT | Performed by: HOSPITALIST

## 2025-07-26 PROCEDURE — 250N000013 HC RX MED GY IP 250 OP 250 PS 637: Performed by: INTERNAL MEDICINE

## 2025-07-26 PROCEDURE — 84481 FREE ASSAY (FT-3): CPT | Performed by: STUDENT IN AN ORGANIZED HEALTH CARE EDUCATION/TRAINING PROGRAM

## 2025-07-26 PROCEDURE — 85379 FIBRIN DEGRADATION QUANT: CPT | Performed by: STUDENT IN AN ORGANIZED HEALTH CARE EDUCATION/TRAINING PROGRAM

## 2025-07-26 PROCEDURE — 250N000009 HC RX 250: Performed by: STUDENT IN AN ORGANIZED HEALTH CARE EDUCATION/TRAINING PROGRAM

## 2025-07-26 PROCEDURE — 84439 ASSAY OF FREE THYROXINE: CPT | Performed by: STUDENT IN AN ORGANIZED HEALTH CARE EDUCATION/TRAINING PROGRAM

## 2025-07-26 PROCEDURE — 999N000111 HC STATISTIC OT IP EVAL DEFER

## 2025-07-26 PROCEDURE — 999N000147 HC STATISTIC PT IP EVAL DEFER

## 2025-07-26 PROCEDURE — 33949 ECMO/ECLS DAILY MGMT ARTERY: CPT

## 2025-07-26 PROCEDURE — 84480 ASSAY TRIIODOTHYRONINE (T3): CPT | Performed by: STUDENT IN AN ORGANIZED HEALTH CARE EDUCATION/TRAINING PROGRAM

## 2025-07-26 PROCEDURE — 99291 CRITICAL CARE FIRST HOUR: CPT | Mod: 25 | Performed by: HOSPITALIST

## 2025-07-26 PROCEDURE — 82947 ASSAY GLUCOSE BLOOD QUANT: CPT | Performed by: STUDENT IN AN ORGANIZED HEALTH CARE EDUCATION/TRAINING PROGRAM

## 2025-07-26 PROCEDURE — 86923 COMPATIBILITY TEST ELECTRIC: CPT | Performed by: STUDENT IN AN ORGANIZED HEALTH CARE EDUCATION/TRAINING PROGRAM

## 2025-07-26 PROCEDURE — 84155 ASSAY OF PROTEIN SERUM: CPT | Performed by: STUDENT IN AN ORGANIZED HEALTH CARE EDUCATION/TRAINING PROGRAM

## 2025-07-26 PROCEDURE — 85384 FIBRINOGEN ACTIVITY: CPT | Performed by: STUDENT IN AN ORGANIZED HEALTH CARE EDUCATION/TRAINING PROGRAM

## 2025-07-26 PROCEDURE — 84484 ASSAY OF TROPONIN QUANT: CPT | Performed by: STUDENT IN AN ORGANIZED HEALTH CARE EDUCATION/TRAINING PROGRAM

## 2025-07-26 PROCEDURE — 85018 HEMOGLOBIN: CPT | Performed by: STUDENT IN AN ORGANIZED HEALTH CARE EDUCATION/TRAINING PROGRAM

## 2025-07-26 PROCEDURE — 94003 VENT MGMT INPAT SUBQ DAY: CPT

## 2025-07-26 PROCEDURE — 86140 C-REACTIVE PROTEIN: CPT | Performed by: STUDENT IN AN ORGANIZED HEALTH CARE EDUCATION/TRAINING PROGRAM

## 2025-07-26 PROCEDURE — 250N000011 HC RX IP 250 OP 636: Performed by: INTERNAL MEDICINE

## 2025-07-26 RX ADMIN — LEVETIRACETAM 1000 MG: 10 INJECTION INTRAVENOUS at 19:10

## 2025-07-26 RX ADMIN — Medication 15 ML: at 07:48

## 2025-07-26 RX ADMIN — Medication 60 ML: at 07:49

## 2025-07-26 RX ADMIN — LEVETIRACETAM 1000 MG: 10 INJECTION INTRAVENOUS at 08:11

## 2025-07-26 RX ADMIN — MIDAZOLAM IN SODIUM CHLORIDE 10 MG/HR: 1 INJECTION INTRAVENOUS at 12:46

## 2025-07-26 RX ADMIN — CHLORHEXIDINE GLUCONATE 15 ML: 1.2 SOLUTION ORAL at 07:56

## 2025-07-26 RX ADMIN — INSULIN HUMAN 4 UNITS/HR: 1 INJECTION, SOLUTION INTRAVENOUS at 10:15

## 2025-07-26 RX ADMIN — Medication 60 ML: at 19:12

## 2025-07-26 RX ADMIN — MIDAZOLAM IN SODIUM CHLORIDE 10 MG/HR: 1 INJECTION INTRAVENOUS at 22:33

## 2025-07-26 RX ADMIN — MIDAZOLAM 1 MG: 1 INJECTION INTRAMUSCULAR; INTRAVENOUS at 15:05

## 2025-07-26 RX ADMIN — Medication 60 ML: at 14:40

## 2025-07-26 RX ADMIN — HEPARIN SODIUM 1450 UNITS/HR: 10000 INJECTION, SOLUTION INTRAVENOUS at 10:03

## 2025-07-26 RX ADMIN — MIDAZOLAM IN SODIUM CHLORIDE 10 MG/HR: 1 INJECTION INTRAVENOUS at 01:14

## 2025-07-26 RX ADMIN — AMIODARONE HYDROCHLORIDE 400 MG: 200 TABLET ORAL at 19:10

## 2025-07-26 RX ADMIN — POLYETHYLENE GLYCOL 3350 17 G: 17 POWDER, FOR SOLUTION ORAL at 07:48

## 2025-07-26 RX ADMIN — TICAGRELOR 90 MG: 90 TABLET ORAL at 19:10

## 2025-07-26 RX ADMIN — Medication 40 MG: at 07:48

## 2025-07-26 RX ADMIN — TICAGRELOR 90 MG: 90 TABLET ORAL at 07:48

## 2025-07-26 RX ADMIN — INSULIN HUMAN 2 UNITS/HR: 1 INJECTION, SOLUTION INTRAVENOUS at 03:51

## 2025-07-26 RX ADMIN — ASPIRIN 81 MG CHEWABLE TABLET 81 MG: 81 TABLET CHEWABLE at 07:48

## 2025-07-26 RX ADMIN — AMIODARONE HYDROCHLORIDE 400 MG: 200 TABLET ORAL at 07:48

## 2025-07-26 RX ADMIN — CHLORHEXIDINE GLUCONATE 15 ML: 1.2 SOLUTION ORAL at 19:10

## 2025-07-26 RX ADMIN — MIDAZOLAM 2 MG: 1 INJECTION INTRAMUSCULAR; INTRAVENOUS at 11:29

## 2025-07-26 ASSESSMENT — ACTIVITIES OF DAILY LIVING (ADL)
ADLS_ACUITY_SCORE: 56
ADLS_ACUITY_SCORE: 53
ADLS_ACUITY_SCORE: 54
ADLS_ACUITY_SCORE: 54
ADLS_ACUITY_SCORE: 53
ADLS_ACUITY_SCORE: 54
ADLS_ACUITY_SCORE: 53
ADLS_ACUITY_SCORE: 57
ADLS_ACUITY_SCORE: 53
ADLS_ACUITY_SCORE: 56
ADLS_ACUITY_SCORE: 54
ADLS_ACUITY_SCORE: 53
ADLS_ACUITY_SCORE: 57
ADLS_ACUITY_SCORE: 54
ADLS_ACUITY_SCORE: 53

## 2025-07-26 NOTE — PLAN OF CARE
4A OT CX and Defer: Evaluation orders received, chart review complete. Per chart and discussion with PT provider, patient neuroprognosis consistent with hypoxic injury. Per palliative note 7/25, anticipate change in goals of care in coming days. Will complete OT orders and defer evaluation.

## 2025-07-26 NOTE — PROGRESS NOTES
[facility]    ECLS Shift Summary:     ECMO Equipment:  Console Serial Number: [id number]  Circuit Lot Number: [id number]  Oxygenator Lot Number: [id number]    Circuit Assessment: Fibrin  Fibrin Location: connectors    Arterial ECMO Cannula: 17 Fr in the Right Femoral Artery  Venous ECMO Cannula: 25 Fr in the Right Femoral Vein  Distal Perfusion Catheter: 8 Fr in the Right Superficial Femoral Artery    ECMO Cannula Arterial Right femoral artery-Site Assessment: WDL, Sutured, Secure  ECMO Cannula Venous Right femoral vein-Site Assessment: WDL, Sutured, Secure  Distal Perfusion Catheter Right superficial femoral artery-Site Assessment: WDL, Sutured, Secure  ECMO Cannula Arterial Right femoral artery-Site Intervention: No intervention needed  ECMO Cannula Venous Right femoral vein-Site Intervention: No intervention needed  Distal Perfusion Catheter Right superficial femoral artery-Site Intervention: No intervention needed    Patient remains on V-A ECMO, all equipment is functioning and alarms are appropriately set. RPM's: 2885 with Blood Flow (Circuit) LPM  Av.9 LPM  Min: 2.82 LPM  Max: 2.92 LPM L/min. Sweep is at 0.5 LPM and 40 %. Extremities are warm and well perfused.     Significant Shift Events: Heparin titrated down to slow bleeding, ACT remains in goal.     Vent settings:  FiO2 (%): 50 %, Resp: 19, Vent Mode: VC/AC, Resp Rate (Set): 12 breaths/min, Tidal Volume (Set, mL): 400 mL, PEEP (cm H2O): 8 cmH2O, Resp Rate (Set): 12 breaths/min, Tidal Volume (Set, mL): 400 mL, PEEP (cm H2O): 8 cmH2O    Anticoagulation:  Dose (units/hr) HEParin: 1450 Units/hr  Rate (mL/hr) HEParin: 14.5 mL/hr  Concentration HEParin: 100 Units/mL        Most recent ACT  (seconds): 194 seconds    Urine output is adequate, Yellow / Straw Colored.  Blood loss was minimal. Product given included none.     Intake/Output Summary (Last 24 hours) at 2025 6683  Last data filed at 2025  0500  Gross per 24 hour   Intake 2198.04 ml   Output 2615 ml   Net -416.96 ml       Labs:  Recent Labs   Lab 07/26/25  0340 07/26/25  0208 07/25/25  2350 07/25/25  2154   PH 7.42 7.42 7.42 7.41   PCO2 36 35 33* 33*   PO2 134* 80 74* 80   HCO3 23 22 21 21   O2PER 40  50  50 50 50 50       Lab Results   Component Value Date    HGB 8.4 (L) 07/26/2025    PHGB <30 07/26/2025     07/26/2025    FIBR 990 (H) 07/26/2025    INR 1.36 (H) 07/26/2025    PTT 88 (H) 07/26/2025    DD 3.57 (H) 07/26/2025    AXA 0.21 05/24/2013    ANTCH 97 07/25/2025       Plan:  Continue with [facility] ECM[facility] support, family moving towards comfort care. No plan for additional interventions at this time.     Kendra Gonzalez, RT  ECMO Specialist  7/26/2025 5:45 AM

## 2025-07-26 NOTE — CARE PLAN
4A Physical Therapy - Per chart review, Pt neuroprognosis consistent with hypoxic injury and anticipated change in goals of care over there following days per palliative note on 7/25. Will completed PT orders and defer evaluation. Thank you.

## 2025-07-26 NOTE — PROGRESS NOTES
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     Cannulation Site:  17 Fr in the R femoral artery  25 Fr in the R femoral vein    Interval events: no acute event overnight    Pulsatilty (IABP paused if applicable):50 mmHG     Physical Exam:  Temp:  [97.9  F (36.6  C)-98.2  F (36.8  C)] 98.1  F (36.7  C)  Pulse:  [] 98  Resp:  [10-41] 26  MAP:  [66 mmHg-198 mmHg] 73 mmHg  Arterial Line BP: ()/(51-85) 117/59  FiO2 (%):  [40 %-50 %] 40 %  SpO2:  [82 %-100 %] 91 %    Intake/Output Summary (Last 24 hours) at 2025 1531  Last data filed at 2025 1500  Gross per 24 hour   Intake 2320.46 ml   Output 1621 ml   Net 699.46 ml    FiO2 (%): 40 %, Resp: 26, Vent Mode: VC/AC, Resp Rate (Set): 12 breaths/min, Tidal Volume (Set, mL): 400 mL, PEEP (cm H2O): 8 cmH2O, Resp Rate (Set): 12 breaths/min, Tidal Volume (Set, mL): 400 mL, PEEP (cm H2O): 8 cmH2O     Labs:  Recent Labs   Lab 25  1400 25  1206 25  1000 25  0756   PH 7.43 7.44 7.43 7.43   PCO2 38 36 36 36   PO2 78* 76* 100 77*   HCO3 25 24 24 24   O2PER 40 40 40 40      Recent Labs   Lab 25  1000 25  0340 25  2154 25  1552   WBC 12.1* 12.8* 15.2* 11.9*   HGB 8.3* 8.4* 8.9* 8.8*     Creatinine   Date Value Ref Range Status   2025 2.98 (H) 0.67 - 1.17 mg/dL Final   2025 2.85 (H) 0.67 - 1.17 mg/dL Final   2025 2.78 (H) 0.67 - 1.17 mg/dL Final   2025 2.63 (H) 0.67 - 1.17 mg/dL Final   2013 0.90 0.66 - 1.25 mg/dL Final   2013 1.00 0.66 - 1.25 mg/dL Final   2013 1.06 0.66 - 1.25 mg/dL Final   2013 1.13 0.66 - 1.25 mg/dL Final       Blood Flow (Circuit) LPM: 2.87 LPM  Sweep LPM: 0.5 LPM  Sweep FiO2   %: 40 %  ACT  (seconds): 182 seconds  Pulse Oximetry  (SpO2%): 96 %  Arterial Pressure  mmH mmHg      ECMO Issues including  assessments and plan on DOS 7/26/2025:  Neuro: Sedated for status myoclonus.  No acute distress.  NIRS stable b/l  RASS goal: -3  CV: Cardiogenic shock.  Hemodynamically stable off pressors.   Pulm: Keep vent settings at rest settings as above.  FEN/Renal: Electrolytes stable w/ replacement protocols in place, Cr stable, UOP stable  Heme: ACT goal: 180-200, Hemoglobin stable.  Minimal oozing around the ECMO cannulas.  ID: Receiving empiric antibiotics  Cannulae: Position is acceptable on exam and the available imaging.  Distal perfusion cannula is in place and patent.  Extremities are well-perfused.   I have personally reviewed the ECMO flows, oxygenation and CO2 clearance, anticoagulation, and cannula position.  I have also personally assessed the patient's systemic response with hemodynamics, oxygenation, ventilation, and bleeding.     I have personally reviewed the ECMO flows, oxygenation and CO2 clearance, anticoagulation, and cannula position.  I have also personally assessed the patient's systemic response with hemodynamics, oxygenation, ventilation, and bleeding.       The patient requires continued ECMO support and management in the ICU.  I have discussed patient care and treatment plan with the primary team.      Ilhwan Yeo, MD  Critical Care Cardiology    July 26, 2025

## 2025-07-26 NOTE — PROGRESS NOTES
Cardiology ICU Progress Note    Brief HPI:  Bob Echols is a 70 year old male with a past medical history of HTN, DM2, and DEMOND as well as recent admission for inferior STEMI on 7/10 (treated with percutaneous revascularization with 2.75 x 20mm Promus RENA to the distal LCx). The patient was discharged on 7/14 and had been doing well at home prior to 7/18 when he was found to be apneic without a pulse with estimated down time ~10-15 min prior to discovery. EMS was called at 1537, arrived and started compressions at 1542, cannulated for VA ECMO at 1614. Subsequently found to have subacute in-stent thrombosis of his recently placed stent without obvious mechanical causes, treated with 2.75 x 20mm Synergy XD RENA. Now admitted to [facility] for further care. His initial pH was 7.08, PcO2 74, PO2 58, Lactate 8.6.     Subjective and Interval:  - Yesterday care conference was held; given very poor neurologic prognosis; decision was made to plan to withdraw care at some point this weekend    Rates controlled  In status myoclonus epilepticus, IV versed increased to 10mg/hr, Valproic acid started per neuro recs   Continues to have poor neurologic exam- basic brainstem reflexes c/w CT findings of severe anoxic brain injury     Assessment and plan by system:  ==Today's changes ==  - pt deemed not eligible for organ donation  - awaiting family to arrive today to discuss further steps about withdrawal of care  - no major clinical changes, continue Midazolam  - plan on getting order changed to DNR    == Neurology ==   #anoxic brain injury    #hypoxic encephalopathy 2/2 cardiac arrest  #Status myoclonus eplipeticus   -Intubated, sedated. Avoiding hyperthermia  -NCC consulted and appreciate recommendations  -vEEG   -Palliative care consulted;  - plan on withdrawing care at some point this weekend    CT head 7/21  Continued diminished gray-white differentiation throughout  the cerebral hemispheres, concerning for hypoxic ischemic  injury.    Current sedation: Versed gtt    Plan:  -Continue to hold sedation pending neurologic recovery   -Spontaneous awakening trial as tolerated  -Permissive hypernatremia for neuroprotection    == Cardiovascular ==  #Refractory VF cardiac arrest   #cardiogenic shock requiring VA ECMO ([facility] E)  #CAD, status post percutaneous revascularization of the left circumflex artery with sinacute in-stent thrombosis   #PAH  #HTN  Presented initially to [facility] on 7/10 with inferior STEMI, found to have acute thrombotic lesion of the distal LCx treated with a 2.75 x 20mm Promus RENA, discharged on apixaban and clopidogrel. He was found down at home in VF-mediated cardiac arrest on  requiring emergent VA ECMO cannulation. Coronary angiography demonstrated in-stent thrombosis of his recently placed stent without obvious mechanical cause and therefore presumed to be clopidogrel failure. Treated with a 2.75 x 20mm Synergy XD RENA.     Peripheral V-A ECMO inserted via RCFA and RCFV   Angiogram:     Prox LAD to Mid LAD lesion is 55% stenosed.    Prox RCA lesion is 30% stenosed.    1st Diag lesion is 40% stenosed.    2nd Mrg lesion is 70% stenosed.    RPAV lesion is 70% stenosed.    Mid Cx lesion is 100% stenosed.    TTE:   Left ventricular size is normal. Left ventricular function is decreased. The  ejection fraction is 5-10% (severely reduced). Mild to moderate right  ventricular dilation is present.  Global right ventricular function is severely reduced.  Trace to mild mitral insufficiency is present.  AV opens with each systole No aortic regurgitation is present.  The inferior vena cava is normal. Trivial to small circumferential pericardial  effusion with no hemodynamic consequences    ECG Rhythm: Atrial fibrillation    ECMO :  Mode: V-A   Pump Type: Cardiohelp  RPM's: 2885  Blood Flow (Circuit) LPM: 2.84 LPM  Sweep LPM: 0.5 LPM  Sweep FiO2   %: 40 %  Venous Pressure  mmHg: -24 mmHg  Arterial Pressure  mmH  mmHg  Internal Pressure mmH mmHg  Pressure Change mmH mmHg    Current Pressors/inotropes/antiarrythmic: N/A    Hemodynamics:  Art Line  Arterial Line BP: 110/60  Arterial Line MAP (mmHg): 71 mmHg  Arterial Line Location: Right radial  Art Line Wave Form: Appropriate wave forms      Plan:  -Continue ASA 81mg and ticagrelor 90mg BID   -Hold statin given shock liver, resume when leaving ICU  -Hold ACE/ARB for now given likely reduced renal fxn after arrest  -Hold beta blocker given shock  -Telemetry monitoring  -Trend troponin and lactic acid  -Continue ECMO support  -FBG negative 1L, Diuresis with bumex gtt @ 0.5 mg/hr  -IV amiodarone bolus and gtt for A fib with RVR/VT, total 5g load from IV  -Amiodarone 400mg BID   -Hydralazine 50mg q6h     == Pulmonary ==  #Acute hypoxemic respiratory failure requiring intubation  #DEMOND    Vent Settings:   FiO2 (%): 50 %, Resp: 16, Vent Mode: VC/AC, Resp Rate (Set): 12 breaths/min, Tidal Volume (Set, mL): 400 mL, PEEP (cm H2O): 8 cmH2O, Resp Rate (Set): 12 breaths/min, Tidal Volume (Set, mL): 400 mL, PEEP (cm H2O): 8 cmH2O    Plan:  -Wean vent as able  -Daily CXR  -Serial ABGs  -Peridex BID  -FBG and diuresis as above  -Will defer tx of RLL consolidation unless develops clinical evidence of infection        == Gastrointestinal, Nutrition ==  #Severe obesity  #?shock liver 2/2 cardiac arrest  #Hypoalbuminemia     Diet Advance tube feeds to goal    Plan:  -Monitor LFTs  -Bowel regimen in place  -GI Prophylaxis: PPI; Protonix 40 mg daily     == Renal, Electrolytes ==  #?acute renal injury 2/2 cardiac arrest  #Lactic acidosis    Plan:  -Avoid nephrotoxins, renally dose meds  -Monitor urine output  -FBG and diuresis as above    == Infectious Disease ==  #Aspiration pneumonia  #Leukocytosis    Plan:  -Continue CTX x 5 days for aspiration coverage  -Monitor for signs of infection  -Avoid fevers     == Hematology ==  # Anemia of critical illness    Receiving heparin for ECMO  "with ACT goal 180-200    Plan:  -Continue Heparin with ACT goal as above  -Transfusion parameters:   -1u PRBC for hbg < 8.0   -1u platelet for plt < 100   -1u FFP for INR > 3   -5u cryoprecipitate for fibrinogen <150    ASA/ticagrelor for RENA  US LE w/ arterial duplex with no evidence of acute thrombus  DVT PPX: Heparin as above    == Endocrinology ==  #Hyperglycemia   #DM2     Plan:  -Insulin infusion as needed    == Integumentary ==  -No skin issues    Plan:  -CTM cannula sites  -WOCN consult    == Lines/Tubes/Drains ==  Cannula: right fem  Airway: ETT  Enteric: OGT  Central access: left fem  Arterial access: radial  Urinary: yes  Rectal Tube: yes    ICU Checklist  Feeding: yes  Analgesia: fentanyl  Sedation: propofol   Thrombopx: heparin infusion  Head of bed: RT  Ulcers: PPI  Glucose: CTM  SBT: not today  Bowel regimen: yes  Indwelling cath: yes  De-escalate meds: no  Code Status: Full Code , Plan on changing to DNR later today eCPR candidate? Yes, on support  Family updated by team. Patient seen and discussed with staff physician, Dr. Ilhwan Yeo.     Mario Romero MD  Cardiovascular Diseases Fellow    Objective:  Most recent vital signs:  /68   Pulse 94   Temp 98.1  F (36.7  C)   Resp 16   Ht 1.778 m (5' 10\")   Wt 105.8 kg (233 lb 4 oz)   SpO2 98%   BMI 33.47 kg/m    Temp:  [97.7  F (36.5  C)-98.2  F (36.8  C)] 98.1  F (36.7  C)  Pulse:  [] 94  Resp:  [10-41] 16  BP: (108)/(68) 108/68  MAP:  [66 mmHg-88 mmHg] 71 mmHg  Arterial Line BP: ()/(51-75) 110/60  FiO2 (%):  [50 %] 50 %  SpO2:  [90 %-100 %] 98 %    Physical exam:  General: critically ill, supine in bed, in NAD  HEENT: PERRL, no scleral icterus or injection  CARDIAC: RRR, no m/r/g appreciated. Peripheral pulses dopplered  RESP: synchronous with mechanical ventilation, CTAB, no wheezes, rhonchi or crackles appreciated.  GI: soft, non-distended, BS hypoactive  : Urinary catheter present  EXTREMITIES: No BLE edema, pulses 2+. " Femoral access site w/o bleeding, dressing C/D/I.   SKIN: No acute lesions appreciated on exposed skin.  NEURO: Intubated and sedated. Unable to assess language or mentation. Unresponsive to noxious stim x 4 extremities.    Imaging/procedure results:  Recent Results (from the past 24 hours)   Glucose by meter   Result Value Ref Range    GLUCOSE BY METER POCT 118 (H) 70 - 99 mg/dL   Blood gas arterial   Result Value Ref Range    pH Arterial 7.46 (H) 7.35 - 7.45    pCO2 Arterial 37 35 - 45 mm Hg    pO2 Arterial 92 80 - 105 mm Hg    FIO2 50     Bicarbonate Arterial 26 21 - 28 mmol/L    Base Excess/Deficit Arterial 2.0 -3.0 - 3.0 mmol/L    Rei's Test Artline     Oxyhemoglobin Arterial 96 92 - 100 %    O2 Sat, Arterial 98.2 (H) 95.0 - 96.0 %    Narrative    In healthy individuals, oxyhemoglobin (O2Hb) and oxygen saturation (SO2) are approximately equal. In the presence of dyshemoglobins, oxyhemoglobin can be considerably lower than oxygen saturation.   Activated clotting time celite, POCT   Result Value Ref Range    Activated Clotting Time (Celite) POCT 198 (H) 74 - 150 seconds   CBC with platelets   Result Value Ref Range    WBC Count 11.9 (H) 4.0 - 11.0 10e3/uL    RBC Count 3.29 (L) 4.40 - 5.90 10e6/uL    Hemoglobin 9.1 (L) 13.3 - 17.7 g/dL    Hematocrit 29.2 (L) 40.0 - 53.0 %    MCV 89 78 - 100 fL    MCH 27.7 26.5 - 33.0 pg    MCHC 31.2 (L) 31.5 - 36.5 g/dL    RDW 14.7 10.0 - 15.0 %    Platelet Count 235 150 - 450 10e3/uL   Comprehensive metabolic panel   Result Value Ref Range    Sodium 151 (H) 135 - 145 mmol/L    Potassium 3.7 3.4 - 5.3 mmol/L    Carbon Dioxide (CO2) 23 22 - 29 mmol/L    Anion Gap 17 (H) 7 - 15 mmol/L    Urea Nitrogen 114.0 (H) 8.0 - 23.0 mg/dL    Creatinine 2.51 (H) 0.67 - 1.17 mg/dL    GFR Estimate 27 (L) >60 mL/min/1.73m2    Calcium 9.1 8.8 - 10.4 mg/dL    Chloride 111 (H) 98 - 107 mmol/L    Glucose 131 (H) 70 - 99 mg/dL    Alkaline Phosphatase 92 40 - 150 U/L    AST 49 (H) 0 - 45 U/L    ALT 33  0 - 70 U/L    Protein Total 6.4 6.4 - 8.3 g/dL    Albumin 2.7 (L) 3.5 - 5.2 g/dL    Bilirubin Total 0.3 <=1.2 mg/dL   Calcium ionized whole blood   Result Value Ref Range    Calcium Ionized Whole Blood 4.6 4.4 - 5.2 mg/dL   Glucose whole blood   Result Value Ref Range    Glucose 126 (H) 70 - 99 mg/dL   Heparin Unfractionated Anti Xa Level   Result Value Ref Range    Anti Xa Unfractionated Heparin 0.75 For Reference Range, See Comment IU/mL    Narrative    Therapeutic Range: UFH: 0.25-0.50 IU/mL for low intensity dosing,  0.30-0.70 IU/mL for high intensity dosing DVT and PE.  This test is not validated for other direct factor X inhibitors (e.g. rivaroxaban, apixaban, edoxaban, betrixaban, fondaparinux) and should not be used for monitoring of other medications.   INR   Result Value Ref Range    INR 1.28 (H) 0.85 - 1.15    PT 16.3 (H) 11.8 - 14.8 Seconds   Partial thromboplastin time   Result Value Ref Range    aPTT 115 (H) 22 - 38 Seconds   Lactic acid whole blood   Result Value Ref Range    Lactic Acid 0.9 0.7 - 2.0 mmol/L   Magnesium   Result Value Ref Range    Magnesium 2.5 (H) 1.7 - 2.3 mg/dL   Troponin T, High Sensitivity   Result Value Ref Range    Troponin T, High Sensitivity 1,136 (HH) <=22 ng/L   Blood gas arterial   Result Value Ref Range    pH Arterial 7.45 7.35 - 7.45    pCO2 Arterial 36 35 - 45 mm Hg    pO2 Arterial 97 80 - 105 mm Hg    FIO2 50     Bicarbonate Arterial 25 21 - 28 mmol/L    Base Excess/Deficit Arterial 1.3 -3.0 - 3.0 mmol/L    Rei's Test Artline     Oxyhemoglobin Arterial 96 92 - 100 %    O2 Sat, Arterial 98.4 (H) 95.0 - 96.0 %    Narrative    In healthy individuals, oxyhemoglobin (O2Hb) and oxygen saturation (SO2) are approximately equal. In the presence of dyshemoglobins, oxyhemoglobin can be considerably lower than oxygen saturation.   CT Head w/o Contrast    Narrative    CT HEAD W/O CONTRAST 7/25/2025 11:05 AM    History: interval eval history of cardiac arrest,     Comparison: CT  Head 7/22/2025, Ct Head 7/21/2025,     Technique: Using multidetector thin collimation helical acquisition  technique, axial, coronal and sagittal CT images from the skull base  to the vertex were obtained without intravenous contrast.   (topogram) image(s) also obtained and reviewed.    Findings: There is no intracranial hemorrhage, mass effect, or midline  shift. Similar loss of gray-white matter differentiation when compared  to CT Head 7/22/2025. Ventricles are proportionate to the cerebral  sulci. The basal cisterns are clear.    The bony calvaria and the bones of the skull base are normal.   Increased interval effusions to frontal, ethmoid, maxillary and  sphenoid sinuses with frothy mixed mucosal debris when compared to CT  Head 7/22/2025  . EEG leads are present.      Impression    Impression:   Unchanged diminished gray-white matter differentiation when compared  to CT Head 7/22/2025.    I have personally reviewed the examination and initial interpretation  and I agree with the findings.    YLNETTE HIDALGO MD         SYSTEM ID:  [id number]   Glucose by meter   Result Value Ref Range    GLUCOSE BY METER POCT 114 (H) 70 - 99 mg/dL   Blood gas arterial   Result Value Ref Range    pH Arterial 7.43 7.35 - 7.45    pCO2 Arterial 39 35 - 45 mm Hg    pO2 Arterial 110 (H) 80 - 105 mm Hg    FIO2 50     Bicarbonate Arterial 26 21 - 28 mmol/L    Base Excess/Deficit Arterial 1.4 -3.0 - 3.0 mmol/L    Rei's Test Artline     Oxyhemoglobin Arterial 97 92 - 100 %    O2 Sat, Arterial 98.8 (H) 95.0 - 96.0 %    Narrative    In healthy individuals, oxyhemoglobin (O2Hb) and oxygen saturation (SO2) are approximately equal. In the presence of dyshemoglobins, oxyhemoglobin can be considerably lower than oxygen saturation.   Activated clotting time celite, POCT   Result Value Ref Range    Activated Clotting Time (Celite) POCT 198 (H) 74 - 150 seconds   Blood gas arterial   Result Value Ref Range    pH Arterial 7.43 7.35 - 7.45     pCO2 Arterial 38 35 - 45 mm Hg    pO2 Arterial 156 (H) 80 - 105 mm Hg    FIO2 50     Bicarbonate Arterial 25 21 - 28 mmol/L    Base Excess/Deficit Arterial 0.7 -3.0 - 3.0 mmol/L    Rei's Test Artline     Oxyhemoglobin Arterial 98 92 - 100 %    O2 Sat, Arterial 100.0 (H) 95.0 - 96.0 %    Narrative    In healthy individuals, oxyhemoglobin (O2Hb) and oxygen saturation (SO2) are approximately equal. In the presence of dyshemoglobins, oxyhemoglobin can be considerably lower than oxygen saturation.   CBC with platelets   Result Value Ref Range    WBC Count 11.9 (H) 4.0 - 11.0 10e3/uL    RBC Count 3.19 (L) 4.40 - 5.90 10e6/uL    Hemoglobin 8.8 (L) 13.3 - 17.7 g/dL    Hematocrit 28.6 (L) 40.0 - 53.0 %    MCV 90 78 - 100 fL    MCH 27.6 26.5 - 33.0 pg    MCHC 30.8 (L) 31.5 - 36.5 g/dL    RDW 14.9 10.0 - 15.0 %    Platelet Count 226 150 - 450 10e3/uL   Comprehensive metabolic panel   Result Value Ref Range    Sodium 153 (H) 135 - 145 mmol/L    Potassium 3.7 3.4 - 5.3 mmol/L    Carbon Dioxide (CO2) 22 22 - 29 mmol/L    Anion Gap 19 (H) 7 - 15 mmol/L    Urea Nitrogen 117.0 (H) 8.0 - 23.0 mg/dL    Creatinine 2.63 (H) 0.67 - 1.17 mg/dL    GFR Estimate 26 (L) >60 mL/min/1.73m2    Calcium 9.1 8.8 - 10.4 mg/dL    Chloride 112 (H) 98 - 107 mmol/L    Glucose 82 70 - 99 mg/dL    Alkaline Phosphatase 88 40 - 150 U/L    AST 49 (H) 0 - 45 U/L    ALT 31 0 - 70 U/L    Protein Total 6.3 (L) 6.4 - 8.3 g/dL    Albumin 2.6 (L) 3.5 - 5.2 g/dL    Bilirubin Total 0.3 <=1.2 mg/dL   Calcium ionized whole blood   Result Value Ref Range    Calcium Ionized Whole Blood 4.6 4.4 - 5.2 mg/dL   Glucose whole blood   Result Value Ref Range    Glucose 80 70 - 99 mg/dL   Heparin Unfractionated Anti Xa Level   Result Value Ref Range    Anti Xa Unfractionated Heparin 0.70 For Reference Range, See Comment IU/mL    Narrative    Therapeutic Range: UFH: 0.25-0.50 IU/mL for low intensity dosing,  0.30-0.70 IU/mL for high intensity dosing DVT and PE.  This test is  not validated for other direct factor X inhibitors (e.g. rivaroxaban, apixaban, edoxaban, betrixaban, fondaparinux) and should not be used for monitoring of other medications.   INR   Result Value Ref Range    INR 1.33 (H) 0.85 - 1.15    PT 16.8 (H) 11.8 - 14.8 Seconds   Partial thromboplastin time   Result Value Ref Range    aPTT 101 (H) 22 - 38 Seconds   Lactic acid whole blood   Result Value Ref Range    Lactic Acid 2.4 (H) 0.7 - 2.0 mmol/L   Magnesium   Result Value Ref Range    Magnesium 2.6 (H) 1.7 - 2.3 mg/dL   Troponin T, High Sensitivity   Result Value Ref Range    Troponin T, High Sensitivity 1,076 (HH) <=22 ng/L   D dimer quantitative   Result Value Ref Range    D-Dimer Quantitative 3.46 (H) 0.00 - 0.50 ug/mL FEU    Narrative    This D-dimer assay is intended for use in conjunction with a clinical pretest probability assessment model to exclude pulmonary embolism (PE) and deep venous thrombosis (DVT) in outpatients suspected of PE or DVT. The cut-off value is 0.50 ug/mL FEU.    For patients 50 years of age or older, the application of age-adjusted cut-off values for D-Dimer may increase the specificity without significant effect on sensitivity. The literature suggested calculation age adjusted cut-off in ug/L = age in years x 10 ug/L. The results in this laboratory are reported as ug/mL rather than ug/L. The calculation for age adjusted cut off in ug/mL= age in years x 0.01 ug/mL. For example, the cut off for a 76 year old male is 77 x 0.01 ug/mL = 0.76 ug/mL (760 ug/L).    M Bassam et al. Age adjusted D-dimer cut-off levels to rule out pulmonary embolism: The ADJUST-PE Study. EDGAR 2014;311:9267-2845.; VEGA Osullivan et al. Diagnostic accuracy of conventional or age adjusted D-dimer cutoff values in older patients with suspected venous thromboembolism. Systemic review and meta-analysis. BMJ 2013:346:f2492.   Blood gas ELS venous   Result Value Ref Range    pH ELS Venous 7.39 7.32 - 7.43    pCO2 ELS Venous  44 40 - 50 mm Hg    pO2 ELS Venous 42 25 - 47 mm Hg    Bicarbonate ELS Venous 26 21 - 28 mmol/L    Base Excess/Deficit Venous 1.2 -3.0 - 3.0 mmol/L    FIO2 40     Oxyhemoglobin ELS Venous 74 %    O2 Saturation ELS Venous 75.7 (H) 70.0 - 75.0 %    Narrative    In healthy individuals, oxyhemoglobin (O2Hb) and oxygen saturation (SO2) are approximately equal. In the presence of dyshemoglobins, oxyhemoglobin can be considerably lower than oxygen saturation.   Blood gas ELS arterial   Result Value Ref Range    pH ELS Arterial 7.41 7.35 - 7.45    pCO2 ELS Arterial 40 35 - 45 mm Hg    pO2 ELS Arterial 146 (H) 80 - 105 mm Hg    Bicarbonate ELS Arterial 25 21 - 28 mmol/L    Base Excess/Deficit Arterial 0.8 -3.0 - 3.0 mmol/L    FIO2 40     Oxyhemoglobin ELS Arterial 97 75 - 100 %    O2 Saturation ELS Arterial 99.4 (H) 95.0 - 96.0 %    Narrative    In healthy individuals, oxyhemoglobin (O2Hb) and oxygen saturation (SO2) are approximately equal. In the presence of dyshemoglobins, oxyhemoglobin can be considerably lower than oxygen saturation.   Fibrinogen activity   Result Value Ref Range    Fibrinogen Activity 1,132 (H) 170 - 510 mg/dL   Blood gas arterial   Result Value Ref Range    pH Arterial 7.44 7.35 - 7.45    pCO2 Arterial 37 35 - 45 mm Hg    pO2 Arterial 83 80 - 105 mm Hg    FIO2 40     Bicarbonate Arterial 25 21 - 28 mmol/L    Base Excess/Deficit Arterial 0.6 -3.0 - 3.0 mmol/L    Rei's Test Artline     Oxyhemoglobin Arterial 96 92 - 100 %    O2 Sat, Arterial 96.7 (H) 95.0 - 96.0 %    Narrative    In healthy individuals, oxyhemoglobin (O2Hb) and oxygen saturation (SO2) are approximately equal. In the presence of dyshemoglobins, oxyhemoglobin can be considerably lower than oxygen saturation.   Glucose by meter   Result Value Ref Range    GLUCOSE BY METER POCT 73 70 - 99 mg/dL   Activated clotting time celite, POCT   Result Value Ref Range    Activated Clotting Time (Celite) POCT 207 (H) 74 - 150 seconds   Activated  clotting time celite, POCT   Result Value Ref Range    Activated Clotting Time (Celite) POCT 215 (H) 74 - 150 seconds   Glucose by meter   Result Value Ref Range    GLUCOSE BY METER POCT 76 70 - 99 mg/dL   Blood gas arterial   Result Value Ref Range    pH Arterial 7.46 (H) 7.35 - 7.45    pCO2 Arterial 33 (L) 35 - 45 mm Hg    pO2 Arterial 67 (L) 80 - 105 mm Hg    FIO2 40     Bicarbonate Arterial 24 21 - 28 mmol/L    Base Excess/Deficit Arterial -0.1 -3.0 - 3.0 mmol/L    Rei's Test Artline     Oxyhemoglobin Arterial 94 92 - 100 %    O2 Sat, Arterial 95.3 95.0 - 96.0 %    Narrative    In healthy individuals, oxyhemoglobin (O2Hb) and oxygen saturation (SO2) are approximately equal. In the presence of dyshemoglobins, oxyhemoglobin can be considerably lower than oxygen saturation.   Glucose by meter   Result Value Ref Range    GLUCOSE BY METER POCT 99 70 - 99 mg/dL   Activated clotting time celite, POCT   Result Value Ref Range    Activated Clotting Time (Celite) POCT 198 (H) 74 - 150 seconds   Glucose by meter   Result Value Ref Range    GLUCOSE BY METER POCT 109 (H) 70 - 99 mg/dL   Blood gas arterial   Result Value Ref Range    pH Arterial 7.43 7.35 - 7.45    pCO2 Arterial 33 (L) 35 - 45 mm Hg    pO2 Arterial 139 (H) 80 - 105 mm Hg    FIO2 50     Bicarbonate Arterial 22 21 - 28 mmol/L    Base Excess/Deficit Arterial -2.1 -3.0 - 3.0 mmol/L    Rei's Test Artline     Oxyhemoglobin Arterial 98 92 - 100 %    O2 Sat, Arterial 99.9 (H) 95.0 - 96.0 %    Peep 8 cm H2O    Narrative    In healthy individuals, oxyhemoglobin (O2Hb) and oxygen saturation (SO2) are approximately equal. In the presence of dyshemoglobins, oxyhemoglobin can be considerably lower than oxygen saturation.   Activated clotting time celite, POCT   Result Value Ref Range    Activated Clotting Time (Celite) POCT 198 (H) 74 - 150 seconds   CBC with platelets   Result Value Ref Range    WBC Count 15.2 (H) 4.0 - 11.0 10e3/uL    RBC Count 3.22 (L) 4.40 - 5.90  10e6/uL    Hemoglobin 8.9 (L) 13.3 - 17.7 g/dL    Hematocrit 28.6 (L) 40.0 - 53.0 %    MCV 89 78 - 100 fL    MCH 27.6 26.5 - 33.0 pg    MCHC 31.1 (L) 31.5 - 36.5 g/dL    RDW 15.0 10.0 - 15.0 %    Platelet Count 226 150 - 450 10e3/uL   Comprehensive metabolic panel   Result Value Ref Range    Sodium 152 (H) 135 - 145 mmol/L    Potassium 3.8 3.4 - 5.3 mmol/L    Carbon Dioxide (CO2) 19 (L) 22 - 29 mmol/L    Anion Gap 22 (H) 7 - 15 mmol/L    Urea Nitrogen 123.0 (H) 8.0 - 23.0 mg/dL    Creatinine 2.78 (H) 0.67 - 1.17 mg/dL    GFR Estimate 24 (L) >60 mL/min/1.73m2    Calcium 9.1 8.8 - 10.4 mg/dL    Chloride 111 (H) 98 - 107 mmol/L    Glucose 112 (H) 70 - 99 mg/dL    Alkaline Phosphatase 89 40 - 150 U/L    AST 46 (H) 0 - 45 U/L    ALT 32 0 - 70 U/L    Protein Total 6.4 6.4 - 8.3 g/dL    Albumin 2.6 (L) 3.5 - 5.2 g/dL    Bilirubin Total 0.3 <=1.2 mg/dL   Calcium ionized whole blood   Result Value Ref Range    Calcium Ionized Whole Blood 4.7 4.4 - 5.2 mg/dL   Glucose whole blood   Result Value Ref Range    Glucose 108 (H) 70 - 99 mg/dL   Heparin Unfractionated Anti Xa Level   Result Value Ref Range    Anti Xa Unfractionated Heparin 0.62 For Reference Range, See Comment IU/mL    Narrative    Therapeutic Range: UFH: 0.25-0.50 IU/mL for low intensity dosing,  0.30-0.70 IU/mL for high intensity dosing DVT and PE.  This test is not validated for other direct factor X inhibitors (e.g. rivaroxaban, apixaban, edoxaban, betrixaban, fondaparinux) and should not be used for monitoring of other medications.   INR   Result Value Ref Range    INR 1.30 (H) 0.85 - 1.15    PT 16.4 (H) 11.8 - 14.8 Seconds   Partial thromboplastin time   Result Value Ref Range    aPTT 87 (H) 22 - 38 Seconds   Lactic acid whole blood   Result Value Ref Range    Lactic Acid 1.1 0.7 - 2.0 mmol/L   Magnesium   Result Value Ref Range    Magnesium 2.5 (H) 1.7 - 2.3 mg/dL   Troponin T, High Sensitivity   Result Value Ref Range    Troponin T, High Sensitivity 1,105  (HH) <=22 ng/L   Blood gas arterial   Result Value Ref Range    pH Arterial 7.41 7.35 - 7.45    pCO2 Arterial 33 (L) 35 - 45 mm Hg    pO2 Arterial 80 80 - 105 mm Hg    FIO2 50     Bicarbonate Arterial 21 21 - 28 mmol/L    Base Excess/Deficit Arterial -3.2 (L) -3.0 - 3.0 mmol/L    Rei's Test Artline     Oxyhemoglobin Arterial 94 92 - 100 %    O2 Sat, Arterial 96.4 (H) 95.0 - 96.0 %    Peep 8 cm H2O    Narrative    In healthy individuals, oxyhemoglobin (O2Hb) and oxygen saturation (SO2) are approximately equal. In the presence of dyshemoglobins, oxyhemoglobin can be considerably lower than oxygen saturation.   Glucose by meter   Result Value Ref Range    GLUCOSE BY METER POCT 105 (H) 70 - 99 mg/dL   Blood gas arterial   Result Value Ref Range    pH Arterial 7.42 7.35 - 7.45    pCO2 Arterial 33 (L) 35 - 45 mm Hg    pO2 Arterial 74 (L) 80 - 105 mm Hg    FIO2 50     Bicarbonate Arterial 21 21 - 28 mmol/L    Base Excess/Deficit Arterial -2.8 -3.0 - 3.0 mmol/L    Rei's Test Artline     Oxyhemoglobin Arterial 95 92 - 100 %    O2 Sat, Arterial 96.3 (H) 95.0 - 96.0 %    Peep 8 cm H2O    Narrative    In healthy individuals, oxyhemoglobin (O2Hb) and oxygen saturation (SO2) are approximately equal. In the presence of dyshemoglobins, oxyhemoglobin can be considerably lower than oxygen saturation.   Glucose by meter   Result Value Ref Range    GLUCOSE BY METER POCT 123 (H) 70 - 99 mg/dL   Activated clotting time celite, POCT   Result Value Ref Range    Activated Clotting Time (Celite) POCT 198 (H) 74 - 150 seconds   Blood gas arterial   Result Value Ref Range    pH Arterial 7.42 7.35 - 7.45    pCO2 Arterial 35 35 - 45 mm Hg    pO2 Arterial 80 80 - 105 mm Hg    FIO2 50     Bicarbonate Arterial 22 21 - 28 mmol/L    Base Excess/Deficit Arterial -1.8 -3.0 - 3.0 mmol/L    Rei's Test Artline     Oxyhemoglobin Arterial 95 92 - 100 %    O2 Sat, Arterial 96.7 (H) 95.0 - 96.0 %    Peep 8 cm H2O    Narrative    In healthy individuals,  oxyhemoglobin (O2Hb) and oxygen saturation (SO2) are approximately equal. In the presence of dyshemoglobins, oxyhemoglobin can be considerably lower than oxygen saturation.   Glucose by meter   Result Value Ref Range    GLUCOSE BY METER POCT 146 (H) 70 - 99 mg/dL   XR Chest Port 1 View    Impression    RESIDENT PRELIMINARY INTERPRETATION  Impression:   1. Stable support devices. Endotracheal tube tip is in the midthoracic  trachea.  2. Unchanged low lung volumes and medial basilar opacities favoring  atelectasis.   Activated clotting time celite, POCT   Result Value Ref Range    Activated Clotting Time (Celite) POCT 194 (H) 74 - 150 seconds   Glucose by meter   Result Value Ref Range    GLUCOSE BY METER POCT 163 (H) 70 - 99 mg/dL   CBC with platelets   Result Value Ref Range    WBC Count 12.8 (H) 4.0 - 11.0 10e3/uL    RBC Count 3.04 (L) 4.40 - 5.90 10e6/uL    Hemoglobin 8.4 (L) 13.3 - 17.7 g/dL    Hematocrit 27.5 (L) 40.0 - 53.0 %    MCV 91 78 - 100 fL    MCH 27.6 26.5 - 33.0 pg    MCHC 30.5 (L) 31.5 - 36.5 g/dL    RDW 15.0 10.0 - 15.0 %    Platelet Count 215 150 - 450 10e3/uL   Comprehensive metabolic panel   Result Value Ref Range    Sodium 151 (H) 135 - 145 mmol/L    Potassium 4.3 3.4 - 5.3 mmol/L    Carbon Dioxide (CO2) 21 (L) 22 - 29 mmol/L    Anion Gap 18 (H) 7 - 15 mmol/L    Urea Nitrogen 130.0 (H) 8.0 - 23.0 mg/dL    Creatinine 2.85 (H) 0.67 - 1.17 mg/dL    GFR Estimate 23 (L) >60 mL/min/1.73m2    Calcium 8.9 8.8 - 10.4 mg/dL    Chloride 112 (H) 98 - 107 mmol/L    Glucose 188 (H) 70 - 99 mg/dL    Alkaline Phosphatase 85 40 - 150 U/L    AST 40 0 - 45 U/L    ALT 29 0 - 70 U/L    Protein Total 6.1 (L) 6.4 - 8.3 g/dL    Albumin 2.6 (L) 3.5 - 5.2 g/dL    Bilirubin Total 0.3 <=1.2 mg/dL   Calcium ionized whole blood   Result Value Ref Range    Calcium Ionized Whole Blood 4.7 4.4 - 5.2 mg/dL   Glucose whole blood   Result Value Ref Range    Glucose 180 (H) 70 - 99 mg/dL   Heparin Unfractionated Anti Xa Level    Result Value Ref Range    Anti Xa Unfractionated Heparin 0.64 For Reference Range, See Comment IU/mL    Narrative    Therapeutic Range: UFH: 0.25-0.50 IU/mL for low intensity dosing,  0.30-0.70 IU/mL for high intensity dosing DVT and PE.  This test is not validated for other direct factor X inhibitors (e.g. rivaroxaban, apixaban, edoxaban, betrixaban, fondaparinux) and should not be used for monitoring of other medications.   INR   Result Value Ref Range    INR 1.36 (H) 0.85 - 1.15    PT 17.0 (H) 11.8 - 14.8 Seconds   Partial thromboplastin time   Result Value Ref Range    aPTT 88 (H) 22 - 38 Seconds   Lactic acid whole blood   Result Value Ref Range    Lactic Acid 1.1 0.7 - 2.0 mmol/L   Magnesium   Result Value Ref Range    Magnesium 2.6 (H) 1.7 - 2.3 mg/dL   Troponin T, High Sensitivity   Result Value Ref Range    Troponin T, High Sensitivity 1,029 (HH) <=22 ng/L   D dimer quantitative   Result Value Ref Range    D-Dimer Quantitative 3.57 (H) 0.00 - 0.50 ug/mL FEU    Narrative    This D-dimer assay is intended for use in conjunction with a clinical pretest probability assessment model to exclude pulmonary embolism (PE) and deep venous thrombosis (DVT) in outpatients suspected of PE or DVT. The cut-off value is 0.50 ug/mL FEU.    For patients 50 years of age or older, the application of age-adjusted cut-off values for D-Dimer may increase the specificity without significant effect on sensitivity. The literature suggested calculation age adjusted cut-off in ug/L = age in years x 10 ug/L. The results in this laboratory are reported as ug/mL rather than ug/L. The calculation for age adjusted cut off in ug/mL= age in years x 0.01 ug/mL. For example, the cut off for a 76 year old male is 77 x 0.01 ug/mL = 0.76 ug/mL (760 ug/L).    M Bassam et al. Age adjusted D-dimer cut-off levels to rule out pulmonary embolism: The ADJUST-PE Study. EDGAR 2014;311:3493-9060.; HJ Shi et al. Diagnostic accuracy of conventional or  age adjusted D-dimer cutoff values in older patients with suspected venous thromboembolism. Systemic review and meta-analysis. BMJ 2013:346:f2492.   Blood gas ELS venous   Result Value Ref Range    pH ELS Venous 7.36 7.32 - 7.43    pCO2 ELS Venous 44 40 - 50 mm Hg    pO2 ELS Venous 47 25 - 47 mm Hg    Bicarbonate ELS Venous 25 21 - 28 mmol/L    Base Excess/Deficit Venous -0.9 -3.0 - 3.0 mmol/L    FIO2 50     Oxyhemoglobin ELS Venous 79 %    O2 Saturation ELS Venous 80.6 (H) 70.0 - 75.0 %    Narrative    In healthy individuals, oxyhemoglobin (O2Hb) and oxygen saturation (SO2) are approximately equal. In the presence of dyshemoglobins, oxyhemoglobin can be considerably lower than oxygen saturation.   Blood gas ELS arterial   Result Value Ref Range    pH ELS Arterial 7.36 7.35 - 7.45    pCO2 ELS Arterial 43 35 - 45 mm Hg    pO2 ELS Arterial 144 (H) 80 - 105 mm Hg    Bicarbonate ELS Arterial 24 21 - 28 mmol/L    Base Excess/Deficit Arterial -1.3 -3.0 - 3.0 mmol/L    FIO2 40     Oxyhemoglobin ELS Arterial 98 75 - 100 %    O2 Saturation ELS Arterial 99.6 (H) 95.0 - 96.0 %    Narrative    In healthy individuals, oxyhemoglobin (O2Hb) and oxygen saturation (SO2) are approximately equal. In the presence of dyshemoglobins, oxyhemoglobin can be considerably lower than oxygen saturation.   Fibrinogen activity   Result Value Ref Range    Fibrinogen Activity 990 (H) 170 - 510 mg/dL   CRP inflammation   Result Value Ref Range    CRP Inflammation 109.00 (H) <5.00 mg/L   Erythrocyte sedimentation rate auto   Result Value Ref Range    Erythrocyte Sedimentation Rate >140 (H) 0 - 20 mm/hr   Hemoglobin plasma   Result Value Ref Range    Hemoglobin Plasma <30 <30 mg/dL    Narrative    .   Lactate Dehydrogenase   Result Value Ref Range    Lactate Dehydrogenase 406 (H) 0 - 250 U/L   Phosphorus   Result Value Ref Range    Phosphorus 5.7 (H) 2.5 - 4.5 mg/dL   TSH   Result Value Ref Range    TSH 0.33 0.30 - 4.20 uIU/mL   T3 Free   Result Value  Ref Range    T3 Free 1.3 (L) 2.0 - 4.4 pg/mL   T3 total   Result Value Ref Range    T3 Total 61 (L) 85 - 202 ng/dL   T4 free   Result Value Ref Range    Free T4 0.90 0.90 - 1.70 ng/dL   Blood gas arterial   Result Value Ref Range    pH Arterial 7.42 7.35 - 7.45    pCO2 Arterial 36 35 - 45 mm Hg    pO2 Arterial 134 (H) 80 - 105 mm Hg    FIO2 50     Bicarbonate Arterial 23 21 - 28 mmol/L    Base Excess/Deficit Arterial -1.2 -3.0 - 3.0 mmol/L    Rei's Test Artline     Oxyhemoglobin Arterial 98 92 - 100 %    O2 Sat, Arterial 99.8 (H) 95.0 - 96.0 %    Peep 8 cm H2O    Narrative    In healthy individuals, oxyhemoglobin (O2Hb) and oxygen saturation (SO2) are approximately equal. In the presence of dyshemoglobins, oxyhemoglobin can be considerably lower than oxygen saturation.   Potassium (Limited Occurrences)   Result Value Ref Range    Potassium 4.3 3.4 - 5.3 mmol/L   Glucose by meter   Result Value Ref Range    GLUCOSE BY METER POCT 165 (H) 70 - 99 mg/dL   Activated clotting time celite, POCT   Result Value Ref Range    Activated Clotting Time (Celite) POCT 194 (H) 74 - 150 seconds   Blood gas arterial   Result Value Ref Range    pH Arterial 7.45 7.35 - 7.45    pCO2 Arterial 34 (L) 35 - 45 mm Hg    pO2 Arterial 144 (H) 80 - 105 mm Hg    FIO2 50     Bicarbonate Arterial 24 21 - 28 mmol/L    Base Excess/Deficit Arterial 0.1 -3.0 - 3.0 mmol/L    Rei's Test Artline     Oxyhemoglobin Arterial 98 92 - 100 %    O2 Sat, Arterial >100.0 (H) 95.0 - 96.0 %    Peep 8 cm H2O    Narrative    In healthy individuals, oxyhemoglobin (O2Hb) and oxygen saturation (SO2) are approximately equal. In the presence of dyshemoglobins, oxyhemoglobin can be considerably lower than oxygen saturation.   Glucose by meter   Result Value Ref Range    GLUCOSE BY METER POCT 157 (H) 70 - 99 mg/dL   Glucose by meter   Result Value Ref Range    GLUCOSE BY METER POCT 150 (H) 70 - 99 mg/dL       Clinically Significant Risk Factors         # Hypernatremia:  "Highest Na = 153 mmol/L in last 2 days, will monitor as appropriate  # Hyperchloremia: Highest Cl = 112 mmol/L in last 2 days, will monitor as appropriate         # Anion Gap Metabolic Acidosis: Highest Anion Gap = 22 mmol/L in last 2 days, will monitor and treat as appropriate  # Hypoalbuminemia: Lowest albumin = 2.6 g/dL at 7/26/2025  3:40 AM, will monitor as appropriate    # Coagulation Defect: INR = 1.36 (Ref range: 0.85 - 1.15) and/or PTT = 88 Seconds (Ref range: 22 - 38 Seconds), will monitor for bleeding   # Acute Kidney Injury, unspecified: based on a >150% or 0.3 mg/dL increase in last creatinine compared to past 90 day average, will monitor renal function      # Acute Hypoxic Respiratory Failure: Based on blood gas results. Continue supplemental oxygen as needed  # Acute Hypercapnic Respiratory Failure: based on arterial blood gas results.  Continue supplemental oxygen and ventilatory support as indicated.  # Acute Hypercapnic Respiratory Failure: based on venous blood gas results.  Continue supplemental oxygen and ventilatory support as indicated.         # Obesity: Estimated body mass index is 33.47 kg/m  as calculated from the following:    Height as of this encounter: 1.778 m (5' 10\").    Weight as of this encounter: 105.8 kg (233 lb 4 oz).        # Financial/Environmental Concerns: none              "

## 2025-07-26 NOTE — PROGRESS NOTES
[facility]    ECLS Shift Summary:     ECMO Equipment:  Console Serial Number: [id number]  Circuit Lot Number: [id number]  Oxygenator Lot Number: [id number]    Circuit Assessment: Fibrin  Fibrin Location: connectors    Arterial ECMO Cannula: 17 Fr in the Right Femoral Artery  Venous ECMO Cannula: 25 Fr in the Right Femoral Vein  Distal Perfusion Catheter: 8 Fr in the Right Superficial Femoral Artery    ECMO Cannula Arterial Right femoral artery-Site Assessment: Sutured, Secure, Oozing  ECMO Cannula Venous Right femoral vein-Site Assessment: Secure, Oozing  Distal Perfusion Catheter Right superficial femoral artery-Site Assessment: WDL  ECMO Cannula Arterial Right femoral artery-Site Intervention: No intervention needed  ECMO Cannula Venous Right femoral vein-Site Intervention: No intervention needed  Distal Perfusion Catheter Right superficial femoral artery-Site Intervention: No intervention needed    Patient remains on V-A ECMO, all equipment is functioning and alarms are appropriately set. RPM's: 2886 with Blood Flow (Circuit) LPM  Av.9 LPM  Min: 2.84 LPM  Max: 2.87 LPM L/min. Sweep is at 0.5 LPM and 40 %. Extremities are warm and well perfused.     Significant Shift Events:None    Vent settings:  FiO2 (%): 40 %, Resp: 28, Vent Mode: VC/AC, Resp Rate (Set): 12 breaths/min, Tidal Volume (Set, mL): 400 mL, PEEP (cm H2O): 8 cmH2O, Resp Rate (Set): 12 breaths/min, Tidal Volume (Set, mL): 400 mL, PEEP (cm H2O): 8 cmH2O    Anticoagulation:  Dose (units/hr) HEParin: 1450 Units/hr  Rate (mL/hr) HEParin: 14.5 mL/hr  Concentration HEParin: 100 Units/mL        Most recent ACT  (seconds): 178 seconds    Urine output is charted per RN, Cloudy, Yellow / Straw Colored, Sediment.  Blood loss was minimal. Product given included none.     Intake/Output Summary (Last 24 hours) at 2025 1702  Last data filed at 2025 1700  Gross per 24 hour   Intake 2332.61 ml   Output 1386 ml    Net 946.61 ml       Labs:  Recent Labs   Lab 07/26/25  1548 07/26/25  1400 07/26/25  1206 07/26/25  1000   PH 7.44 7.43 7.44 7.43   PCO2 37 38 36 36   PO2 84 78* 76* 100   HCO3 25 25 24 24   O2PER 40  40  40 40 40 40       Lab Results   Component Value Date    HGB 8.2 (L) 07/26/2025    PHGB <30 07/26/2025     07/26/2025    FIBR 947 (H) 07/26/2025    INR 1.37 (H) 07/26/2025    PTT 65 (H) 07/26/2025    DD 3.91 (H) 07/26/2025    AXA 0.21 05/24/2013    ANTCH 93 07/26/2025       Plan:  Continue VA ECMO support. Pts family has made decision to transition to comfort care in the next 1-2 days.    Cecilia Garcia, RT  ECMO Specialist  7/26/2025 5:02 PM

## 2025-07-26 NOTE — PLAN OF CARE
Goal Outcome Evaluation:      Plan of Care Reviewed With: patient, spouse      Shift events    No acute changes.  Pupils 2 mm, NPIs 3s-4s. RR 12-30.  Versed gtt unchanged.  No pressors, no antihypertensives.      Plan:  Dr. Romero spoke with wife to clarify code status.  Patient is now DNR/DNI.  Wife and family will possibly arrive tomorrow or Monday to proceed with withdrawal of cares.

## 2025-07-26 NOTE — PLAN OF CARE
Patient unresponsive. Intermittently tachypneic. No tremors seen at this time.     Mechanical support by invasive ventilation, VA ECMO    VCAC 50%, p8, r12, t400  VC ECMO: 40%,  sweep 0.5  2.8 LPM, 2885 RPM.    Norepinephrine 0.03 mcg/kg/min, Heparin 1750 units/hr (circuit).    [employer] at bedside state patient is not a candidate for organ donation, but considering tissue/eyes after circulatory death.    Shift Events:  Restarted tube feeds with 30q4 free water flush.    Replaced KCL 20 mEQ x1. No blood/fluid/volume given.    Bite block removed d/t lip injury.    Decreased Heparin drip rate d/t continued bleeding at left groin sheath site. Dressing changed multiple times with quickclot.    VCAC 40%, p8, r12, t400  VC ECMO: 40%,  sweep 0.5  2.8 LPM, 2885 RPM.    Norepinephrine OFF, Insulin 3 units/hr,   Heparin 1450 units/hr (circuit).    Plan:    Planned withdrawal of care pending family timing.    [facility] note 7/22/25:  R lower lip wound: Rotate/reposition and secure ET tube to keep pressure off of injured area. Apply Vaseline to lips each shift.

## 2025-07-27 VITALS
HEIGHT: 70 IN | SYSTOLIC BLOOD PRESSURE: 108 MMHG | TEMPERATURE: 97.9 F | DIASTOLIC BLOOD PRESSURE: 68 MMHG | RESPIRATION RATE: 14 BRPM | BODY MASS INDEX: 35.32 KG/M2 | OXYGEN SATURATION: 99 % | WEIGHT: 246.69 LBS

## 2025-07-27 LAB
ABO + RH BLD: NORMAL
ACT BLD: 158 SECONDS (ref 74–150)
ACT BLD: 162 SECONDS (ref 74–150)
ACT BLD: 162 SECONDS (ref 74–150)
ACT BLD: 166 SECONDS (ref 74–150)
ACT BLD: 174 SECONDS (ref 74–150)
ACT BLD: 198 SECONDS (ref 74–150)
ALBUMIN SERPL BCG-MCNC: 2.4 G/DL (ref 3.5–5.2)
ALBUMIN SERPL BCG-MCNC: 2.6 G/DL (ref 3.5–5.2)
ALLEN'S TEST: ABNORMAL
ALP SERPL-CCNC: 72 U/L (ref 40–150)
ALP SERPL-CCNC: 74 U/L (ref 40–150)
ALT SERPL W P-5'-P-CCNC: 24 U/L (ref 0–70)
ALT SERPL W P-5'-P-CCNC: 27 U/L (ref 0–70)
ANION GAP SERPL CALCULATED.3IONS-SCNC: 16 MMOL/L (ref 7–15)
ANION GAP SERPL CALCULATED.3IONS-SCNC: 17 MMOL/L (ref 7–15)
APTT PPP: 45 SECONDS (ref 22–38)
APTT PPP: 49 SECONDS (ref 22–38)
AST SERPL W P-5'-P-CCNC: 29 U/L (ref 0–45)
AST SERPL W P-5'-P-CCNC: 36 U/L (ref 0–45)
AT III ACT/NOR PPP CHRO: 91 % (ref 85–135)
BASE EXCESS BLDA CALC-SCNC: 0.2 MMOL/L (ref -3–3)
BASE EXCESS BLDA CALC-SCNC: 0.4 MMOL/L (ref -3–3)
BASE EXCESS BLDA CALC-SCNC: 0.7 MMOL/L (ref -3–3)
BASE EXCESS BLDA CALC-SCNC: 0.8 MMOL/L (ref -3–3)
BASE EXCESS BLDA CALC-SCNC: 0.9 MMOL/L (ref -3–3)
BASE EXCESS BLDA CALC-SCNC: 1.2 MMOL/L (ref -3–3)
BASE EXCESS BLDV CALC-SCNC: 0.3 MMOL/L (ref -3–3)
BILIRUB SERPL-MCNC: 0.3 MG/DL
BILIRUB SERPL-MCNC: 0.3 MG/DL
BLD GP AB SCN SERPL QL: NEGATIVE
BLD PROD TYP BPU: NORMAL
BLOOD COMPONENT TYPE: NORMAL
BUN SERPL-MCNC: 150 MG/DL (ref 8–23)
BUN SERPL-MCNC: 155 MG/DL (ref 8–23)
CA-I BLD-MCNC: 4.8 MG/DL (ref 4.4–5.2)
CA-I BLD-MCNC: 4.8 MG/DL (ref 4.4–5.2)
CALCIUM SERPL-MCNC: 9 MG/DL (ref 8.8–10.4)
CALCIUM SERPL-MCNC: 9 MG/DL (ref 8.8–10.4)
CHLORIDE SERPL-SCNC: 117 MMOL/L (ref 98–107)
CHLORIDE SERPL-SCNC: 118 MMOL/L (ref 98–107)
CODING SYSTEM: NORMAL
CREAT SERPL-MCNC: 2.78 MG/DL (ref 0.67–1.17)
CREAT SERPL-MCNC: 2.87 MG/DL (ref 0.67–1.17)
CROSSMATCH: NORMAL
CRP SERPL-MCNC: 95.6 MG/L
D DIMER PPP FEU-MCNC: 18.47 UG/ML FEU (ref 0–0.5)
EGFRCR SERPLBLD CKD-EPI 2021: 23 ML/MIN/1.73M2
EGFRCR SERPLBLD CKD-EPI 2021: 24 ML/MIN/1.73M2
ERYTHROCYTE [DISTWIDTH] IN BLOOD BY AUTOMATED COUNT: 15 % (ref 10–15)
ERYTHROCYTE [DISTWIDTH] IN BLOOD BY AUTOMATED COUNT: 15.6 % (ref 10–15)
ERYTHROCYTE [SEDIMENTATION RATE] IN BLOOD BY WESTERGREN METHOD: >140 MM/HR (ref 0–20)
FIBRINOGEN PPP-MCNC: 864 MG/DL (ref 170–510)
GLUCOSE BLD-MCNC: 163 MG/DL (ref 70–99)
GLUCOSE BLD-MCNC: 215 MG/DL (ref 70–99)
GLUCOSE BLDC GLUCOMTR-MCNC: 111 MG/DL (ref 70–99)
GLUCOSE BLDC GLUCOMTR-MCNC: 127 MG/DL (ref 70–99)
GLUCOSE BLDC GLUCOMTR-MCNC: 151 MG/DL (ref 70–99)
GLUCOSE BLDC GLUCOMTR-MCNC: 154 MG/DL (ref 70–99)
GLUCOSE BLDC GLUCOMTR-MCNC: 158 MG/DL (ref 70–99)
GLUCOSE BLDC GLUCOMTR-MCNC: 166 MG/DL (ref 70–99)
GLUCOSE BLDC GLUCOMTR-MCNC: 188 MG/DL (ref 70–99)
GLUCOSE BLDC GLUCOMTR-MCNC: 190 MG/DL (ref 70–99)
GLUCOSE BLDC GLUCOMTR-MCNC: 193 MG/DL (ref 70–99)
GLUCOSE BLDC GLUCOMTR-MCNC: 218 MG/DL (ref 70–99)
GLUCOSE BLDC GLUCOMTR-MCNC: 227 MG/DL (ref 70–99)
GLUCOSE SERPL-MCNC: 168 MG/DL (ref 70–99)
GLUCOSE SERPL-MCNC: 219 MG/DL (ref 70–99)
HCO3 BLD-SCNC: 25 MMOL/L (ref 21–28)
HCO3 BLDA-SCNC: 27 MMOL/L (ref 21–28)
HCO3 BLDV-SCNC: 27 MMOL/L (ref 21–28)
HCO3 SERPL-SCNC: 22 MMOL/L (ref 22–29)
HCO3 SERPL-SCNC: 22 MMOL/L (ref 22–29)
HCT VFR BLD AUTO: 26.8 % (ref 40–53)
HCT VFR BLD AUTO: 27.7 % (ref 40–53)
HGB BLD-MCNC: 8.1 G/DL (ref 13.3–17.7)
HGB BLD-MCNC: 8.5 G/DL (ref 13.3–17.7)
HGB FREE PLAS-MCNC: 30 MG/DL
INR PPP: 1.28 (ref 0.85–1.15)
INR PPP: 1.33 (ref 0.85–1.15)
ISSUE DATE AND TIME: NORMAL
LACTATE SERPL-SCNC: 1.3 MMOL/L (ref 0.7–2)
LACTATE SERPL-SCNC: 1.4 MMOL/L (ref 0.7–2)
LDH SERPL L TO P-CCNC: 380 U/L (ref 0–250)
MAGNESIUM SERPL-MCNC: 3 MG/DL (ref 1.7–2.3)
MAGNESIUM SERPL-MCNC: 3.1 MG/DL (ref 1.7–2.3)
MCH RBC QN AUTO: 27.6 PG (ref 26.5–33)
MCH RBC QN AUTO: 27.7 PG (ref 26.5–33)
MCHC RBC AUTO-ENTMCNC: 30.2 G/DL (ref 31.5–36.5)
MCHC RBC AUTO-ENTMCNC: 30.7 G/DL (ref 31.5–36.5)
MCV RBC AUTO: 90 FL (ref 78–100)
MCV RBC AUTO: 92 FL (ref 78–100)
O2/TOTAL GAS SETTING VFR VENT: 40 %
O2/TOTAL GAS SETTING VFR VENT: 50 %
OXYHGB MFR BLDA: 96 % (ref 75–100)
OXYHGB MFR BLDA: 96 % (ref 92–100)
OXYHGB MFR BLDA: 97 % (ref 92–100)
OXYHGB MFR BLDV: 78 %
PCO2 BLD: 35 MM HG (ref 35–45)
PCO2 BLD: 35 MM HG (ref 35–45)
PCO2 BLD: 37 MM HG (ref 35–45)
PCO2 BLD: 37 MM HG (ref 35–45)
PCO2 BLD: 39 MM HG (ref 35–45)
PCO2 BLDA: 50 MM HG (ref 35–45)
PCO2 BLDV: 50 MM HG (ref 40–50)
PEEP: 8 CM H2O
PH BLD: 7.42 [PH] (ref 7.35–7.45)
PH BLD: 7.43 [PH] (ref 7.35–7.45)
PH BLD: 7.44 [PH] (ref 7.35–7.45)
PH BLD: 7.45 [PH] (ref 7.35–7.45)
PH BLD: 7.46 [PH] (ref 7.35–7.45)
PH BLDA: 7.33 [PH] (ref 7.35–7.45)
PH BLDV: 7.33 [PH] (ref 7.32–7.43)
PHOSPHATE SERPL-MCNC: 5.3 MG/DL (ref 2.5–4.5)
PLATELET # BLD AUTO: 163 10E3/UL (ref 150–450)
PLATELET # BLD AUTO: 184 10E3/UL (ref 150–450)
PO2 BLD: 107 MM HG (ref 80–105)
PO2 BLD: 112 MM HG (ref 80–105)
PO2 BLD: 124 MM HG (ref 80–105)
PO2 BLD: 85 MM HG (ref 80–105)
PO2 BLD: 93 MM HG (ref 80–105)
PO2 BLD: 95 MM HG (ref 80–105)
PO2 BLD: 95 MM HG (ref 80–105)
PO2 BLDA: 103 MM HG (ref 80–105)
PO2 BLDV: 48 MM HG (ref 25–47)
POTASSIUM SERPL-SCNC: 3.9 MMOL/L (ref 3.4–5.3)
POTASSIUM SERPL-SCNC: 4.3 MMOL/L (ref 3.4–5.3)
PROT SERPL-MCNC: 5.7 G/DL (ref 6.4–8.3)
PROT SERPL-MCNC: 6 G/DL (ref 6.4–8.3)
PROTHROMBIN TIME: 16.3 SECONDS (ref 11.8–14.8)
PROTHROMBIN TIME: 16.8 SECONDS (ref 11.8–14.8)
RBC # BLD AUTO: 2.92 10E6/UL (ref 4.4–5.9)
RBC # BLD AUTO: 3.08 10E6/UL (ref 4.4–5.9)
SAO2 % BLDA: 98 % (ref 95–96)
SAO2 % BLDA: 98.1 % (ref 95–96)
SAO2 % BLDA: 98.7 % (ref 95–96)
SAO2 % BLDA: 98.7 % (ref 95–96)
SAO2 % BLDA: 98.9 % (ref 95–96)
SAO2 % BLDA: 99 % (ref 95–96)
SAO2 % BLDA: 99.1 % (ref 95–96)
SAO2 % BLDA: 99.5 % (ref 95–96)
SAO2 % BLDV: 79.4 % (ref 70–75)
SODIUM SERPL-SCNC: 156 MMOL/L (ref 135–145)
SODIUM SERPL-SCNC: 156 MMOL/L (ref 135–145)
SPECIMEN EXP DATE BLD: NORMAL
T3 SERPL-MCNC: 42 NG/DL (ref 85–202)
T3FREE SERPL-MCNC: 1 PG/ML (ref 2–4.4)
T4 FREE SERPL-MCNC: 0.77 NG/DL (ref 0.9–1.7)
TROPONIN T SERPL HS-MCNC: 860 NG/L
TROPONIN T SERPL HS-MCNC: 978 NG/L
TSH SERPL DL<=0.005 MIU/L-ACNC: 0.5 UIU/ML (ref 0.3–4.2)
UFH PPP CHRO-ACNC: 0.18 IU/ML (ref ?–1.1)
UFH PPP CHRO-ACNC: 0.26 IU/ML (ref ?–1.1)
UNIT ABO/RH: NORMAL
UNIT NUMBER: NORMAL
UNIT STATUS: NORMAL
UNIT TYPE ISBT: 6200
WBC # BLD AUTO: 10.6 10E3/UL (ref 4–11)
WBC # BLD AUTO: 11.2 10E3/UL (ref 4–11)

## 2025-07-27 PROCEDURE — 85347 COAGULATION TIME ACTIVATED: CPT

## 2025-07-27 PROCEDURE — 82947 ASSAY GLUCOSE BLOOD QUANT: CPT | Performed by: STUDENT IN AN ORGANIZED HEALTH CARE EDUCATION/TRAINING PROGRAM

## 2025-07-27 PROCEDURE — 83051 HEMOGLOBIN PLASMA: CPT | Performed by: STUDENT IN AN ORGANIZED HEALTH CARE EDUCATION/TRAINING PROGRAM

## 2025-07-27 PROCEDURE — P9016 RBC LEUKOCYTES REDUCED: HCPCS | Performed by: STUDENT IN AN ORGANIZED HEALTH CARE EDUCATION/TRAINING PROGRAM

## 2025-07-27 PROCEDURE — 85520 HEPARIN ASSAY: CPT | Performed by: STUDENT IN AN ORGANIZED HEALTH CARE EDUCATION/TRAINING PROGRAM

## 2025-07-27 PROCEDURE — 250N000013 HC RX MED GY IP 250 OP 250 PS 637: Performed by: STUDENT IN AN ORGANIZED HEALTH CARE EDUCATION/TRAINING PROGRAM

## 2025-07-27 PROCEDURE — 84155 ASSAY OF PROTEIN SERUM: CPT | Performed by: STUDENT IN AN ORGANIZED HEALTH CARE EDUCATION/TRAINING PROGRAM

## 2025-07-27 PROCEDURE — 85610 PROTHROMBIN TIME: CPT | Performed by: STUDENT IN AN ORGANIZED HEALTH CARE EDUCATION/TRAINING PROGRAM

## 2025-07-27 PROCEDURE — 33949 ECMO/ECLS DAILY MGMT ARTERY: CPT

## 2025-07-27 PROCEDURE — 85730 THROMBOPLASTIN TIME PARTIAL: CPT | Performed by: STUDENT IN AN ORGANIZED HEALTH CARE EDUCATION/TRAINING PROGRAM

## 2025-07-27 PROCEDURE — 250N000009 HC RX 250: Performed by: STUDENT IN AN ORGANIZED HEALTH CARE EDUCATION/TRAINING PROGRAM

## 2025-07-27 PROCEDURE — 85384 FIBRINOGEN ACTIVITY: CPT | Performed by: STUDENT IN AN ORGANIZED HEALTH CARE EDUCATION/TRAINING PROGRAM

## 2025-07-27 PROCEDURE — 82805 BLOOD GASES W/O2 SATURATION: CPT | Performed by: HOSPITALIST

## 2025-07-27 PROCEDURE — 85027 COMPLETE CBC AUTOMATED: CPT | Performed by: STUDENT IN AN ORGANIZED HEALTH CARE EDUCATION/TRAINING PROGRAM

## 2025-07-27 PROCEDURE — 82330 ASSAY OF CALCIUM: CPT | Performed by: STUDENT IN AN ORGANIZED HEALTH CARE EDUCATION/TRAINING PROGRAM

## 2025-07-27 PROCEDURE — 250N000013 HC RX MED GY IP 250 OP 250 PS 637: Performed by: INTERNAL MEDICINE

## 2025-07-27 PROCEDURE — 250N000011 HC RX IP 250 OP 636: Performed by: STUDENT IN AN ORGANIZED HEALTH CARE EDUCATION/TRAINING PROGRAM

## 2025-07-27 PROCEDURE — 33949 ECMO/ECLS DAILY MGMT ARTERY: CPT | Performed by: HOSPITALIST

## 2025-07-27 PROCEDURE — 84100 ASSAY OF PHOSPHORUS: CPT | Performed by: STUDENT IN AN ORGANIZED HEALTH CARE EDUCATION/TRAINING PROGRAM

## 2025-07-27 PROCEDURE — 83605 ASSAY OF LACTIC ACID: CPT | Performed by: STUDENT IN AN ORGANIZED HEALTH CARE EDUCATION/TRAINING PROGRAM

## 2025-07-27 PROCEDURE — 82310 ASSAY OF CALCIUM: CPT | Performed by: STUDENT IN AN ORGANIZED HEALTH CARE EDUCATION/TRAINING PROGRAM

## 2025-07-27 PROCEDURE — 85300 ANTITHROMBIN III ACTIVITY: CPT | Performed by: STUDENT IN AN ORGANIZED HEALTH CARE EDUCATION/TRAINING PROGRAM

## 2025-07-27 PROCEDURE — 85014 HEMATOCRIT: CPT | Performed by: STUDENT IN AN ORGANIZED HEALTH CARE EDUCATION/TRAINING PROGRAM

## 2025-07-27 PROCEDURE — 85652 RBC SED RATE AUTOMATED: CPT | Performed by: STUDENT IN AN ORGANIZED HEALTH CARE EDUCATION/TRAINING PROGRAM

## 2025-07-27 PROCEDURE — 83735 ASSAY OF MAGNESIUM: CPT | Performed by: STUDENT IN AN ORGANIZED HEALTH CARE EDUCATION/TRAINING PROGRAM

## 2025-07-27 PROCEDURE — 83615 LACTATE (LD) (LDH) ENZYME: CPT | Performed by: STUDENT IN AN ORGANIZED HEALTH CARE EDUCATION/TRAINING PROGRAM

## 2025-07-27 PROCEDURE — 84439 ASSAY OF FREE THYROXINE: CPT | Performed by: STUDENT IN AN ORGANIZED HEALTH CARE EDUCATION/TRAINING PROGRAM

## 2025-07-27 PROCEDURE — 86140 C-REACTIVE PROTEIN: CPT | Performed by: STUDENT IN AN ORGANIZED HEALTH CARE EDUCATION/TRAINING PROGRAM

## 2025-07-27 PROCEDURE — 85379 FIBRIN DEGRADATION QUANT: CPT | Performed by: STUDENT IN AN ORGANIZED HEALTH CARE EDUCATION/TRAINING PROGRAM

## 2025-07-27 PROCEDURE — 84481 FREE ASSAY (FT-3): CPT | Performed by: STUDENT IN AN ORGANIZED HEALTH CARE EDUCATION/TRAINING PROGRAM

## 2025-07-27 PROCEDURE — 250N000011 HC RX IP 250 OP 636: Performed by: INTERNAL MEDICINE

## 2025-07-27 PROCEDURE — 82805 BLOOD GASES W/O2 SATURATION: CPT | Performed by: STUDENT IN AN ORGANIZED HEALTH CARE EDUCATION/TRAINING PROGRAM

## 2025-07-27 PROCEDURE — 999N000259 HC STATISTIC EXTUBATION

## 2025-07-27 PROCEDURE — 84443 ASSAY THYROID STIM HORMONE: CPT | Performed by: STUDENT IN AN ORGANIZED HEALTH CARE EDUCATION/TRAINING PROGRAM

## 2025-07-27 PROCEDURE — 84480 ASSAY TRIIODOTHYRONINE (T3): CPT | Performed by: STUDENT IN AN ORGANIZED HEALTH CARE EDUCATION/TRAINING PROGRAM

## 2025-07-27 PROCEDURE — 82805 BLOOD GASES W/O2 SATURATION: CPT | Performed by: INTERNAL MEDICINE

## 2025-07-27 PROCEDURE — 99233 SBSQ HOSP IP/OBS HIGH 50: CPT | Mod: 25 | Performed by: HOSPITALIST

## 2025-07-27 PROCEDURE — 999N000157 HC STATISTIC RCP TIME EA 10 MIN

## 2025-07-27 PROCEDURE — 94003 VENT MGMT INPAT SUBQ DAY: CPT

## 2025-07-27 PROCEDURE — 84484 ASSAY OF TROPONIN QUANT: CPT | Performed by: STUDENT IN AN ORGANIZED HEALTH CARE EDUCATION/TRAINING PROGRAM

## 2025-07-27 RX ORDER — MIDAZOLAM HCL IN 0.9 % NACL/PF 1 MG/ML
1-10 PLASTIC BAG, INJECTION (ML) INTRAVENOUS CONTINUOUS
Status: DISCONTINUED | OUTPATIENT
Start: 2025-07-27 | End: 2025-07-27 | Stop reason: HOSPADM

## 2025-07-27 RX ORDER — HEPARIN SODIUM 10000 [USP'U]/100ML
10-50 INJECTION, SOLUTION INTRAVENOUS CONTINUOUS
Status: DISCONTINUED | OUTPATIENT
Start: 2025-07-27 | End: 2025-07-27 | Stop reason: HOSPADM

## 2025-07-27 RX ORDER — HEPARIN SODIUM 10000 [USP'U]/100ML
10-50 INJECTION, SOLUTION INTRAVENOUS CONTINUOUS
OUTPATIENT
Start: 2025-07-27

## 2025-07-27 RX ORDER — GLYCOPYRROLATE 0.2 MG/ML
0.2 INJECTION, SOLUTION INTRAMUSCULAR; INTRAVENOUS ONCE
Status: COMPLETED | OUTPATIENT
Start: 2025-07-27 | End: 2025-07-27

## 2025-07-27 RX ORDER — GLYCOPYRROLATE 0.2 MG/ML
0.1 INJECTION, SOLUTION INTRAMUSCULAR; INTRAVENOUS EVERY 4 HOURS PRN
Status: DISCONTINUED | OUTPATIENT
Start: 2025-07-27 | End: 2025-07-27 | Stop reason: HOSPADM

## 2025-07-27 RX ORDER — LIDOCAINE 40 MG/G
CREAM TOPICAL
Status: DISCONTINUED | OUTPATIENT
Start: 2025-07-27 | End: 2025-07-27 | Stop reason: HOSPADM

## 2025-07-27 RX ADMIN — LEVETIRACETAM 1000 MG: 10 INJECTION INTRAVENOUS at 07:46

## 2025-07-27 RX ADMIN — Medication 40 MG: at 07:46

## 2025-07-27 RX ADMIN — CHLORHEXIDINE GLUCONATE 15 ML: 1.2 SOLUTION ORAL at 07:46

## 2025-07-27 RX ADMIN — Medication 15 ML: at 07:46

## 2025-07-27 RX ADMIN — Medication 50 MCG/HR: at 12:39

## 2025-07-27 RX ADMIN — Medication 60 ML: at 07:46

## 2025-07-27 RX ADMIN — MIDAZOLAM IN SODIUM CHLORIDE 10 MG/HR: 1 INJECTION INTRAVENOUS at 08:26

## 2025-07-27 RX ADMIN — AMIODARONE HYDROCHLORIDE 400 MG: 200 TABLET ORAL at 07:46

## 2025-07-27 RX ADMIN — Medication 50 MCG: at 13:24

## 2025-07-27 RX ADMIN — HEPARIN SODIUM 1000 UNITS/HR: 10000 INJECTION, SOLUTION INTRAVENOUS at 05:06

## 2025-07-27 RX ADMIN — POLYETHYLENE GLYCOL 3350 17 G: 17 POWDER, FOR SOLUTION ORAL at 07:46

## 2025-07-27 RX ADMIN — INSULIN HUMAN 12 UNITS/HR: 1 INJECTION, SOLUTION INTRAVENOUS at 07:20

## 2025-07-27 RX ADMIN — TICAGRELOR 90 MG: 90 TABLET ORAL at 07:46

## 2025-07-27 RX ADMIN — ASPIRIN 81 MG CHEWABLE TABLET 81 MG: 81 TABLET CHEWABLE at 07:46

## 2025-07-27 RX ADMIN — HEPARIN SODIUM 1000 UNITS/HR: 10000 INJECTION, SOLUTION INTRAVENOUS at 01:30

## 2025-07-27 RX ADMIN — Medication 50 MCG: at 13:32

## 2025-07-27 RX ADMIN — GLYCOPYRROLATE 0.2 MG: 0.2 INJECTION INTRAMUSCULAR; INTRAVENOUS at 13:03

## 2025-07-27 ASSESSMENT — ACTIVITIES OF DAILY LIVING (ADL)
ADLS_ACUITY_SCORE: 56
ADLS_ACUITY_SCORE: 53
ADLS_ACUITY_SCORE: 56
ADLS_ACUITY_SCORE: 57
ADLS_ACUITY_SCORE: 53
ADLS_ACUITY_SCORE: 56
ADLS_ACUITY_SCORE: 56
ADLS_ACUITY_SCORE: 57
ADLS_ACUITY_SCORE: 56
ADLS_ACUITY_SCORE: 53
ADLS_ACUITY_SCORE: 53
ADLS_ACUITY_SCORE: 56
ADLS_ACUITY_SCORE: 56
ADLS_ACUITY_SCORE: 57
ADLS_ACUITY_SCORE: 53

## 2025-07-27 NOTE — PROGRESS NOTES
[facility]    ECLS Discontinuation Note:     ECLS was discontinued 7/27/2025 at 1324 transition to comfort care.      Cecilia Garcia RT  ECMO Specialist  7/27/2025 1:43 PM

## 2025-07-27 NOTE — PROGRESS NOTES
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     Cannulation Site:  17 Fr in the R femoral artery  25 Fr in the R femoral vein    Interval events: No acute event overnight.     Pulsatilty (IABP paused if applicable):50 mmHG     Physical Exam:  Temp:  [97.2  F (36.2  C)-98.1  F (36.7  C)] 97.9  F (36.6  C)  Pulse:  [] 100  Resp:  [11-29] 14  MAP:  [66 mmHg-99 mmHg] 83 mmHg  Arterial Line BP: (109-147)/(53-83) 127/69  FiO2 (%):  [40 %-50 %] 50 %  SpO2:  [90 %-100 %] 99 %    Intake/Output Summary (Last 24 hours) at 2025 1257  Last data filed at 2025 1200  Gross per 24 hour   Intake 3051.82 ml   Output 1632 ml   Net 1419.82 ml    FiO2 (%): 50 %, Resp: 14, Vent Mode: VC/AC, Resp Rate (Set): 12 breaths/min, Tidal Volume (Set, mL): 400 mL, PEEP (cm H2O): 8 cmH2O, Resp Rate (Set): 12 breaths/min, Tidal Volume (Set, mL): 400 mL, PEEP (cm H2O): 8 cmH2O     Labs:  Recent Labs   Lab 25  1154 25  0953 25  0756 25  0559   PH 7.42 7.44 7.42 7.42   PCO2 39 37 39 39   PO2 107* 95 93 112*   HCO3  25   O2PER 50 50 50 50      Recent Labs   Lab 25  0953 25  0350 25  2153 25  1548   WBC 10.6 11.2* 11.6* 11.7*   HGB 8.5* 8.1* 8.1* 8.2*     Creatinine   Date Value Ref Range Status   2025 2.78 (H) 0.67 - 1.17 mg/dL Final   2025 2.87 (H) 0.67 - 1.17 mg/dL Final   2025 2.86 (H) 0.67 - 1.17 mg/dL Final   2025 2.98 (H) 0.67 - 1.17 mg/dL Final   2013 0.90 0.66 - 1.25 mg/dL Final   2013 1.00 0.66 - 1.25 mg/dL Final   2013 1.06 0.66 - 1.25 mg/dL Final   2013 1.13 0.66 - 1.25 mg/dL Final       Blood Flow (Circuit) LPM: 3 LPM  Sweep LPM: 1 LPM  Sweep FiO2   %: 40 %  ACT  (seconds): 166 seconds  Pulse Oximetry  (SpO2%): 96 %  Arterial Pressure  mmH mmHg      ECMO Issues including  assessments and plan on DOS 7/27/2025:  Neuro: Sedated for status myoclonus.  No acute distress.  NIRS stable b/l  RASS goal: -3  CV: Cardiogenic shock.  Hemodynamically stable off pressors.   Pulm: Keep vent settings at rest settings as above.  FEN/Renal: Electrolytes stable w/ replacement protocols in place, Cr stable, UOP stable  Heme: ACT goal: 180-200, Hemoglobin stable.  Minimal oozing around the ECMO cannulas.  ID: Receiving empiric antibiotics  Cannulae: Position is acceptable on exam and the available imaging.  Distal perfusion cannula is in place and patent.  Extremities are well-perfused.   I have personally reviewed the ECMO flows, oxygenation and CO2 clearance, anticoagulation, and cannula position.  I have also personally assessed the patient's systemic response with hemodynamics, oxygenation, ventilation, and bleeding.     I have personally reviewed the ECMO flows, oxygenation and CO2 clearance, anticoagulation, and cannula position.  I have also personally assessed the patient's systemic response with hemodynamics, oxygenation, ventilation, and bleeding.       The patient requires continued ECMO support and management in the ICU.  I have discussed patient care and treatment plan with the primary team.      Ilhwan Yeo, MD  Critical Care Cardiology    July 27, 2025

## 2025-07-27 NOTE — PLAN OF CARE
Patient unresponsive. Intermittently tachypneic. No tremors seen at this time.      Mechanical support by invasive ventilation, VA ECMO     VCAC 40%, p8, r12, t400  VA-ECMO: 40%,  sweep 0.5  2.8 LPM, 2885 RPM.     Insulin 3.5 units/hr, Midazolam 10 mg/hr, Heparin 1450 units/hr (circuit).     Patient now DNR/DNI.     Shift Events:  Continued episodes of desaturation, possibly myoclonic activity.    Free water flush increased to 50 mL q4 hours in response to worsening hypernatremia.     Due to continued bleeding from left groin arterial sheath, orders taken for new ACT goal 160-180. ECMO flows increased to 3.6 LPM to compensate.    Transfused PRBC x1.     VCAC 40%, p8, r12, t400  VA-ECMO: 40%,  sweep 1  3.6 LPM, 3300 RPM.     Insulin 6 units/hr, Midazolam 10 mg/hr, Heparin 1000 units/hr (circuit).    Plan:  Planned withdrawal of care pending family timing.     [facility] note 7/22/25:  R lower lip wound: Rotate/reposition and secure ET tube to keep pressure off of injured area. Apply Vaseline to lips each shift.

## 2025-07-27 NOTE — PROGRESS NOTES
[facility], Procedure Note          Extubation:       Bob Echols  MRN# [id number]   July 27, 2025, 2:34 PM         Patient extubated at: July 27, 2025, 1:30 PM   Supplemental Oxygen: Via Room Air   Cough: The cough is N/A   Secretion Mode: N/A   Secretion Amount: N/A    Respiratory Exam:: Breath sounds: absent     Location: all lobes   Skin Exam:: Patient color: pale and dusky   Patient Status: Currently sedated and does not respond   Arterial Blood Gasses: pH Arterial (no units)   Date Value   07/27/2025 7.42     pO2 Arterial (mm Hg)   Date Value   07/27/2025 107 (H)     pCO2 Arterial (mm Hg)   Date Value   07/27/2025 39     Bicarbonate Arterial (mmol/L)   Date Value   07/27/2025 25            Recorded by Abi Rahman RT

## 2025-07-27 NOTE — PROGRESS NOTES
[facility]    ECLS Shift Summary:     ECMO Equipment:  Console Serial Number: [id number]  Circuit Lot Number: [id number]  Oxygenator Lot Number: [id number]    Circuit Assessment: Fibrin  Fibrin Location: Connectors    Arterial ECMO Cannula: 17 Fr in the Right Femoral Artery  Venous ECMO Cannula: 25 Fr in the Right Femoral Vein  Distal Perfusion Catheter: 8 Fr in the Right Superficial Femoral Artery    ECMO Cannula Arterial Right femoral artery-Site Assessment: WDL, Sutured, Secure  ECMO Cannula Venous Right femoral vein-Site Assessment: WDL, Sutured, Secure  Distal Perfusion Catheter Right superficial femoral artery-Site Assessment: WDL, Sutured, Secure  ECMO Cannula Arterial Right femoral artery-Site Intervention: Dressing changed/new dressing  ECMO Cannula Venous Right femoral vein-Site Intervention: Dressing changed/new dressing  Distal Perfusion Catheter Right superficial femoral artery-Site Intervention: Dressing changed/new dressing    Patient remains on V-A ECMO, all equipment is functioning and alarms are appropriately set. RPM's: 3301 with Blood Flow (Circuit) LPM  Avg: 3.2 LPM  Min: 2.78 LPM  Max: 3.56 LPM L/min. Sweep is at (S) 1 LPM and 40 %. Extremities are warm and well perfused, NIRS in place.     Significant Shift Events: Dressing changed, ACT goal lowered to 160-180, heparin titrated to achieve goal. Sweep increased to 1 LPM for acidotic post-oxy gas. 1 unit(s) PRBC given for Hgb< 8.    Vent settings:  FiO2 (%): 50 %, Resp: 12, Vent Mode: VC/AC, Resp Rate (Set): 12 breaths/min, Tidal Volume (Set, mL): 400 mL, PEEP (cm H2O): 8 cmH2O, Resp Rate (Set): 12 breaths/min, Tidal Volume (Set, mL): 400 mL, PEEP (cm H2O): 8 cmH2O    Anticoagulation:  Dose (units/hr) HEParin: 1000 Units/hr  Rate (mL/hr) HEParin: 10 mL/hr  Concentration HEParin: 100 Units/mL        Most recent ACT  (seconds): 162 seconds    Urine output is adequate, Cloudy, Yellow / Straw Colored,  Sediment.  Blood loss was minimal. Product given included 1 unit(s) PRBC for Hbg < 8.     Intake/Output Summary (Last 24 hours) at 7/27/2025 0557  Last data filed at 7/27/2025 0500  Gross per 24 hour   Intake 2695.44 ml   Output 1573 ml   Net 1122.44 ml       Labs:  Recent Labs   Lab 07/27/25  0350 07/27/25  0154 07/26/25  2354 07/26/25  2153   PH 7.46* 7.43 7.45 7.44   PCO2 35 37 35 36   PO2 124* 95 85 110*   HCO3 25 25 25 24   O2PER 50  40  50 50 50 50       Lab Results   Component Value Date    HGB 8.1 (L) 07/27/2025    PHGB 30 (H) 07/27/2025     07/27/2025    FIBR 864 (H) 07/27/2025    INR 1.33 (H) 07/27/2025    PTT 49 (H) 07/27/2025    DD 18.47 (H) 07/27/2025    AXA 0.21 05/24/2013    ANTCH 93 07/26/2025       Plan:  Remain on VA ECMO, family plan to transition to comfort care in 1-2 days.    Kendra Gonzalez, RT  ECMO Specialist  7/27/2025 5:57 AM

## 2025-07-27 NOTE — DEATH PRONOUNCEMENT
MD DEATH PRONOUNCEMENT    Called to pronounce Bob Echols dead.    Physical Exam: Unresponsive to noxious stimuli, Spontaneous respirations absent, Breath sounds absent, Carotid pulse absent, and Heart sounds absent    Patient was pronounced dead at 13:39, 2025.    Preliminary Cause of Death: Cardiac arrest (H)       Active Problems:    Cardiac arrest (H)       Infectious disease present?: NO    Communicable disease present? (examples: HIV, chicken pox, TB, Ebola, CJD) :  NO    Multi-drug resistant organism present? (example: MRSA): NO    Please consider an autopsy if any of the following exist:  NO Unexpected or unexplained death during or following any dental, medical, or surgical diagnostic treatment procedures.   NO Death of mother at or up to seven days after delivery.     NO All  and pediatric deaths.     NO Death where the cause is sufficiently obscure to delay completion of the death certificate.   NO Deaths in which autopsy would confirm a suspected illness/condition that would affect surviving family members or recipients of transplanted organs.     The following deaths must be reported to the 's Office:  NO A death that may be due entirely or in part to any factors other than natural disease (recent surgery, recent trauma, suspected abuse/neglect).   NO A death that may be an accident, suicide, or homicide.     NO Any sudden, unexpected death in which there is no prior history of significant heart disease or any other condition associated with sudden death.   NO A death under suspicious, unusual, or unexpected circumstances.    NO Any death which is apparently due to natural causes but in which the  does not have a personal physician familiar with the patient s medical history, social, or environmental situation or the circumstances of the terminal event.   NO Any death apparently due to Sudden Infant Death Syndrome.     NO Deaths that occur during, in association  with, or as consequences of a diagnostic, therapeutic, or anesthetic procedure.   NO Any death in which a fracture of a major bone has occurred within the past (6) six months.   NO A death of persons note seen by their physician within 120 days of demise.     NO Any death in which the  was an inmate of a public institution or was in the custody of Law Enforcement personnel.   NO  All unexpected deaths of children   YES Solid organ donors   NO Unidentified bodies   NO Deaths of persons whose bodies are to be cremated or otherwise disposed of so that the bodies will later be unavailable for examination;   NO Deaths unattended by a physician outside of a licensed healthcare facility or licensed residential hospice program   NO Deaths occurring within 24 hours of arrival to a health care facility if death is unexpected.    NO Deaths associated with the decedent s employment.   NO Deaths attributed to acts of terrorism.   NO Any death in which there is uncertainty as to whether it is a medical examiner s care should be discussed with the medical investigator.        Body disposition: Autopsy was discussed with family member:  Spouse in person.  Permission for autopsy was declined.

## 2025-07-27 NOTE — PROGRESS NOTES
Family here and decided to withdrawal cares. Fentanyl, versed, and robinal given per orders.  Patient was extubated with wife, son, daughter via facetime, and other family at his side.  Patient  at 1339.

## 2025-07-27 NOTE — DISCHARGE SUMMARY
[address] [address]  p: [phone]    Discharge Summary: Cardiology Service    Bob Echols MRN# [id number]   YOB: 1955 Age: 70 year old       Admission Date: 7/18/2025  Discharge Date: 07/27/25      Discharge Diagnoses:  1. VF arrest s/p [facility] ECMO  2. CAD s/p PCI to LCx  3. Diffuse anoxic brain injury  4. Status epilepticus  5. Acute hypoxemic respiratory failure   6. LIBBY  7. Acute blood loss anemia  8. DM2    Consults:  Palliative    Imaging with results:  Echocardiogram 7/22/2025:  [facility] ECMO turndown 3.5 lpm     Baseline  LVEF is 55%  Base of inferuor wall is akinetic  The right ventricle is normal size.  Global right ventricular function is mildly reduced.  Trivial pericardial effusion is present.     [facility] ECMO turndown to 1 lpm  LVEF is 60%  RV size and function are normal    Coronary Angiogram 7/18/25:    Prox LAD to Mid LAD lesion is 55% stenosed.    Prox RCA lesion is 30% stenosed.    1st Diag lesion is 40% stenosed.    2nd Mrg lesion is 70% stenosed.    RPAV lesion is 70% stenosed.    Mid Cx lesion is 100% stenosed.     Pre intervention : new in-stent thrombotic occlusion at site of recently placed 2.75 everolimus eluting Synergy XD stent. NO mechanical cause for the thrombosis was identified. The stent was fully expanded with IVUS guidance to 3.25 mm proximally and 3.0 mm distally with noncompliant balloons     Post intervention : No residual stenosis at stent site. Distal GIOVANI 3 flow. No change in ostial 80% narrowing in small M2    Brief HPI:  Bob Echols is a 70 year old male with a past medical history of HTN, DM2, and DEMOND as well as recent admission for inferior STEMI on 7/10 (treated with percutaneous revascularization with 2.75 x 20mm Promus RENA to the distal LCx). The patient was discharged on 7/14 and had been doing well at home prior to 7/18 when he was found to be apneic without a pulse with estimated  down time ~10-15 min prior to discovery. EMS was called at 1537, arrived and started compressions at 1542, cannulated for [facility] ECMO at 1614. Subsequently found to have subacute in-stent thrombosis of his recently placed stent without obvious mechanical causes, treated with 2.75 x 20mm Synergy XD RENA. Now admitted to [facility] for further care.     Hospital Course by Diagnosis:  1. VF arrest s/p [facility] ECMO  2. CAD s/p PCI to LCx  3. Diffuse anoxic brain injury  4. Status epilepticus  5. Acute hypoxemic respiratory failure   6. LIBBY  7. Acute blood loss anemia  8. DM2   Patient was admitted and supported with [facility] ECMO. Patient began to shows of cardiac recovery, but neurologic exam and c/f prolonged downtime were concerning for anoxic brain injury. CT head showed diffuse loss of gray-white differentation c/w anoxic brain injury. Patient continued to have grim neurologic prognosis and care conference was held with family. Given grim overall prognosis, family elected to transition to comfort care. Life sustaining treatment was withdrawn and patient  1339 on 2025.      Condition on discharge  Temp:  [97.2  F (36.2  C)-98.1  F (36.7  C)] 97.9  F (36.6  C)  Pulse:  [] 100  Resp:  [11-29] 14  MAP:  [66 mmHg-99 mmHg] 83 mmHg  Arterial Line BP: (109-147)/(53-83) 127/69  FiO2 (%):  [40 %-50 %] 50 %  SpO2:  [90 %-100 %] 99 %  Physical Exam: Unresponsive to noxious stimuli, Spontaneous respirations absent, Breath sounds absent, Carotid pulse absent, and Heart sounds absent    Discharge medications:   Current Discharge Medication List        CONTINUE these medications which have NOT CHANGED    Details   amLODIPine (NORVASC) 10 MG tablet Take 10 mg by mouth daily      apixaban ANTICOAGULANT (ELIQUIS) 5 MG tablet Take 1 tablet (5 mg) by mouth 2 times daily.  Qty: 60 tablet, Refills: 3    Comments: Please fill FREE 30 day supply.  Associated Diagnoses: PAF (paroxysmal atrial fibrillation) (H)      aspirin 81 MG EC tablet Take 1  tablet (81 mg) by mouth daily. Stop after 7 days.  Qty: 7 tablet, Refills: 0    Associated Diagnoses: ST elevation myocardial infarction (STEMI), unspecified artery (H)      atorvastatin (LIPITOR) 80 MG tablet Take 1 tablet (80 mg) by mouth daily.  Qty: 90 tablet, Refills: 3    Associated Diagnoses: ST elevation myocardial infarction (STEMI), unspecified artery (H)      clopidogrel (PLAVIX) 75 MG tablet Take 1 tablet (75 mg) by mouth daily. For heart stent.  Qty: 90 tablet, Refills: 3    Associated Diagnoses: ST elevation myocardial infarction (STEMI) involving other coronary artery (H)      colchicine (COLCRYS) 0.6 MG tablet Take 1 tablet (0.6 mg) by mouth 2 times daily.  Qty: 180 tablet, Refills: 0    Associated Diagnoses: ST elevation myocardial infarction (STEMI) involving other coronary artery (H)      empagliflozin (JARDIANCE) 10 MG TABS tablet Take 1 tablet (10 mg) by mouth daily.  Qty: 90 tablet, Refills: 1    Associated Diagnoses: ST elevation myocardial infarction (STEMI) involving other coronary artery (H)      gabapentin (NEURONTIN) 300 MG capsule Take 3 capsules (900 mg) by mouth 3 times daily.  Qty: 90 capsule, Refills: 0    Associated Diagnoses: Diabetic polyneuropathy associated with type 2 diabetes mellitus (H)      insulin aspart (NOVOLOG FLEXPEN) 100 UNIT/ML pen Inject 12 Units subcutaneously 3 times daily (with meals).      insulin glargine (LANTUS PEN) 100 UNIT/ML pen Inject 24 Units subcutaneously at bedtime.      metoprolol tartrate (LOPRESSOR) 25 MG tablet Take 1 tablet (25 mg) by mouth 2 times daily.  Qty: 60 tablet, Refills: 0    Comments: Future refills by PCP [facility] with phone number [phone].  Associated Diagnoses: ST elevation myocardial infarction (STEMI) involving other coronary artery (H)      nitroGLYcerin (NITROSTAT) 0.4 MG sublingual tablet For chest pain place 1 tablet under the tongue every 5 minutes for 3 doses. If symptoms persist 5 minutes  after 1st dose call 911.  Qty: 15 tablet, Refills: 1    Associated Diagnoses: ST elevation myocardial infarction (STEMI) involving other coronary artery (H)      torsemide (DEMADEX) 20 MG tablet Take 1 tablet (20 mg) by mouth daily.  Qty: 30 tablet, Refills: 0    Comments: Future refills by PCP [facility] with phone number [phone].  Associated Diagnoses: Acute diastolic congestive heart failure (H)             Dilan Lozada MD  Cardiovascular Diseases Fellow

## 2025-07-30 LAB
7AMINOCLONAZEPAM SERPL-MCNC: NEGATIVE NG/ML
ALPRAZ SERPL-MCNC: NEGATIVE NG/ML
BENZODIAZ SPEC QL: POSITIVE
CHLORDIAZEP SERPL-MCNC: NEGATIVE NG/ML
CLONAZEPAM SERPL-MCNC: NEGATIVE NG/ML
DESALKYLFLURAZ SERPL CFM-MCNC: NEGATIVE NG/ML
DIAZEPAM SERPL-MCNC: NEGATIVE NG/ML
FLURAZEPAM SPEC-MCNC: NEGATIVE NG/ML
GABAPENTIN SERPLBLD QL SCN: POSITIVE
GABAPENTIN SERPLBLD-MCNC: 6.2 UG/ML
LORAZEPAM SERPL-MCNC: NEGATIVE NG/ML
MIDAZOLAM SERPL-MCNC: 52.4 NG/ML
NORCHLORDIAZEP SERPL-MCNC: NEGATIVE UG/ML
NORDIAZEPAM SPEC-MCNC: NEGATIVE NG/ML
OXAZEPAM SERPL CFM-MCNC: NEGATIVE NG/ML
TEMAZEPAM SERPL-MCNC: NEGATIVE NG/ML
TRIAZOLAM SPEC-MCNC: NEGATIVE NG/ML

## 2025-07-30 NOTE — PROGRESS NOTES
Addendum to Discharge Summary dated 7/27/2025    # R Lower Lip, mucosal, hospital acquired - pressure injury  Due to trauma related to ETT placement.   Drugs/Tx:  R lower lip wound: Rotate/reposition and secure ET tub    Sidney Lozada MD

## 2025-07-31 LAB
AMPHET BLD CFM-MCNC: NEGATIVE NG/ML
APAP BLD-MCNC: NEGATIVE UG/ML
BARBITURATES SPEC-MCNC: NEGATIVE UG/ML
BENZODIAZ SPEC-MCNC: ABNORMAL NG/ML
BUPRENORPHINE SERPL-MCNC: NEGATIVE NG/ML
BZE BLD CFM-MCNC: NEGATIVE NG/ML
CARBOXYTHC BLD-MCNC: NEGATIVE NG/ML
CARISOPRODOL IA: NEGATIVE UG/ML
DECLARED MEDICATIONS: ABNORMAL
DRUGS BLD SCN NOM: ABNORMAL
DRUGS FLD: ABNORMAL
ETHANOL BLD-MCNC: NEGATIVE GM/DL
FENTANYL IA: NEGATIVE NG/ML
GABAPENTIN IA: ABNORMAL UG/ML
MEPERIDINE SERPLBLD-MCNC: NEGATIVE NG/ML
METHADONE SAL CFM-MCNC: NEGATIVE NG/ML
OPIATES SPEC-MCNC: NEGATIVE NG/ML
OXYCODONE SERPLBLD SCN-MCNC: NEGATIVE NG/ML
PCP SPEC-MCNC: NEGATIVE NG/ML
PROPOXYPH SPEC-MCNC: NEGATIVE NG/ML
TRAMADOL BLD-MCNC: NEGATIVE NG/ML

## (undated) DEVICE — WIRE GUIDE 0.035"X260CM SAFE-T-J EXCHANGE G00517

## (undated) DEVICE — TOTE ANGIO CORP PC15AT SAN32CC83O

## (undated) DEVICE — KIT HAND CONTROL ANGIOTOUCH ACIST 65CM AT-P65

## (undated) DEVICE — CATH ANGIO INFINITI PIGTAIL 145 6 SH 6FRX110CM  534-652S

## (undated) DEVICE — CATH DIAGNOSTIC RADIAL 5FR TIG 4.0

## (undated) DEVICE — RAD INTRODUCER KIT MICRO 5FRX10CM .018 NITINOL G/W

## (undated) DEVICE — DEFIB PRO-PADZ LVP LQD GEL ADULT 8900-2105-01

## (undated) DEVICE — CATH EAGLE EYE PLAT IVUS SHRT

## (undated) DEVICE — Device

## (undated) DEVICE — GUIDEWIRE VASC 0.014INX180CM RUNTHROUGH 25-1011

## (undated) DEVICE — INFL DVC BASIXCOMPAK PLYCRB 30 ATM 13IN 20ML IN4530

## (undated) DEVICE — CATH ANGIO JUDKINS R4 6FRX100CM INFINITI 534621T

## (undated) DEVICE — CATH ANGIO JUDKINS JL4 6FRX100CM INFINITI 534620T

## (undated) DEVICE — CATH GD VISTA BRITE BLU YLW 100CM 6FR XB3.5 6700540E

## (undated) DEVICE — INTRO GLIDESHEATH SLENDER 6FR 10X45CM 60-1060

## (undated) DEVICE — CATH BALLOON EMERGE 2.5X12MM H7493918912250

## (undated) DEVICE — MANIFOLD KIT ANGIO AUTOMATED 014613

## (undated) DEVICE — SLEEVE TR BAND RADIAL COMPRESSION DEVICE 29CM XX-RF06L

## (undated) DEVICE — INTRODUCER SHEATH FAST-CATH 6FRX12CM 406103

## (undated) DEVICE — VALVE HEMOSTASIS .096" COPILOT MECH 1003331

## (undated) DEVICE — CATH EP PACEL 5FRX110CM 1MM RIGHT HEART CURVE 401763

## (undated) DEVICE — CATH ANGIO INFINITI 3DRC 6FRX100CM 534676T

## (undated) DEVICE — CATH GD VISTA BRITE BLU YLW 100CM 6FR XB3 67005200M

## (undated) DEVICE — CATH BALLOON EMERGE 3.0X12MM H7493918912300

## (undated) DEVICE — INTRODUCER SHEATH GREEN 6.5FRX11CM .038IN PSI-6F-11-038ACT

## (undated) RX ORDER — FENTANYL CITRATE-0.9 % NACL/PF 10 MCG/ML
PLASTIC BAG, INJECTION (ML) INTRAVENOUS
Status: DISPENSED
Start: 2025-07-10

## (undated) RX ORDER — LIDOCAINE HYDROCHLORIDE 10 MG/ML
INJECTION, SOLUTION EPIDURAL; INFILTRATION; INTRACAUDAL; PERINEURAL
Status: DISPENSED
Start: 2025-07-10

## (undated) RX ORDER — NITROGLYCERIN 5 MG/ML
VIAL (ML) INTRAVENOUS
Status: DISPENSED
Start: 2025-07-10

## (undated) RX ORDER — VERAPAMIL HYDROCHLORIDE 2.5 MG/ML
INJECTION INTRAVENOUS
Status: DISPENSED
Start: 2025-07-10

## (undated) RX ORDER — FENTANYL CITRATE 50 UG/ML
INJECTION, SOLUTION INTRAMUSCULAR; INTRAVENOUS
Status: DISPENSED
Start: 2025-07-10

## (undated) RX ORDER — HEPARIN SODIUM 1000 [USP'U]/ML
INJECTION, SOLUTION INTRAVENOUS; SUBCUTANEOUS
Status: DISPENSED
Start: 2025-07-10

## (undated) RX ORDER — ATROPINE SULFATE 0.1 MG/ML
INJECTION INTRAVENOUS
Status: DISPENSED
Start: 2025-07-10

## (undated) RX ORDER — FENTANYL CITRATE 50 UG/ML
INJECTION, SOLUTION INTRAMUSCULAR; INTRAVENOUS
Status: DISPENSED
Start: 2025-07-18

## (undated) RX ORDER — TICAGRELOR 90 MG/1
TABLET, FILM COATED ORAL
Status: DISPENSED
Start: 2025-07-10

## (undated) RX ORDER — FUROSEMIDE 10 MG/ML
INJECTION INTRAMUSCULAR; INTRAVENOUS
Status: DISPENSED
Start: 2025-07-10

## (undated) RX ORDER — HEPARIN SODIUM 200 [USP'U]/100ML
INJECTION, SOLUTION INTRAVENOUS
Status: DISPENSED
Start: 2025-07-10